# Patient Record
Sex: FEMALE | Race: WHITE | NOT HISPANIC OR LATINO | Employment: OTHER | URBAN - METROPOLITAN AREA
[De-identification: names, ages, dates, MRNs, and addresses within clinical notes are randomized per-mention and may not be internally consistent; named-entity substitution may affect disease eponyms.]

---

## 2017-01-02 ENCOUNTER — NURSE TRIAGE (OUTPATIENT)
Dept: ADMINISTRATIVE | Facility: CLINIC | Age: 72
End: 2017-01-02

## 2017-01-02 NOTE — TELEPHONE ENCOUNTER
"  Reason for Disposition   [1] MILD or MODERATE vomiting AND [2] present > 48 hours (2 days) (Exception: mild vomiting with associated diarrhea)    Answer Assessment - Initial Assessment Questions  1. VOMITING SEVERITY: "How many times have you vomited in the past 24 hours?"      - MILD:  1 - 2 times/day     - MODERATE: 3 - 5 times/day, decreased oral intake without significant weight loss or symptoms of dehydration     - SEVERE: 6 or more times/day, vomits everything or nearly everything, with significant weight loss, symptoms of dehydration       Mild x 2 today  2. ONSET: "When did the vomiting begin?"       Has had nausea for several days   3. FLUIDS: "What fluids or food have you vomited up today?" "Have you been able to keep any fluids down?"      Has been drinking tea today  4. ABDOMINAL PAIN: "Are your having any abdominal pain?" If yes : "How bad is it and what does it feel like?" (e.g., crampy, dull, intermittent, constant)       Intermittent right side abdominal pain  5. DIARRHEA: "Is there any diarrhea?" If so, ask: "How many times today?"       X 4 small amounts today- has taken an otc pill for diarrhea and thinks it is better now  6. CONTACTS: "Is there anyone else in the family with the same symptoms?"       no  7. CAUSE: "What do you think is causing your vomiting?"      Thinks it is related to medicine she was given in ER. Seen 12/28 and 12/30  8. HYDRATION STATUS: "Any signs of dehydration?" (e.g., dry mouth [not only dry lips], too weak to stand) "When did you last urinate?"      Lips dry- has been urinating today, smaller amounts but clear  9. OTHER SYMPTOMS: "Do you have any other symptoms?" (e.g., fever, headache, vertigo, vomiting blood or coffee grounds)      Headache she has had when seen in ER  10. PREGNANCY: "Is there any chance you are pregnant?" "When was your last menstrual period?"        n/a    Protocols used: ST VOMITING-A-    "

## 2017-01-03 ENCOUNTER — TELEPHONE (OUTPATIENT)
Dept: INTERNAL MEDICINE | Facility: CLINIC | Age: 72
End: 2017-01-03

## 2017-01-03 NOTE — TELEPHONE ENCOUNTER
----- Message from Belén Pyle sent at 1/3/2017  2:29 PM CST -----  Contact: self [phone]  RX request - refill or new RX.  Is this a refill or new RX:  New   RX name and strength: diphenhydramine atropine 2.5 mg  Directions: Take 1 tablet by mouth 4 times a day   Is this a 30 day or 90 day RX:  30 day  Pharmacy name and phone #: [email or web address]  [phone] (Phone)  [phone] (Fax)  Comments:

## 2017-01-10 ENCOUNTER — TELEPHONE (OUTPATIENT)
Dept: INTERNAL MEDICINE | Facility: CLINIC | Age: 72
End: 2017-01-10

## 2017-01-10 NOTE — TELEPHONE ENCOUNTER
Spoke to pt and was able to get her in on 1/30/17 for a f/u.  patient stated that she has had diarrhea since 12/28 and had a few pill left from last year of imodium and would like a rx sent to pharmacy. I suggest she come in and see some on to address the diarrhea she refused appt stated that she was tired and she just want to sleep

## 2017-01-10 NOTE — TELEPHONE ENCOUNTER
Please informed patient that Imodium is available over-the-counter.  How many stools is patient having a day?  Is she having diarrhea daily?

## 2017-01-10 NOTE — TELEPHONE ENCOUNTER
----- Message from Aden Panchal sent at 1/9/2017  2:43 PM CST -----  Contact: Self [phone]  Type: Sooner appointment than  is able to schedule    When is the first available appointment? 02/20/2017    What is the nature of the appointment? ED f/u for Seizures and med refill    What appointment type: EP    Comments:Please advice    Thanks

## 2017-01-20 ENCOUNTER — OFFICE VISIT (OUTPATIENT)
Dept: PHYSICAL MEDICINE AND REHAB | Facility: CLINIC | Age: 72
End: 2017-01-20
Payer: MEDICARE

## 2017-01-20 ENCOUNTER — HOSPITAL ENCOUNTER (OUTPATIENT)
Dept: RADIOLOGY | Facility: HOSPITAL | Age: 72
Discharge: HOME | End: 2017-01-20
Attending: PHYSICAL MEDICINE & REHABILITATION
Payer: MEDICARE

## 2017-01-20 VITALS
DIASTOLIC BLOOD PRESSURE: 111 MMHG | HEART RATE: 78 BPM | WEIGHT: 186.06 LBS | BODY MASS INDEX: 31.77 KG/M2 | SYSTOLIC BLOOD PRESSURE: 178 MMHG | HEIGHT: 64 IN

## 2017-01-20 DIAGNOSIS — S13.4XXS WHIPLASH INJURY TO NECK, SEQUELA: ICD-10-CM

## 2017-01-20 DIAGNOSIS — M54.12 RADICULOPATHY OF CERVICAL SPINE: ICD-10-CM

## 2017-01-20 DIAGNOSIS — G89.29 CHRONIC BILATERAL LOW BACK PAIN WITHOUT SCIATICA: ICD-10-CM

## 2017-01-20 DIAGNOSIS — R20.2 NUMBNESS AND TINGLING: ICD-10-CM

## 2017-01-20 DIAGNOSIS — M54.2 NECK PAIN: ICD-10-CM

## 2017-01-20 DIAGNOSIS — R20.0 NUMBNESS AND TINGLING: ICD-10-CM

## 2017-01-20 DIAGNOSIS — R56.9 SEIZURE: Primary | ICD-10-CM

## 2017-01-20 DIAGNOSIS — R20.0 LEFT FACIAL NUMBNESS: ICD-10-CM

## 2017-01-20 DIAGNOSIS — M54.50 CHRONIC BILATERAL LOW BACK PAIN WITHOUT SCIATICA: ICD-10-CM

## 2017-01-20 DIAGNOSIS — M47.22 OSTEOARTHRITIS OF SPINE WITH RADICULOPATHY, CERVICAL REGION: ICD-10-CM

## 2017-01-20 PROBLEM — S13.4XXA WHIPLASH INJURY TO NECK: Status: ACTIVE | Noted: 2017-01-20

## 2017-01-20 PROCEDURE — 99499 UNLISTED E&M SERVICE: CPT | Mod: S$GLB,,, | Performed by: PHYSICAL MEDICINE & REHABILITATION

## 2017-01-20 PROCEDURE — 1157F ADVNC CARE PLAN IN RCRD: CPT | Mod: S$GLB,,, | Performed by: PHYSICAL MEDICINE & REHABILITATION

## 2017-01-20 PROCEDURE — 99999 PR PBB SHADOW E&M-EST. PATIENT-LVL III: CPT | Mod: PBBFAC,,, | Performed by: PHYSICAL MEDICINE & REHABILITATION

## 2017-01-20 PROCEDURE — 3080F DIAST BP >= 90 MM HG: CPT | Mod: S$GLB,,, | Performed by: PHYSICAL MEDICINE & REHABILITATION

## 2017-01-20 PROCEDURE — 1159F MED LIST DOCD IN RCRD: CPT | Mod: S$GLB,,, | Performed by: PHYSICAL MEDICINE & REHABILITATION

## 2017-01-20 PROCEDURE — 72114 X-RAY EXAM L-S SPINE BENDING: CPT | Mod: TC

## 2017-01-20 PROCEDURE — 72114 X-RAY EXAM L-S SPINE BENDING: CPT | Mod: 26,,, | Performed by: RADIOLOGY

## 2017-01-20 PROCEDURE — 1160F RVW MEDS BY RX/DR IN RCRD: CPT | Mod: S$GLB,,, | Performed by: PHYSICAL MEDICINE & REHABILITATION

## 2017-01-20 PROCEDURE — 3077F SYST BP >= 140 MM HG: CPT | Mod: S$GLB,,, | Performed by: PHYSICAL MEDICINE & REHABILITATION

## 2017-01-20 PROCEDURE — 99214 OFFICE O/P EST MOD 30 MIN: CPT | Mod: S$GLB,,, | Performed by: PHYSICAL MEDICINE & REHABILITATION

## 2017-01-20 PROCEDURE — 1125F AMNT PAIN NOTED PAIN PRSNT: CPT | Mod: S$GLB,,, | Performed by: PHYSICAL MEDICINE & REHABILITATION
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
## 2017-01-20 NOTE — LETTER
January 23, 2017      Luanne Connell MD  [address]  [place] [address]           [facility]  [address]  [facility] [address]  Phone: [phone]          Patient: Sofia Augustine   MR Number: [id number]   YOB: 1945   Date of Visit: 1/20/2017       Dear Dr. Luanne Connell:    Thank you for referring Sofia Augustine to me for evaluation. Attached you will find relevant portions of my assessment and plan of care.    If you have questions, please do not hesitate to call me. I look forward to following Sofia Augustine along with you.    Sincerely,    Kym Mckeon MD    Enclosure  CC:  No Recipients    If you would like to receive this communication electronically, please contact [email or web address] or [phone] to request more information on [facility] [employer] Link access.    For providers and/or their staff who would like to refer a patient to [facility], please contact us through our one-stop-shop provider referral line, [facility], at [phone].    If you feel you have received this communication in error or would no longer like to receive these types of communications, please e-mail [email or web address]
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
## 2017-01-28 ENCOUNTER — NURSE TRIAGE (OUTPATIENT)
Dept: ADMINISTRATIVE | Facility: CLINIC | Age: 72
End: 2017-01-28

## 2017-01-28 ENCOUNTER — HOSPITAL ENCOUNTER (EMERGENCY)
Facility: HOSPITAL | Age: 72
Discharge: HOME | End: 2017-01-28
Attending: EMERGENCY MEDICINE
Payer: MEDICARE

## 2017-01-28 VITALS
OXYGEN SATURATION: 96 % | HEIGHT: 64 IN | HEART RATE: 71 BPM | DIASTOLIC BLOOD PRESSURE: 77 MMHG | BODY MASS INDEX: 31.58 KG/M2 | SYSTOLIC BLOOD PRESSURE: 158 MMHG | TEMPERATURE: 98 F | WEIGHT: 185 LBS | RESPIRATION RATE: 18 BRPM

## 2017-01-28 DIAGNOSIS — S29.011A CHEST WALL MUSCLE STRAIN, INITIAL ENCOUNTER: Primary | ICD-10-CM

## 2017-01-28 LAB
ALBUMIN SERPL BCP-MCNC: 3.9 G/DL
ALP SERPL-CCNC: 105 U/L
ALT SERPL W/O P-5'-P-CCNC: 13 U/L
ANION GAP SERPL CALC-SCNC: 8 MMOL/L
APTT BLDCRRT: 25.5 SEC
AST SERPL-CCNC: 15 U/L
BASOPHILS # BLD AUTO: 0.02 K/UL
BASOPHILS NFR BLD: 0.5 %
BILIRUB SERPL-MCNC: 0.4 MG/DL
BNP SERPL-MCNC: 33 PG/ML
BUN SERPL-MCNC: 14 MG/DL
CALCIUM SERPL-MCNC: 9 MG/DL
CHLORIDE SERPL-SCNC: 105 MMOL/L
CO2 SERPL-SCNC: 27 MMOL/L
CREAT SERPL-MCNC: 0.9 MG/DL
DIFFERENTIAL METHOD: NORMAL
EOSINOPHIL # BLD AUTO: 0.1 K/UL
EOSINOPHIL NFR BLD: 3.4 %
ERYTHROCYTE [DISTWIDTH] IN BLOOD BY AUTOMATED COUNT: 13.1 %
EST. GFR  (AFRICAN AMERICAN): >60 ML/MIN/1.73 M^2
EST. GFR  (NON AFRICAN AMERICAN): >60 ML/MIN/1.73 M^2
GLUCOSE SERPL-MCNC: 101 MG/DL
HCT VFR BLD AUTO: 40.5 %
HGB BLD-MCNC: 13.6 G/DL
INR PPP: 1.1
LYMPHOCYTES # BLD AUTO: 1.4 K/UL
LYMPHOCYTES NFR BLD: 34.5 %
MCH RBC QN AUTO: 30.8 PG
MCHC RBC AUTO-ENTMCNC: 33.6 %
MCV RBC AUTO: 92 FL
MONOCYTES # BLD AUTO: 0.5 K/UL
MONOCYTES NFR BLD: 11.5 %
NEUTROPHILS # BLD AUTO: 2.1 K/UL
NEUTROPHILS NFR BLD: 50.1 %
PLATELET # BLD AUTO: 190 K/UL
PMV BLD AUTO: 9.7 FL
POTASSIUM SERPL-SCNC: 4.1 MMOL/L
PROT SERPL-MCNC: 7 G/DL
PROTHROMBIN TIME: 11.3 SEC
RBC # BLD AUTO: 4.42 M/UL
SODIUM SERPL-SCNC: 140 MMOL/L
TROPONIN I SERPL DL<=0.01 NG/ML-MCNC: 0.06 NG/ML
TROPONIN I SERPL DL<=0.01 NG/ML-MCNC: 0.06 NG/ML
WBC # BLD AUTO: 4.17 K/UL

## 2017-01-28 PROCEDURE — 83880 ASSAY OF NATRIURETIC PEPTIDE: CPT

## 2017-01-28 PROCEDURE — 85730 THROMBOPLASTIN TIME PARTIAL: CPT

## 2017-01-28 PROCEDURE — 85025 COMPLETE CBC W/AUTO DIFF WBC: CPT

## 2017-01-28 PROCEDURE — 93005 ELECTROCARDIOGRAM TRACING: CPT

## 2017-01-28 PROCEDURE — 85610 PROTHROMBIN TIME: CPT

## 2017-01-28 PROCEDURE — 99284 EMERGENCY DEPT VISIT MOD MDM: CPT | Mod: 25

## 2017-01-28 PROCEDURE — 80053 COMPREHEN METABOLIC PANEL: CPT

## 2017-01-28 PROCEDURE — 96374 THER/PROPH/DIAG INJ IV PUSH: CPT

## 2017-01-28 PROCEDURE — 63600175 PHARM REV CODE 636 W HCPCS: Performed by: EMERGENCY MEDICINE

## 2017-01-28 PROCEDURE — 84484 ASSAY OF TROPONIN QUANT: CPT | Mod: 91

## 2017-01-28 RX ORDER — METHOCARBAMOL 500 MG/1
1000 TABLET, FILM COATED ORAL 3 TIMES DAILY
Qty: 30 TABLET | Refills: 0 | Status: SHIPPED | OUTPATIENT
Start: 2017-01-28 | End: 2017-02-02

## 2017-01-28 RX ORDER — KETOROLAC TROMETHAMINE 30 MG/ML
15 INJECTION, SOLUTION INTRAMUSCULAR; INTRAVENOUS
Status: COMPLETED | OUTPATIENT
Start: 2017-01-28 | End: 2017-01-28

## 2017-01-28 RX ORDER — DIAZEPAM 10 MG/2ML
2 INJECTION INTRAMUSCULAR
Status: DISCONTINUED | OUTPATIENT
Start: 2017-01-28 | End: 2017-01-28

## 2017-01-28 RX ADMIN — KETOROLAC TROMETHAMINE 15 MG: 30 INJECTION, SOLUTION INTRAMUSCULAR at 05:01

## 2017-01-28 NOTE — ED AVS SNAPSHOT
[facility] [address]  [place] [address]               Sofia Augustine   2017  3:36 PM   ED    Description:  Female : 1945   Department:  [facility]           Your Care was Coordinated By:     Provider Role From To    Sugey Venegas MD Attending Provider [id number] --      Reason for Visit     Chest Pain           Diagnoses this Visit        Comments    Chest wall muscle strain, initial encounter    -  Primary       ED Disposition     None           To Do List           Follow-up Information     Follow up with Luanne Connell MD In 2 days.    Specialty:  Internal Medicine    Contact information:     [facility]  [place] [address]  [phone]         These Medications        Disp Refills Start End    methocarbamol (ROBAXIN) 500 MG Tab 30 tablet 0 2017    Take 2 tablets (1,000 mg total) by mouth 3 (three) times daily. - Oral    Pharmacy: [facility] [address]  [place] - [address] AT [facility] OF [facility] [address] Ph #: [phone]         [facility]     [facility] -  Assistance  Registered nurses in the [facility] provide clinical advisement, health education, appointment booking, and other advisory services.  Call for this free service at [phone].             Medications           Message regarding Medications     Verify the changes and/or additions to your medication regime listed below are the same as discussed with your clinician today.  If any of these changes or additions are incorrect, please notify your healthcare provider.        START taking these NEW medications        Refills    methocarbamol (ROBAXIN) 500 MG Tab 0    Sig: Take 2 tablets (1,000 mg total) by mouth 3 (three) times daily.    Class: Print    Route: Oral      These medications were administered today        Dose Freq    ketorolac injection 15 mg 15 mg ED  "1 Time    Sig: Inject 15 mg into the vein ED 1 Time.    Class: Normal    Route: Intravenous           Verify that the below list of medications is an accurate representation of the medications you are currently taking.  If none reported, the list may be blank. If incorrect, please contact your healthcare provider. Carry this list with you in case of emergency.           Current Medications     aspirin 81 MG Chew Take 81 mg by mouth once daily.    atorvastatin (LIPITOR) 40 MG tablet Take 1 tablet (40 mg total) by mouth once daily.    carbamazepine (TEGRETOL) 200 mg tablet Take 200 mg nightly for one week then take 200 mg TWICE DAILY thereafter    chlorthalidone (HYGROTEN) 25 MG Tab Take 1 tablet (25 mg total) by mouth once daily.    gabapentin (NEURONTIN) 300 MG capsule Take one cap at bedtime for one week, than increase to 2 caps at bedtime at bedtime, if tolerated.  Do not stop abruptly.    hydrocodone-acetaminophen 5-325mg (NORCO) 5-325 mg per tablet Take 1 tablet by mouth every 8 (eight) hours as needed for Pain.    levocetirizine (XYZAL) 5 MG tablet Take 1 tablet (5 mg total) by mouth every evening.    lidocaine (LIDODERM) 5 % Apply patch to affected area for 12 hours, then remove for 12 hours.    lisinopril 10 MG tablet Take 1 tablet (10 mg total) by mouth once daily.    meloxicam (MOBIC) 15 MG tablet TAKE 1 TABLET(15 MG) BY MOUTH EVERY DAY    methocarbamol (ROBAXIN) 500 MG Tab Take 2 tablets (1,000 mg total) by mouth 3 (three) times daily.    ondansetron (ZOFRAN-ODT) 4 MG TbDL Take 1 tablet (4 mg total) by mouth every 8 (eight) hours as needed.    promethazine (PHENERGAN) 25 MG tablet Take 1 tablet (25 mg total) by mouth every 6 (six) hours as needed for Nausea.           Clinical Reference Information           Your Vitals Were     BP Pulse Temp Resp Height Weight    148/83 66 98.2 °F (36.8 °C) (Oral) 13 5' 4" (1.626 m) 83.9 kg (185 lb)    SpO2 BMI             98% 31.76 kg/m2         Allergies as of 1/28/2017 "     No Known Allergies      Immunizations Administered on Date of Encounter - 1/28/2017     None      ED Micro, Lab, POCT     Start Ordered       Status Ordering Provider    01/28/17 1538 01/28/17 1538  CBC auto differential  STAT      Final result     01/28/17 1538 01/28/17 1538  Comprehensive metabolic panel  STAT      Final result     01/28/17 1538 01/28/17 1538  Protime-INR  STAT      Final result     01/28/17 1538 01/28/17 1538  Troponin I  Now then every 3 hours     Comments:  PLEASE REVIEW ORDER START TIME BEFORE MARKING SPECIMEN COLLECTED.   Start Status   01/28/17 1538 Final result   01/28/17 1838 In process       Acknowledged     01/28/17 1538 01/28/17 1538  B-Type natriuretic peptide (BNP)  STAT      Final result     01/28/17 1538 01/28/17 1538  APTT  STAT      Final result     01/28/17 1538 01/28/17 1538    STAT,   Status:  Canceled      Canceled       ED Imaging Orders     Start Ordered       Status Ordering Provider    01/28/17 1538 01/28/17 1538  X-Ray Chest PA And Lateral  1 time imaging      Final result         Discharge Instructions         FOLLOW UP WITH PRIMARY CARE ON Monday  RETURN TO ED IF SYMPTOMS WORSEN    Chest Strain    You have a chest strain. This happens when the muscles between the ribs stretch and tear. This may occur when you have a severe cough. It may also happen after strenuous lifting or twisting injuries of the upper back.  A chest strain usually causes pain when you move or take a deep breath. The strain may take a few days to a few weeks to heal.  Home care  Follow these guidelines when caring for yourself at home:  · Rest. Dont do any heavy lifting or strenuous activity. Dont do any activity that causes pain.  · If you have a severe cough, use a cough syrup with dextromethorphan, unless another cough medicine was prescribed. If you have high blood pressure, check with your health care provider or pharmacist before using an over-the-counter cough medicine.  · You may use  acetaminophen or ibuprofen to control pain, unless another medicine was prescribed. If you have chronic liver or kidney disease, talk with your provider before using these medicines. Also talk with your provider if youve had a stomach ulcer or GI bleeding.  Follow-up care  Follow up with your health care provider, or as advised.  When to seek medical advice  Call your health care provider right away if any of these occur:  · A change in the type of pain. This means if it feels different, gets worse, lasts longer, or begins to spread into your shoulder, arm, neck, jaw, or back.  · Pain doesnt go away in 1 week  · Shortness of breath, difficulty breathing, or fast breathing  · Pain gets worse when you breathe  · Cough with dark-colored sputum (phlegm) or blood  · Weakness, dizziness, or fainting  · Fever of 101ºF (38.3ºC) or higher, or as directed by your health care provider   © 0042-6315 "[employer]". [address] [place] [address]. All rights reserved. This information is not intended as a substitute for professional medical care. Always follow your healthcare professional's instructions.          Your Scheduled Appointments     Jan 30, 2017 11:00 AM [facility]   Hospital Follow Up with Luanne Connell MD   [facility] ([place])    [address] [place] [address]   [phone]            Mar 07, 2017  9:40 AM CST   Consult with MD Jim Torres - Neurology ([facility] )    [address]  [place] [address] [phone]            Mar 21, 2017  9:00 AM CDT   Established Patient Visit with MD Jim Page [facility]-Physical Med & Rehab ([facility] )    [address]  [place] [address] [phone]              [facility] Sign-Up     Activating your [facility] account is as easy as 1-2-3!     1) Visit [email or web address], select Sign Up Now, enter this activation code and your date of birth, then select  Next.  [email or web address]  Expires: 3/14/2017  6:51 PM      2) Create a username and password to use when you visit [facility] in the future and select a security question in case you lose your password and select Next.    3) Enter your e-mail address and click Sign Up!    Additional Information  If you have questions, please e-mail [employer] [email or web address] or call [phone] to talk to our [employer] staff. Remember, [facility] is NOT to be used for urgent needs. For medical emergencies, dial 911.          [facility] complies with applicable Federal civil rights laws and does not discriminate on the basis of race, color, national origin, age, disability, or sex.        Language Assistance Services     ATTENTION: Language assistance services are available, free of charge. Please call [phone].      ATENCIÓN: Si habla ruddyañol, tiene a rodriguez disposición servicios gratuitos de asistencia lingüística. Llame al [phone].     DOMONIQUE Ý: N?u b?n nói Ti?ng Vi?t, có các d?ch v? h? tr? ngôn ng? mi?n phí dành cho b?n. G?i s? [phone].

## 2017-01-28 NOTE — ED PROVIDER NOTES
Encounter Date: 1/28/2017       History     Chief Complaint   Patient presents with    Chest Pain     radiates to the back since Friday morning. +nausea, lightheadedness     Review of patient's allergies indicates:  No Known Allergies  HPI Comments: 72 Y/O WF PRESENTS TO ED WITH RIGHT SIDED CHEST PAIN THAT HAS BEEN ONGOING SINCE Friday NIGHT.  SHE REPORTS THAT SHE HAD A SEIZURE AT HOME AND MUST HAVE HIT THE RIGHT SIDE OF HER CHEST WHEN SHE DID.  PAIN IS REPRODUCED WITH PALPATION AND WORSENED WITH MOVEMENT.  NO SOB.  NO N/V/DIAPHORESIS.  NO FEVER/CHILLS.  NO COUGH.  NO CHEST PRESSURE.  NO RELIEVING FACTORS.  NO NEW MEDS.      The history is provided by the patient. No  was used.     Past Medical History   Diagnosis Date    Abnormal mammogram of both breasts     Hypertension     Memory disorder     Seizures      Past Medical History Pertinent Negatives   Diagnosis Date Noted    Anticoagulant long-term use 10/12/2016    Asthma 10/12/2016    CHF (congestive heart failure) 10/12/2016    COPD (chronic obstructive pulmonary disease) 10/12/2016    Coronary artery disease 10/12/2016    Diabetes mellitus 10/26/2015    Encounter for blood transfusion 10/12/2016     Past Surgical History   Procedure Laterality Date    Cataract extraction      Cysts breast       History reviewed. No pertinent family history.  Social History   Substance Use Topics    Smoking status: Never Smoker    Smokeless tobacco: Never Used    Alcohol use No     Review of Systems   Constitutional: Negative for chills, diaphoresis and fever.   HENT: Negative for congestion and facial swelling.    Eyes: Negative for pain and redness.   Respiratory: Negative for cough, chest tightness and shortness of breath.    Cardiovascular: Positive for chest pain. Negative for palpitations and leg swelling.   Gastrointestinal: Negative for abdominal pain, diarrhea, nausea and vomiting.   Endocrine: Negative for polydipsia and polyphagia.    Genitourinary: Negative for difficulty urinating, dysuria and flank pain.   Musculoskeletal: Negative for back pain and neck pain.        RIGHT LATERAL CHEST PAIN   Skin: Negative for pallor and rash.   Allergic/Immunologic: Negative for immunocompromised state.   Neurological: Positive for seizures. Negative for dizziness, syncope, weakness, numbness and headaches.        SEIZURE Friday NIGHT   Hematological: Does not bruise/bleed easily.   Psychiatric/Behavioral: Negative for agitation and confusion. The patient is nervous/anxious.    All other systems reviewed and are negative.      Physical Exam   Initial Vitals   BP Pulse Resp Temp SpO2   01/28/17 1533 01/28/17 1533 01/28/17 1533 01/28/17 1533 01/28/17 1533   195/95 76 18 97.8 °F (36.6 °C) 97 %     Physical Exam    Nursing note and vitals reviewed.  Constitutional: She appears well-developed and well-nourished. She is not diaphoretic. No distress.   HENT:   Head: Normocephalic and atraumatic.   Mouth/Throat: Oropharynx is clear and moist.   Eyes: Conjunctivae and EOM are normal. No scleral icterus.   Neck: Normal range of motion. Neck supple.   Cardiovascular: Normal rate, regular rhythm and normal heart sounds. Exam reveals no gallop and no friction rub.    No murmur heard.  Pulmonary/Chest: Breath sounds normal. No respiratory distress. She has no wheezes. She has no rhonchi. She has no rales. She exhibits tenderness.       Abdominal: Soft. Bowel sounds are normal. She exhibits no distension. There is no tenderness. There is no rebound and no guarding.   Musculoskeletal: Normal range of motion. She exhibits tenderness. She exhibits no edema.   Lymphadenopathy:     She has no cervical adenopathy.   Neurological: She is alert and oriented to person, place, and time.   Skin: Skin is warm and dry. No rash noted. No erythema.   Psychiatric: She has a normal mood and affect. Her behavior is normal. Judgment and thought content normal.         ED Course    Procedures  Labs Reviewed   TROPONIN I - Abnormal; Notable for the following:        Result Value    Troponin I 0.064 (*)     All other components within normal limits    Narrative:     PLEASE REVIEW ORDER START TIME BEFORE MARKING SPECIMEN  COLLECTED.   CBC W/ AUTO DIFFERENTIAL    Narrative:     PLEASE REVIEW ORDER START TIME BEFORE MARKING SPECIMEN  COLLECTED.   COMPREHENSIVE METABOLIC PANEL    Narrative:     PLEASE REVIEW ORDER START TIME BEFORE MARKING SPECIMEN  COLLECTED.   PROTIME-INR    Narrative:     PLEASE REVIEW ORDER START TIME BEFORE MARKING SPECIMEN  COLLECTED.   B-TYPE NATRIURETIC PEPTIDE    Narrative:     PLEASE REVIEW ORDER START TIME BEFORE MARKING SPECIMEN  COLLECTED.   APTT    Narrative:     PLEASE REVIEW ORDER START TIME BEFORE MARKING SPECIMEN  COLLECTED.   TROPONIN I     EKG Readings: (Independently Interpreted)   Initial Reading: No STEMI. Heart Rate: 74.       X-Rays:   Independently Interpreted Readings:   Chest X-Ray: Normal heart size.  No infiltrates.  No acute abnormalities.     Medical Decision Making:   Initial Assessment:   72 Y/O WF WITH RIGHT LATERAL CHEST PAIN THAT BEGAN AFTER HAVING A SEIZURE THIS MORNING AT 0200.  NO ASSOCIATED CHEST PRESSURE OR SOB.  NO COUGH/WHEEZING.  NO BACK OR NECK PAIN.      ON PE, + TOTALLY REPRODUCIBLE RIGHT LATERAL RIB PAIN ON EXAM.  LUNGS CTAB.   Differential Diagnosis:   DDX:  RIB FRACTURE, MUSCLE STRAIN, RIB CONTUSION, ACS  Independently Interpreted Test(s):   I have ordered and independently interpreted X-rays - see prior notes.  I have ordered and independently interpreted EKG Reading(s) - see prior notes  Clinical Tests:   Lab Tests: Ordered and Reviewed  The following lab test(s) were unremarkable: CBC and CMP       <> Summary of Lab: TROP ELEVATED  Radiological Study: Ordered and Reviewed  Medical Tests: Ordered and Reviewed  ED Management:  TX:  IV TORADOL  CXR NEG  TROP ELEVATED.  WILL REPEAT TROP IN 3 HOURS TO ENSURE IT IS NOT TRENDING UP.   PT SYMPTOMS BEGAN AFTER A SEIZURE.  PT REPEAT TROP IS NEG.  SHE IS D/C HOME TO F/U WITH PCP     Pt counseled on their diagnosis and the importance of following up with PCP.  Pt also cautioned on when to return to ED.  Pt verbalizes understanding of discharge plan and will return to ED immediately if symptoms worsen                     ED Course     Clinical Impression:   The encounter diagnosis was Chest wall muscle strain, initial encounter.    Disposition:   Disposition: Discharged  Condition: Stable       Sugey Venegas MD  01/31/17 7460

## 2017-01-28 NOTE — TELEPHONE ENCOUNTER
Reason for Disposition   [1] Pain in the upper back over the ribs (rib cage) AND [2] worsened by coughing (or clearly increases with breathing)   [1] Chest pain lasts > 5 minutes AND [2] age > 50    Protocols used: ST BACK PAIN-A-, ST CHEST PAIN-A-AH    Patient had a seizure yesterday morning and afterwords she said she felt stiff. Today she is calling because she says she started having pain on the right side of her body from her back radiating to her ribs on anterior side of her chest. She states the pain is a 7/10 burning pain not relieved by taking pain medication norco. She states at times she feels like she may pass out when she stands up. She also reports that she had felt disoriented at times. Advised to call 911 to have an ambulance bring her into ED.

## 2017-01-28 NOTE — ED TRIAGE NOTES
Pt came into ED states she had a seizure on Friday and she started having chest pain that started 6hrs ago

## 2017-01-29 ENCOUNTER — HOSPITAL ENCOUNTER (EMERGENCY)
Facility: HOSPITAL | Age: 72
Discharge: HOME | End: 2017-01-29
Attending: FAMILY MEDICINE
Payer: MEDICARE

## 2017-01-29 ENCOUNTER — NURSE TRIAGE (OUTPATIENT)
Dept: ADMINISTRATIVE | Facility: CLINIC | Age: 72
End: 2017-01-29

## 2017-01-29 VITALS
RESPIRATION RATE: 18 BRPM | DIASTOLIC BLOOD PRESSURE: 67 MMHG | OXYGEN SATURATION: 97 % | HEIGHT: 64 IN | SYSTOLIC BLOOD PRESSURE: 153 MMHG | TEMPERATURE: 98 F | BODY MASS INDEX: 31.58 KG/M2 | HEART RATE: 80 BPM | WEIGHT: 185 LBS

## 2017-01-29 DIAGNOSIS — S29.011D MUSCLE STRAIN OF CHEST WALL, SUBSEQUENT ENCOUNTER: Primary | ICD-10-CM

## 2017-01-29 PROCEDURE — 63600175 PHARM REV CODE 636 W HCPCS

## 2017-01-29 PROCEDURE — 99284 EMERGENCY DEPT VISIT MOD MDM: CPT | Mod: ,,,

## 2017-01-29 PROCEDURE — 99283 EMERGENCY DEPT VISIT LOW MDM: CPT | Mod: 25

## 2017-01-29 PROCEDURE — 96372 THER/PROPH/DIAG INJ SC/IM: CPT

## 2017-01-29 RX ORDER — KETOROLAC TROMETHAMINE 30 MG/ML
10 INJECTION, SOLUTION INTRAMUSCULAR; INTRAVENOUS
Status: COMPLETED | OUTPATIENT
Start: 2017-01-29 | End: 2017-01-29

## 2017-01-29 RX ORDER — NAPROXEN 500 MG/1
500 TABLET ORAL 2 TIMES DAILY WITH MEALS
Qty: 10 TABLET | Refills: 0 | Status: SHIPPED | OUTPATIENT
Start: 2017-01-29 | End: 2017-02-03

## 2017-01-29 RX ADMIN — KETOROLAC TROMETHAMINE 10 MG: 30 INJECTION, SOLUTION INTRAMUSCULAR at 04:01

## 2017-01-29 NOTE — DISCHARGE INSTRUCTIONS
FOLLOW UP WITH PRIMARY CARE ON Monday  RETURN TO ED IF SYMPTOMS WORSEN    Chest Strain    You have a chest strain. This happens when the muscles between the ribs stretch and tear. This may occur when you have a severe cough. It may also happen after strenuous lifting or twisting injuries of the upper back.  A chest strain usually causes pain when you move or take a deep breath. The strain may take a few days to a few weeks to heal.  Home care  Follow these guidelines when caring for yourself at home:  · Rest. Dont do any heavy lifting or strenuous activity. Dont do any activity that causes pain.  · If you have a severe cough, use a cough syrup with dextromethorphan, unless another cough medicine was prescribed. If you have high blood pressure, check with your health care provider or pharmacist before using an over-the-counter cough medicine.  · You may use acetaminophen or ibuprofen to control pain, unless another medicine was prescribed. If you have chronic liver or kidney disease, talk with your provider before using these medicines. Also talk with your provider if youve had a stomach ulcer or GI bleeding.  Follow-up care  Follow up with your health care provider, or as advised.  When to seek medical advice  Call your health care provider right away if any of these occur:  · A change in the type of pain. This means if it feels different, gets worse, lasts longer, or begins to spread into your shoulder, arm, neck, jaw, or back.  · Pain doesnt go away in 1 week  · Shortness of breath, difficulty breathing, or fast breathing  · Pain gets worse when you breathe  · Cough with dark-colored sputum (phlegm) or blood  · Weakness, dizziness, or fainting  · Fever of 101ºF (38.3ºC) or higher, or as directed by your health care provider   © 8655-2311 [employer]. [address] [place] [address], [place] [address]. All rights reserved. This information is not intended as a substitute for professional medical care.  Always follow your healthcare professional's instructions.

## 2017-01-29 NOTE — ED PROVIDER NOTES
"Encounter Date: 1/29/2017       History     Chief Complaint   Patient presents with    Back Pain     seen at [facility] with seizure, today rib pain  and back pain, didn't give me any pain pills     Review of patient's allergies indicates:  No Known Allergies  HPI Comments: 72yo WF with medical history of HTN and seizures presents to ED with complaint of muscle strain in right rib region. Pain is secondary to "possible fall". Patient states "I think I had a seizure two nights ago and fell out of my bed but woke in the bed." Denies hitting head. Patient seen at [facility] ER yesterday, treated for muscle strain and discharged home with robaxin. Xrays at [facility] were negative for a fracture and infectious process. Patient has hydrocodone at home and states pain is 7/10 with hydrocodone and robaxin. Patient called PCP nurse this am and told to come to ED with pain management. Patient denies fever, chills, nausea, vomit, diaphoresis, chest pain, shortness of breath, syncope, changes in bowel, changes in urine, changes in vision, paresthesias, back pain, headache, rash,     The history is provided by the patient.     Past Medical History   Diagnosis Date    Abnormal mammogram of both breasts     Hypertension     Memory disorder     Seizures      Past Medical History Pertinent Negatives   Diagnosis Date Noted    Anticoagulant long-term use 10/12/2016    Asthma 10/12/2016    CHF (congestive heart failure) 10/12/2016    COPD (chronic obstructive pulmonary disease) 10/12/2016    Coronary artery disease 10/12/2016    Diabetes mellitus 10/26/2015    Encounter for blood transfusion 10/12/2016     Past Surgical History   Procedure Laterality Date    Cataract extraction      Cysts breast       No family history on file.  Social History   Substance Use Topics    Smoking status: Never Smoker    Smokeless tobacco: Never Used    Alcohol use No     Review of Systems   Constitutional: Negative for activity change, chills, " diaphoresis, fatigue and fever.   HENT: Negative for congestion, ear discharge, ear pain, hearing loss and sore throat.    Eyes: Negative for visual disturbance.   Respiratory: Negative for cough, chest tightness, shortness of breath and wheezing.    Cardiovascular: Negative for chest pain, palpitations and leg swelling.   Gastrointestinal: Negative for abdominal pain, constipation, diarrhea, nausea and vomiting.   Genitourinary: Negative for dysuria, flank pain and hematuria.   Musculoskeletal: Negative for back pain, gait problem, neck pain and neck stiffness.   Skin: Negative for rash.   Neurological: Negative for dizziness, syncope, weakness, light-headedness and headaches.   Psychiatric/Behavioral: Negative for sleep disturbance.       Physical Exam   Initial Vitals   BP Pulse Resp Temp SpO2   01/29/17 1544 01/29/17 1544 01/29/17 1544 01/29/17 1544 01/29/17 1544   153/67 80 18 98 °F (36.7 °C) 97 %     Physical Exam    Vitals reviewed.  Constitutional: Vital signs are normal. She appears well-developed.   HENT:   Head: Normocephalic and atraumatic.   Nose: Nose normal.   Eyes: Conjunctivae and lids are normal. Pupils are equal, round, and reactive to light. Lids are everted and swept, no foreign bodies found.   Neck: Trachea normal and normal range of motion. Neck supple. No thyromegaly present.   Cardiovascular: Normal rate, regular rhythm, normal heart sounds, intact distal pulses and normal pulses.   Pulmonary/Chest: Breath sounds normal. She has no wheezes. She has no rhonchi. She has no rales.   Abdominal: Soft. Normal appearance and bowel sounds are normal. She exhibits no distension. There is no tenderness. There is no guarding.   Musculoskeletal: Normal range of motion. She exhibits tenderness. She exhibits no edema.        Arms:  Neurological: She is alert. She has normal strength and normal reflexes.   Skin: Skin is warm and dry. No rash noted. No cyanosis.   Psychiatric: She has a normal mood and  affect.         ED Course   Procedures  Labs Reviewed - No data to display          Medical Decision Making:   History:   Old Medical Records: I decided to obtain old medical records.  Old Records Summarized: records from clinic visits.       APC / Resident Notes:   70yo with medical history of HTN and seizures presents to ED with complaint of muscle strain in right rib region. Cardiac exam reveals regular rate and rhythm, no murmurs. Lungs clear bilaterally with no wheezes, no rales, no rhonchi. Abdomen is soft, non tender, non distended with normal bowel sounds x4. Right lateral chest wall is tender on palpation. No rashes or bruises noted. Pain exacerbated with movement. Vitals stable. Patient is in no acute distress.    I did not see the need to re-image the patient from yesterday's xray at [facility] since it was negative. I advised patient that the ED is not for pain management.    Given toradol 10mg.     DDX include but not limited to muscle strain, pleurisy, rib fracture.    Patient discharged home with naproxen 500mg. ED precautions given. Patient advised to follow up with PCP. Patient stable at discharge.     I have discussed and reviewed with my supervising physician.              ED Course     Clinical Impression:   The encounter diagnosis was Muscle strain of chest wall, subsequent encounter.    Disposition:   Disposition: Discharged  Condition: Stable       Carley Tyson PA-C  01/29/17 7824

## 2017-01-29 NOTE — ED AVS SNAPSHOT
[facility]  [address]  [facility] [address]               Sofia Augustine   2017  3:48 PM   ED    Description:  Female : 1945   Department:  [facility]           Your Care was Coordinated By:     Provider Role From To    Lee Wu MD Attending Provider [phone] --    Carley Tyson PA-C Physician Assistant [phone] --      Reason for Visit     Back Pain           Diagnoses this Visit        Comments    Muscle strain of chest wall, subsequent encounter    -  Primary       ED Disposition     None           To Do List           Follow-up Information     Schedule an appointment as soon as possible for a visit with Luanne Connell MD.    Specialty:  Internal Medicine    Contact information:     [facility]  [place] [address]  [phone]         These Medications        Disp Refills Start End    naproxen (NAPROSYN) 500 MG tablet 10 tablet 0 2017 2/3/2017    Take 1 tablet (500 mg total) by mouth 2 (two) times daily with meals. - Oral    Pharmacy: [facility] [id number]  [place] [address] AT [facility] [address] Ph #: [phone]         [facility]     [facility] Care [facility] -  Assistance  Registered nurses in the [facility] provide clinical advisement, health education, appointment booking, and other advisory services.  Call for this free service at [phone].             Medications           Message regarding Medications     Verify the changes and/or additions to your medication regime listed below are the same as discussed with your clinician today.  If any of these changes or additions are incorrect, please notify your healthcare provider.        START taking these NEW medications        Refills    naproxen (NAPROSYN) 500 MG tablet 0    Sig: Take 1 tablet (500 mg total) by mouth 2 (two) times daily with meals.    Class:  Print    Route: Oral      These medications were administered today        Dose Freq    ketorolac injection 10 mg 10 mg ED 1 Time    Sig: Inject 10 mg into the muscle ED 1 Time.    Class: Normal    Route: Intramuscular    Cosign for Ordering: Required by Lee Wu MD           Verify that the below list of medications is an accurate representation of the medications you are currently taking.  If none reported, the list may be blank. If incorrect, please contact your healthcare provider. Carry this list with you in case of emergency.           Current Medications     aspirin 81 MG Chew Take 81 mg by mouth once daily.    atorvastatin (LIPITOR) 40 MG tablet Take 1 tablet (40 mg total) by mouth once daily.    carbamazepine (TEGRETOL) 200 mg tablet Take 200 mg nightly for one week then take 200 mg TWICE DAILY thereafter    chlorthalidone (HYGROTEN) 25 MG Tab Take 1 tablet (25 mg total) by mouth once daily.    gabapentin (NEURONTIN) 300 MG capsule Take one cap at bedtime for one week, than increase to 2 caps at bedtime at bedtime, if tolerated.  Do not stop abruptly.    hydrocodone-acetaminophen 5-325mg (NORCO) 5-325 mg per tablet Take 1 tablet by mouth every 8 (eight) hours as needed for Pain.    ketorolac injection 10 mg Inject 10 mg into the muscle ED 1 Time.    levocetirizine (XYZAL) 5 MG tablet Take 1 tablet (5 mg total) by mouth every evening.    lidocaine (LIDODERM) 5 % Apply patch to affected area for 12 hours, then remove for 12 hours.    lisinopril 10 MG tablet Take 1 tablet (10 mg total) by mouth once daily.    meloxicam (MOBIC) 15 MG tablet TAKE 1 TABLET(15 MG) BY MOUTH EVERY DAY    methocarbamol (ROBAXIN) 500 MG Tab Take 2 tablets (1,000 mg total) by mouth 3 (three) times daily.    naproxen (NAPROSYN) 500 MG tablet Take 1 tablet (500 mg total) by mouth 2 (two) times daily with meals.    ondansetron (ZOFRAN-ODT) 4 MG TbDL Take 1 tablet (4 mg total) by mouth every 8 (eight) hours as needed.     "promethazine (PHENERGAN) 25 MG tablet Take 1 tablet (25 mg total) by mouth every 6 (six) hours as needed for Nausea.           Clinical Reference Information           Your Vitals Were     BP Pulse Temp Resp Height Weight    153/67 80 98 °F (36.7 °C) (Oral) 18 5' 4" (1.626 m) 83.9 kg (185 lb)    SpO2 BMI             97% 31.76 kg/m2         Allergies as of 1/29/2017     No Known Allergies      Immunizations Administered on Date of Encounter - 1/29/2017     None      ED Micro, Lab, POCT     None      ED Imaging Orders     None        Discharge Instructions         Chest Strain    You have a chest strain. This happens when the muscles between the ribs stretch and tear. This may occur when you have a severe cough. It may also happen after strenuous lifting or twisting injuries of the upper back.  A chest strain usually causes pain when you move or take a deep breath. The strain may take a few days to a few weeks to heal.  Home care  Follow these guidelines when caring for yourself at home:  · Rest. Dont do any heavy lifting or strenuous activity. Dont do any activity that causes pain.  · If you have a severe cough, use a cough syrup with dextromethorphan, unless another cough medicine was prescribed. If you have high blood pressure, check with your health care provider or pharmacist before using an over-the-counter cough medicine.  · You may use acetaminophen or ibuprofen to control pain, unless another medicine was prescribed. If you have chronic liver or kidney disease, talk with your provider before using these medicines. Also talk with your provider if youve had a stomach ulcer or GI bleeding.  Follow-up care  Follow up with your health care provider, or as advised.  When to seek medical advice  Call your health care provider right away if any of these occur:  · A change in the type of pain. This means if it feels different, gets worse, lasts longer, or begins to spread into your shoulder, arm, neck, jaw, or " back.  · Pain doesnt go away in 1 week  · Shortness of breath, difficulty breathing, or fast breathing  · Pain gets worse when you breathe  · Cough with dark-colored sputum (phlegm) or blood  · Weakness, dizziness, or fainting  · Fever of 101ºF (38.3ºC) or higher, or as directed by your health care provider   © 4813-1915 [employer]. [address] [place] [address], [place] [address]. All rights reserved. This information is not intended as a substitute for professional medical care. Always follow your healthcare professional's instructions.          Your Scheduled Appointments     Jan 30, 2017 11:00 AM CST   Hospital Follow Up with Luanne Connell MD   [facility]    [address] [facility] [address]   [phone]            Mar 07, 2017  9:40 AM CST   Consult with Sergio Valladares MD   [facility] - Neurology ([facility] )    [address]  [place] [address] [phone]            Mar 21, 2017  9:00 AM CDT   Established Patient Visit with MD Jim Page [facility] ([facility] )    [address]  [place] [address] [phone]              [facility] Sign-Up     Activating your [facility] account is as easy as 1-2-3!     1) Visit [email or web address], select Sign Up Now, enter this activation code and your date of birth, then select Next.  [email or web address]  Expires: 3/14/2017  6:51 PM      2) Create a username and password to use when you visit [facility] in the future and select a security question in case you lose your password and select Next.    3) Enter your e-mail address and click Sign Up!    Additional Information  If you have questions, please e-mail [email or web address] or call [phone] to talk to our [facility] staff. Remember, [facility] is NOT to be used for urgent needs. For medical emergencies, dial 911.          [facility] complies with applicable Federal civil rights  laws and does not discriminate on the basis of race, color, national origin, age, disability, or sex.        Language Assistance Services     ATTENTION: Language assistance services are available, free of charge. Please call [phone].      ATENCIÓN: Si habla ree, tiene a rodriguez disposición servicios gratuitos de asistencia lingüística. Llame al [phone].     CHÚ Ý: N?u b?n nói Ti?ng Vi?t, có các d?ch v? h? tr? ngôn ng? mi?n phí dành cho b?n. G?i s? [phone].

## 2017-01-29 NOTE — DISCHARGE INSTRUCTIONS
Chest Strain    You have a chest strain. This happens when the muscles between the ribs stretch and tear. This may occur when you have a severe cough. It may also happen after strenuous lifting or twisting injuries of the upper back.  A chest strain usually causes pain when you move or take a deep breath. The strain may take a few days to a few weeks to heal.  Home care  Follow these guidelines when caring for yourself at home:  · Rest. Dont do any heavy lifting or strenuous activity. Dont do any activity that causes pain.  · If you have a severe cough, use a cough syrup with dextromethorphan, unless another cough medicine was prescribed. If you have high blood pressure, check with your health care provider or pharmacist before using an over-the-counter cough medicine.  · You may use acetaminophen or ibuprofen to control pain, unless another medicine was prescribed. If you have chronic liver or kidney disease, talk with your provider before using these medicines. Also talk with your provider if youve had a stomach ulcer or GI bleeding.  Follow-up care  Follow up with your health care provider, or as advised.  When to seek medical advice  Call your health care provider right away if any of these occur:  · A change in the type of pain. This means if it feels different, gets worse, lasts longer, or begins to spread into your shoulder, arm, neck, jaw, or back.  · Pain doesnt go away in 1 week  · Shortness of breath, difficulty breathing, or fast breathing  · Pain gets worse when you breathe  · Cough with dark-colored sputum (phlegm) or blood  · Weakness, dizziness, or fainting  · Fever of 101ºF (38.3ºC) or higher, or as directed by your health care provider   © 3119-9660 [employer]. [address] [place] [address], [place] [address]. All rights reserved. This information is not intended as a substitute for professional medical care. Always follow your healthcare professional's instructions.

## 2017-01-29 NOTE — TELEPHONE ENCOUNTER
Reason for Disposition   SEVERE chest pain    Additional Information   Commented on: Answer Assessment - Initial Assessment Questions     Patient was seen in ER for back pain after having a seizure and was given robaxin as a muscle relaxer.  Patient states that after taken muscle relaxer, Ibuprofen and norco, patient is still experiencing pain.  Patient rates her pain 7/10 with no relief.  Advised patient to go to ER for pain management.    Protocols used: ST CHEST PAIN-A-AH    Please f/u with patient.

## 2017-01-30 ENCOUNTER — HOSPITAL ENCOUNTER (OUTPATIENT)
Dept: RADIOLOGY | Facility: HOSPITAL | Age: 72
Discharge: HOME | End: 2017-01-30
Attending: INTERNAL MEDICINE
Payer: MEDICARE

## 2017-01-30 ENCOUNTER — OFFICE VISIT (OUTPATIENT)
Dept: INTERNAL MEDICINE | Facility: CLINIC | Age: 72
End: 2017-01-30
Payer: MEDICARE

## 2017-01-30 ENCOUNTER — TELEPHONE (OUTPATIENT)
Dept: INTERNAL MEDICINE | Facility: CLINIC | Age: 72
End: 2017-01-30

## 2017-01-30 VITALS
RESPIRATION RATE: 14 BRPM | WEIGHT: 186.06 LBS | HEIGHT: 64 IN | BODY MASS INDEX: 31.77 KG/M2 | TEMPERATURE: 98 F | HEART RATE: 80 BPM | SYSTOLIC BLOOD PRESSURE: 140 MMHG | DIASTOLIC BLOOD PRESSURE: 78 MMHG

## 2017-01-30 DIAGNOSIS — R10.9 ABDOMINAL PAIN, UNSPECIFIED LOCATION: Primary | ICD-10-CM

## 2017-01-30 DIAGNOSIS — R07.81 RIB PAIN ON RIGHT SIDE: ICD-10-CM

## 2017-01-30 PROCEDURE — 1157F ADVNC CARE PLAN IN RCRD: CPT | Mod: S$GLB,,, | Performed by: INTERNAL MEDICINE

## 2017-01-30 PROCEDURE — 1159F MED LIST DOCD IN RCRD: CPT | Mod: S$GLB,,, | Performed by: INTERNAL MEDICINE

## 2017-01-30 PROCEDURE — 3077F SYST BP >= 140 MM HG: CPT | Mod: S$GLB,,, | Performed by: INTERNAL MEDICINE

## 2017-01-30 PROCEDURE — 1125F AMNT PAIN NOTED PAIN PRSNT: CPT | Mod: S$GLB,,, | Performed by: INTERNAL MEDICINE

## 2017-01-30 PROCEDURE — 99999 PR PBB SHADOW E&M-EST. PATIENT-LVL IV: CPT | Mod: PBBFAC,,, | Performed by: INTERNAL MEDICINE

## 2017-01-30 PROCEDURE — 99213 OFFICE O/P EST LOW 20 MIN: CPT | Mod: S$GLB,,, | Performed by: INTERNAL MEDICINE

## 2017-01-30 PROCEDURE — 71100 X-RAY EXAM RIBS UNI 2 VIEWS: CPT | Mod: TC,PO

## 2017-01-30 PROCEDURE — 3078F DIAST BP <80 MM HG: CPT | Mod: S$GLB,,, | Performed by: INTERNAL MEDICINE

## 2017-01-30 PROCEDURE — 71100 X-RAY EXAM RIBS UNI 2 VIEWS: CPT | Mod: 26,,, | Performed by: RADIOLOGY

## 2017-01-30 PROCEDURE — 1160F RVW MEDS BY RX/DR IN RCRD: CPT | Mod: S$GLB,,, | Performed by: INTERNAL MEDICINE

## 2017-01-30 RX ORDER — VALACYCLOVIR HYDROCHLORIDE 1 G/1
1000 TABLET, FILM COATED ORAL 3 TIMES DAILY
Qty: 21 TABLET | Refills: 0 | Status: SHIPPED | OUTPATIENT
Start: 2017-01-30 | End: 2017-02-06 | Stop reason: SDUPTHER

## 2017-01-30 RX ORDER — HYDROCODONE BITARTRATE AND ACETAMINOPHEN 5; 325 MG/1; MG/1
1 TABLET ORAL EVERY 8 HOURS PRN
Qty: 40 TABLET | Refills: 0 | Status: SHIPPED | OUTPATIENT
Start: 2017-01-30 | End: 2017-02-13 | Stop reason: SDUPTHER

## 2017-01-30 NOTE — PROGRESS NOTES
CC: followup of ED visit for chest pain  HPI:  The patient is a 71 y.o. year old female who presents to the office for followup of ED visit for chest pain.  Her symptoms started Saturday upon awakening.  She reports experiencing a seizure in her sleep, but denies any known trauma.  She reports awakening in her bed.  Pain is independent of activity, but is exacerbated by certain activities.  The pain is an intermittent aching sensation involving the right side of the abdomen.  The patient also reports pruritis this morning.  She denies any fever, nausea or vomiting, or relationship to meals.  Pain is exacerbated with cough and deep inspiration.    PAST MEDICAL HISTORY:  Past Medical History   Diagnosis Date    Abnormal mammogram of both breasts     Hypertension     Memory disorder     Seizures        SURGICAL HISTORY:  Past Surgical History   Procedure Laterality Date    Cataract extraction      Cysts breast         MEDS:  Medcard reviewed and updated    ALLERGIES: Allergy Card reviewed and updated    SOCIAL HISTORY:   The patient is a nonsmoker.    PE:   APPEARANCE: Well nourished, well developed, in no acute distress.    CHEST: Lungs clear to auscultation with unlabored respirations.  CARDIOVASCULAR: Normal S1, S2. No murmurs. No carotid bruits. No pedal edema.  ABDOMEN: Bowel sounds normal. Not distended. Soft. No tenderness or masses.    SKIN: Normal skin turgor, warm and dry.  PSYCHIATRIC: The patient is oriented to person, place, and time and has a pleasant affect.        ASSESSMENT/PLAN:  Sofia was seen today for er f/u.    Diagnoses and all orders for this visit:    Abdominal pain, unspecified location  -     Possibly musculoskeletal  -     Monitor    Rib pain on right side  -     X-Ray Ribs 2 View Right; Future    Other orders  -     valacyclovir (VALTREX) 1000 MG tablet; Take 1 tablet (1,000 mg total) by mouth 3 (three) times daily.  -     hydrocodone-acetaminophen 5-325mg (NORCO) 5-325 mg per tablet;  Take 1 tablet by mouth every 8 (eight) hours as needed for Pain.

## 2017-01-30 NOTE — MR AVS SNAPSHOT
[facility] Medicine   [facility]  [place] [address]  Phone: [phone]  Fax: [phone]                  Sofia Augustine   2017 11:00 AM   Office Visit    Description:  Female : 1945   Provider:  Luanne Connell MD   Department:  [facility]           Reason for Visit     ER f/u           Diagnoses this Visit        Comments    Abdominal pain, unspecified location    -  Primary     Rib pain on right side                To Do List           Future Appointments        Provider Department Dept Phone    3/7/2017 9:40 AM MD Jim [facility] [phone]    3/21/2017 9:00 AM MD Jim Page [facility] [phone]      Goals (5 Years of Data)     None      Follow-Up and Disposition     Return in about 3 months (around 2017).       These Medications        Disp Refills Start End    valacyclovir (VALTREX) 1000 MG tablet 21 tablet 0 2017    Take 1 tablet (1,000 mg total) by mouth 3 (three) times daily. - Oral    Pharmacy: [facility] [id number]  [address]  [address]  AT [facility] Ph #: [phone]       hydrocodone-acetaminophen 5-325mg (NORCO) 5-325 mg per tablet 40 tablet 0 2017     Take 1 tablet by mouth every 8 (eight) hours as needed for Pain. - Oral    [facility] [address] [address]  AT [facility] Ph #: [phone]       Notes to Pharmacy: Caution, medication is sedating      [facility]     [facility] On Call Nurse Care Line -  Assistance  Registered nurses in the [facility] On Call Center provide clinical advisement, health education, appointment booking, and other advisory services.  Call for this free service at [phone].             Medications           Message regarding Medications     Verify the changes and/or additions to your medication regime listed below are the  same as discussed with your clinician today.  If any of these changes or additions are incorrect, please notify your healthcare provider.        START taking these NEW medications        Refills    valacyclovir (VALTREX) 1000 MG tablet 0    Sig: Take 1 tablet (1,000 mg total) by mouth 3 (three) times daily.    Class: Print    Route: Oral           Verify that the below list of medications is an accurate representation of the medications you are currently taking.  If none reported, the list may be blank. If incorrect, please contact your healthcare provider. Carry this list with you in case of emergency.           Current Medications     aspirin 81 MG Chew Take 81 mg by mouth once daily.    atorvastatin (LIPITOR) 40 MG tablet Take 1 tablet (40 mg total) by mouth once daily.    carbamazepine (TEGRETOL) 200 mg tablet Take 200 mg nightly for one week then take 200 mg TWICE DAILY thereafter    chlorthalidone (HYGROTEN) 25 MG Tab Take 1 tablet (25 mg total) by mouth once daily.    gabapentin (NEURONTIN) 300 MG capsule Take one cap at bedtime for one week, than increase to 2 caps at bedtime at bedtime, if tolerated.  Do not stop abruptly.    hydrocodone-acetaminophen 5-325mg (NORCO) 5-325 mg per tablet Take 1 tablet by mouth every 8 (eight) hours as needed for Pain.    levocetirizine (XYZAL) 5 MG tablet Take 1 tablet (5 mg total) by mouth every evening.    lidocaine (LIDODERM) 5 % Apply patch to affected area for 12 hours, then remove for 12 hours.    lisinopril 10 MG tablet Take 1 tablet (10 mg total) by mouth once daily.    meloxicam (MOBIC) 15 MG tablet TAKE 1 TABLET(15 MG) BY MOUTH EVERY DAY    methocarbamol (ROBAXIN) 500 MG Tab Take 2 tablets (1,000 mg total) by mouth 3 (three) times daily.    naproxen (NAPROSYN) 500 MG tablet Take 1 tablet (500 mg total) by mouth 2 (two) times daily with meals.    ondansetron (ZOFRAN-ODT) 4 MG TbDL Take 1 tablet (4 mg total) by mouth every 8 (eight) hours as needed.    promethazine  "(PHENERGAN) 25 MG tablet Take 1 tablet (25 mg total) by mouth every 6 (six) hours as needed for Nausea.    valacyclovir (VALTREX) 1000 MG tablet Take 1 tablet (1,000 mg total) by mouth 3 (three) times daily.           Clinical Reference Information           Vital Signs - Last Recorded  Most recent update: 1/30/2017 10:59 AM by Cody cShulte LPN    BP Pulse Temp Resp Ht Wt    (!) 140/78 (BP Location: Left arm, Patient Position: Sitting, BP Method: Manual) 80 98.1 °F (36.7 °C) (Oral) 14 5' 4" (1.626 m) 84.4 kg (186 lb 1.1 oz)    BMI                31.94 kg/m2          Blood Pressure          Most Recent Value    BP  (!)  140/78      Allergies as of 1/30/2017     No Known Allergies      Immunizations Administered on Date of Encounter - 1/30/2017     None      Orders Placed During Today's Visit     Future Labs/Procedures Expected by Expires    X-Ray Ribs 2 View Right  1/30/2017 1/30/2018      [facility] Sign-Up     Activating your [facility] account is as easy as 1-2-3!     1) Visit [email or web address], select Sign Up Now, enter this activation code and your date of birth, then select Next.  [email or web address]  Expires: 3/14/2017  6:51 PM      2) Create a username and password to use when you visit [facility] in the future and select a security question in case you lose your password and select Next.    3) Enter your e-mail address and click Sign Up!    Additional Information  If you have questions, please e-mail [email or web address] or call [phone] to talk to our [facility] staff. Remember, [facility] is NOT to be used for urgent needs. For medical emergencies, dial 911.         "

## 2017-01-31 NOTE — TELEPHONE ENCOUNTER
----- Message from Christina Palma sent at 1/31/2017 10:41 AM CST -----  Contact: patient- [phone]  Patient was told to contact  When she made it home. Please return call to patient.

## 2017-02-03 ENCOUNTER — TELEPHONE (OUTPATIENT)
Dept: INTERNAL MEDICINE | Facility: CLINIC | Age: 72
End: 2017-02-03

## 2017-02-03 ENCOUNTER — NURSE TRIAGE (OUTPATIENT)
Dept: ADMINISTRATIVE | Facility: CLINIC | Age: 72
End: 2017-02-03

## 2017-02-03 NOTE — TELEPHONE ENCOUNTER
Reason for Disposition   [1] Shingles rash already diagnosed and [2] taking antiviral medication   Shingles, questions about   Prevention of Shingles (vaccine info), questions about    [facility]  [facility]    Patient is already being treated for shingles rash and is taking valcyclovir, norco q8, and ibuprofen. She state she took her pain medication about 4 hours ago and her pain right now is 7/10 and she still has 4 hours before she can take any more pain medication and she is wondering what she should do. Asked her if she is taking neurontin at bedtime and she says she is not. Advised she should begin taking that as prescribed as it will help with nerve pain. Also advised triager would speak to on call MD to see if maybe pain medication could be increased to Q6H or given at a higher dosage.     On call MD recommends she can take Norco Q6H and also recommended that she start taking neurontin as prescribed. Advised patient.

## 2017-02-03 NOTE — TELEPHONE ENCOUNTER
----- Message from Ashleigh Cook RN sent at 2/3/2017  5:33 PM CST -----  Contact: david Connell,    I paged you because Ms. Augustine is being treated for shingles. The pain medication is q8h prn and she took it 4 hours ago and the pain persists 7/10 on pain scale. She has not been taking neurontin at night so advised that would also help. She would like someone from your office to call her advising on whether she can increase norco to take it more frequent or increase the dosage?? Please advise.    Thank you,    Ashleigh Cook RN  [facility]

## 2017-02-06 RX ORDER — VALACYCLOVIR HYDROCHLORIDE 1 G/1
TABLET, FILM COATED ORAL
Qty: 21 TABLET | Refills: 0 | Status: SHIPPED | OUTPATIENT
Start: 2017-02-06 | End: 2017-05-01 | Stop reason: ALTCHOICE

## 2017-02-13 NOTE — TELEPHONE ENCOUNTER
----- Message from Mili Bond sent at 2/13/2017  2:11 PM CST -----  Contact: Self/ [phone]  Type: Rx    Name of medication(s): hydrocodone-acetaminophen 5-325mg (NORCO) 5-325 mg per tablet    Is this a refill? New rx? Refill     Who prescribed medication? Dr. Connell     Pharmacy Name, Phone, & Location: [facility] on file     Comments: pt is calling to have a refill on the medication above. Please call and advise     Thank you

## 2017-02-14 ENCOUNTER — TELEPHONE (OUTPATIENT)
Dept: INTERNAL MEDICINE | Facility: CLINIC | Age: 72
End: 2017-02-14

## 2017-02-14 RX ORDER — HYDROCODONE BITARTRATE AND ACETAMINOPHEN 5; 325 MG/1; MG/1
1 TABLET ORAL EVERY 8 HOURS PRN
Qty: 40 TABLET | Refills: 0 | Status: SHIPPED | OUTPATIENT
Start: 2017-02-14 | End: 2017-05-01 | Stop reason: ALTCHOICE

## 2017-02-15 RX ORDER — VALACYCLOVIR HYDROCHLORIDE 1 G/1
TABLET, FILM COATED ORAL
Qty: 21 TABLET | Refills: 0 | OUTPATIENT
Start: 2017-02-15

## 2017-02-15 NOTE — TELEPHONE ENCOUNTER
Called patient and advised her that she needs to schedule an appointment.  Recommend she drinks fluids and gets some rest.  Patient also needs to be reevaluated for shingles.

## 2017-02-15 NOTE — TELEPHONE ENCOUNTER
----- Message from Lizzy Howe sent at 2/15/2017 12:50 PM CST -----  Contact: pt [phone]  Pt will like a call from the nurse  she has a tempeture is 101,please advise

## 2017-02-16 ENCOUNTER — TELEPHONE (OUTPATIENT)
Dept: INTERNAL MEDICINE | Facility: CLINIC | Age: 72
End: 2017-02-16

## 2017-02-16 ENCOUNTER — OFFICE VISIT (OUTPATIENT)
Dept: INTERNAL MEDICINE | Facility: CLINIC | Age: 72
End: 2017-02-16
Payer: MEDICARE

## 2017-02-16 VITALS
DIASTOLIC BLOOD PRESSURE: 96 MMHG | TEMPERATURE: 98 F | HEART RATE: 86 BPM | SYSTOLIC BLOOD PRESSURE: 144 MMHG | HEIGHT: 64 IN | WEIGHT: 185.19 LBS | BODY MASS INDEX: 31.62 KG/M2

## 2017-02-16 DIAGNOSIS — B02.9 HERPES ZOSTER WITHOUT COMPLICATION: ICD-10-CM

## 2017-02-16 DIAGNOSIS — M25.512 ACUTE PAIN OF LEFT SHOULDER: ICD-10-CM

## 2017-02-16 DIAGNOSIS — A08.4 VIRAL GASTROENTERITIS: Primary | ICD-10-CM

## 2017-02-16 PROCEDURE — 3077F SYST BP >= 140 MM HG: CPT | Mod: S$GLB,,, | Performed by: INTERNAL MEDICINE

## 2017-02-16 PROCEDURE — 1159F MED LIST DOCD IN RCRD: CPT | Mod: S$GLB,,, | Performed by: INTERNAL MEDICINE

## 2017-02-16 PROCEDURE — 1160F RVW MEDS BY RX/DR IN RCRD: CPT | Mod: S$GLB,,, | Performed by: INTERNAL MEDICINE

## 2017-02-16 PROCEDURE — 1157F ADVNC CARE PLAN IN RCRD: CPT | Mod: S$GLB,,, | Performed by: INTERNAL MEDICINE

## 2017-02-16 PROCEDURE — 99213 OFFICE O/P EST LOW 20 MIN: CPT | Mod: S$GLB,,, | Performed by: INTERNAL MEDICINE

## 2017-02-16 PROCEDURE — 99999 PR PBB SHADOW E&M-EST. PATIENT-LVL III: CPT | Mod: PBBFAC,,, | Performed by: INTERNAL MEDICINE

## 2017-02-16 PROCEDURE — 3080F DIAST BP >= 90 MM HG: CPT | Mod: S$GLB,,, | Performed by: INTERNAL MEDICINE

## 2017-02-16 PROCEDURE — 1125F AMNT PAIN NOTED PAIN PRSNT: CPT | Mod: S$GLB,,, | Performed by: INTERNAL MEDICINE

## 2017-02-16 NOTE — PROGRESS NOTES
Subjective:       Patient ID: Sofia Augustine is a 71 y.o. female.    Chief Complaint: Herpes Zoster; Sore Throat; and Nausea    HPI     Patient is a 71-year-old female here today with a 2 day history of nausea and diarrhea.  She says that her symptoms began yesterday with nausea, gagging, abdominal cramping and diarrhea.  She declines getting any outside food.  No fever or chills, but she does feel fatigued.  She's been able to drink some Coca-Cola today which has helped.  No new medications.  Her family does have a upper respiratory infection but they're trying to overcome at home.    She also says that she is being treated right now for shingles with Valtrex.  Her rash is improving, pain is intermittent and sharp in the right upper quadrant where the rash is.  The pain has slowly improved as well.  She uses hydrocodone for this.    She also describes a week long pain in the left shoulder.  She says she woke up and she noticed that she had restricted movement of the left shoulder.  She says she slowly trying to stretch it and that does help the pain.  She says that her hydrocodone also helps for this pain.  She does not recall any heavy lifting.  No other direct trauma reported prior to onset of pain.    Review of Systems   Constitutional: Negative for chills, fatigue and fever.   HENT: Negative for congestion, ear pain, postnasal drip, rhinorrhea, sinus pressure and sore throat.    Eyes: Negative for itching and visual disturbance.   Respiratory: Negative for cough, shortness of breath and wheezing.    Cardiovascular: Negative for chest pain, palpitations and leg swelling.   Gastrointestinal: Positive for abdominal pain, diarrhea, nausea and vomiting.   Genitourinary: Negative for dysuria.   Musculoskeletal: Positive for arthralgias. Negative for myalgias.   Skin: Negative for rash.   Neurological: Negative for weakness, light-headedness and headaches.       Objective:      Physical Exam   Constitutional: She  is oriented to person, place, and time. She appears well-developed and well-nourished. No distress.   HENT:   Head: Normocephalic and atraumatic.   Mouth/Throat: Oropharynx is clear and moist. No oropharyngeal exudate.   Eyes: Conjunctivae and EOM are normal. Pupils are equal, round, and reactive to light. Right eye exhibits no discharge. Left eye exhibits no discharge.   Neck: Normal range of motion. Neck supple. No thyromegaly present.   Cardiovascular: Normal rate, regular rhythm and normal heart sounds.    No murmur heard.  Pulmonary/Chest: Effort normal and breath sounds normal. No respiratory distress. She has no wheezes. She has no rales.   Abdominal: Soft. She exhibits no distension. There is no tenderness.   Rash on the RUQ crusted and minimally present    Musculoskeletal: She exhibits no edema.   Lymphadenopathy:     She has no cervical adenopathy.   Neurological: She is alert and oriented to person, place, and time.   Skin: Skin is warm and dry. She is not diaphoretic.   Nursing note and vitals reviewed.        Left Shoulder:  No TTP   Limited ROM on lateral abduction >60 degrees    Assessment:       1. Viral gastroenteritis    2. Acute pain of left shoulder    3. Herpes zoster without complication        Plan:       Suspect diarrhea, cramping and nausea secondary to viral gastroenteritis. BP stable, no tachycardia, moist oral mucosa. Keep up with hydration, avoid caffeine/excessive sugary drinks; she has Zofran at home for nausea PRN, avoid anti-diarrheals; sx should take up to 72 hours to resolve. If sx worse or do not improve, let us know and we'll need to see her in clinic for examination.    Shingles Rash:  Clearing, still having pain  Using hydrocodone and still completing therapy with Valtrex    L shoulder pain:  Suspect musculoskeletal pain given restricted ROM  Ibuprofen 800 mg IBD PRN pain  Declines imaging or physical therapy at this time    RTC PRN

## 2017-02-16 NOTE — MR AVS SNAPSHOT
[facility]   [facility]  [place] [address]  Phone: [phone]  Fax: [phone]                  Sofia Augustine   2017 11:40 AM   Office Visit    Description:  Female : 1945   Provider:  Alecia Segura MD   Department:  [facility]           Reason for Visit     Herpes Zoster     Sore Throat     Nausea                To Do List           Future Appointments        Provider Department Dept Phone    2017 11:40 AM Alecia Segura MD [facility] [phone]    3/7/2017 9:40 AM Sergio Valladares MD [facility] Neu[facility] [phone]    3/21/2017 9:00 AM Kym Mckeon MD [facility] [phone]    2017 8:40 AM Luanne Connell MD [facility] [phone]      Goals (5 Years of Data)     None      [facility] On Call     [facility] On Call Nurse [facility] -  Assistance  Registered nurses in the [facility] On Call Center provide clinical advisement, health education, appointment booking, and other advisory services.  Call for this free service at [phone].             Medications           Message regarding Medications     Verify the changes and/or additions to your medication regime listed below are the same as discussed with your clinician today.  If any of these changes or additions are incorrect, please notify your healthcare provider.        STOP taking these medications     meloxicam (MOBIC) 15 MG tablet TAKE 1 TABLET(15 MG) BY MOUTH EVERY DAY    levocetirizine (XYZAL) 5 MG tablet Take 1 tablet (5 mg total) by mouth every evening.           Verify that the below list of medications is an accurate representation of the medications you are currently taking.  If none reported, the list may be blank. If incorrect, please contact your healthcare provider. Carry this list with you in case of emergency.           Current Medications     aspirin 81 MG Chew Take 81 mg by mouth once  "daily.    atorvastatin (LIPITOR) 40 MG tablet Take 1 tablet (40 mg total) by mouth once daily.    carbamazepine (TEGRETOL) 200 mg tablet Take 200 mg nightly for one week then take 200 mg TWICE DAILY thereafter    chlorthalidone (HYGROTEN) 25 MG Tab Take 1 tablet (25 mg total) by mouth once daily.    gabapentin (NEURONTIN) 300 MG capsule Take one cap at bedtime for one week, than increase to 2 caps at bedtime at bedtime, if tolerated.  Do not stop abruptly.    hydrocodone-acetaminophen 5-325mg (NORCO) 5-325 mg per tablet Take 1 tablet by mouth every 8 (eight) hours as needed for Pain.    lidocaine (LIDODERM) 5 % Apply patch to affected area for 12 hours, then remove for 12 hours.    lisinopril 10 MG tablet Take 1 tablet (10 mg total) by mouth once daily.    ondansetron (ZOFRAN-ODT) 4 MG TbDL Take 1 tablet (4 mg total) by mouth every 8 (eight) hours as needed.    promethazine (PHENERGAN) 25 MG tablet Take 1 tablet (25 mg total) by mouth every 6 (six) hours as needed for Nausea.    valacyclovir (VALTREX) 1000 MG tablet TAKE 1 TABLET BY MOUTH THREE TIMES DAILY           Clinical Reference Information           Your Vitals Were     BP Pulse Temp Height Weight BMI    144/96 86 98.3 °F (36.8 °C) 5' 4" (1.626 m) 84 kg (185 lb 3 oz) 31.79 kg/m2      Blood Pressure          Most Recent Value    BP  (!)  144/96      Allergies as of 2/16/2017     No Known Allergies      Immunizations Administered on Date of Encounter - 2/16/2017     None      [facility] Sign-Up     Activating your [facility] account is as easy as 1-2-3!     1) Visit [email or web address], select Sign Up Now, enter this activation code and your date of birth, then select Next.  [email or web address]  Expires: 3/14/2017  6:51 PM      2) Create a username and password to use when you visit [facility] in the future and select a security question in case you lose your password and select Next.    3) Enter your e-mail address and click Sign Up!    Additional Information  If you " have questions, please e-mail [email or web address] or call [phone] to talk to our [facility] staff. Remember, [facility] is NOT to be used for urgent needs. For medical emergencies, dial 911.         Language Assistance Services     ATTENTION: Language assistance services are available, free of charge. Please call [phone].      ATENCIÓN: Si habla español, tiene a rodriguez disposición servicios gratuitos de asistencia lingüística. Llame al [phone].     CHÚ Ý: N?u b?n nói Ti?ng Vi?t, có các d?ch v? h? tr? ngôn ng? mi?n phí dành cho b?n. G?i s? [phone].         [facility] complies with applicable Federal civil rights laws and does not discriminate on the basis of race, color, national origin, age, disability, or sex.

## 2017-02-16 NOTE — TELEPHONE ENCOUNTER
"Called patient per PCP UC appointment is needed for the fever pain and lesions from "shingles".. There was no answer lmom for a return call held slot for 1120  "

## 2017-02-21 ENCOUNTER — TELEPHONE (OUTPATIENT)
Dept: INTERNAL MEDICINE | Facility: CLINIC | Age: 72
End: 2017-02-21

## 2017-02-21 NOTE — TELEPHONE ENCOUNTER
----- Message from Lizzy Howe sent at 2/20/2017  3:59 PM CST -----  Contact: pt [phone]  Pt will like a script of (Diphenoxylate atropine 2.5 mg) sent to [email or web address] for Diarrhea please advise

## 2017-02-21 NOTE — TELEPHONE ENCOUNTER
Spoke with pt-she has been having diarrhea since she saw Dr Segura last Wednesday.       She wants some Lomotil. She has not tried anything OTC. Asked her to try Imodium,and if she is still having diarrhea,she needs to some in and see someone.

## 2017-02-25 ENCOUNTER — NURSE TRIAGE (OUTPATIENT)
Dept: ADMINISTRATIVE | Facility: CLINIC | Age: 72
End: 2017-02-25

## 2017-04-27 RX ORDER — CARBAMAZEPINE 200 MG/1
TABLET ORAL
Qty: 30 TABLET | Refills: 11 | Status: SHIPPED | OUTPATIENT
Start: 2017-04-27 | End: 2017-11-05 | Stop reason: SDUPTHER

## 2017-04-27 NOTE — TELEPHONE ENCOUNTER
----- Message from Luis Angel Chavez sent at 4/27/2017  3:56 PM CDT -----  Contact: Pt at [phone]  RX request - refill or new RX.  Is this a refill or new RX:  New rx  RX name and strength: carbamazepine (TEGRETOL) 200 mg tablet  Directions:  Take 200 mg nightly for one week then take 200 mg TWICE DAILY thereafter  Is this a 30 day or 90 day RX:  30 tablet  Pharmacy name and phone #: [facility]  [phone] (Phone)  [phone] (Fax)  Comments:  Pt only has 3 pills left. Pt need script to be refilled.

## 2017-05-01 ENCOUNTER — OFFICE VISIT (OUTPATIENT)
Dept: INTERNAL MEDICINE | Facility: CLINIC | Age: 72
End: 2017-05-01
Payer: MEDICARE

## 2017-05-01 VITALS
BODY MASS INDEX: 34.96 KG/M2 | WEIGHT: 185.19 LBS | DIASTOLIC BLOOD PRESSURE: 98 MMHG | HEIGHT: 61 IN | HEART RATE: 78 BPM | SYSTOLIC BLOOD PRESSURE: 160 MMHG | TEMPERATURE: 99 F

## 2017-05-01 DIAGNOSIS — R07.9 CHEST PAIN, UNSPECIFIED TYPE: ICD-10-CM

## 2017-05-01 DIAGNOSIS — R56.9 SEIZURE: ICD-10-CM

## 2017-05-01 DIAGNOSIS — I10 ESSENTIAL HYPERTENSION: Primary | Chronic | ICD-10-CM

## 2017-05-01 DIAGNOSIS — Z01.419 WELL WOMAN EXAM: ICD-10-CM

## 2017-05-01 PROCEDURE — 99499 UNLISTED E&M SERVICE: CPT | Mod: S$GLB,,, | Performed by: INTERNAL MEDICINE

## 2017-05-01 PROCEDURE — 3080F DIAST BP >= 90 MM HG: CPT | Mod: S$GLB,,, | Performed by: INTERNAL MEDICINE

## 2017-05-01 PROCEDURE — 99999 PR PBB SHADOW E&M-EST. PATIENT-LVL IV: CPT | Mod: PBBFAC,,, | Performed by: INTERNAL MEDICINE

## 2017-05-01 PROCEDURE — 1159F MED LIST DOCD IN RCRD: CPT | Mod: S$GLB,,, | Performed by: INTERNAL MEDICINE

## 2017-05-01 PROCEDURE — 3077F SYST BP >= 140 MM HG: CPT | Mod: S$GLB,,, | Performed by: INTERNAL MEDICINE

## 2017-05-01 PROCEDURE — 1160F RVW MEDS BY RX/DR IN RCRD: CPT | Mod: S$GLB,,, | Performed by: INTERNAL MEDICINE

## 2017-05-01 PROCEDURE — 93010 ELECTROCARDIOGRAM REPORT: CPT | Mod: S$GLB,,, | Performed by: INTERNAL MEDICINE

## 2017-05-01 PROCEDURE — 99214 OFFICE O/P EST MOD 30 MIN: CPT | Mod: S$GLB,,, | Performed by: INTERNAL MEDICINE

## 2017-05-01 PROCEDURE — 93005 ELECTROCARDIOGRAM TRACING: CPT | Mod: S$GLB,,, | Performed by: INTERNAL MEDICINE

## 2017-05-01 PROCEDURE — 1125F AMNT PAIN NOTED PAIN PRSNT: CPT | Mod: S$GLB,,, | Performed by: INTERNAL MEDICINE

## 2017-05-01 RX ORDER — LOSARTAN POTASSIUM 50 MG/1
50 TABLET ORAL NIGHTLY
Qty: 90 TABLET | Refills: 4
Start: 2017-05-01 | End: 2017-05-29 | Stop reason: ALTCHOICE

## 2017-05-01 RX ORDER — HYDROCODONE BITARTRATE AND ACETAMINOPHEN 5; 325 MG/1; MG/1
1 TABLET ORAL EVERY 8 HOURS PRN
Qty: 40 TABLET | Refills: 0 | Status: SHIPPED | OUTPATIENT
Start: 2017-05-01 | End: 2017-06-13 | Stop reason: SDUPTHER

## 2017-05-01 NOTE — PROGRESS NOTES
"CC: followup of hypertension  HPI:  The patient is a 71 y.o. year old female with seizures who presents to the office for followup of hypertension.  The patient complains of chest pain, shortness of breath, headache, fatigue and nausea and lightheadedness.  She denies any blurred vision.  Chest pain is a pressure sensation, "like something is sitting on her chest".  Pain has improved today, but she does report left neck and shoulder pain.  She reports numbness and tingling on her left side, mild symptoms over the weekend.  Then symptoms started on the right side yesterday.  She has not been taking her antihypertensives for several months.  She states she thought she was told to discontinue her medications, but there is no clear record of that in her chart.  She states she may have misunderstood.  She denies any seizure activity recently.    PAST MEDICAL HISTORY:  Past Medical History:   Diagnosis Date    Abnormal mammogram of both breasts     Hypertension     Memory disorder     Seizures        SURGICAL HISTORY:  Past Surgical History:   Procedure Laterality Date    CATARACT EXTRACTION      cysts breast         MEDS:  Medcard reviewed and updated    ALLERGIES: Allergy Card reviewed and updated    SOCIAL HISTORY:   The patient is a nonsmoker.    PE:   Vitals: B/P: 160/100, recheck  APPEARANCE: Well nourished, well developed, in no acute distress.    CHEST: Lungs clear to auscultation with unlabored respirations.  CARDIOVASCULAR: Normal S1, S2. No murmurs. No carotid bruits. No pedal edema.  ABDOMEN: Bowel sounds normal. Not distended. Soft. No tenderness or masses.   PSYCHIATRIC: The patient is oriented to person, place, and time and has a pleasant affect.        ASSESSMENT/PLAN:  Sofia was seen today for hypertension.    Diagnoses and all orders for this visit:    Essential hypertension  -     Blood pressure is elevated  -   Resume losartan      Seizure  -     Quiescent at present    Chest pain, unspecified " type  -     EKG 12-lead  -     Recommend patient go to the emergency department          Well woman exam  -     Ambulatory consult to Obstetrics / Gynecology    Other orders  -     losartan (COZAAR) 50 MG tablet; Take 1 tablet (50 mg total) by mouth every evening.  -     hydrocodone-acetaminophen 5-325mg (NORCO) 5-325 mg per tablet; Take 1 tablet by mouth every 8 (eight) hours as needed for Pain.

## 2017-05-01 NOTE — MR AVS SNAPSHOT
[facility] [facility]   [facility]  [place] [address]  Phone: [phone]  Fax: [phone]                  Sofia Augustine   2017 8:40 AM   Office Visit    Description:  Female : 1945   Provider:  Luanne Connell MD   Department:  [facility]           Reason for Visit     Hypertension           Diagnoses this Visit        Comments    Essential hypertension    -  Primary     Seizure         Chest pain, unspecified type         Well woman exam                To Do List           Goals (5 Years of Data)     None      Follow-Up and Disposition     Return in about 3 months (around 2017).       These Medications        Disp Refills Start End    losartan (COZAAR) 50 MG tablet 90 tablet 4 2017     Take 1 tablet (50 mg total) by mouth every evening. - Oral    Pharmacy: [facility] [id number]  [place] [address] [address]  AT [facility] Ph #: [phone]       hydrocodone-acetaminophen 5-325mg (NORCO) 5-325 mg per tablet 40 tablet 0 2017     Take 1 tablet by mouth every 8 (eight) hours as needed for Pain. - Oral    [facility] [id number] [facility] [place] [facility] [address]  [address] [facility] Ph #: [phone]       Notes to Pharmacy: Caution, medication is sedating      [facility] On Call     [facility] On Call Nurse Care Line - 24/7 Assistance  Unless otherwise directed by your provider, please contact [facility] On-Call, our nurse care line that is available for 24/7 assistance.     Registered nurses in the [facility] On Call Center provide: appointment scheduling, clinical advisement, health education, and other advisory services.  Call: [phone] (toll free)               Medications           Message regarding Medications     Verify the changes and/or additions to your medication regime listed below are the same as discussed with your clinician today.  If any of these changes or additions are  "incorrect, please notify your healthcare provider.        START taking these NEW medications        Refills    losartan (COZAAR) 50 MG tablet 4    Sig: Take 1 tablet (50 mg total) by mouth every evening.    Class: No Print    Route: Oral      STOP taking these medications     lisinopril 10 MG tablet Take 1 tablet (10 mg total) by mouth once daily.    chlorthalidone (HYGROTEN) 25 MG Tab Take 1 tablet (25 mg total) by mouth once daily.    promethazine (PHENERGAN) 25 MG tablet Take 1 tablet (25 mg total) by mouth every 6 (six) hours as needed for Nausea.    ondansetron (ZOFRAN-ODT) 4 MG TbDL Take 1 tablet (4 mg total) by mouth every 8 (eight) hours as needed.    lidocaine (LIDODERM) 5 % Apply patch to affected area for 12 hours, then remove for 12 hours.    valacyclovir (VALTREX) 1000 MG tablet TAKE 1 TABLET BY MOUTH THREE TIMES DAILY           Verify that the below list of medications is an accurate representation of the medications you are currently taking.  If none reported, the list may be blank. If incorrect, please contact your healthcare provider. Carry this list with you in case of emergency.           Current Medications     aspirin 81 MG Chew Take 81 mg by mouth once daily.    atorvastatin (LIPITOR) 40 MG tablet Take 1 tablet (40 mg total) by mouth once daily.    carbamazepine (TEGRETOL) 200 mg tablet Take 200 mg nightly for one week then take 200 mg TWICE DAILY thereafter    gabapentin (NEURONTIN) 300 MG capsule Take one cap at bedtime for one week, than increase to 2 caps at bedtime at bedtime, if tolerated.  Do not stop abruptly.    hydrocodone-acetaminophen 5-325mg (NORCO) 5-325 mg per tablet Take 1 tablet by mouth every 8 (eight) hours as needed for Pain.    losartan (COZAAR) 50 MG tablet Take 1 tablet (50 mg total) by mouth every evening.           Clinical Reference Information           Your Vitals Were     BP Pulse Temp Height Weight BMI    160/98 78 98.6 °F (37 °C) 5' 1" (1.549 m) 84 kg (185 lb 3 " oz) 34.99 kg/m2      Blood Pressure          Most Recent Value    BP  (!)  160/98      Allergies as of 5/1/2017     No Known Allergies      Immunizations Administered on Date of Encounter - 5/1/2017     None      Orders Placed During Today's Visit      Normal Orders This Visit    Ambulatory consult to Obstetrics / Gynecology     EKG 12-lead       [facility] Sign-Up     Activating your [facility] account is as easy as 1-2-3!     1) Visit [email or web address], select Sign Up Now, enter this activation code and your date of birth, then select Next.  [email or web address]  Expires: 6/15/2017  9:07 AM      2) Create a username and password to use when you visit [facility] in the future and select a security question in case you lose your password and select Next.    3) Enter your e-mail address and click Sign Up!    Additional Information  If you have questions, please e-mail [email or web address] or call [phone] to talk to our [facility] staff. Remember, [facility] is NOT to be used for urgent needs. For medical emergencies, dial 911.         Language Assistance Services     ATTENTION: Language assistance services are available, free of charge. Please call [phone].      ATENCIÓN: Si radhikala ree, tiene a rodriguez disposición servicios gratuitos de asistencia lingüística. Llame al [phone].     CHÚ Ý: N?u b?n nói Ti?ng Vi?t, có các d?ch v? h? tr? ngôn ng? mi?n phí dành cho b?n. G?i s? [phone].         [facility] complies with applicable Federal civil rights laws and does not discriminate on the basis of race, color, national origin, age, disability, or sex.

## 2017-05-13 ENCOUNTER — HOSPITAL ENCOUNTER (OUTPATIENT)
Facility: HOSPITAL | Age: 72
Discharge: HOME | End: 2017-05-15
Attending: EMERGENCY MEDICINE | Admitting: HOSPITALIST
Payer: MEDICARE

## 2017-05-13 DIAGNOSIS — R07.9 CHEST PAIN: ICD-10-CM

## 2017-05-13 DIAGNOSIS — R56.9 SEIZURE: ICD-10-CM

## 2017-05-13 DIAGNOSIS — I10 ESSENTIAL HYPERTENSION: Chronic | ICD-10-CM

## 2017-05-13 DIAGNOSIS — M47.22 OSTEOARTHRITIS OF SPINE WITH RADICULOPATHY, CERVICAL REGION: ICD-10-CM

## 2017-05-13 DIAGNOSIS — R07.9 CHEST PAIN, RULE OUT ACUTE MYOCARDIAL INFARCTION: Primary | ICD-10-CM

## 2017-05-13 DIAGNOSIS — F33.9 RECURRENT MAJOR DEPRESSIVE DISORDER, REMISSION STATUS UNSPECIFIED: ICD-10-CM

## 2017-05-13 DIAGNOSIS — R20.0 LEFT FACIAL NUMBNESS: ICD-10-CM

## 2017-05-13 LAB
ALBUMIN SERPL BCP-MCNC: 3.5 G/DL
ALP SERPL-CCNC: 114 U/L
ALT SERPL W/O P-5'-P-CCNC: 10 U/L
ANION GAP SERPL CALC-SCNC: 9 MMOL/L
AST SERPL-CCNC: 16 U/L
BASOPHILS # BLD AUTO: 0.01 K/UL
BASOPHILS NFR BLD: 0.3 %
BILIRUB SERPL-MCNC: 0.4 MG/DL
BNP SERPL-MCNC: 67 PG/ML
BUN SERPL-MCNC: 13 MG/DL
CALCIUM SERPL-MCNC: 9.1 MG/DL
CHLORIDE SERPL-SCNC: 107 MMOL/L
CHOLEST/HDLC SERPL: 3.9 {RATIO}
CO2 SERPL-SCNC: 24 MMOL/L
CREAT SERPL-MCNC: 0.8 MG/DL
DIFFERENTIAL METHOD: ABNORMAL
EOSINOPHIL # BLD AUTO: 0.1 K/UL
EOSINOPHIL NFR BLD: 2.6 %
ERYTHROCYTE [DISTWIDTH] IN BLOOD BY AUTOMATED COUNT: 12.2 %
EST. GFR  (AFRICAN AMERICAN): >60 ML/MIN/1.73 M^2
EST. GFR  (NON AFRICAN AMERICAN): >60 ML/MIN/1.73 M^2
GLUCOSE SERPL-MCNC: 107 MG/DL
HCT VFR BLD AUTO: 37.1 %
HDL/CHOLESTEROL RATIO: 25.8 %
HDLC SERPL-MCNC: 182 MG/DL
HDLC SERPL-MCNC: 47 MG/DL
HGB BLD-MCNC: 13 G/DL
INR PPP: 1
LDLC SERPL CALC-MCNC: 112.8 MG/DL
LYMPHOCYTES # BLD AUTO: 1.7 K/UL
LYMPHOCYTES NFR BLD: 43.7 %
MAGNESIUM SERPL-MCNC: 1.9 MG/DL
MCH RBC QN AUTO: 31.6 PG
MCHC RBC AUTO-ENTMCNC: 35 %
MCV RBC AUTO: 90 FL
MONOCYTES # BLD AUTO: 0.3 K/UL
MONOCYTES NFR BLD: 8.7 %
NEUTROPHILS # BLD AUTO: 1.7 K/UL
NEUTROPHILS NFR BLD: 44.4 %
NONHDLC SERPL-MCNC: 135 MG/DL
PHOSPHATE SERPL-MCNC: 3.1 MG/DL
PLATELET # BLD AUTO: 181 K/UL
PMV BLD AUTO: 10.2 FL
POCT GLUCOSE: 110 MG/DL (ref 70–110)
POTASSIUM SERPL-SCNC: 3.9 MMOL/L
PROT SERPL-MCNC: 6.9 G/DL
PROTHROMBIN TIME: 10.8 SEC
RBC # BLD AUTO: 4.12 M/UL
SODIUM SERPL-SCNC: 140 MMOL/L
TRIGL SERPL-MCNC: 111 MG/DL
TROPONIN I SERPL DL<=0.01 NG/ML-MCNC: 0.06 NG/ML
TROPONIN I SERPL DL<=0.01 NG/ML-MCNC: 0.07 NG/ML
TSH SERPL DL<=0.005 MIU/L-ACNC: 0.41 UIU/ML
WBC # BLD AUTO: 3.78 K/UL

## 2017-05-13 PROCEDURE — 25000003 PHARM REV CODE 250: Performed by: HOSPITALIST

## 2017-05-13 PROCEDURE — 80053 COMPREHEN METABOLIC PANEL: CPT

## 2017-05-13 PROCEDURE — 93005 ELECTROCARDIOGRAM TRACING: CPT

## 2017-05-13 PROCEDURE — 84484 ASSAY OF TROPONIN QUANT: CPT

## 2017-05-13 PROCEDURE — 82962 GLUCOSE BLOOD TEST: CPT

## 2017-05-13 PROCEDURE — 99220 PR INITIAL OBSERVATION CARE,LEVL III: CPT | Mod: ,,, | Performed by: PHYSICIAN ASSISTANT

## 2017-05-13 PROCEDURE — 93010 ELECTROCARDIOGRAM REPORT: CPT | Mod: 76,,, | Performed by: INTERNAL MEDICINE

## 2017-05-13 PROCEDURE — 83880 ASSAY OF NATRIURETIC PEPTIDE: CPT

## 2017-05-13 PROCEDURE — 93010 ELECTROCARDIOGRAM REPORT: CPT | Mod: ,,, | Performed by: INTERNAL MEDICINE

## 2017-05-13 PROCEDURE — 84100 ASSAY OF PHOSPHORUS: CPT

## 2017-05-13 PROCEDURE — 99285 EMERGENCY DEPT VISIT HI MDM: CPT | Mod: ,,, | Performed by: EMERGENCY MEDICINE

## 2017-05-13 PROCEDURE — 84443 ASSAY THYROID STIM HORMONE: CPT

## 2017-05-13 PROCEDURE — 83735 ASSAY OF MAGNESIUM: CPT

## 2017-05-13 PROCEDURE — 85610 PROTHROMBIN TIME: CPT

## 2017-05-13 PROCEDURE — 99285 EMERGENCY DEPT VISIT HI MDM: CPT | Mod: 25

## 2017-05-13 PROCEDURE — G0378 HOSPITAL OBSERVATION PER HR: HCPCS

## 2017-05-13 PROCEDURE — 80061 LIPID PANEL: CPT

## 2017-05-13 PROCEDURE — 85025 COMPLETE CBC W/AUTO DIFF WBC: CPT

## 2017-05-13 PROCEDURE — 25000003 PHARM REV CODE 250: Performed by: PHYSICIAN ASSISTANT

## 2017-05-13 PROCEDURE — 25000003 PHARM REV CODE 250: Performed by: EMERGENCY MEDICINE

## 2017-05-13 RX ORDER — HYDRALAZINE HYDROCHLORIDE 25 MG/1
25 TABLET, FILM COATED ORAL EVERY 8 HOURS PRN
Status: DISCONTINUED | OUTPATIENT
Start: 2017-05-13 | End: 2017-05-15 | Stop reason: HOSPADM

## 2017-05-13 RX ORDER — PANTOPRAZOLE SODIUM 40 MG/1
40 TABLET, DELAYED RELEASE ORAL DAILY
Status: DISCONTINUED | OUTPATIENT
Start: 2017-05-14 | End: 2017-05-15 | Stop reason: HOSPADM

## 2017-05-13 RX ORDER — GLUCAGON 1 MG
1 KIT INJECTION
Status: DISCONTINUED | OUTPATIENT
Start: 2017-05-13 | End: 2017-05-15 | Stop reason: HOSPADM

## 2017-05-13 RX ORDER — ONDANSETRON 2 MG/ML
4 INJECTION INTRAMUSCULAR; INTRAVENOUS EVERY 12 HOURS PRN
Status: DISCONTINUED | OUTPATIENT
Start: 2017-05-13 | End: 2017-05-15 | Stop reason: HOSPADM

## 2017-05-13 RX ORDER — NITROGLYCERIN 0.4 MG/1
0.4 TABLET SUBLINGUAL
Status: COMPLETED | OUTPATIENT
Start: 2017-05-13 | End: 2017-05-13

## 2017-05-13 RX ORDER — ATORVASTATIN CALCIUM 20 MG/1
40 TABLET, FILM COATED ORAL DAILY
Status: DISCONTINUED | OUTPATIENT
Start: 2017-05-14 | End: 2017-05-15 | Stop reason: HOSPADM

## 2017-05-13 RX ORDER — IBUPROFEN 200 MG
24 TABLET ORAL
Status: DISCONTINUED | OUTPATIENT
Start: 2017-05-13 | End: 2017-05-15 | Stop reason: HOSPADM

## 2017-05-13 RX ORDER — CARBAMAZEPINE 200 MG/1
200 TABLET ORAL 2 TIMES DAILY
Status: DISCONTINUED | OUTPATIENT
Start: 2017-05-13 | End: 2017-05-15 | Stop reason: HOSPADM

## 2017-05-13 RX ORDER — LABETALOL HYDROCHLORIDE 5 MG/ML
10 INJECTION, SOLUTION INTRAVENOUS
Status: DISCONTINUED | OUTPATIENT
Start: 2017-05-13 | End: 2017-05-13

## 2017-05-13 RX ORDER — ONDANSETRON 8 MG/1
8 TABLET, ORALLY DISINTEGRATING ORAL EVERY 8 HOURS PRN
Status: DISCONTINUED | OUTPATIENT
Start: 2017-05-13 | End: 2017-05-15 | Stop reason: HOSPADM

## 2017-05-13 RX ORDER — LOSARTAN POTASSIUM 50 MG/1
50 TABLET ORAL NIGHTLY
Status: DISCONTINUED | OUTPATIENT
Start: 2017-05-14 | End: 2017-05-15 | Stop reason: HOSPADM

## 2017-05-13 RX ORDER — LOSARTAN POTASSIUM 50 MG/1
50 TABLET ORAL DAILY
Status: DISCONTINUED | OUTPATIENT
Start: 2017-05-14 | End: 2017-05-13

## 2017-05-13 RX ORDER — NAPROXEN SODIUM 220 MG/1
81 TABLET, FILM COATED ORAL DAILY
Status: DISCONTINUED | OUTPATIENT
Start: 2017-05-14 | End: 2017-05-15 | Stop reason: HOSPADM

## 2017-05-13 RX ORDER — ACETAMINOPHEN 325 MG/1
650 TABLET ORAL EVERY 4 HOURS PRN
Status: DISCONTINUED | OUTPATIENT
Start: 2017-05-13 | End: 2017-05-15 | Stop reason: HOSPADM

## 2017-05-13 RX ORDER — LOSARTAN POTASSIUM 50 MG/1
50 TABLET ORAL DAILY
Status: DISCONTINUED | OUTPATIENT
Start: 2017-05-13 | End: 2017-05-13

## 2017-05-13 RX ORDER — HYDROCODONE BITARTRATE AND ACETAMINOPHEN 5; 325 MG/1; MG/1
1 TABLET ORAL EVERY 4 HOURS PRN
Status: DISCONTINUED | OUTPATIENT
Start: 2017-05-13 | End: 2017-05-15 | Stop reason: HOSPADM

## 2017-05-13 RX ORDER — IBUPROFEN 200 MG
16 TABLET ORAL
Status: DISCONTINUED | OUTPATIENT
Start: 2017-05-13 | End: 2017-05-15 | Stop reason: HOSPADM

## 2017-05-13 RX ORDER — GABAPENTIN 300 MG/1
600 CAPSULE ORAL NIGHTLY
Status: DISCONTINUED | OUTPATIENT
Start: 2017-05-13 | End: 2017-05-13

## 2017-05-13 RX ORDER — NITROGLYCERIN 0.4 MG/1
0.4 TABLET SUBLINGUAL EVERY 5 MIN PRN
Status: DISCONTINUED | OUTPATIENT
Start: 2017-05-13 | End: 2017-05-15 | Stop reason: HOSPADM

## 2017-05-13 RX ORDER — ASPIRIN 325 MG
325 TABLET ORAL
Status: COMPLETED | OUTPATIENT
Start: 2017-05-13 | End: 2017-05-13

## 2017-05-13 RX ADMIN — NITROGLYCERIN 0.4 MG: 0.4 TABLET SUBLINGUAL at 07:05

## 2017-05-13 RX ADMIN — CARBAMAZEPINE 200 MG: 200 TABLET ORAL at 10:05

## 2017-05-13 RX ADMIN — LOSARTAN POTASSIUM 50 MG: 50 TABLET, FILM COATED ORAL at 07:05

## 2017-05-13 RX ADMIN — ASPIRIN 325 MG ORAL TABLET 325 MG: 325 PILL ORAL at 03:05

## 2017-05-13 NOTE — IP AVS SNAPSHOT
[facility]  [address]  [facility] [address]  Phone: [phone]           Patient Discharge Instructions   Our goal is to set you up for success. This packet includes information on your condition, medications, and your home care.  It will help you care for yourself to prevent having to return to the hospital.     Please ask your nurse if you have any questions.      There are many details to remember when preparing to leave the hospital. Here is what you will need to do:    1. Take your medicine. If you are prescribed medications, review your Medication List on the following pages. You may have new medications to  at the pharmacy and others that you'll need to stop taking. Review the instructions for how and when to take your medications. Talk with your doctor or nurses if you are unsure of what to do.     2. Go to your follow-up appointments. Specific follow-up information is listed in the following pages. Your may be contacted by a nurse or clinical provider about future appointments. Be sure we have all of the phone numbers to reach you. Please contact your provider's office if you are unable to make an appointment.     3. Watch for warning signs. Your doctor or nurse will give you detailed warning signs to watch for and when to call for assistance. These instructions may also include educational information about your condition. If you experience any of warning signs to your health, call your doctor.           [facility]  Unless otherwise directed by your provider, please   contact [facility], our nurse care line   that is available for 24/7 assistance.     [phone] (toll-free)     Registered nurses in the [facility]   provide: appointment scheduling, clinical advisement, health education, and other advisory services.                  ** Verify the list of medication(s) below is accurate and up to date. Carry this with you in case of  emergency. If your medications have changed, please notify your healthcare provider.             Medication List      CONTINUE taking these medications        Additional Info                      aspirin 81 MG Chew   Refills:  0   Dose:  81 mg    Last time this was given:  81 mg on 5/15/2017  8:22 AM   Instructions:  Take 81 mg by mouth once daily.     Begin Date    AM    Noon    PM    Bedtime       atorvastatin 40 MG tablet   Commonly known as:  LIPITOR   Quantity:  14 tablet   Refills:  0   Dose:  40 mg    Last time this was given:  40 mg on 5/15/2017  8:23 AM   Instructions:  Take 1 tablet (40 mg total) by mouth once daily.     Begin Date    AM    Noon    PM    Bedtime       carbamazepine 200 mg tablet   Commonly known as:  TEGRETOL   Quantity:  30 tablet   Refills:  11    Last time this was given:  200 mg on 5/15/2017  8:22 AM   Instructions:  Take 200 mg nightly for one week then take 200 mg TWICE DAILY thereafter     Begin Date    AM    Noon    PM    Bedtime       gabapentin 300 MG capsule   Commonly known as:  NEURONTIN   Quantity:  90 capsule   Refills:  11    Instructions:  Take one cap at bedtime for one week, than increase to 2 caps at bedtime at bedtime, if tolerated. Do not stop abruptly.     Begin Date    AM    Noon    PM    Bedtime       hydrocodone-acetaminophen 5-325mg 5-325 mg per tablet   Commonly known as:  NORCO   Quantity:  40 tablet   Refills:  0   Dose:  1 tablet   Comments:  Caution, medication is sedating    Last time this was given:  1 tablet on 5/15/2017  5:09 AM   Instructions:  Take 1 tablet by mouth every 8 (eight) hours as needed for Pain.     Begin Date    AM    Noon    PM    Bedtime       losartan 50 MG tablet   Commonly known as:  COZAAR   Quantity:  90 tablet   Refills:  4   Dose:  50 mg    Last time this was given:  50 mg on 5/14/2017  8:56 PM   Instructions:  Take 1 tablet (50 mg total) by mouth every evening.     Begin Date    AM    Noon    PM    Bedtime                  Please  bring to all follow up appointments:    1. A copy of your discharge instructions.  2. All medicines you are currently taking in their original bottles.  3. Identification and insurance card.    Please arrive 15 minutes ahead of scheduled appointment time.    Please call 24 hours in advance if you must reschedule your appointment and/or time.        Your Scheduled Appointments     Jun 13, 2017  2:00 PM CDT   Hospital Follow Up with MD Eunice Wylie - Internal Medicine ([facility] [place])    [address]  [place] [address]   [phone]            Aug 01, 2017  8:20 AM CDT   Established Patient Visit with MD Eunice Wylie - Internal Medicine ([facility] [place])    [address]  [place] [address]   [phone]              Follow-up Information     Follow up with Luanne Connell MD On 6/13/2017.    Specialty:  Internal Medicine    Why:  Please follow up with PCP in 2-3 weeks. / 2:00pm     Contact information:    [address] [facility] [address]  [phone]          Follow up with Jim Paiz - Neurology.    Specialty:  Neurology    Why:  Please follow up with Neurology - the office will call you to schedule appointment.     Contact information:    [address]  [facility] [address] [phone]    Additional information:    7th Floor - [facility] [place]        Follow up with Jim Paiz - Psychiatry.    Specialty:  Psychiatry    Why:  Please call office to schedule appointment.     Contact information:    [address]  [facility] [address] [phone]    Additional information:    [facility] - 4th Floor        Follow up with Jim Paiz - Cardiology.    Specialty:  Cardiology    Why:  Please follow up with cardiology, the office will call you to schedule appointment.     Contact information:    [address]  [facility] [address] [phone]    Additional information:    3rd floor     "  Referrals     Future Orders    Ambulatory Referral to Cardiology     Ambulatory Referral to Neurology     Ambulatory Referral to Psychiatry         Discharge Instructions     Future Orders    Activity as tolerated     Call MD for:  difficulty breathing or increased cough     Call MD for:  severe uncontrolled pain     Diet general     Questions:    Total calories:      Fat restriction, if any:      Protein restriction, if any:      Na restriction, if any:  2gNa    Fluid restriction:      Additional restrictions:          Primary Diagnosis     Your primary diagnosis was:  Chest Pain, Rule Out Acute Myocardial Infarction      Admission Information     Date & Time Provider Department CSN    5/13/2017  2:35 PM [facility] [id number]      Care Providers     Provider Role Specialty Primary office phone    Rasta Gonzalez MD Attending Provider Hospitalist [phone]    Jose Mcdaniel MD Team Attending  Hospitalist [phone]    Rasta Gonzalez MD Team Attending  Hospitalist [phone]      Your Vitals Were     BP Pulse Temp Resp Height Weight    182/87 (BP Location: Left arm, Patient Position: Lying, BP Method: Automatic) 78 96.9 °F (36.1 °C) (Oral) 18 5' 4" (1.626 m) 85.7 kg (189 lb)    SpO2 BMI             99% 32.44 kg/m2         Recent Lab Values        5/9/2016                           6:06 PM           A1C 6.1                       Allergies as of 5/15/2017     No Known Allergies      Advance Directives     An advance directive is a document which, in the event you are no longer able to make decisions for yourself, tells your healthcare team what kind of treatment you do or do not want to receive, or who you would like to make those decisions for you.  If you do not currently have an advance directive, [facility] encourages you to create one.  For more information call:  [phone]), [phone] ([phone]),  or log on to [email or web address].      "   Language Assistance Services     ATTENTION: Language assistance services are available, free of charge. Please call [phone].      ATENCIÓN: Si servando keenan, tiene a rodriguez disposición servicios gratuitos de asistencia lingüística. Llame al [phone].     CHÚ Ý: N?u b?n nói Ti?ng Vi?t, có các d?ch v? h? tr? ngôn ng? mi?n phí dành cho b?n. G?i s? [phone].        Stroke Education              [facility] Sign-Up     Activating your [facility] account is as easy as 1-2-3!     1) Visit [email or web address], select Sign Up Now, enter this activation code and your date of birth, then select Next.  [email or web address]  Expires: 6/15/2017  9:07 AM      2) Create a username and password to use when you visit [facility] in the future and select a security question in case you lose your password and select Next.    3) Enter your e-mail address and click Sign Up!    Additional Information  If you have questions, please e-mail [email or web address] or call [phone] to talk to our [facility] staff. Remember, [facility] is NOT to be used for urgent needs. For medical emergencies, dial 911.          [facility] complies with applicable Federal civil rights laws and does not discriminate on the basis of race, color, national origin, age, disability, or sex.

## 2017-05-13 NOTE — ED NOTES
"Patient report chest pain since last night that was slightly relieved with pain medication last night but returned this morning.  Pain radiates to neck and face and left arm.  Reports "tightness" in face.  Denies SOB.  C/o some nausea.  States "I had an episode this morning when I felt like i couldn't get my words out right".   "

## 2017-05-13 NOTE — ED PROVIDER NOTES
"Encounter Date: 5/13/2017       History     Chief Complaint   Patient presents with    Chest Pain     Review of patient's allergies indicates:  No Known Allergies  HPI Comments: 72 yo F with HTN, seizure disorder presents with left sided chest pain. She first noticed the chest pain last night while at rest, lasted about 2 hours before resolving (took home hydrocodone for pain). She states she felt normal when she woke up this am. Around 9:30 am symptoms returned- left sided chest tightness with pain in her L arm and a sensation of "tightness" in her left face that is constant and associated with shortness of breath. She reports a mild posterior headache that she says is similar to headaches she has had in the past. She reports feeling weak in her left upper extremity that has been present since 9:30 am. She did not take any medication for pain or blood pressure today (takes her losartan at night).     The history is provided by the patient and medical records.     Past Medical History:   Diagnosis Date    Abnormal mammogram of both breasts     Hypertension     Memory disorder     Seizures      Past Surgical History:   Procedure Laterality Date    CATARACT EXTRACTION      cysts breast       No family history on file.  Social History   Substance Use Topics    Smoking status: Never Smoker    Smokeless tobacco: Never Used    Alcohol use No     Review of Systems   Constitutional: Negative for chills and fever.   Eyes: Negative for visual disturbance.   Respiratory: Positive for shortness of breath. Negative for cough.    Cardiovascular: Positive for chest pain. Negative for leg swelling.   Gastrointestinal: Negative for abdominal pain, nausea and vomiting.   Genitourinary: Negative for dysuria.   Musculoskeletal: Negative for neck pain.   Neurological: Positive for weakness, numbness and headaches.   All other systems reviewed and are negative.      Physical Exam   Initial Vitals   BP Pulse Resp Temp SpO2 "   05/13/17 1433 05/13/17 1433 05/13/17 1433 05/13/17 1433 05/13/17 1433   221/105 88 16 98.2 °F (36.8 °C) 97 %     Physical Exam    Nursing note and vitals reviewed.  Constitutional: She appears well-developed and well-nourished. She is not diaphoretic. No distress.   HENT:   Mouth/Throat: Oropharynx is clear and moist.   Eyes: EOM are normal. Pupils are equal, round, and reactive to light.   Neck: Neck supple.   Cardiovascular: Normal rate and regular rhythm.   Pulses:       Carotid pulses are 2+ on the right side, and 2+ on the left side.       Radial pulses are 2+ on the right side, and 2+ on the left side.   Pulmonary/Chest: Breath sounds normal. No respiratory distress. She has no wheezes. She has no rales.   Musculoskeletal: She exhibits no edema.   Neurological: She is alert and oriented to person, place, and time.   Patient reports diminished sensation to light touch in left face and left arm, otherwise CN 2-12 intact. 4/5 strength in LUE with elbow extension, otherwise strength 5/5 in RUE and bilateral lower extremities. No pronator drift, no dysarthria or aphasia.    Skin: Skin is warm and dry.         ED Course   Procedures  Labs Reviewed - No data to display          Medical Decision Making:   Initial Assessment:   70 yo F with chest pain, left upper extremity weakness/pain. Ddx broad- ACS v. CVA (at time of presentation patient >4 hours from symptom onset) v. Muscle spasm v. PTX v. Hypertensive emergency. Low suspicion for PNA v. Dissection. Work up initiated with labs including cardiac markers, ECG, CXR, CT head. Holding aspirin until CT head performed.   Valentina Reyes MD PGY3  [employer] [facility]  3:15 PM    Clinical Tests:   Lab Tests: Reviewed  The following lab test(s) were unremarkable: Troponin, CBC and CMP  Radiological Study: Reviewed  ED Management:  Patient reports CP minimal at this time (1/10), BP improved to 160/70s without intervention, patient given aspirin as CT head without acute abnormality. On  chart review patient has not had a stress test in the past, trop minimally elevated. Consulted to medicine for admission for ACS rule out.   Valentina Reyes MD PGY3  [employer] [facility]  6:00 PM                     ED Course     Clinical Impression:   The encounter diagnosis was Chest pain.          Valentina Reyes MD  Resident  05/13/17 1800

## 2017-05-14 PROBLEM — F32.9 MAJOR DEPRESSIVE DISORDER: Status: ACTIVE | Noted: 2017-05-14

## 2017-05-14 LAB
ANION GAP SERPL CALC-SCNC: 8 MMOL/L
BILIRUB UR QL STRIP: NEGATIVE
BUN SERPL-MCNC: 17 MG/DL
CALCIUM SERPL-MCNC: 8.9 MG/DL
CHLORIDE SERPL-SCNC: 106 MMOL/L
CLARITY UR REFRACT.AUTO: CLEAR
CO2 SERPL-SCNC: 27 MMOL/L
COLOR UR AUTO: YELLOW
CREAT SERPL-MCNC: 0.8 MG/DL
EST. GFR  (AFRICAN AMERICAN): >60 ML/MIN/1.73 M^2
EST. GFR  (NON AFRICAN AMERICAN): >60 ML/MIN/1.73 M^2
GLUCOSE SERPL-MCNC: 103 MG/DL
GLUCOSE UR QL STRIP: NEGATIVE
HGB UR QL STRIP: NEGATIVE
KETONES UR QL STRIP: NEGATIVE
LEUKOCYTE ESTERASE UR QL STRIP: ABNORMAL
MICROSCOPIC COMMENT: NORMAL
NITRITE UR QL STRIP: NEGATIVE
PH UR STRIP: 6 [PH] (ref 5–8)
POTASSIUM SERPL-SCNC: 3.9 MMOL/L
PROT UR QL STRIP: NEGATIVE
RBC #/AREA URNS AUTO: 0 /HPF (ref 0–4)
SODIUM SERPL-SCNC: 141 MMOL/L
SP GR UR STRIP: 1.01 (ref 1–1.03)
SQUAMOUS #/AREA URNS AUTO: 1 /HPF
TROPONIN I SERPL DL<=0.01 NG/ML-MCNC: 0.06 NG/ML
TROPONIN I SERPL DL<=0.01 NG/ML-MCNC: 0.07 NG/ML
URN SPEC COLLECT METH UR: ABNORMAL
UROBILINOGEN UR STRIP-ACNC: NEGATIVE EU/DL
WBC #/AREA URNS AUTO: 1 /HPF (ref 0–5)

## 2017-05-14 PROCEDURE — 81001 URINALYSIS AUTO W/SCOPE: CPT

## 2017-05-14 PROCEDURE — 99226 PR SUBSEQUENT OBSERVATION CARE,LEVEL III: CPT | Mod: GC,,, | Performed by: INTERNAL MEDICINE

## 2017-05-14 PROCEDURE — 25000003 PHARM REV CODE 250: Performed by: PHYSICIAN ASSISTANT

## 2017-05-14 PROCEDURE — 99226 PR SUBSEQUENT OBSERVATION CARE,LEVEL III: CPT | Mod: ,,, | Performed by: PHYSICIAN ASSISTANT

## 2017-05-14 PROCEDURE — 36415 COLL VENOUS BLD VENIPUNCTURE: CPT

## 2017-05-14 PROCEDURE — 84484 ASSAY OF TROPONIN QUANT: CPT | Mod: 91

## 2017-05-14 PROCEDURE — 84484 ASSAY OF TROPONIN QUANT: CPT

## 2017-05-14 PROCEDURE — G0378 HOSPITAL OBSERVATION PER HR: HCPCS

## 2017-05-14 PROCEDURE — 80048 BASIC METABOLIC PNL TOTAL CA: CPT

## 2017-05-14 RX ORDER — BENZONATATE 100 MG/1
100 CAPSULE ORAL 3 TIMES DAILY PRN
Status: DISCONTINUED | OUTPATIENT
Start: 2017-05-14 | End: 2017-05-15 | Stop reason: HOSPADM

## 2017-05-14 RX ADMIN — LOSARTAN POTASSIUM 50 MG: 50 TABLET, FILM COATED ORAL at 08:05

## 2017-05-14 RX ADMIN — CARBAMAZEPINE 200 MG: 200 TABLET ORAL at 08:05

## 2017-05-14 RX ADMIN — HYDROCODONE BITARTRATE AND ACETAMINOPHEN 1 TABLET: 5; 325 TABLET ORAL at 02:05

## 2017-05-14 RX ADMIN — PANTOPRAZOLE SODIUM 40 MG: 40 TABLET, DELAYED RELEASE ORAL at 09:05

## 2017-05-14 RX ADMIN — ASPIRIN 81 MG CHEWABLE TABLET 81 MG: 81 TABLET CHEWABLE at 09:05

## 2017-05-14 RX ADMIN — BENZONATATE 100 MG: 100 CAPSULE ORAL at 05:05

## 2017-05-14 RX ADMIN — CARBAMAZEPINE 200 MG: 200 TABLET ORAL at 01:05

## 2017-05-14 RX ADMIN — HYDROCODONE BITARTRATE AND ACETAMINOPHEN 1 TABLET: 5; 325 TABLET ORAL at 08:05

## 2017-05-14 RX ADMIN — ATORVASTATIN CALCIUM 40 MG: 20 TABLET, FILM COATED ORAL at 09:05

## 2017-05-14 NOTE — SUBJECTIVE & OBJECTIVE
Past Medical History:   Diagnosis Date    Abnormal mammogram of both breasts     Hypertension     Memory disorder     Seizures        Past Surgical History:   Procedure Laterality Date    CATARACT EXTRACTION      cysts breast         Review of patient's allergies indicates:  No Known Allergies    No current facility-administered medications on file prior to encounter.      Current Outpatient Prescriptions on File Prior to Encounter   Medication Sig    aspirin 81 MG Chew Take 81 mg by mouth once daily.    atorvastatin (LIPITOR) 40 MG tablet Take 1 tablet (40 mg total) by mouth once daily.    carbamazepine (TEGRETOL) 200 mg tablet Take 200 mg nightly for one week then take 200 mg TWICE DAILY thereafter    gabapentin (NEURONTIN) 300 MG capsule Take one cap at bedtime for one week, than increase to 2 caps at bedtime at bedtime, if tolerated.  Do not stop abruptly.    hydrocodone-acetaminophen 5-325mg (NORCO) 5-325 mg per tablet Take 1 tablet by mouth every 8 (eight) hours as needed for Pain.    losartan (COZAAR) 50 MG tablet Take 1 tablet (50 mg total) by mouth every evening.     Family History     None        Social History Main Topics    Smoking status: Never Smoker    Smokeless tobacco: Never Used    Alcohol use No    Drug use: No    Sexual activity: No     Review of Systems   Constitutional: Positive for activity change, appetite change, chills and fatigue. Negative for fever.   Eyes: Negative for photophobia and visual disturbance.   Respiratory: Positive for cough, chest tightness and shortness of breath. Negative for wheezing.    Cardiovascular: Positive for chest pain and palpitations. Negative for leg swelling.   Gastrointestinal: Positive for nausea. Negative for abdominal pain and vomiting.   Endocrine: Positive for cold intolerance. Negative for heat intolerance.   Genitourinary: Negative for difficulty urinating and dysuria.   Musculoskeletal: Positive for arthralgias, myalgias and neck  pain.   Skin: Negative for rash and wound.   Neurological: Positive for seizures, speech difficulty, weakness and numbness. Negative for dizziness.   Psychiatric/Behavioral: Positive for dysphoric mood. Negative for confusion and self-injury.     Objective:     Vital Signs (Most Recent):  Temp: 97.8 °F (36.6 °C) (05/13/17 2300)  Pulse: 64 (05/13/17 2300)  Resp: 18 (05/13/17 2300)  BP: (!) 145/86 (05/13/17 2300)  SpO2: 96 % (05/13/17 2300) Vital Signs (24h Range):  Temp:  [97.8 °F (36.6 °C)-98.4 °F (36.9 °C)] 97.8 °F (36.6 °C)  Pulse:  [63-88] 64  Resp:  [11-20] 18  SpO2:  [94 %-100 %] 96 %  BP: (145-221)/() 145/86     Weight: 85.7 kg (189 lb)  Body mass index is 32.44 kg/(m^2).    Physical Exam   Constitutional: She is oriented to person, place, and time. She appears well-developed and well-nourished. No distress.   HENT:   Head: Normocephalic and atraumatic.   Eyes: EOM are normal. Pupils are equal, round, and reactive to light.   Neck: Normal range of motion.   Cardiovascular: Normal rate and regular rhythm.    No murmur heard.  Pulmonary/Chest: Effort normal and breath sounds normal. No respiratory distress. She has no wheezes.   Abdominal: Soft. Bowel sounds are normal. She exhibits no distension. There is no tenderness. There is no guarding.   Musculoskeletal: Normal range of motion. She exhibits no edema, tenderness or deformity.   Neurological: She is alert and oriented to person, place, and time. No cranial nerve deficit. She exhibits normal muscle tone. Coordination normal. GCS eye subscore is 4. GCS verbal subscore is 5. GCS motor subscore is 6.   No focal deficits. 4-5/5 strength throughout, no facial droop, no slurred speech, no dysmetria.   Skin: Skin is warm and dry.   Psychiatric: Her affect is blunt. She exhibits a depressed mood.   Nursing note and vitals reviewed.       Significant Labs:   CBC:   Recent Labs  Lab 05/13/17  1457   WBC 3.78*   HGB 13.0   HCT 37.1        CMP:   Recent  Labs  Lab 05/13/17  1457      K 3.9      CO2 24      BUN 13   CREATININE 0.8   CALCIUM 9.1   PROT 6.9   ALBUMIN 3.5   BILITOT 0.4   ALKPHOS 114   AST 16   ALT 10   ANIONGAP 9   EGFRNONAA >60.0     Cardiac Markers:   Recent Labs  Lab 05/13/17  1457   BNP 67     All pertinent labs within the past 24 hours have been reviewed.    Significant Imaging: EKG: I have reviewed all pertinent results/findings within the past 24 hours and my personal findings are: no ST changes  I have reviewed all pertinent imaging results/findings within the past 24 hours.

## 2017-05-14 NOTE — H&P
"[facility] Medicine  History & Physical    Patient Name: Sofia Augustine  MRN: [id number]  Admission Date: 5/13/2017  Attending Physician: Rasta Gonzalez MD   Primary Care Provider: Luanne Connell MD    [facility] Medicine Team: [facility] MED E Lee Whitten PA-C     Patient information was obtained from patient, past medical records and ER records.     Subjective:     Principal Problem:Chest pain, rule out acute myocardial infarction    Chief Complaint:   Chief Complaint   Patient presents with    Chest Pain        HPI: 71F with PMHx of depression, hypertension, hyperlipidemia, TIA, seizures currently on Tegretol presents for evaluation of chest pain that started acutely last night at 1800 and relieved with 2 hydrocodone. She additionally reports a HA and left facial/tongue numbness that is chronic per chart review. She reports left arm pain as well that is chronic and "more intense than usual". She describes her chest pain as a heaviness. Movement and inspiration do not change her chest pain. She reports a mild posterior headache that she says is similar to headaches she has had in the past. She reports feeling weak in her left upper extremity. At the time of my exam the patient only reports a mild HA, all other symptoms have resolved or improved.     Upon chart review, patient has had multiple admissions and ED visits for nearly the exact same presentation. She has been evaluated by cardiology and vascular neurology who did not feel her symptoms were ACS or CVA in light of non-focal neurologic exam. She was encouraged to follow up with neurology for her seizures, cardiology for outpatient pharmacological SPECT, and psychiatry for what may be a psychosomatic component. She has not followed up with any of those recommendations. Vascular neurology notes her symptoms may be suspicious for partial seizures. She did have an abnormal EEG as of 10/2016. She reported to nursing having " recently lost her son to an overdose/murder.    Intake labs remarkable for elevated troponin near baseline and HTN. Serial EKGs without signs of ischemia.       Past Medical History:   Diagnosis Date    Abnormal mammogram of both breasts     Hypertension     Memory disorder     Seizures        Past Surgical History:   Procedure Laterality Date    CATARACT EXTRACTION      cysts breast         Review of patient's allergies indicates:  No Known Allergies    No current facility-administered medications on file prior to encounter.      Current Outpatient Prescriptions on File Prior to Encounter   Medication Sig    aspirin 81 MG Chew Take 81 mg by mouth once daily.    atorvastatin (LIPITOR) 40 MG tablet Take 1 tablet (40 mg total) by mouth once daily.    carbamazepine (TEGRETOL) 200 mg tablet Take 200 mg nightly for one week then take 200 mg TWICE DAILY thereafter    gabapentin (NEURONTIN) 300 MG capsule Take one cap at bedtime for one week, than increase to 2 caps at bedtime at bedtime, if tolerated.  Do not stop abruptly.    hydrocodone-acetaminophen 5-325mg (NORCO) 5-325 mg per tablet Take 1 tablet by mouth every 8 (eight) hours as needed for Pain.    losartan (COZAAR) 50 MG tablet Take 1 tablet (50 mg total) by mouth every evening.     Family History     None        Social History Main Topics    Smoking status: Never Smoker    Smokeless tobacco: Never Used    Alcohol use No    Drug use: No    Sexual activity: No     Review of Systems   Constitutional: Positive for activity change, appetite change, chills and fatigue. Negative for fever.   Eyes: Negative for photophobia and visual disturbance.   Respiratory: Positive for cough, chest tightness and shortness of breath. Negative for wheezing.    Cardiovascular: Positive for chest pain and palpitations. Negative for leg swelling.   Gastrointestinal: Positive for nausea. Negative for abdominal pain and vomiting.   Endocrine: Positive for cold intolerance.  Negative for heat intolerance.   Genitourinary: Negative for difficulty urinating and dysuria.   Musculoskeletal: Positive for arthralgias, myalgias and neck pain.   Skin: Negative for rash and wound.   Neurological: Positive for seizures, speech difficulty, weakness and numbness. Negative for dizziness.   Psychiatric/Behavioral: Positive for dysphoric mood. Negative for confusion and self-injury.     Objective:     Vital Signs (Most Recent):  Temp: 97.8 °F (36.6 °C) (05/13/17 2300)  Pulse: 64 (05/13/17 2300)  Resp: 18 (05/13/17 2300)  BP: (!) 145/86 (05/13/17 2300)  SpO2: 96 % (05/13/17 2300) Vital Signs (24h Range):  Temp:  [97.8 °F (36.6 °C)-98.4 °F (36.9 °C)] 97.8 °F (36.6 °C)  Pulse:  [63-88] 64  Resp:  [11-20] 18  SpO2:  [94 %-100 %] 96 %  BP: (145-221)/() 145/86     Weight: 85.7 kg (189 lb)  Body mass index is 32.44 kg/(m^2).    Physical Exam   Constitutional: She is oriented to person, place, and time. She appears well-developed and well-nourished. No distress.   HENT:   Head: Normocephalic and atraumatic.   Eyes: EOM are normal. Pupils are equal, round, and reactive to light.   Neck: Normal range of motion.   Cardiovascular: Normal rate and regular rhythm.    No murmur heard.  Pulmonary/Chest: Effort normal and breath sounds normal. No respiratory distress. She has no wheezes.   Abdominal: Soft. Bowel sounds are normal. She exhibits no distension. There is no tenderness. There is no guarding.   Musculoskeletal: Normal range of motion. She exhibits no edema, tenderness or deformity.   Neurological: She is alert and oriented to person, place, and time. No cranial nerve deficit. She exhibits normal muscle tone. Coordination normal. GCS eye subscore is 4. GCS verbal subscore is 5. GCS motor subscore is 6.   No focal deficits. 4-5/5 strength throughout, no facial droop, no slurred speech, no dysmetria.   Skin: Skin is warm and dry.   Psychiatric: Her affect is blunt. She exhibits a depressed mood.    Nursing note and vitals reviewed.       Significant Labs:   CBC:   Recent Labs  Lab 05/13/17  1457   WBC 3.78*   HGB 13.0   HCT 37.1        CMP:   Recent Labs  Lab 05/13/17  1457      K 3.9      CO2 24      BUN 13   CREATININE 0.8   CALCIUM 9.1   PROT 6.9   ALBUMIN 3.5   BILITOT 0.4   ALKPHOS 114   AST 16   ALT 10   ANIONGAP 9   EGFRNONAA >60.0     Cardiac Markers:   Recent Labs  Lab 05/13/17  1457   BNP 67     All pertinent labs within the past 24 hours have been reviewed.    Significant Imaging: EKG: I have reviewed all pertinent results/findings within the past 24 hours and my personal findings are: no ST changes  I have reviewed all pertinent imaging results/findings within the past 24 hours.       Assessment/Plan:     * Chest pain, rule out acute myocardial infarction  - no EKG changes suspicious for ischemia, trop trend flat x3 and at baseline, BNP WNL  - last cardiology note recommended outpatient pharmacologic SPET, will order dobutamine stress echo  - low suspicion of ACS at this time    Left facial numbness  - no focal deficits on exam, resolved by my examination, history of exact same complaint in previous admissions  - in light of no focal deficits, will not order MRI or vascular consult at this time    Seizure  - some suspicion that numbness may be partial seizure vs psychosomatic  - continue carbamazepine   - please ensure patient has proper follow up with neurology, as she has not followed up with them since d/c in 10/2016  - previous EEG with some slowing, no epileptiform discharges    Spondylosis of cervical spine  - seen by pain management. Symptoms associated with LEFT arm pain.  - recommended to start gabapentin, patient has not, uncertain why    Essential hypertension  - uncontrolled on admission, hydralazine prn  - losartan 50 mg, may need to titrate up  - patient does not take Lipitor 2/2 nausea, encourage diet and exercise    Major depressive disorder  - recommend  psych eval outpatient. No HI/SI/AVH  - reports to nursing she lost her son abruptly, this admission noted to be over mother's day weekend  - I believe her depression is the main contributing factor to her presentation today.    VTE Risk Mitigation         Ordered     Medium Risk of VTE  Once      05/13/17 2115     Place sequential compression device  Until discontinued      05/13/17 2115     Place PAUL hose  Until discontinued      05/13/17 2115        Lee Whitten PA-C  Department of Hospital Medicine   [facility]

## 2017-05-14 NOTE — SUBJECTIVE & OBJECTIVE
Interval History: Patient resting in bed. She endorses another episode of chest pain around 9 am this morning described as someone stepping on her chest and endorses it is present under bilateral breasts. The pain resolved on it's own after less than one minute. Patient also continue to have left arm numbness and weakness.     Review of Systems   Constitutional: Positive for activity change, appetite change, chills and fatigue. Negative for fever.   Respiratory: Positive for cough, chest tightness and shortness of breath. Negative for wheezing.    Cardiovascular: Positive for chest pain. Negative for palpitations and leg swelling.   Gastrointestinal: Positive for nausea. Negative for abdominal pain and vomiting.   Musculoskeletal: Positive for arthralgias, myalgias and neck pain.   Neurological: Positive for weakness and numbness. Negative for dizziness.     Objective:     Vital Signs (Most Recent):  Temp: 97.9 °F (36.6 °C) (05/14/17 1159)  Pulse: 81 (05/14/17 1159)  Resp: 18 (05/14/17 1159)  BP: 125/70 (05/14/17 1159)  SpO2: 95 % (05/14/17 1159) Vital Signs (24h Range):  Temp:  [97.1 °F (36.2 °C)-98.4 °F (36.9 °C)] 97.9 °F (36.6 °C)  Pulse:  [63-88] 81  Resp:  [11-20] 18  SpO2:  [92 %-100 %] 95 %  BP: (123-221)/() 125/70     Weight: 85.7 kg (189 lb)  Body mass index is 32.44 kg/(m^2).    Intake/Output Summary (Last 24 hours) at 05/14/17 1254  Last data filed at 05/14/17 1000   Gross per 24 hour   Intake              180 ml   Output                0 ml   Net              180 ml      Physical Exam   Constitutional: She is oriented to person, place, and time. She appears well-developed and well-nourished. No distress.   HENT:   Head: Normocephalic and atraumatic.   Neck: Normal range of motion. Neck supple. No JVD present.   Cardiovascular: Normal rate, regular rhythm and intact distal pulses.    No murmur heard.  Pulmonary/Chest: Effort normal and breath sounds normal. She has no wheezes. She has no rales.    Abdominal: Soft. Bowel sounds are normal. She exhibits no distension and no mass. There is no tenderness. There is no guarding.   Neurological: She is alert and oriented to person, place, and time. No cranial nerve deficit.     Significant Labs:   CBC:   Recent Labs  Lab 05/13/17  1457   WBC 3.78*   HGB 13.0   HCT 37.1        CMP:   Recent Labs  Lab 05/13/17  1457 05/14/17  0534    141   K 3.9 3.9    106   CO2 24 27    103   BUN 13 17   CREATININE 0.8 0.8   CALCIUM 9.1 8.9   PROT 6.9  --    ALBUMIN 3.5  --    BILITOT 0.4  --    ALKPHOS 114  --    AST 16  --    ALT 10  --    ANIONGAP 9 8   EGFRNONAA >60.0 >60.0     All pertinent labs within the past 24 hours have been reviewed.

## 2017-05-14 NOTE — ASSESSMENT & PLAN NOTE
- Intermittent atypical chest pain that comes/goes and resolves spontaneously.   - Chronically elevated troponin but below baseline and remain flat. No EKG changes suspicious for ischemia. BNP WNL.   - low suspicion of ACS at this time  - Previously evaluated by Cardiology and recommended outpatient pharmacologic spect at that time but was never complete. Does not appear to have followed up in cardiology clinic.   - Given recurrence with plan for pharmacologic spect stress to be completed tomorrow. NPO after midnight.     Recent Labs  Lab 05/13/17  1907 05/14/17  0003 05/14/17  0534   TROPONINI 0.071* 0.064* 0.066*

## 2017-05-14 NOTE — ASSESSMENT & PLAN NOTE
- seen by pain management. Symptoms associated with LEFT arm pain.  - recommended to start gabapentin, patient has not, uncertain why

## 2017-05-14 NOTE — ASSESSMENT & PLAN NOTE
- BP uncontrolled at time of admission but now BP stable   - Continue losartan 50 mg and titrate up PRN if BP elevated  - Of note, patient does not take Lipitor 2/2 nausea, encourage diet and exercise

## 2017-05-14 NOTE — ASSESSMENT & PLAN NOTE
- recommend psych eval outpatient. No HI/SI/AVH  - reports to nursing she lost of her son abruptly, this admission noted to be over mother's day weekend  - Underlying depression likely contributing factor to her presentation

## 2017-05-14 NOTE — ASSESSMENT & PLAN NOTE
- uncontrolled on admission, hydralazine prn  - losartan 50 mg, may need to titrate up  - patient does not take Lipitor 2/2 nausea, encourage diet and exercise

## 2017-05-14 NOTE — ASSESSMENT & PLAN NOTE
- no focal deficits on exam, history of exact same complaint in previous admissions  - in light of no focal deficits, will not order MRI or vascular consult at this time  - Neuro checks

## 2017-05-14 NOTE — ASSESSMENT & PLAN NOTE
- recommend psych eval outpatient. No HI/SI/AVH  - reports to nursing she lost of her son abruptly, this admission noted to be over mother's day weekend  - I believe her depression is the main contributing factor to her presentation today.

## 2017-05-14 NOTE — CONSULTS
Cardiology Consult initial evalaution    Reason for consult: Chest pain, elevated troponins  Requesting physician: JACQUI Carlson    History of Present Illness:  72 Y/O F with PMH significant for HTN, HLD, TIA, seizures disorder, chronic elevated troponins. She presents with recurrent episodes of chest pain at home associated with headache with left sided facial numbness and heavy tongue, she took hydrocodone on Friday and went to sleep, had recurrent episode yesterday and came to the ED where she received NTG that relived the chest pain but made her feel very week. On presentation patient was hypertensive, /105 she was given her home dose of losartan with improvement in her BP.  Upon chart review, patient has had multiple admissions and ED visits for nearly the exact same presentation. She has been evaluated by cardiology and vascular neurology who did not feel her symptoms were ACS or CVA in light of non-focal neurologic exam. She was encouraged to follow up with neurology for her seizures, cardiology for outpatient stress, and psychiatry for what may be a psychosomatic component. She has not followed    EKG: NSR    Cardiac Marker:     Recent Labs  Lab 05/13/17  1457 05/13/17  1907 05/14/17  0003 05/14/17  0534   TROPONINI 0.064* 0.071* 0.064* 0.066*   BNP 67  --   --   --        Prior Cardiology Work up:   TTE   1 - Normal left ventricular systolic function (EF 60-65%).     2 - Normal left ventricular diastolic function.     3 - Normal right ventricular systolic function .     4 - Trivial mitral regurgitation.     5 - Trivial to mild tricuspid regurgitation.     6 - The estimated PA systolic pressure is 28 mmHg.     Review of patient's allergies indicates:  No Known Allergies  Past Medical History:   Diagnosis Date    Abnormal mammogram of both breasts     Hypertension     Major depressive disorder 5/14/2017    Memory disorder     Seizures    .  Past Surgical History:   Procedure Laterality Date     CATARACT EXTRACTION      cysts breast       History reviewed. No pertinent family history.  Social History   Substance Use Topics    Smoking status: Never Smoker    Smokeless tobacco: Never Used    Alcohol use No      PTA Medications   Medication Sig    aspirin 81 MG Chew Take 81 mg by mouth once daily.    atorvastatin (LIPITOR) 40 MG tablet Take 1 tablet (40 mg total) by mouth once daily.    carbamazepine (TEGRETOL) 200 mg tablet Take 200 mg nightly for one week then take 200 mg TWICE DAILY thereafter    gabapentin (NEURONTIN) 300 MG capsule Take one cap at bedtime for one week, than increase to 2 caps at bedtime at bedtime, if tolerated.  Do not stop abruptly.    hydrocodone-acetaminophen 5-325mg (NORCO) 5-325 mg per tablet Take 1 tablet by mouth every 8 (eight) hours as needed for Pain.    losartan (COZAAR) 50 MG tablet Take 1 tablet (50 mg total) by mouth every evening.     Review of Systems:  Constitutional: negative for chills, fevers and night sweats  Ears, nose, mouth, throat, and face: negative for nasal congestion, sore throat and tinnitus  Respiratory: negative for cough, dyspnea on exertion, pleurisy/chest pain, sputum and wheezing  Cardiovascular: See HPI  Gastrointestinal: negative  Genitourinary:negative for dysuria, frequency, hematuria, hesitancy and nocturia  Hematologic/lymphatic: negative for bleeding and easy bruising  Musculoskeletal:negative for muscle weakness and myalgias  Neurological: negative for dizziness, headaches, paresthesia and weakness  Behavioral/Psych: negative for anxiety and bad mood      Vital Signs (Most Recent)  Temp: 97.2 °F (36.2 °C) (05/14/17 0800)  Pulse: 69 (05/14/17 0800)  Resp: 18 (05/14/17 0800)  BP: (!) 142/71 (05/14/17 0800)  SpO2: (!) 92 % (05/14/17 0800)    Vital Signs Range (Last 24H):  Temp:  [97.1 °F (36.2 °C)-98.4 °F (36.9 °C)]   Pulse:  [63-88]   Resp:  [11-20]   BP: (123-221)/()   SpO2:  [92 %-100 %]     I&O: No intake or output data in the  24 hours ending 05/14/17 1120    Physical Exam:  General: Patient in no acute distress or discomfort  HEENT: No JVD, moist mucous membranes  Cardiac: S1S2 RRR no GMR  Chest: CTABL, no wheezing or rales  Abd:Soft NTND  Ext: No Edema No swelling  Neuro: A and O X 3, non focal      Laboratory:  Cardiac Biomarker:     Recent Labs  Lab 05/13/17  1457 05/13/17  1907 05/14/17  0003 05/14/17  0534   TROPONINI 0.064* 0.071* 0.064* 0.066*   BNP 67  --   --   --        CBC with Diff:     Recent Labs  Lab 05/13/17  1457   WBC 3.78*   HGB 13.0   HCT 37.1      LYMPH 43.7  1.7   MONO 8.7  0.3   EOSINOPHIL 2.6       COAG:    Recent Labs  Lab 05/13/17  1457   INR 1.0       CMP:   Recent Labs  Lab 05/13/17  1457 05/13/17 1907 05/14/17  0534     --  103   CALCIUM 9.1  --  8.9   ALBUMIN 3.5  --   --    PROT 6.9  --   --      --  141   K 3.9  --  3.9   CO2 24  --  27     --  106   BUN 13  --  17   CREATININE 0.8  --  0.8   ALKPHOS 114  --   --    ALT 10  --   --    AST 16  --   --    BILITOT 0.4  --   --    MG  --  1.9  --    PHOS  --  3.1  --      Estimated Creatinine Clearance: 67.3 mL/min (based on Cr of 0.8).      Active Problems:   Present on Admission:   Chest pain, rule out acute myocardial infarction   Essential hypertension   Seizure   Left facial numbness   Spondylosis of cervical spine   Major depressive disorder      ASSESSMENT/PLAN:   72 Y/O F with PMH significant for HTN, HLD, TIA, seizures disorder, chronic elevated troponins. She presents with recurrent episodes of chest pain that resolved with NTG given in the ED.    Impression:  Hypertension emergency.  Chronic troponins elevation of unclear etiology.  Chest pain resolved with NTG concerning for cardiac chest pain, currently chest pain free.    Recommendations:  Keep NPO MN.  Plan for Dobutamine stress test in the morning.      Mattie Simental MD  Cardiology Fellow

## 2017-05-14 NOTE — ED NOTES
Called back to Dr. Ching ([facility] [id number]) to inform of pt's arm pain and chest pain.  He requests repeat BP in opposite arm with larger cuff.

## 2017-05-14 NOTE — ASSESSMENT & PLAN NOTE
- some suspicion that numbness may be partial seizure vs psychosomatic  - continue carbamazepine   - please ensure patient has proper follow up with neurology, as she has not followed up with them since d/c in 10/2016  - previous EEG with some slowing, no epileptiform discharges

## 2017-05-14 NOTE — ED NOTES
Spoke to Dr. Ching regarding patient's left arm pain and chest relief after being given 1 SL nitro.  Notified of patient's elevated BP.  VORB to give Losartan 50mg as per home medications and send to [facility].  Awaiting troponin results.

## 2017-05-14 NOTE — ED NOTES
Pt c/o midsternal CP rated 3/10.  BP elevated.  Called to Dr. Ching, orders received.  Repeat EKG, troponin done,  NTG 1 SL given as per ordered

## 2017-05-14 NOTE — ASSESSMENT & PLAN NOTE
- no focal deficits on exam, resolved by my examination, history of exact same complaint in previous admissions  - in light of no focal deficits, will not order MRI or vascular consult at this time

## 2017-05-14 NOTE — PLAN OF CARE
Problem: Patient Care Overview  Goal: Plan of Care Review  Outcome: Ongoing (interventions implemented as appropriate)  Pt aaox3, vss, no s/s of distress noted.  Pt denies pain.  Safety precautions maintained, pt remains free of falls.  Seizure precautions maintained, bed rails padded, suction at bedside.  Pt states depression, but no thoughts of suicide.  Call light within reach.

## 2017-05-14 NOTE — PLAN OF CARE
Problem: Patient Care Overview  Goal: Plan of Care Review  Outcome: Ongoing (interventions implemented as appropriate)  Fall precautions maintained. Bed in lowest position with wheels locked. Side table and call bell within reach. Psych consult pending per PA note. Pt afebrile. Will continue to monitor.

## 2017-05-14 NOTE — ASSESSMENT & PLAN NOTE
- some suspicion that numbness may be partial seizure vs psychosomatic vs atypical migraine  - continue carbamazepine   - please ensure patient has proper follow up with neurology, as she has not followed up with them since d/c in 10/2016  - previous EEG with some slowing, no epileptiform discharges

## 2017-05-14 NOTE — PROGRESS NOTES
"[facility] Hospital Medicine  Progress Note    Patient Name: Sofia Augustine  MRN: [id number]  Patient Class: OP- Observation   Admission Date: 5/13/2017  Length of Stay: 0 days  Attending Physician: Rasta Gonzalez MD  Primary Care Provider: Luanne Connell MD    [facility] Medicine Team: [facility] Ariella Kay PA-C    Subjective:     Principal Problem:Chest pain, rule out acute myocardial infarction    HPI:  71F with PMHx of depression, hypertension, hyperlipidemia, TIA, seizures currently on Tegretol presents for evaluation of chest pain that started acutely last night at 1800 and relieved with 2 hydrocodone. She additionally reports a HA and left facial/tongue numbness that is chronic per chart review. She reports left arm pain as well that is chronic and "more intense than usual". She describes her chest pain as a heaviness. Movement and inspiration do not change her chest pain. She reports a mild posterior headache that she says is similar to headaches she has had in the past. She reports feeling weak in her left upper extremity. At the time of my exam the patient only reports a mild HA, all other symptoms have resolved or improved.     Upon chart review, patient has had multiple admissions and ED visits for nearly the exact same presentation. She has been evaluated by cardiology and vascular neurology who did not feel her symptoms were ACS or CVA in light of non-focal neurologic exam. She was encouraged to follow up with neurology for her seizures, cardiology for outpatient pharmacological SPECT, and psychiatry for what may be a psychosomatic component. She has not followed up with any of those recommendations. Vascular neurology notes her symptoms may be suspicious for partial seizures. She did have an abnormal EEG as of 10/2016. She reported to nursing having recently lost her son to an overdose/murder.    Intake labs remarkable for elevated troponin near baseline and HTN. " Serial EKGs without signs of ischemia.       Hospital Course:  Placed in observation overnight for evaluation of chest pain. Troponin levels remained flat and EKG with no ischemic changes. Patient previously seen by Cardiology and recommended to have outpatient pharmacological spect (not completed) and unable to be done on weekend 5/14. Plan for NPO after midnight and spect ordered for tomorrow.     Interval History: Patient resting in bed. She endorses another episode of chest pain around 9 am this morning described as someone stepping on her chest and endorses it is present under bilateral breasts. The pain resolved on it's own after less than one minute. Patient also continue to have left arm numbness and weakness.     Review of Systems   Constitutional: Positive for activity change, appetite change, chills and fatigue. Negative for fever.   Respiratory: Positive for cough, chest tightness and shortness of breath. Negative for wheezing.    Cardiovascular: Positive for chest pain. Negative for palpitations and leg swelling.   Gastrointestinal: Positive for nausea. Negative for abdominal pain and vomiting.   Musculoskeletal: Positive for arthralgias, myalgias and neck pain.   Neurological: Positive for weakness and numbness. Negative for dizziness.     Objective:     Vital Signs (Most Recent):  Temp: 97.9 °F (36.6 °C) (05/14/17 1159)  Pulse: 81 (05/14/17 1159)  Resp: 18 (05/14/17 1159)  BP: 125/70 (05/14/17 1159)  SpO2: 95 % (05/14/17 1159) Vital Signs (24h Range):  Temp:  [97.1 °F (36.2 °C)-98.4 °F (36.9 °C)] 97.9 °F (36.6 °C)  Pulse:  [63-88] 81  Resp:  [11-20] 18  SpO2:  [92 %-100 %] 95 %  BP: (123-221)/() 125/70     Weight: 85.7 kg (189 lb)  Body mass index is 32.44 kg/(m^2).    Intake/Output Summary (Last 24 hours) at 05/14/17 1254  Last data filed at 05/14/17 1000   Gross per 24 hour   Intake              180 ml   Output                0 ml   Net              180 ml      Physical Exam   Constitutional:  She is oriented to person, place, and time. She appears well-developed and well-nourished. No distress.   HENT:   Head: Normocephalic and atraumatic.   Neck: Normal range of motion. Neck supple. No JVD present.   Cardiovascular: Normal rate, regular rhythm and intact distal pulses.    No murmur heard.  Pulmonary/Chest: Effort normal and breath sounds normal. She has no wheezes. She has no rales.   Abdominal: Soft. Bowel sounds are normal. She exhibits no distension and no mass. There is no tenderness. There is no guarding.   Neurological: She is alert and oriented to person, place, and time. No cranial nerve deficit.     Significant Labs:   CBC:   Recent Labs  Lab 05/13/17  1457   WBC 3.78*   HGB 13.0   HCT 37.1        CMP:   Recent Labs  Lab 05/13/17  1457 05/14/17  0534    141   K 3.9 3.9    106   CO2 24 27    103   BUN 13 17   CREATININE 0.8 0.8   CALCIUM 9.1 8.9   PROT 6.9  --    ALBUMIN 3.5  --    BILITOT 0.4  --    ALKPHOS 114  --    AST 16  --    ALT 10  --    ANIONGAP 9 8   EGFRNONAA >60.0 >60.0     All pertinent labs within the past 24 hours have been reviewed.      Assessment/Plan:      * Chest pain, rule out acute myocardial infarction  - Intermittent atypical chest pain that comes/goes and resolves spontaneously.   - Chronically elevated troponin but below baseline and remain flat. No EKG changes suspicious for ischemia. BNP WNL.   - low suspicion of ACS at this time  - Previously evaluated by Cardiology and recommended outpatient pharmacologic spect at that time but was never complete. Does not appear to have followed up in cardiology clinic.   - Given recurrence with plan for pharmacologic spect stress to be completed tomorrow. NPO after midnight.     Recent Labs  Lab 05/13/17  1907 05/14/17  0003 05/14/17  0534   TROPONINI 0.071* 0.064* 0.066*     Left facial numbness  - no focal deficits on exam, history of exact same complaint in previous admissions  - in light of no focal  deficits, will not order MRI or vascular consult at this time  - Neuro checks    Spondylosis of cervical spine  - seen by pain management. Symptoms associated with LEFT arm pain.  - recommended to start gabapentin, patient has not, uncertain why    Essential hypertension  - BP uncontrolled at time of admission but now BP stable   - Continue losartan 50 mg and titrate up PRN if BP elevated  - Of note, patient does not take Lipitor 2/2 nausea, encourage diet and exercise    Seizure  - some suspicion that numbness may be partial seizure vs psychosomatic vs atypical migraine  - continue carbamazepine   - please ensure patient has proper follow up with neurology, as she has not followed up with them since d/c in 10/2016  - previous EEG with some slowing, no epileptiform discharges    Major depressive disorder  - recommend psych eval outpatient. No HI/SI/AVH  - reports to nursing she lost of her son abruptly, this admission noted to be over mother's day weekend  - Underlying depression likely contributing factor to her presentation    VTE Risk Mitigation         Ordered     Medium Risk of VTE  Once      05/13/17 2115     Place sequential compression device  Until discontinued      05/13/17 2115     Place PAUL hose  Until discontinued      05/13/17 2115          Ariella Kay PA-C  Department of Hospital Medicine   [facility]

## 2017-05-14 NOTE — ASSESSMENT & PLAN NOTE
- no EKG changes suspicious for ischemia, trop trend flat x3 and at baseline, BNP WNL  - last cardiology note recommended outpatient pharmacologic SPET, will order dobutamine stress echo  - low suspicion of ACS at this time

## 2017-05-15 VITALS
RESPIRATION RATE: 18 BRPM | DIASTOLIC BLOOD PRESSURE: 87 MMHG | HEIGHT: 64 IN | WEIGHT: 189 LBS | OXYGEN SATURATION: 99 % | SYSTOLIC BLOOD PRESSURE: 182 MMHG | BODY MASS INDEX: 32.27 KG/M2 | TEMPERATURE: 97 F | HEART RATE: 78 BPM

## 2017-05-15 LAB
ANION GAP SERPL CALC-SCNC: 8 MMOL/L
BASOPHILS # BLD AUTO: 0.01 K/UL
BASOPHILS NFR BLD: 0.3 %
BUN SERPL-MCNC: 20 MG/DL
CALCIUM SERPL-MCNC: 8.7 MG/DL
CHLORIDE SERPL-SCNC: 108 MMOL/L
CO2 SERPL-SCNC: 25 MMOL/L
CREAT SERPL-MCNC: 0.9 MG/DL
DIASTOLIC DYSFUNCTION: NO
DIFFERENTIAL METHOD: ABNORMAL
EOSINOPHIL # BLD AUTO: 0.1 K/UL
EOSINOPHIL NFR BLD: 2.8 %
ERYTHROCYTE [DISTWIDTH] IN BLOOD BY AUTOMATED COUNT: 12.3 %
EST. GFR  (AFRICAN AMERICAN): >60 ML/MIN/1.73 M^2
EST. GFR  (NON AFRICAN AMERICAN): >60 ML/MIN/1.73 M^2
GLUCOSE SERPL-MCNC: 101 MG/DL
HCT VFR BLD AUTO: 37.9 %
HGB BLD-MCNC: 12.9 G/DL
LYMPHOCYTES # BLD AUTO: 1.6 K/UL
LYMPHOCYTES NFR BLD: 43.9 %
MAGNESIUM SERPL-MCNC: 1.9 MG/DL
MCH RBC QN AUTO: 31.6 PG
MCHC RBC AUTO-ENTMCNC: 34 %
MCV RBC AUTO: 93 FL
MONOCYTES # BLD AUTO: 0.4 K/UL
MONOCYTES NFR BLD: 11 %
NEUTROPHILS # BLD AUTO: 1.5 K/UL
NEUTROPHILS NFR BLD: 42 %
PHOSPHATE SERPL-MCNC: 4.1 MG/DL
PLATELET # BLD AUTO: 165 K/UL
PMV BLD AUTO: 10.3 FL
POTASSIUM SERPL-SCNC: 4 MMOL/L
RBC # BLD AUTO: 4.08 M/UL
RETIRED EF AND QEF - SEE NOTES: 60 (ref 55–65)
SODIUM SERPL-SCNC: 141 MMOL/L
WBC # BLD AUTO: 3.55 K/UL

## 2017-05-15 PROCEDURE — 85025 COMPLETE CBC W/AUTO DIFF WBC: CPT

## 2017-05-15 PROCEDURE — 36415 COLL VENOUS BLD VENIPUNCTURE: CPT

## 2017-05-15 PROCEDURE — 93351 STRESS TTE COMPLETE: CPT | Mod: 26,,, | Performed by: INTERNAL MEDICINE

## 2017-05-15 PROCEDURE — G0378 HOSPITAL OBSERVATION PER HR: HCPCS

## 2017-05-15 PROCEDURE — 84100 ASSAY OF PHOSPHORUS: CPT

## 2017-05-15 PROCEDURE — 99217 PR OBSERVATION CARE DISCHARGE: CPT | Mod: ,,, | Performed by: PHYSICIAN ASSISTANT

## 2017-05-15 PROCEDURE — C8930 TTE W OR W/O CONTR, CONT ECG: HCPCS

## 2017-05-15 PROCEDURE — 83735 ASSAY OF MAGNESIUM: CPT

## 2017-05-15 PROCEDURE — 80048 BASIC METABOLIC PNL TOTAL CA: CPT

## 2017-05-15 PROCEDURE — 93321 DOPPLER ECHO F-UP/LMTD STD: CPT | Mod: 26,,, | Performed by: INTERNAL MEDICINE

## 2017-05-15 PROCEDURE — 99217 PR OBSERVATION CARE DISCHARGE: CPT | Mod: GC,,, | Performed by: INTERNAL MEDICINE

## 2017-05-15 PROCEDURE — 25000003 PHARM REV CODE 250: Performed by: PHYSICIAN ASSISTANT

## 2017-05-15 RX ADMIN — ATORVASTATIN CALCIUM 40 MG: 20 TABLET, FILM COATED ORAL at 08:05

## 2017-05-15 RX ADMIN — CARBAMAZEPINE 200 MG: 200 TABLET ORAL at 08:05

## 2017-05-15 RX ADMIN — HYDROCODONE BITARTRATE AND ACETAMINOPHEN 1 TABLET: 5; 325 TABLET ORAL at 05:05

## 2017-05-15 RX ADMIN — PANTOPRAZOLE SODIUM 40 MG: 40 TABLET, DELAYED RELEASE ORAL at 08:05

## 2017-05-15 RX ADMIN — ASPIRIN 81 MG CHEWABLE TABLET 81 MG: 81 TABLET CHEWABLE at 08:05

## 2017-05-15 RX ADMIN — ACETAMINOPHEN 650 MG: 325 TABLET ORAL at 02:05

## 2017-05-15 NOTE — ASSESSMENT & PLAN NOTE
- recommend psych eval outpatient. No HI/SI/AVH  - reported to nursing she lost her son abruptly, this admission noted to be over mother's day weekend  - Underlying depression likely contributing factor to her presentation

## 2017-05-15 NOTE — DISCHARGE SUMMARY
"[facility] Hospital Medicine  Discharge Summary      Patient Name: Sofia Augustine  MRN: [id number]  Admission Date: 5/13/2017  Hospital Length of Stay: 0 days  Discharge Date and Time:  05/15/2017 3:04 PM  Attending Physician: Rasta Gonzalez MD   Discharging Provider: Valentina Huizar PA-C  Primary Care Provider: Luanne Connell MD  Hospital Medicine Team: [facility] MED E Valentina Huizar PA-C    HPI:   71F with PMHx of depression, hypertension, hyperlipidemia, TIA, seizures currently on Tegretol presents for evaluation of chest pain that started acutely last night at 1800 and relieved with 2 hydrocodone. She additionally reports a HA and left facial/tongue numbness that is chronic per chart review. She reports left arm pain as well that is chronic and "more intense than usual". She describes her chest pain as a heaviness. Movement and inspiration do not change her chest pain. She reports a mild posterior headache that she says is similar to headaches she has had in the past. She reports feeling weak in her left upper extremity. At the time of my exam the patient only reports a mild HA, all other symptoms have resolved or improved.     Upon chart review, patient has had multiple admissions and ED visits for nearly the exact same presentation. She has been evaluated by cardiology and vascular neurology who did not feel her symptoms were ACS or CVA in light of non-focal neurologic exam. She was encouraged to follow up with neurology for her seizures, cardiology for outpatient pharmacological SPECT, and psychiatry for what may be a psychosomatic component. She has not followed up with any of those recommendations. Vascular neurology notes her symptoms may be suspicious for partial seizures. She did have an abnormal EEG as of 10/2016. She reported to nursing having recently lost her son to an overdose/murder.    Intake labs remarkable for elevated troponin near baseline and HTN. Serial EKGs " without signs of ischemia.       * No surgery found *      Indwelling Lines/Drains at time of discharge:   Lines/Drains/Airways          No matching active lines, drains, or airways        Hospital Course:   Placed in observation overnight for evaluation of chest pain. Troponin levels remained flat and EKG with no ischemic changes. Patient previously seen by Cardiology and recommended to have outpatient pharmacological spect (not completed) and unable to be done on weekend 5/14. Dobutamine stress test completed 5/15/17 with no evidence of ischemia. Discussed with cardiology, patient stable for discharge with outpatient PCP and neurology follow up (has not followed up since discharge in 10/2016).      Consults:   Consults         Status Ordering Provider     Inpatient consult to Cardiology  Once     Provider:  (Not yet assigned)    Completed MUARO MONTGOMERY          Significant Diagnostic Studies: Dobutamine stress test: No evidence of stress induced myocardial ischemia.    Pending Diagnostic Studies:     None        Final Active Diagnoses:    Diagnosis Date Noted POA    PRINCIPAL PROBLEM:  Chest pain, rule out acute myocardial infarction [R07.9] 05/13/2017 Yes    Left facial numbness [R20.0]  Yes    Spondylosis of cervical spine [M47.812] 04/03/2016 Yes    Major depressive disorder [F32.9] 05/14/2017 Yes    Seizure [R56.9] 10/12/2016 Yes    Essential hypertension [I10] 04/03/2016 Yes     Chronic      Problems Resolved During this Admission:    Diagnosis Date Noted Date Resolved POA      * Chest pain, rule out acute myocardial infarction  - Intermittent atypical chest pain that comes/goes and resolves spontaneously.   - Chronically elevated troponin but below baseline and remained flat. No EKG changes suspicious for ischemia. BNP WNL.   - low suspicion of ACS at this time  - Previously evaluated by Cardiology and recommended outpatient pharmacologic spect at that time but was never complete. Does not  appear to have followed up in cardiology clinic.   - Given recurrence, dobutamine stress test completed 5/15/17 which showed no evidence of ischemia. Discussed with cardiology, patient stable for discharge home today with outpatient PCP follow up.      Recent Labs  Lab 05/13/17  1907 05/14/17  0003 05/14/17  0534   TROPONINI 0.071* 0.064* 0.066*         Left facial numbness  - no focal deficits on exam, history of exact same complaint in previous admissions  - in light of no focal deficits, will not order MRI or vascular consult at this time  - Neuro checks    Spondylosis of cervical spine  - seen by pain management. Symptoms associated with LEFT arm pain.  - recommended to start gabapentin, patient has not, uncertain why    Essential hypertension  - BP uncontrolled at time of admission but now BP stable   - Continue losartan 50 mg and titrate up PRN if BP elevated  - Of note, patient does not take Lipitor 2/2 nausea, encourage diet and exercise    Seizure  - some suspicion that numbness may be partial seizure vs psychosomatic vs atypical migraine  - continue carbamazepine   - please ensure patient has proper follow up with neurology, as she has not followed up with them since d/c in 10/2016. Ambulatory referral placed on discharge.   - previous EEG with some slowing, no epileptiform discharges    Major depressive disorder  - recommend psych eval outpatient. No HI/SI/AVH  - reported to nursing she lost her son abruptly, this admission noted to be over mother's day weekend  - Underlying depression likely contributing factor to her presentation      Discharged Condition: stable    Disposition: Home or Self Care    Follow Up:  Follow-up Information     Follow up with Luanne Connell MD.    Specialty:  Internal Medicine    Why:  Please follow up with PCP in 2-3 weeks.     Contact information:    [address] [facility]  [place] [address]  [phone]          Follow up with Jim Paiz - Neurology.    Specialty:   Neurology    Why:  Please follow up with Neurology - the office will call you to schedule appointment.     Contact information:    [address]  [facility] [address] [phone]    Additional information:    7th Floor - [facility] [place]        Patient Instructions:     Ambulatory Referral to Neurology   Referral Priority: Routine Referral Type: Consultation   Referral Reason: Specialty Services Required    Requested Specialty: Neurology    Number of Visits Requested: 1      Diet general   Order Specific Question Answer Comments   Na restriction, if any: 2gNa      Activity as tolerated     Call MD for:  severe uncontrolled pain     Call MD for:  difficulty breathing or increased cough       Medications:  Reconciled Home Medications:   Current Discharge Medication List      CONTINUE these medications which have NOT CHANGED    Details   aspirin 81 MG Chew Take 81 mg by mouth once daily.      atorvastatin (LIPITOR) 40 MG tablet Take 1 tablet (40 mg total) by mouth once daily.  Qty: 14 tablet, Refills: 0      carbamazepine (TEGRETOL) 200 mg tablet Take 200 mg nightly for one week then take 200 mg TWICE DAILY thereafter  Qty: 30 tablet, Refills: 11      gabapentin (NEURONTIN) 300 MG capsule Take one cap at bedtime for one week, than increase to 2 caps at bedtime at bedtime, if tolerated.  Do not stop abruptly.  Qty: 90 capsule, Refills: 11    Associated Diagnoses: Numbness and tingling; Osteoarthritis of spine with radiculopathy, cervical region; Transient neurological symptoms; Numbness; Left facial numbness      hydrocodone-acetaminophen 5-325mg (NORCO) 5-325 mg per tablet Take 1 tablet by mouth every 8 (eight) hours as needed for Pain.  Qty: 40 tablet, Refills: 0    Comments: Caution, medication is sedating      losartan (COZAAR) 50 MG tablet Take 1 tablet (50 mg total) by mouth every evening.  Qty: 90 tablet, Refills: 4           Time spent on the discharge of patient: 25 minutes    Valentina Huizar  URSULA  Department of Hospital Medicine  [facility]  Staff: Dr. Gonzalez

## 2017-05-15 NOTE — SUBJECTIVE & OBJECTIVE
"Interval History: Reports one episode of chest pain overnight and an additional episode this morning, both described as "pressure" that lasted approximately a minute with radiation to left side of neck and resolved spontaneously. Endorses intermittent shortness of breath.     Review of Systems   Constitutional: Positive for activity change, appetite change, chills and fatigue. Negative for fever.   Respiratory: Positive for cough, chest tightness and shortness of breath. Negative for wheezing.    Cardiovascular: Positive for chest pain. Negative for palpitations and leg swelling.   Gastrointestinal: Negative for abdominal pain, nausea and vomiting.   Musculoskeletal: Positive for arthralgias, myalgias and neck pain.   Neurological: Positive for weakness and numbness. Negative for dizziness.     Objective:     Vital Signs (Most Recent):  Temp: 96.8 °F (36 °C) (05/15/17 0800)  Pulse: 64 (05/15/17 1100)  Resp: 18 (05/15/17 0800)  BP: 105/61 (05/15/17 0800)  SpO2: (!) 94 % (05/15/17 0800) Vital Signs (24h Range):  Temp:  [96.8 °F (36 °C)-98.6 °F (37 °C)] 96.8 °F (36 °C)  Pulse:  [63-79] 64  Resp:  [14-18] 18  SpO2:  [94 %-96 %] 94 %  BP: (105-168)/(61-81) 105/61     Weight: 85.7 kg (189 lb)  Body mass index is 32.44 kg/(m^2).  No intake or output data in the 24 hours ending 05/15/17 1231   Physical Exam   Constitutional: She is oriented to person, place, and time. She appears well-developed and well-nourished. No distress.   Neck: Normal range of motion. Neck supple. No JVD present.   Cardiovascular: Normal rate, regular rhythm and intact distal pulses.    No murmur heard.  Pulmonary/Chest: Effort normal and breath sounds normal. She has no wheezes. She has no rales.   Abdominal: Soft. Bowel sounds are normal. She exhibits no distension and no mass. There is no tenderness. There is no guarding.   Neurological: She is alert and oriented to person, place, and time. No cranial nerve deficit.       Significant Labs:   CBC: "   Recent Labs  Lab 05/13/17  1457 05/15/17  0532   WBC 3.78* 3.55*   HGB 13.0 12.9   HCT 37.1 37.9    165     CMP:   Recent Labs  Lab 05/13/17  1457 05/14/17  0534 05/15/17  0531    141 141   K 3.9 3.9 4.0    106 108   CO2 24 27 25    103 101   BUN 13 17 20   CREATININE 0.8 0.8 0.9   CALCIUM 9.1 8.9 8.7   PROT 6.9  --   --    ALBUMIN 3.5  --   --    BILITOT 0.4  --   --    ALKPHOS 114  --   --    AST 16  --   --    ALT 10  --   --    ANIONGAP 9 8 8   EGFRNONAA >60.0 >60.0 >60.0     All pertinent labs within the past 24 hours have been reviewed.    Significant Imaging: I have reviewed all pertinent imaging results/findings within the past 24 hours.

## 2017-05-15 NOTE — PLAN OF CARE
Problem: Patient Care Overview  Goal: Plan of Care Review  Outcome: Ongoing (interventions implemented as appropriate)  Pt aaox3, vss, no s/s of distress noted.  Safety precautions maintained, pt remains free of falls.  No s/s of infection.  Pt states depression.  Pt c/o headache.  Stress test today.  Call light within reach.

## 2017-05-15 NOTE — PLAN OF CARE
Problem: Patient Care Overview  Goal: Plan of Care Review  Outcome: Ongoing (interventions implemented as appropriate)  Patient AAO,VSS. Complains of HA rating 4/10. PRN medication provided. POC reviewed with patient. Tele monitor and nonskid socks on patient. Bed in lowest position with wheels locked. Call bell and side table in reach of patient. Will continue to monitor patient.

## 2017-05-15 NOTE — ASSESSMENT & PLAN NOTE
- Intermittent atypical chest pain that comes/goes and resolves spontaneously.   - Chronically elevated troponin but below baseline and remained flat. No EKG changes suspicious for ischemia. BNP WNL.   - low suspicion of ACS at this time  - Previously evaluated by Cardiology and recommended outpatient pharmacologic spect at that time but was never complete. Does not appear to have followed up in cardiology clinic.   - Given recurrence, dobutamine stress test completed 5/15/17 which showed no evidence of ischemia. Discussed with cardiology, patient stable for discharge home today with outpatient PCP follow up.      Recent Labs  Lab 05/13/17  1907 05/14/17  0003 05/14/17  0534   TROPONINI 0.071* 0.064* 0.066*

## 2017-05-15 NOTE — PROGRESS NOTES
Progress Note  Cardiology Consult Service.      Interval History:   No acute events overnight. Pt states that chest pain has resolved, but she can still feel some residual pressure. She also states that the L side of her face still feels slightly different. Dobutamine echo today.     EKG: NSR    Cardiac Marker:     Recent Labs  Lab 05/13/17  1457 05/13/17  1907 05/14/17  0003 05/14/17  0534   TROPONINI 0.064* 0.071* 0.064* 0.066*   BNP 67  --   --   --        Prior Cardiology Work up:   TTE   1 - Normal left ventricular systolic function (EF 60-65%).     2 - Normal left ventricular diastolic function.     3 - Normal right ventricular systolic function .     4 - Trivial mitral regurgitation.     5 - Trivial to mild tricuspid regurgitation.     6 - The estimated PA systolic pressure is 28 mmHg.     Review of patient's allergies indicates:  No Known Allergies  Past Medical History:   Diagnosis Date    Abnormal mammogram of both breasts     Hypertension     Major depressive disorder 5/14/2017    Memory disorder     Seizures    .  Past Surgical History:   Procedure Laterality Date    CATARACT EXTRACTION      cysts breast       History reviewed. No pertinent family history.  Social History   Substance Use Topics    Smoking status: Never Smoker    Smokeless tobacco: Never Used    Alcohol use No      PTA Medications   Medication Sig    aspirin 81 MG Chew Take 81 mg by mouth once daily.    atorvastatin (LIPITOR) 40 MG tablet Take 1 tablet (40 mg total) by mouth once daily.    carbamazepine (TEGRETOL) 200 mg tablet Take 200 mg nightly for one week then take 200 mg TWICE DAILY thereafter    gabapentin (NEURONTIN) 300 MG capsule Take one cap at bedtime for one week, than increase to 2 caps at bedtime at bedtime, if tolerated.  Do not stop abruptly.    hydrocodone-acetaminophen 5-325mg (NORCO) 5-325 mg per tablet Take 1 tablet by mouth every 8 (eight) hours as needed for Pain.    losartan (COZAAR) 50 MG tablet  Take 1 tablet (50 mg total) by mouth every evening.     Review of Systems:  Constitutional: negative for chills, fevers and night sweats  Ears, nose, mouth, throat, and face: negative for nasal congestion, sore throat and tinnitus  Respiratory: negative for cough, dyspnea on exertion, pleurisy/chest pain, sputum and wheezing  Cardiovascular: See HPI  Gastrointestinal: negative  Genitourinary:negative for dysuria, frequency, hematuria, hesitancy and nocturia  Hematologic/lymphatic: negative for bleeding and easy bruising  Musculoskeletal:negative for muscle weakness and myalgias  Neurological: negative for dizziness, headaches, paresthesia and weakness  Behavioral/Psych: negative for anxiety and bad mood      Vital Signs (Most Recent)  Temp: 96.8 °F (36 °C) (05/15/17 0800)  Pulse: 66 (05/15/17 0800)  Resp: 18 (05/15/17 0800)  BP: 105/61 (05/15/17 0800)  SpO2: (!) 94 % (05/15/17 0800)    Vital Signs Range (Last 24H):  Temp:  [96.8 °F (36 °C)-98.6 °F (37 °C)]   Pulse:  [63-81]   Resp:  [14-18]   BP: (105-168)/(61-81)   SpO2:  [94 %-96 %]     I&O:     Intake/Output Summary (Last 24 hours) at 05/15/17 0918  Last data filed at 05/14/17 1000   Gross per 24 hour   Intake              180 ml   Output                0 ml   Net              180 ml       Physical Exam:  General: Patient in no acute distress or discomfort  HEENT: No JVD, moist mucous membranes  Cardiac: S1S2 RRR no GMR  Chest: CTABL, no wheezing or rales  Abd:Soft NTND  Ext: No Edema No swelling  Neuro: A and O X 3, non focal      Laboratory:  Cardiac Biomarker:     Recent Labs  Lab 05/13/17  1457 05/13/17  1907 05/14/17  0003 05/14/17  0534   TROPONINI 0.064* 0.071* 0.064* 0.066*   BNP 67  --   --   --        CBC with Diff:     Recent Labs  Lab 05/13/17  1457 05/15/17  0532   WBC 3.78* 3.55*   HGB 13.0 12.9   HCT 37.1 37.9    165   LYMPH 43.7  1.7 43.9  1.6   MONO 8.7  0.3 11.0  0.4   EOSINOPHIL 2.6 2.8       COAG:    Recent Labs  Lab 05/13/17  1414    INR 1.0       CMP:     Recent Labs  Lab 05/13/17  1457 05/13/17  1907 05/14/17  0534 05/15/17  0531     --  103 101   CALCIUM 9.1  --  8.9 8.7   ALBUMIN 3.5  --   --   --    PROT 6.9  --   --   --      --  141 141   K 3.9  --  3.9 4.0   CO2 24  --  27 25     --  106 108   BUN 13  --  17 20   CREATININE 0.8  --  0.8 0.9   ALKPHOS 114  --   --   --    ALT 10  --   --   --    AST 16  --   --   --    BILITOT 0.4  --   --   --    MG  --  1.9  --  1.9   PHOS  --  3.1  --  4.1     Estimated Creatinine Clearance: 59.9 mL/min (based on Cr of 0.9).      Active Problems:   Present on Admission:   Chest pain, rule out acute myocardial infarction   Essential hypertension   Seizure   Left facial numbness   Spondylosis of cervical spine   Major depressive disorder      ASSESSMENT/PLAN:   70 Y/O F with PMH significant for HTN, HLD, TIA, seizures disorder, chronic elevated troponins. She presents with recurrent episodes of chest pain that resolved with NTG given in the ED.    Impression:  Chronic troponins elevation of unclear etiology perhaps related to hypertensive emergency.  Chest pain resolved with NTG concerning for cardiac chest pain, currently chest pain free.    Recommendations:    Plan for Dobutamine stress test today.    Will discuss to following plans with staff today...    LUTHER Beltran MD  PGY-1

## 2017-05-15 NOTE — PLAN OF CARE
Luanne Connell MD  [address] / [place] [address]    Future Appointments  Date Time Provider Department Center   8/1/2017 8:20 AM Luanne Connell MD [facility] [place]         [facility] [id number]  [place] DONATO  [address]  AT [facility] [address]  [address] [place] [address]  Phone: [phone] Fax: [phone]      Payor: [email or web address] MEDICARE / Plan: [employer] / Product Type: Medicare Advantage /        05/15/17 1523   Discharge Assessment   Assessment Type Discharge Planning Assessment   Confirmed/corrected address and phone number on facesheet? Yes   Assessment information obtained from? Patient   Expected Length of Stay (days) 2   Prior to hospitilization cognitive status: Alert/Oriented   Prior to hospitalization functional status: Assistive Equipment   Current cognitive status: Alert/Oriented   Current Functional Status: Assistive Equipment   Arrived From home or self-care   Lives With child(leanne), adult   Able to Return to Prior Arrangements yes   Is patient able to care for self after discharge? Yes   Who are your caregiver(s) and their phone number(s)? Bernarda Nicolas  daughter [phone]   Patient's perception of discharge disposition home or selfcare   Patient currently receives home health services? No   Patient currently receives any other outside agency services? No   Equipment Currently Used at Home walker, standard   Does the patient have transportation to healthcare appointments? Yes   Transportation Available car   On Dialysis? No   Discharge Plan A Home with family   Patient/Family In Agreement With Plan yes

## 2017-05-15 NOTE — PROGRESS NOTES
Pt returned to unit via w/c from echo.  Pt aaox3, vss, no s/s of distress noted.  Call light placed within reach.

## 2017-05-15 NOTE — PROGRESS NOTES
Pt discharged from unit.  Pt aaox3, vss, no s/s of distress noted.  Discharge instructions given to pt and she verbalized understanding.  Home meds and f/u appts reviewed as well.  IV removed from rfa w/ catheter intact, no redness or swelling noted to area.  Pt in room waiting for w/c.

## 2017-05-15 NOTE — ASSESSMENT & PLAN NOTE
- some suspicion that numbness may be partial seizure vs psychosomatic vs atypical migraine  - continue carbamazepine   - please ensure patient has proper follow up with neurology, as she has not followed up with them since d/c in 10/2016. Ambulatory referral placed on discharge.   - previous EEG with some slowing, no epileptiform discharges

## 2017-05-22 ENCOUNTER — TELEPHONE (OUTPATIENT)
Dept: INTERNAL MEDICINE | Facility: CLINIC | Age: 72
End: 2017-05-22

## 2017-05-22 NOTE — TELEPHONE ENCOUNTER
----- Message from Belén Pyle sent at 5/20/2017 11:42 AM CDT -----  Contact: Patient  [phone]  Patient would like to get medical advice.  Symptoms (please be specific):  Diarrhea   How long has patient had these symptoms:  2  Pharmacy name and phone #:  [facility] [place] [id number] - [phone] (Phone)  [phone] (Fax)  Any drug allergies:  none  Comments:

## 2017-05-22 NOTE — TELEPHONE ENCOUNTER
----- Message from Lizzy Howe sent at 5/22/2017 12:49 PM CDT -----  Contact: pt [phone]  Pt just want to let you know she is feeling better.

## 2017-05-23 ENCOUNTER — CLINICAL SUPPORT (OUTPATIENT)
Dept: AUDIOLOGY | Facility: CLINIC | Age: 72
End: 2017-05-23
Payer: MEDICARE

## 2017-05-23 ENCOUNTER — OFFICE VISIT (OUTPATIENT)
Dept: OTOLARYNGOLOGY | Facility: CLINIC | Age: 72
End: 2017-05-23
Payer: MEDICARE

## 2017-05-23 VITALS
DIASTOLIC BLOOD PRESSURE: 91 MMHG | SYSTOLIC BLOOD PRESSURE: 142 MMHG | BODY MASS INDEX: 32.17 KG/M2 | WEIGHT: 187.38 LBS

## 2017-05-23 DIAGNOSIS — H61.23 EXCESSIVE EAR WAX, BILATERAL: ICD-10-CM

## 2017-05-23 DIAGNOSIS — H90.3 ASYMMETRICAL SENSORINEURAL HEARING LOSS: ICD-10-CM

## 2017-05-23 DIAGNOSIS — R42 DIZZINESS: Primary | ICD-10-CM

## 2017-05-23 DIAGNOSIS — R42 LIGHTHEADEDNESS: ICD-10-CM

## 2017-05-23 DIAGNOSIS — H93.13 TINNITUS, BILATERAL: ICD-10-CM

## 2017-05-23 PROCEDURE — 92567 TYMPANOMETRY: CPT | Mod: S$GLB,,, | Performed by: AUDIOLOGIST

## 2017-05-23 PROCEDURE — 69210 REMOVE IMPACTED EAR WAX UNI: CPT | Mod: S$GLB,,, | Performed by: NURSE PRACTITIONER

## 2017-05-23 PROCEDURE — 1159F MED LIST DOCD IN RCRD: CPT | Mod: S$GLB,,, | Performed by: NURSE PRACTITIONER

## 2017-05-23 PROCEDURE — 92557 COMPREHENSIVE HEARING TEST: CPT | Mod: S$GLB,,, | Performed by: AUDIOLOGIST

## 2017-05-23 PROCEDURE — 99999 PR PBB SHADOW E&M-EST. PATIENT-LVL III: CPT | Mod: PBBFAC,,, | Performed by: NURSE PRACTITIONER

## 2017-05-23 PROCEDURE — 99203 OFFICE O/P NEW LOW 30 MIN: CPT | Mod: 25,S$GLB,, | Performed by: NURSE PRACTITIONER

## 2017-05-23 PROCEDURE — 99999 PR PBB SHADOW E&M-EST. PATIENT-LVL I: CPT | Mod: PBBFAC,,,

## 2017-05-23 RX ORDER — DICLOFENAC SODIUM 50 MG/1
TABLET, DELAYED RELEASE ORAL
Refills: 3 | Status: ON HOLD | COMMUNITY
Start: 2017-04-30 | End: 2017-12-27

## 2017-05-23 NOTE — PATIENT INSTRUCTIONS
Audiogram Reviewed.  Schedule VNG? (on Tegretol).  Hearing conservation strongly recommended.  Trial of amplification bilaterally also recommended.  Re-check of hearing in 18-24 months or sooner if subjective change noted.  F/U with PCP as per schedule.  Referral to Dr. Sosa.  RTC prn.

## 2017-05-23 NOTE — LETTER
May 26, 2017      Luanne Connell MD  [address] [place] [address]           [facility]  [address]  [facility] [address]  Phone: [phone]  Fax: [phone]          Patient: Sofia Augustine   MR Number: [id number]   YOB: 1945   Date of Visit: 5/23/2017       Dear Dr. Luanne Connell:    Thank you for referring Sofia Augustine to me for evaluation. Attached you will find relevant portions of my assessment and plan of care.    If you have questions, please do not hesitate to call me. I look forward to following Sofia Augustine along with you.    Sincerely,        Enclosure  CC:  No Recipients    If you would like to receive this communication electronically, please contact [email or web address] or [phone] to request more information on [employer] Link access.    For providers and/or their staff who would like to refer a patient to [facility], please contact us through our one-stop-shop provider referral line, [facility], at [phone].    If you feel you have received this communication in error or would no longer like to receive these types of communications, please e-mail [email or web address]

## 2017-05-29 ENCOUNTER — OFFICE VISIT (OUTPATIENT)
Dept: CARDIOLOGY | Facility: CLINIC | Age: 72
End: 2017-05-29
Payer: MEDICARE

## 2017-05-29 VITALS
SYSTOLIC BLOOD PRESSURE: 158 MMHG | OXYGEN SATURATION: 97 % | WEIGHT: 187 LBS | BODY MASS INDEX: 31.92 KG/M2 | HEART RATE: 74 BPM | DIASTOLIC BLOOD PRESSURE: 89 MMHG | HEIGHT: 64 IN

## 2017-05-29 DIAGNOSIS — I10 ESSENTIAL HYPERTENSION: Primary | Chronic | ICD-10-CM

## 2017-05-29 DIAGNOSIS — R07.9 CHEST PAIN, RULE OUT ACUTE MYOCARDIAL INFARCTION: ICD-10-CM

## 2017-05-29 PROCEDURE — 99499 UNLISTED E&M SERVICE: CPT | Mod: S$GLB,,, | Performed by: INTERNAL MEDICINE

## 2017-05-29 PROCEDURE — 99204 OFFICE O/P NEW MOD 45 MIN: CPT | Mod: S$GLB,,, | Performed by: INTERNAL MEDICINE

## 2017-05-29 PROCEDURE — 1159F MED LIST DOCD IN RCRD: CPT | Mod: S$GLB,,, | Performed by: INTERNAL MEDICINE

## 2017-05-29 PROCEDURE — 99999 PR PBB SHADOW E&M-EST. PATIENT-LVL III: CPT | Mod: PBBFAC,,, | Performed by: INTERNAL MEDICINE

## 2017-05-29 PROCEDURE — 1125F AMNT PAIN NOTED PAIN PRSNT: CPT | Mod: S$GLB,,, | Performed by: INTERNAL MEDICINE

## 2017-05-29 RX ORDER — LOSARTAN POTASSIUM AND HYDROCHLOROTHIAZIDE 12.5; 5 MG/1; MG/1
1 TABLET ORAL DAILY
Qty: 90 TABLET | Refills: 3 | Status: ON HOLD | OUTPATIENT
Start: 2017-05-29 | End: 2017-12-27

## 2017-05-29 NOTE — LETTER
May 29, 2017      Rasta Gonzalez MD  [address]  [facility] [address]           [facility]  [address]  [facility] [address]  Phone: [phone]          Patient: Sofia Augustine   MR Number: [id number]   YOB: 1945   Date of Visit: 5/29/2017       Dear Dr. Rasta Gonzalez:    Thank you for referring Sofia Augustine to me for evaluation. Attached you will find relevant portions of my assessment and plan of care.    If you have questions, please do not hesitate to call me. I look forward to following Sofia Augustine along with you.    Sincerely,    Mao Bowen MD    Enclosure  CC:  No Recipients    If you would like to receive this communication electronically, please contact [email or web address] or [phone] to request more information on [employer] Link access.    For providers and/or their staff who would like to refer a patient to [facility], please contact us through our one-stop-shop provider referral line, [facility] , at [phone].    If you feel you have received this communication in error or would no longer like to receive these types of communications, please e-mail [email or web address]
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
## 2017-06-06 NOTE — PROGRESS NOTES
"Subjective:       Patient ID: Sofia Augustine is a 71 y.o. female.    Chief Complaint: Dizziness    Dizziness:   Chronicity:  New  Onset:  More than 1 month ago  Progression since onset:  Gradually improving  Severity:  Mild  Duration: "a few minutes"  Dizziness characteristics:  Lightheaded/impending faint and off-balance   Associated symptoms: tinnitus and visual disturbances (sees "sparkles").no hearing loss, no ear congestion, no ear pain, no fever, no headaches, no nausea, no vomiting, no diaphoresis, no aural fullness, no weakness, no light-headedness, no syncope, no palpitations, no panic, no facial weakness, no slurred speech, no numbness in extremities and no chest pain.   AU tinnitus, L>R  Aggravated by:  Position changes  Treatments tried:  Body position changes (laying down)  Improvements on treatment:  Moderate   PMH includes: neurologic disease.no strokes, no cardiac surgery, no head trauma, no balance testing, no ear trauma, no ear surgery, no head trauma, no ear infections, no anxiety, no ear tubes and no environmental allergies.    She is saddened and tearful as she discusses the death of her son (2001) and her dad a few years ago.    Past Medical History: Patient has a past medical history of Abnormal mammogram of both breasts; Hypertension; Major depressive disorder (5/14/2017); Memory disorder; and Seizures.    Past Surgical History: Patient has a past surgical history that includes Cataract extraction and cysts breast.    Social History: Patient reports that she has never smoked. She has never used smokeless tobacco. She reports that she does not drink alcohol or use drugs.    Family History: family history is not on file.    Medications:   Current Outpatient Prescriptions   Medication Sig    aspirin 81 MG Chew Take 81 mg by mouth once daily.    atorvastatin (LIPITOR) 40 MG tablet Take 1 tablet (40 mg total) by mouth once daily.    carbamazepine (TEGRETOL) 200 mg tablet Take 200 mg " nightly for one week then take 200 mg TWICE DAILY thereafter    diclofenac (VOLTAREN) 50 MG EC tablet TK 1 T PO BID PRN P    gabapentin (NEURONTIN) 300 MG capsule Take one cap at bedtime for one week, than increase to 2 caps at bedtime at bedtime, if tolerated.  Do not stop abruptly.    hydrocodone-acetaminophen 5-325mg (NORCO) 5-325 mg per tablet Take 1 tablet by mouth every 8 (eight) hours as needed for Pain.    losartan-hydrochlorothiazide 50-12.5 mg (HYZAAR) 50-12.5 mg per tablet Take 1 tablet by mouth once daily.     No current facility-administered medications for this visit.        Allergies: Patient has No Known Allergies.    Review of Systems   Constitutional: Negative for activity change, appetite change, diaphoresis, fatigue, fever and unexpected weight change.   HENT: Positive for tinnitus. Negative for congestion, dental problem, ear discharge, ear pain, facial swelling, hearing loss, nosebleeds, postnasal drip, rhinorrhea, sinus pressure, sneezing, sore throat, trouble swallowing and voice change.    Eyes: Negative for pain and visual disturbance.   Respiratory: Negative for cough, chest tightness, shortness of breath, wheezing and stridor.    Cardiovascular: Negative for chest pain, palpitations and syncope.   Gastrointestinal: Negative for nausea and vomiting.   Musculoskeletal: Negative for gait problem and neck pain.   Skin: Negative for color change and rash.   Allergic/Immunologic: Negative for environmental allergies.   Neurological: Positive for dizziness. Negative for seizures, syncope, facial asymmetry, speech difficulty, weakness, light-headedness, numbness and headaches.   Psychiatric/Behavioral: Negative for agitation and confusion. The patient is not nervous/anxious.        Objective:       BP (!) 142/91 (BP Location: Right arm, Patient Position: Sitting, BP Method: Automatic)   Wt 85 kg (187 lb 6.3 oz)   BMI 32.17 kg/m²     Physical Exam   Constitutional: She is oriented to person,  place, and time. She appears well-developed and well-nourished.   HENT:   Head: Normocephalic and atraumatic. Not macrocephalic and not microcephalic. Head is without raccoon's eyes, without Syed's sign, without abrasion, without contusion, without laceration, without right periorbital erythema and without left periorbital erythema. Hair is normal.   Right Ear: Tympanic membrane, external ear and ear canal normal. No lacerations. No drainage, swelling or tenderness. No foreign bodies. No mastoid tenderness. Tympanic membrane is not injected, not scarred, not perforated, not erythematous, not retracted and not bulging. Tympanic membrane mobility is normal. No middle ear effusion. No hemotympanum. Decreased hearing is noted.   Left Ear: Tympanic membrane, external ear and ear canal normal. No lacerations. No drainage, swelling or tenderness. No foreign bodies. No mastoid tenderness. Tympanic membrane is not injected, not scarred, not perforated, not erythematous, not retracted and not bulging. Tympanic membrane mobility is normal.  No middle ear effusion. No hemotympanum. Decreased hearing is noted.   Nose: Nose normal. No mucosal edema, rhinorrhea, nose lacerations, sinus tenderness or nasal deformity.   Mouth/Throat: Uvula is midline, oropharynx is clear and moist and mucous membranes are normal. No oral lesions. No trismus in the jaw. Abnormal dentition. Dental caries present. No dental abscesses, uvula swelling or lacerations.   PROCEDURE NOTE:  Ceruminosis is noted in both EACs.  Wax was removed by manual debridement and suctioning utilizing the assistance of binocular microscopy revealing EACs and TMs WNL. The patient tolerated this procedure without difficulty. The subjective decrease noted in hearing pre-cleaning was resolved post-cleaning.       Eyes: Conjunctivae, EOM and lids are normal. Pupils are equal, round, and reactive to light.   Neck: Trachea normal and normal range of motion. Neck supple. No  spinous process tenderness and no muscular tenderness present. No no neck rigidity. No edema, no erythema and normal range of motion present. No thyroid mass and no thyromegaly present.   Pulmonary/Chest: Effort normal.   Abdominal: Soft.   Musculoskeletal: Normal range of motion.   Lymphadenopathy:        Head (right side): No submental, no submandibular, no tonsillar, no preauricular and no posterior auricular adenopathy present.        Head (left side): No submental, no submandibular, no tonsillar, no preauricular, no posterior auricular and no occipital adenopathy present.     She has no cervical adenopathy.   Neurological: She is alert and oriented to person, place, and time. No cranial nerve deficit or sensory deficit.   Skin: Skin is warm and dry.   Psychiatric: She has a normal mood and affect. Her behavior is normal. Judgment and thought content normal.   Nursing note and vitals reviewed.      As a result of this patients history and examination findings, a comprehensive audiogram was ordered to determine the level of hearing/hearing loss.        CT of Head Without Contrast Results(05/13/2017): No acute intracranial hemorrhage, mass or mass effect.  MRI Brain Without Contrast Results (10/14/2016): Unremarkable noncontrast MRI of the brain.    Assessment:       1. Dizziness    2. Lightheadedness    3. Tinnitus, bilateral    4. Excessive ear wax, bilateral    5. Asymmetrical sensorineural hearing loss        Plan:       Audiogram Reviewed.  Schedule VNG? (on Tegretol).  Hearing conservation strongly recommended.  Trial of amplification bilaterally also recommended.  Re-check of hearing in 18-24 months or sooner if subjective change noted.  F/U with PCP as per schedule.  Referral to Dr. Sosa.  RTC 2 weeks or prn sooner if symptoms worsen.

## 2017-06-13 ENCOUNTER — HOSPITAL ENCOUNTER (OUTPATIENT)
Dept: RADIOLOGY | Facility: HOSPITAL | Age: 72
Discharge: HOME | End: 2017-06-13
Attending: INTERNAL MEDICINE
Payer: MEDICARE

## 2017-06-13 ENCOUNTER — OFFICE VISIT (OUTPATIENT)
Dept: INTERNAL MEDICINE | Facility: CLINIC | Age: 72
End: 2017-06-13
Payer: MEDICARE

## 2017-06-13 VITALS
TEMPERATURE: 99 F | BODY MASS INDEX: 31.57 KG/M2 | HEIGHT: 64 IN | DIASTOLIC BLOOD PRESSURE: 90 MMHG | HEART RATE: 80 BPM | WEIGHT: 184.94 LBS | SYSTOLIC BLOOD PRESSURE: 122 MMHG

## 2017-06-13 DIAGNOSIS — M25.512 LEFT SHOULDER PAIN, UNSPECIFIED CHRONICITY: ICD-10-CM

## 2017-06-13 DIAGNOSIS — R07.9 CHEST PAIN, UNSPECIFIED TYPE: Primary | ICD-10-CM

## 2017-06-13 DIAGNOSIS — R07.9 CHEST PAIN, UNSPECIFIED TYPE: ICD-10-CM

## 2017-06-13 PROCEDURE — 99999 PR PBB SHADOW E&M-EST. PATIENT-LVL IV: CPT | Mod: PBBFAC,,, | Performed by: INTERNAL MEDICINE

## 2017-06-13 PROCEDURE — 99214 OFFICE O/P EST MOD 30 MIN: CPT | Mod: S$GLB,,, | Performed by: INTERNAL MEDICINE

## 2017-06-13 PROCEDURE — 71020 XR CHEST PA AND LATERAL: CPT | Mod: TC,PO

## 2017-06-13 PROCEDURE — 1159F MED LIST DOCD IN RCRD: CPT | Mod: S$GLB,,, | Performed by: INTERNAL MEDICINE

## 2017-06-13 PROCEDURE — 71020 XR CHEST PA AND LATERAL: CPT | Mod: 26,,, | Performed by: RADIOLOGY

## 2017-06-13 PROCEDURE — 1125F AMNT PAIN NOTED PAIN PRSNT: CPT | Mod: S$GLB,,, | Performed by: INTERNAL MEDICINE

## 2017-06-13 RX ORDER — HYDROCODONE BITARTRATE AND ACETAMINOPHEN 5; 325 MG/1; MG/1
1 TABLET ORAL EVERY 8 HOURS PRN
Qty: 40 TABLET | Refills: 0 | Status: SHIPPED | OUTPATIENT
Start: 2017-06-13 | End: 2017-07-25 | Stop reason: SDUPTHER

## 2017-06-13 NOTE — PROGRESS NOTES
Transitional Care Note  Subjective:       Patient ID: Sofia Augustine is a 71 y.o. female.  Chief Complaint: Follow-up (hosp)    Family and/or Caretaker present at visit?  No.  Diagnostic tests reviewed/disposition: I have reviewed all completed as well as pending diagnostic tests at the time of discharge.  Disease/illness education: Chest pain  Home health/community services discussion/referrals: Patient does not have home health established from hospital visit.  They do not need home health.  If needed, we will set up home health for the patient.   Establishment or re-establishment of referral orders for community resources: No other necessary community resources.   Discussion with other health care providers: No discussion with other health care providers necessary.   CC: followup of hypertension  HPI:  The patient is a 71 y.o. year old female who presents to the office for followup of hypertension.  The patient denies any chest pain, shortness of breath, headache, blurred vision, excessive fatigue, nausea or vomiting.      PAST MEDICAL HISTORY:  Past Medical History:  No date: Abnormal mammogram of both breasts  No date: Hypertension  5/14/2017: Major depressive disorder  No date: Memory disorder  No date: Seizures    SURGICAL HISTORY:  Past Surgical History:  No date: CATARACT EXTRACTION  No date: cysts breast    MEDS:  Medcard reviewed and updated    ALLERGIES: Allergy Card reviewed and updated    SOCIAL HISTORY:   The patient is a nonsmoker.          Review of Systems   Constitutional: Positive for fatigue. Negative for fever.   Eyes: Positive for pain.   Respiratory: Positive for cough and shortness of breath. Negative for wheezing.    Cardiovascular: Positive for chest pain. Negative for palpitations.   Gastrointestinal: Negative for abdominal pain, constipation and diarrhea.   Musculoskeletal: Positive for back pain and gait problem.        Left shoulder pain- right hand dominant   Neurological:  Positive for dizziness, numbness and headaches.   Psychiatric/Behavioral: Positive for sleep disturbance.       Objective:      Physical Exam   Constitutional: She is oriented to person, place, and time. She appears well-developed and well-nourished.   Cardiovascular: Normal rate, regular rhythm and normal heart sounds.    No murmur heard.  Pulmonary/Chest: Effort normal and breath sounds normal. No respiratory distress.   Abdominal: Soft. Bowel sounds are normal.   Musculoskeletal:   Tenderness to palpation of anterior aspect of left shoulder.  Pain with abduction, elevation to 90 degrees, internal and external rotation.   Neurological: She is alert and oriented to person, place, and time. No cranial nerve deficit.   Skin: Skin is warm and dry.   Psychiatric: She has a normal mood and affect. Her behavior is normal. Judgment and thought content normal.       Assessment:       1. Chest pain, unspecified type    2. Left shoulder pain, unspecified chronicity        Plan:         Sofia was seen today for follow-up.    Diagnoses and all orders for this visit:    Chest pain, unspecified type  -     X-Ray Chest PA And Lateral; Future    Left shoulder pain, unspecified chronicity  -     Ambulatory Referral to Orthopedics  -     Hydrocodone prescribed    Other orders  -     hydrocodone-acetaminophen 5-325mg (NORCO) 5-325 mg per tablet; Take 1 tablet by mouth every 8 (eight) hours as needed for Pain.

## 2017-06-22 ENCOUNTER — HOSPITAL ENCOUNTER (OUTPATIENT)
Facility: HOSPITAL | Age: 72
Discharge: HOME | End: 2017-06-23
Attending: FAMILY MEDICINE | Admitting: FAMILY MEDICINE
Payer: MEDICARE

## 2017-06-22 ENCOUNTER — NURSE TRIAGE (OUTPATIENT)
Dept: ADMINISTRATIVE | Facility: CLINIC | Age: 72
End: 2017-06-22

## 2017-06-22 DIAGNOSIS — K52.9 GASTROENTERITIS: ICD-10-CM

## 2017-06-22 DIAGNOSIS — A08.4 VIRAL GASTROENTERITIS: ICD-10-CM

## 2017-06-22 DIAGNOSIS — R07.89 CHEST TIGHTNESS: ICD-10-CM

## 2017-06-22 DIAGNOSIS — I10 ESSENTIAL HYPERTENSION: Chronic | ICD-10-CM

## 2017-06-22 DIAGNOSIS — R07.89 CHEST PRESSURE: ICD-10-CM

## 2017-06-22 DIAGNOSIS — R79.89 ELEVATED TROPONIN: Primary | ICD-10-CM

## 2017-06-22 LAB
ALBUMIN SERPL BCP-MCNC: 3.8 G/DL
ALP SERPL-CCNC: 147 U/L
ALT SERPL W/O P-5'-P-CCNC: 12 U/L
ANION GAP SERPL CALC-SCNC: 11 MMOL/L
AST SERPL-CCNC: 19 U/L
BASOPHILS # BLD AUTO: 0.01 K/UL
BASOPHILS NFR BLD: 0.2 %
BILIRUB SERPL-MCNC: 0.6 MG/DL
BUN SERPL-MCNC: 18 MG/DL
CALCIUM SERPL-MCNC: 9.2 MG/DL
CHLORIDE SERPL-SCNC: 107 MMOL/L
CO2 SERPL-SCNC: 22 MMOL/L
CREAT SERPL-MCNC: 0.9 MG/DL
DIFFERENTIAL METHOD: ABNORMAL
EOSINOPHIL # BLD AUTO: 0.1 K/UL
EOSINOPHIL NFR BLD: 0.8 %
ERYTHROCYTE [DISTWIDTH] IN BLOOD BY AUTOMATED COUNT: 12.4 %
EST. GFR  (AFRICAN AMERICAN): >60 ML/MIN/1.73 M^2
EST. GFR  (NON AFRICAN AMERICAN): >60 ML/MIN/1.73 M^2
GLUCOSE SERPL-MCNC: 108 MG/DL
HCT VFR BLD AUTO: 39 %
HGB BLD-MCNC: 13.3 G/DL
LIPASE SERPL-CCNC: 22 U/L
LYMPHOCYTES # BLD AUTO: 0.6 K/UL
LYMPHOCYTES NFR BLD: 9.5 %
MCH RBC QN AUTO: 31 PG
MCHC RBC AUTO-ENTMCNC: 34.1 %
MCV RBC AUTO: 91 FL
MONOCYTES # BLD AUTO: 0.3 K/UL
MONOCYTES NFR BLD: 4 %
NEUTROPHILS # BLD AUTO: 5.3 K/UL
NEUTROPHILS NFR BLD: 85.3 %
PLATELET # BLD AUTO: 187 K/UL
PMV BLD AUTO: 9.9 FL
POTASSIUM SERPL-SCNC: 3.9 MMOL/L
PROT SERPL-MCNC: 7.5 G/DL
RBC # BLD AUTO: 4.29 M/UL
SODIUM SERPL-SCNC: 140 MMOL/L
TROPONIN I SERPL DL<=0.01 NG/ML-MCNC: 0.05 NG/ML
TROPONIN I SERPL DL<=0.01 NG/ML-MCNC: 0.05 NG/ML
WBC # BLD AUTO: 6.2 K/UL

## 2017-06-22 PROCEDURE — 87045 FECES CULTURE AEROBIC BACT: CPT

## 2017-06-22 PROCEDURE — 25000003 PHARM REV CODE 250

## 2017-06-22 PROCEDURE — 87209 SMEAR COMPLEX STAIN: CPT

## 2017-06-22 PROCEDURE — 63600175 PHARM REV CODE 636 W HCPCS

## 2017-06-22 PROCEDURE — 99285 EMERGENCY DEPT VISIT HI MDM: CPT | Mod: ,,,

## 2017-06-22 PROCEDURE — 25000003 PHARM REV CODE 250: Performed by: FAMILY MEDICINE

## 2017-06-22 PROCEDURE — 93010 ELECTROCARDIOGRAM REPORT: CPT | Mod: S$GLB,,, | Performed by: INTERNAL MEDICINE

## 2017-06-22 PROCEDURE — G0378 HOSPITAL OBSERVATION PER HR: HCPCS

## 2017-06-22 PROCEDURE — 96374 THER/PROPH/DIAG INJ IV PUSH: CPT

## 2017-06-22 PROCEDURE — 84484 ASSAY OF TROPONIN QUANT: CPT

## 2017-06-22 PROCEDURE — 81001 URINALYSIS AUTO W/SCOPE: CPT

## 2017-06-22 PROCEDURE — 93005 ELECTROCARDIOGRAM TRACING: CPT

## 2017-06-22 PROCEDURE — 87449 NOS EACH ORGANISM AG IA: CPT

## 2017-06-22 PROCEDURE — 63600175 PHARM REV CODE 636 W HCPCS: Performed by: FAMILY MEDICINE

## 2017-06-22 PROCEDURE — 99285 EMERGENCY DEPT VISIT HI MDM: CPT | Mod: 25

## 2017-06-22 PROCEDURE — 87046 STOOL CULTR AEROBIC BACT EA: CPT | Mod: 59

## 2017-06-22 PROCEDURE — 99220 PR INITIAL OBSERVATION CARE,LEVL III: CPT | Mod: ,,, | Performed by: INTERNAL MEDICINE

## 2017-06-22 PROCEDURE — 81003 URINALYSIS AUTO W/O SCOPE: CPT

## 2017-06-22 PROCEDURE — 25000003 PHARM REV CODE 250: Performed by: INTERNAL MEDICINE

## 2017-06-22 PROCEDURE — 63600175 PHARM REV CODE 636 W HCPCS: Performed by: INTERNAL MEDICINE

## 2017-06-22 PROCEDURE — 96375 TX/PRO/DX INJ NEW DRUG ADDON: CPT

## 2017-06-22 PROCEDURE — 99219 PR INITIAL OBSERVATION CARE,LEVL II: CPT | Mod: ,,, | Performed by: INTERNAL MEDICINE

## 2017-06-22 PROCEDURE — 36415 COLL VENOUS BLD VENIPUNCTURE: CPT

## 2017-06-22 PROCEDURE — 80053 COMPREHEN METABOLIC PANEL: CPT

## 2017-06-22 PROCEDURE — 87427 SHIGA-LIKE TOXIN AG IA: CPT

## 2017-06-22 PROCEDURE — 85025 COMPLETE CBC W/AUTO DIFF WBC: CPT

## 2017-06-22 PROCEDURE — 96361 HYDRATE IV INFUSION ADD-ON: CPT

## 2017-06-22 PROCEDURE — 84484 ASSAY OF TROPONIN QUANT: CPT | Mod: 91

## 2017-06-22 PROCEDURE — 83690 ASSAY OF LIPASE: CPT

## 2017-06-22 RX ORDER — ENOXAPARIN SODIUM 100 MG/ML
40 INJECTION SUBCUTANEOUS EVERY 24 HOURS
Status: DISCONTINUED | OUTPATIENT
Start: 2017-06-22 | End: 2017-06-24 | Stop reason: HOSPADM

## 2017-06-22 RX ORDER — ONDANSETRON 2 MG/ML
4 INJECTION INTRAMUSCULAR; INTRAVENOUS
Status: COMPLETED | OUTPATIENT
Start: 2017-06-22 | End: 2017-06-22

## 2017-06-22 RX ORDER — NAPROXEN SODIUM 220 MG/1
81 TABLET, FILM COATED ORAL DAILY
Status: DISCONTINUED | OUTPATIENT
Start: 2017-06-23 | End: 2017-06-24 | Stop reason: HOSPADM

## 2017-06-22 RX ORDER — ONDANSETRON 2 MG/ML
4 INJECTION INTRAMUSCULAR; INTRAVENOUS EVERY 12 HOURS PRN
Status: DISCONTINUED | OUTPATIENT
Start: 2017-06-22 | End: 2017-06-23

## 2017-06-22 RX ORDER — ACETAMINOPHEN 325 MG/1
650 TABLET ORAL EVERY 6 HOURS PRN
Status: DISCONTINUED | OUTPATIENT
Start: 2017-06-22 | End: 2017-06-24 | Stop reason: HOSPADM

## 2017-06-22 RX ORDER — HYDROCODONE BITARTRATE AND ACETAMINOPHEN 5; 325 MG/1; MG/1
1 TABLET ORAL EVERY 8 HOURS PRN
Status: DISCONTINUED | OUTPATIENT
Start: 2017-06-22 | End: 2017-06-23

## 2017-06-22 RX ORDER — CARBAMAZEPINE 200 MG/1
200 TABLET ORAL 2 TIMES DAILY
Status: DISCONTINUED | OUTPATIENT
Start: 2017-06-22 | End: 2017-06-24 | Stop reason: HOSPADM

## 2017-06-22 RX ORDER — MORPHINE SULFATE 2 MG/ML
4 INJECTION, SOLUTION INTRAMUSCULAR; INTRAVENOUS EVERY 4 HOURS PRN
Status: DISCONTINUED | OUTPATIENT
Start: 2017-06-22 | End: 2017-06-24 | Stop reason: HOSPADM

## 2017-06-22 RX ORDER — LOSARTAN POTASSIUM AND HYDROCHLOROTHIAZIDE 12.5; 5 MG/1; MG/1
1 TABLET ORAL DAILY
Status: DISCONTINUED | OUTPATIENT
Start: 2017-06-23 | End: 2017-06-23

## 2017-06-22 RX ORDER — ASPIRIN 325 MG
325 TABLET ORAL
Status: COMPLETED | OUTPATIENT
Start: 2017-06-22 | End: 2017-06-22

## 2017-06-22 RX ORDER — GABAPENTIN 300 MG/1
600 CAPSULE ORAL NIGHTLY
Status: DISCONTINUED | OUTPATIENT
Start: 2017-06-22 | End: 2017-06-24 | Stop reason: HOSPADM

## 2017-06-22 RX ORDER — KETOROLAC TROMETHAMINE 30 MG/ML
10 INJECTION, SOLUTION INTRAMUSCULAR; INTRAVENOUS
Status: COMPLETED | OUTPATIENT
Start: 2017-06-22 | End: 2017-06-22

## 2017-06-22 RX ORDER — HYDROCODONE BITARTRATE AND ACETAMINOPHEN 5; 325 MG/1; MG/1
1 TABLET ORAL EVERY 4 HOURS PRN
Status: DISCONTINUED | OUTPATIENT
Start: 2017-06-22 | End: 2017-06-24 | Stop reason: HOSPADM

## 2017-06-22 RX ORDER — ATORVASTATIN CALCIUM 20 MG/1
40 TABLET, FILM COATED ORAL DAILY
Status: DISCONTINUED | OUTPATIENT
Start: 2017-06-23 | End: 2017-06-24 | Stop reason: HOSPADM

## 2017-06-22 RX ADMIN — KETOROLAC TROMETHAMINE 10 MG: 30 INJECTION, SOLUTION INTRAMUSCULAR at 02:06

## 2017-06-22 RX ADMIN — ONDANSETRON 4 MG: 2 INJECTION INTRAMUSCULAR; INTRAVENOUS at 11:06

## 2017-06-22 RX ADMIN — ONDANSETRON 4 MG: 2 INJECTION INTRAMUSCULAR; INTRAVENOUS at 01:06

## 2017-06-22 RX ADMIN — ASPIRIN 325 MG ORAL TABLET 325 MG: 325 PILL ORAL at 05:06

## 2017-06-22 RX ADMIN — ENOXAPARIN SODIUM 40 MG: 100 INJECTION SUBCUTANEOUS at 09:06

## 2017-06-22 RX ADMIN — GABAPENTIN 600 MG: 300 CAPSULE ORAL at 09:06

## 2017-06-22 RX ADMIN — SODIUM CHLORIDE, SODIUM LACTATE, POTASSIUM CHLORIDE, AND CALCIUM CHLORIDE 1000 ML: .6; .31; .03; .02 INJECTION, SOLUTION INTRAVENOUS at 02:06

## 2017-06-22 RX ADMIN — CARBAMAZEPINE 200 MG: 200 TABLET ORAL at 09:06

## 2017-06-22 RX ADMIN — HYDROCODONE BITARTRATE AND ACETAMINOPHEN 1 TABLET: 5; 325 TABLET ORAL at 09:06

## 2017-06-22 NOTE — ED TRIAGE NOTES
Nausea, vomiting, diarrhea, chest pain, facial numbness that all began this morning. Pt has vomited and had diarrhea greater than 10 times today.

## 2017-06-22 NOTE — ED PROVIDER NOTES
Encounter Date: 6/22/2017       History     Chief Complaint   Patient presents with    Diarrhea     N/V/D. Denies fever. Reports chest tightness.      71-year-old female with medical history of hypertension, depression, diabetes mellitus, COPD and CHF presents to the ED with nausea, vomiting and diarrhea.  Patient also endorses chest pressure.  Patient states symptoms began this morning around 7 AM.  Patient reports approximately 7 episodes of vomiting and 10 episodes of diarrhea.  Patient denies any recent travel, recent antibiotic use, eating anything out of the ordinary, sick contacts.  Patient denies any blood in her vomit or stool.  Patient reports that chest pain radiates from her left chest into her back.  Patient denies fever, chills, shortness of breath, weakness, syncope, changes in urination, constipation.          Review of patient's allergies indicates:  No Known Allergies  Past Medical History:   Diagnosis Date    Abnormal mammogram of both breasts     Hypertension     Major depressive disorder 5/14/2017    Memory disorder     Seizures      Past Surgical History:   Procedure Laterality Date    CATARACT EXTRACTION      cysts breast       History reviewed. No pertinent family history.  Social History   Substance Use Topics    Smoking status: Never Smoker    Smokeless tobacco: Never Used    Alcohol use No     Review of Systems   Constitutional: Negative for fever.   HENT: Negative for congestion and sore throat.    Respiratory: Negative for cough, chest tightness and shortness of breath.    Cardiovascular: Positive for chest pain.   Gastrointestinal: Positive for abdominal pain, nausea and vomiting. Negative for abdominal distention, blood in stool and diarrhea.   Genitourinary: Negative for dysuria and hematuria.   Musculoskeletal: Negative for back pain and myalgias.   Skin: Negative for rash.   Neurological: Negative for weakness.   Hematological: Does not bruise/bleed easily.    Psychiatric/Behavioral: The patient is nervous/anxious.        Physical Exam     Initial Vitals [06/22/17 1148]   BP Pulse Resp Temp SpO2   (!) 142/89 109 16 99.7 °F (37.6 °C) 96 %      MAP       106.67         Physical Exam    Vitals reviewed.  Constitutional: Vital signs are normal. She appears well-developed and well-nourished. She is not diaphoretic. No distress.   HENT:   Head: Normocephalic and atraumatic.   Nose: Nose normal.   Mouth/Throat: Oropharynx is clear and moist.   Eyes: Conjunctivae and lids are normal. Pupils are equal, round, and reactive to light. Lids are everted and swept, no foreign bodies found.   Neck: Trachea normal and normal range of motion. Neck supple.   Cardiovascular: Normal rate, regular rhythm, intact distal pulses and normal pulses.   No murmur heard.  Pulmonary/Chest: Breath sounds normal. She has no wheezes. She has no rhonchi. She has no rales.   Abdominal: Soft. Normal appearance. Bowel sounds are increased. There is no tenderness. There is no rebound and no guarding.   Musculoskeletal: Normal range of motion. She exhibits no edema or tenderness.   Neurological: She is alert and oriented to person, place, and time. She has normal strength. No cranial nerve deficit or sensory deficit.   Skin: Skin is warm. Capillary refill takes less than 2 seconds. No cyanosis.   Psychiatric: She has a normal mood and affect.         ED Course   Procedures  Labs Reviewed   CBC W/ AUTO DIFFERENTIAL - Abnormal; Notable for the following:        Result Value    Lymph # 0.6 (*)     Gran% 85.3 (*)     Lymph% 9.5 (*)     All other components within normal limits   COMPREHENSIVE METABOLIC PANEL - Abnormal; Notable for the following:     CO2 22 (*)     Alkaline Phosphatase 147 (*)     All other components within normal limits   TROPONIN I - Abnormal; Notable for the following:     Troponin I 0.053 (*)     All other components within normal limits    Narrative:     ADD ON TROPONIN ORDER [id number] PER  DR. JUAN DANIEL GUPTA  06/22/2017    15:55    CULTURE, STOOL   CLOSTRIDIUM DIFFICILE   ENTEROHEMORRHAGIC E.COLI   LIPASE   TROPONIN I   URINALYSIS, REFLEX TO URINE CULTURE   STOOL EXAM-OVA,CYSTS,PARASITES   TROPONIN I        Imaging Results          X-Ray Chest PA And Lateral (Final result)  Result time 06/22/17 14:24:26    Final result by Silvestre Best MD (06/22/17 14:24:26)                 Impression:     Normal chest      Electronically signed by: Silvestre Best MD  Date:     06/22/17  Time:    14:24              Narrative:    No cardiac size is normal.  Lungs are clear.  No infiltrate is seen.                                 Medical Decision Making:   History:   Old Medical Records: I decided to obtain old medical records.  Old Records Summarized: records from clinic visits.  Clinical Tests:   Lab Tests: Ordered and Reviewed  Radiological Study: Ordered and Reviewed  Medical Tests: Ordered and Reviewed       APC / Resident Notes:   71-year-old female with medical history of hypertension, depression, diabetes mellitus, COPD and CHF presents to the ED with nausea, vomiting and diarrhea.  Cardiac exam reveals regular rate and rhythm.  Abdomen is soft, nontender, nondistended with hyperactive bowel sounds.  Lungs clear bilateral auscultation with no decreased breath sounds.  Cranial nerves II through XII intact.  Patient able to ambulate without difficulty.  Romberg negative.  Strength intact.  Sensation intact.  Distal pulses intact.  Vitals are stable.  Patient is in no acute distress.    Patient given IV fluids, IV Zofran 4 mg, IV toradol 10mg.    3:25 PM   Patient resting in recliner.    Labs and imaging reviewed.  CBC wnl. CMP wnl.Lipase 22. Troponin .053.     CXR shows no acute process.     DDX includes but is not limited to ACS, gastroenteritis, IBS, pneumonia, pleural effusion, electrolyte abnormality, dehydration, RIGOBERTO.     Patient placed in IMC for observation.    I discussed and reviewed with my  supervising physician.              ED Course     Clinical Impression:   The primary encounter diagnosis was Elevated troponin. Diagnoses of Chest tightness, Chest pressure, and Gastroenteritis were also pertinent to this visit.    Disposition:   Disposition: Placed in Observation  Condition: Stable                        Carley Tyson PA-C  06/22/17 2036

## 2017-06-22 NOTE — CONSULTS
[facility]  Cardiology Consult Note    Attending Physician: Candice Magallon MD  Reason for Consult: Elevated Troponin    HPI:   Ms. Augustine is a 70yo F with a history of HTN, HLD, TIA, and seizures who presented to the ED with nausea, vomiting, diarrhea and chest pressure.  Her troponin was elevated and cardiology was consulted.    She states that the chest pain is similar to previous, but more severe.  She describes it as a central chest pressure that is intermittent and not associated with exertion.    Her EKG shows sinus tachycardia with a rate of 103 and no evidence of ischemic changes.    She is still having frequent episodes of watery diarrhea and nausea.  The chest pain has improved.    Review of Systems   Review of Systems   Constitution: Positive for weakness. Negative for chills.   HENT: Negative for headaches, hoarse voice and nosebleeds.    Eyes: Negative for double vision, pain and photophobia.   Cardiovascular: Positive for chest pain. Negative for dyspnea on exertion, irregular heartbeat and leg swelling.   Respiratory: Negative for hemoptysis and shortness of breath.    Skin: Negative for dry skin, flushing and itching.   Musculoskeletal: Negative for falls, joint swelling and muscle weakness.   Gastrointestinal: Positive for diarrhea, nausea and vomiting. Negative for abdominal pain.   Genitourinary: Negative for dysuria, flank pain and frequency.   Neurological: Negative for focal weakness and loss of balance.         PMH:     Past Medical History:   Diagnosis Date    Abnormal mammogram of both breasts     Hypertension     Major depressive disorder 5/14/2017    Memory disorder     Seizures      Past Surgical History:   Procedure Laterality Date    CATARACT EXTRACTION      cysts breast          Allergies:     Review of patient's allergies indicates:  No Known Allergies     Medications:     No current facility-administered medications on file prior to encounter.      Current  Outpatient Prescriptions on File Prior to Encounter   Medication Sig Dispense Refill    aspirin 81 MG Chew Take 81 mg by mouth once daily.      atorvastatin (LIPITOR) 40 MG tablet Take 1 tablet (40 mg total) by mouth once daily. 14 tablet 0    carbamazepine (TEGRETOL) 200 mg tablet Take 200 mg nightly for one week then take 200 mg TWICE DAILY thereafter 30 tablet 11    gabapentin (NEURONTIN) 300 MG capsule Take one cap at bedtime for one week, than increase to 2 caps at bedtime at bedtime, if tolerated.  Do not stop abruptly. 90 capsule 11    hydrocodone-acetaminophen 5-325mg (NORCO) 5-325 mg per tablet Take 1 tablet by mouth every 8 (eight) hours as needed for Pain. 40 tablet 0    losartan-hydrochlorothiazide 50-12.5 mg (HYZAAR) 50-12.5 mg per tablet Take 1 tablet by mouth once daily. 90 tablet 3    diclofenac (VOLTAREN) 50 MG EC tablet TK 1 T PO BID PRN P  3        Social History:     Social History   Substance Use Topics    Smoking status: Never Smoker    Smokeless tobacco: Never Used    Alcohol use No        Family History:     History reviewed. No pertinent family history.     Physical Exam:     Vitals:  Temp:  [99.7 °F (37.6 °C)]   Pulse:  [109]   Resp:  [16]   BP: (142)/(89)   SpO2:  [96 %]   I/O's:  No intake or output data in the 24 hours ending 06/22/17 1838     Physical Exam   Constitutional: She is oriented to person, place, and time. She appears well-developed and well-nourished. No distress.   HENT:   Head: Normocephalic and atraumatic.   Dry mucous membranes     Eyes: Conjunctivae and EOM are normal. Pupils are equal, round, and reactive to light.   Neck: Normal range of motion. Neck supple. No JVD present.   Cardiovascular: Normal rate and regular rhythm.  Exam reveals no gallop and no friction rub.    No murmur heard.  Pulmonary/Chest: Effort normal and breath sounds normal. No respiratory distress. She has no wheezes. She has no rales.   Abdominal: Soft. Bowel sounds are normal. She  exhibits no distension. There is no tenderness.   Musculoskeletal: Normal range of motion. She exhibits no edema.   Neurological: She is alert and oriented to person, place, and time. No cranial nerve deficit.   Skin: Skin is warm and dry. No rash noted. No erythema.         Labs:     Recent Results (from the past 336 hour(s))   CBC auto differential    Collection Time: 06/22/17  1:45 PM   Result Value Ref Range    WBC 6.20 3.90 - 12.70 K/uL    Hemoglobin 13.3 12.0 - 16.0 g/dL    Hematocrit 39.0 37.0 - 48.5 %    Platelets 187 150 - 350 K/uL       No results found for this or any previous visit (from the past 336 hour(s)).    Lab Results   Component Value Date    CHOL 182 05/13/2017    HDL 47 05/13/2017    LDLCALC 112.8 05/13/2017    TRIG 111 05/13/2017         Recent Labs  Lab 06/22/17  1345   TROPONINI 0.053*       Lab Results   Component Value Date    HGBA1C 6.1 05/09/2016       Estimated Creatinine Clearance: 59.9 mL/min (based on Cr of 0.9).    Stress Echo 5/15/17:    CONCLUSIONS     1 - Normal left ventricular systolic function (EF 60-65%).     2 - Normal right ventricular systolic function .     3 - Normal left ventricular diastolic function.     No evidence of stress induced myocardial ischemia.  Sensitivity is diminished given challenging imaging.    Assessment & Recommendations:   In summary, Ms. Augustine is a 70yo F with a history of HTN, HLD, TIA, and seizures who presented to the ED with nausea, vomiting, diarrhea and chest pressure.  Her troponin was elevated and cardiology was consulted.    #Atypical chest pain: Pt has a history of chronic atypical chest pain and elevated troponin.  States pain is more severe than prior. Suspect this is unlikely to be ACS.    Recommend admitting to obs and trending troponin.  If it remains flat, can likely discharge in AM.  Could consider PET Stress vs LHC as an outpatient with chronic troponin elevation and stress echo that showed reduced sensitivity due to image  quality.    Thank you for this consult. Further recommendations are pending the attending addendum. Please do not hesitate to page with questions.     Signed:  Celina Beltran MD, MPH  Cardiology Fellow, PGY-5  Pager: [phone]  6/22/2017 6:38 PM    Attending Addendum:

## 2017-06-22 NOTE — TELEPHONE ENCOUNTER
Reason for Disposition   MODERATE vomiting (e.g., 3 - 5 times/day) and age > 60    Protocols used: ST VOMITING-A-OH    Pt reports diarrhea and vomiting since this morning. She has vomited 4 times. Per protocol patient advised to go to ER for further eval

## 2017-06-23 VITALS
TEMPERATURE: 99 F | HEIGHT: 64 IN | WEIGHT: 187.38 LBS | DIASTOLIC BLOOD PRESSURE: 62 MMHG | OXYGEN SATURATION: 93 % | RESPIRATION RATE: 16 BRPM | BODY MASS INDEX: 31.99 KG/M2 | SYSTOLIC BLOOD PRESSURE: 121 MMHG | HEART RATE: 84 BPM

## 2017-06-23 PROBLEM — E87.6 HYPOKALEMIA DUE TO LOSS OF POTASSIUM: Status: ACTIVE | Noted: 2017-06-23

## 2017-06-23 PROBLEM — E83.42 HYPOMAGNESEMIA: Status: ACTIVE | Noted: 2017-06-23

## 2017-06-23 LAB
ANION GAP SERPL CALC-SCNC: 7 MMOL/L
BASOPHILS # BLD AUTO: 0.01 K/UL
BASOPHILS NFR BLD: 0.3 %
BILIRUB UR QL STRIP: NEGATIVE
BUN SERPL-MCNC: 16 MG/DL
C DIFF GDH STL QL: NEGATIVE
C DIFF TOX A+B STL QL IA: NEGATIVE
CALCIUM SERPL-MCNC: 8.3 MG/DL
CHLORIDE SERPL-SCNC: 108 MMOL/L
CLARITY UR REFRACT.AUTO: ABNORMAL
CO2 SERPL-SCNC: 24 MMOL/L
COLOR UR AUTO: YELLOW
CREAT SERPL-MCNC: 0.8 MG/DL
DIFFERENTIAL METHOD: ABNORMAL
EOSINOPHIL # BLD AUTO: 0 K/UL
EOSINOPHIL NFR BLD: 1.3 %
ERYTHROCYTE [DISTWIDTH] IN BLOOD BY AUTOMATED COUNT: 12.3 %
EST. GFR  (AFRICAN AMERICAN): >60 ML/MIN/1.73 M^2
EST. GFR  (NON AFRICAN AMERICAN): >60 ML/MIN/1.73 M^2
GLUCOSE SERPL-MCNC: 89 MG/DL
GLUCOSE UR QL STRIP: NEGATIVE
HCT VFR BLD AUTO: 34.3 %
HGB BLD-MCNC: 11.8 G/DL
HGB UR QL STRIP: NEGATIVE
KETONES UR QL STRIP: NEGATIVE
LEUKOCYTE ESTERASE UR QL STRIP: ABNORMAL
LYMPHOCYTES # BLD AUTO: 0.8 K/UL
LYMPHOCYTES NFR BLD: 25.6 %
MAGNESIUM SERPL-MCNC: 1.4 MG/DL
MAGNESIUM SERPL-MCNC: 3.6 MG/DL
MCH RBC QN AUTO: 31.1 PG
MCHC RBC AUTO-ENTMCNC: 34.4 %
MCV RBC AUTO: 91 FL
MICROSCOPIC COMMENT: NORMAL
MONOCYTES # BLD AUTO: 0.4 K/UL
MONOCYTES NFR BLD: 13.5 %
NEUTROPHILS # BLD AUTO: 1.8 K/UL
NEUTROPHILS NFR BLD: 59 %
NITRITE UR QL STRIP: NEGATIVE
PH UR STRIP: 5 [PH] (ref 5–8)
PLATELET # BLD AUTO: 127 K/UL
PMV BLD AUTO: 9.5 FL
POTASSIUM SERPL-SCNC: 3.7 MMOL/L
PROT UR QL STRIP: NEGATIVE
RBC # BLD AUTO: 3.79 M/UL
RBC #/AREA URNS AUTO: 2 /HPF (ref 0–4)
SODIUM SERPL-SCNC: 139 MMOL/L
SP GR UR STRIP: 1.02 (ref 1–1.03)
SQUAMOUS #/AREA URNS AUTO: 2 /HPF
TROPONIN I SERPL DL<=0.01 NG/ML-MCNC: 0.06 NG/ML
URN SPEC COLLECT METH UR: ABNORMAL
UROBILINOGEN UR STRIP-ACNC: NEGATIVE EU/DL
WBC # BLD AUTO: 3.12 K/UL
WBC #/AREA URNS AUTO: 3 /HPF (ref 0–5)

## 2017-06-23 PROCEDURE — 63600175 PHARM REV CODE 636 W HCPCS

## 2017-06-23 PROCEDURE — 25000003 PHARM REV CODE 250

## 2017-06-23 PROCEDURE — 25000003 PHARM REV CODE 250: Performed by: INTERNAL MEDICINE

## 2017-06-23 PROCEDURE — 83735 ASSAY OF MAGNESIUM: CPT | Mod: 91

## 2017-06-23 PROCEDURE — 36415 COLL VENOUS BLD VENIPUNCTURE: CPT

## 2017-06-23 PROCEDURE — 84484 ASSAY OF TROPONIN QUANT: CPT

## 2017-06-23 PROCEDURE — 83735 ASSAY OF MAGNESIUM: CPT

## 2017-06-23 PROCEDURE — 63600175 PHARM REV CODE 636 W HCPCS: Performed by: NURSE PRACTITIONER

## 2017-06-23 PROCEDURE — 80048 BASIC METABOLIC PNL TOTAL CA: CPT

## 2017-06-23 PROCEDURE — 85025 COMPLETE CBC W/AUTO DIFF WBC: CPT

## 2017-06-23 PROCEDURE — 99217 PR OBSERVATION CARE DISCHARGE: CPT | Mod: ,,, | Performed by: NURSE PRACTITIONER

## 2017-06-23 PROCEDURE — G0378 HOSPITAL OBSERVATION PER HR: HCPCS

## 2017-06-23 PROCEDURE — 25000003 PHARM REV CODE 250: Performed by: NURSE PRACTITIONER

## 2017-06-23 PROCEDURE — 63600175 PHARM REV CODE 636 W HCPCS: Performed by: INTERNAL MEDICINE

## 2017-06-23 RX ORDER — ONDANSETRON 4 MG/1
4 TABLET, ORALLY DISINTEGRATING ORAL EVERY 6 HOURS PRN
Qty: 20 TABLET | Refills: 0 | Status: SHIPPED | OUTPATIENT
Start: 2017-06-23 | End: 2017-10-02 | Stop reason: SDUPTHER

## 2017-06-23 RX ORDER — LOPERAMIDE HYDROCHLORIDE 2 MG/1
2 CAPSULE ORAL 3 TIMES DAILY
Qty: 30 CAPSULE | Refills: 0 | Status: SHIPPED | OUTPATIENT
Start: 2017-06-23 | End: 2018-02-21 | Stop reason: SDUPTHER

## 2017-06-23 RX ORDER — ONDANSETRON 4 MG/1
4 TABLET, ORALLY DISINTEGRATING ORAL EVERY 6 HOURS PRN
Status: DISCONTINUED | OUTPATIENT
Start: 2017-06-23 | End: 2017-06-24 | Stop reason: HOSPADM

## 2017-06-23 RX ORDER — LOPERAMIDE HYDROCHLORIDE 2 MG/1
2 CAPSULE ORAL 3 TIMES DAILY
Status: DISCONTINUED | OUTPATIENT
Start: 2017-06-23 | End: 2017-06-24 | Stop reason: HOSPADM

## 2017-06-23 RX ORDER — LOSARTAN POTASSIUM AND HYDROCHLOROTHIAZIDE 12.5; 5 MG/1; MG/1
2 TABLET ORAL DAILY
Status: DISCONTINUED | OUTPATIENT
Start: 2017-06-23 | End: 2017-06-24 | Stop reason: HOSPADM

## 2017-06-23 RX ORDER — IBUPROFEN 400 MG/1
800 TABLET ORAL ONCE
Status: COMPLETED | OUTPATIENT
Start: 2017-06-23 | End: 2017-06-23

## 2017-06-23 RX ORDER — POTASSIUM CHLORIDE 750 MG/1
30 CAPSULE, EXTENDED RELEASE ORAL ONCE
Status: COMPLETED | OUTPATIENT
Start: 2017-06-23 | End: 2017-06-23

## 2017-06-23 RX ORDER — ONDANSETRON 2 MG/ML
4 INJECTION INTRAMUSCULAR; INTRAVENOUS EVERY 6 HOURS PRN
Status: DISCONTINUED | OUTPATIENT
Start: 2017-06-23 | End: 2017-06-24 | Stop reason: HOSPADM

## 2017-06-23 RX ORDER — MAGNESIUM SULFATE HEPTAHYDRATE 40 MG/ML
2 INJECTION, SOLUTION INTRAVENOUS
Status: COMPLETED | OUTPATIENT
Start: 2017-06-23 | End: 2017-06-23

## 2017-06-23 RX ADMIN — POTASSIUM CHLORIDE 30 MEQ: 750 CAPSULE, EXTENDED RELEASE ORAL at 10:06

## 2017-06-23 RX ADMIN — ONDANSETRON 4 MG: 4 TABLET, ORALLY DISINTEGRATING ORAL at 08:06

## 2017-06-23 RX ADMIN — ASPIRIN 81 MG CHEWABLE TABLET 81 MG: 81 TABLET CHEWABLE at 08:06

## 2017-06-23 RX ADMIN — LOPERAMIDE HYDROCHLORIDE 2 MG: 2 CAPSULE ORAL at 11:06

## 2017-06-23 RX ADMIN — MAGNESIUM SULFATE IN WATER 2 G: 40 INJECTION, SOLUTION INTRAVENOUS at 09:06

## 2017-06-23 RX ADMIN — ONDANSETRON 4 MG: 2 INJECTION INTRAMUSCULAR; INTRAVENOUS at 08:06

## 2017-06-23 RX ADMIN — MAGNESIUM SULFATE IN WATER 2 G: 40 INJECTION, SOLUTION INTRAVENOUS at 02:06

## 2017-06-23 RX ADMIN — GABAPENTIN 600 MG: 300 CAPSULE ORAL at 08:06

## 2017-06-23 RX ADMIN — CARBAMAZEPINE 200 MG: 200 TABLET ORAL at 10:06

## 2017-06-23 RX ADMIN — ENOXAPARIN SODIUM 40 MG: 100 INJECTION SUBCUTANEOUS at 05:06

## 2017-06-23 RX ADMIN — HYDROCODONE BITARTRATE AND ACETAMINOPHEN 1 TABLET: 5; 325 TABLET ORAL at 08:06

## 2017-06-23 RX ADMIN — LOSARTAN POTASSIUM AND HYDROCHLOROTHIAZIDE 2 TABLET: 12.5; 5 TABLET ORAL at 10:06

## 2017-06-23 RX ADMIN — ATORVASTATIN CALCIUM 40 MG: 20 TABLET, FILM COATED ORAL at 08:06

## 2017-06-23 RX ADMIN — IBUPROFEN 800 MG: 400 TABLET, FILM COATED ORAL at 11:06

## 2017-06-23 RX ADMIN — MAGNESIUM SULFATE IN WATER 2 G: 40 INJECTION, SOLUTION INTRAVENOUS at 12:06

## 2017-06-23 RX ADMIN — CARBAMAZEPINE 200 MG: 200 TABLET ORAL at 08:06

## 2017-06-23 NOTE — NURSING
Paged and spoke to THOR Miller NP to inform her that 4g of Mg had been administered thus far. Requested Mg level to be drawn before last dose. Stated that a level would be obtained once completed. No new orders at this time.

## 2017-06-23 NOTE — HPI
Ms. Augustine is a 70 yo female with past medical history of DM2, HTN, COPD presents w mild gastroenteritis and CP since 8am.  She states that she has vomited early this morninx at home, 4x in the hospital so far, with 1x episode of diarrhea in the hospital.  Pt states that she has had fever ~99 and chills. Denies cough.  Described her CP as: Site: left central, Onset: sudden, Character: pressure, Radiation: jaw, L arm, Associations: none, Timing: continuous since 2am this morning and constant, Exacerbating/Relieving factors: none, Severity: 6/10  Seen in cardiology clinic.  Med rec derived from last clinic note as pt does not know her meds.    She has visit the ED three times this year for chest pain - 2x in 2017 and again 2 weeks ago.  She had a  stress echo on 5/15/2017 that was normal - no ischemia. recently seen in Cardiology clinic as well.     The patient was placed in observation to the Hospital Medicine Service for further evaluation and treatment.

## 2017-06-23 NOTE — NURSING
Order clarification confirmed for magnesium 2 mg x3 by THOR Miller NP. Stated ok to administer as ordered.

## 2017-06-23 NOTE — HPI
72 yo F w DM2, HTN, COPD presents w mild gastroenteritis and CP since 8am.  Pt states that she has vomited early this morninx at home, 4x in the hospital so far, with 1x episode of diarrhea in the hospital.  Pt states that she has had fever ~99 and chills.  Denies cough.  Described her CP as:    Site: left central, Onset: sudden, Character: pressure, Radiation: jaw, L arm, Associations: none, Timing: continuous since 2am this morning and constant, Exacerbating/Relieving factors: none, Severity: 6/10    She has visit the ED three times this year for chest pain - 2x in 2017 and again 2 weeks ago.  She had a DSE on 5/15/2017 that was normal - no ischemia.      ECHOCARDIOGRAM: 2016  EF 65%, otherwise normal     EC2017  NSR, normal ECG     STRESS TESTIN2017  DSE - no ischemia

## 2017-06-23 NOTE — PLAN OF CARE
Problem: Patient Care Overview  Goal: Plan of Care Review  Outcome: Ongoing (interventions implemented as appropriate)  Pt. Placed on fall precautions,assisted to ambulate to BR instructed to call every time she needs to get up,verbalizes understanding.Pt. Placed on tele monitor shows NSR rate in 80's,continue to monitor no c/o CP. Complaining of generalized discomfort,effectively controlled with Hydrocodone. Continue plan of care

## 2017-06-23 NOTE — HOSPITAL COURSE
Ms. Augustine was admitted 6/22 with complaints of chest pain and N/V/D. Initial EKG was negative, troponin trend was positive however flat, she has elevated troponin historically. She had  stress echo in May that was negative for ischemia, had follow up in cardiology clinic without changes to plan of care. She was hydrated with IVF overnight, electrolytes repleted. She has improved N/V. Diarrhea less frequent, tolerated imodium. She will be discharged with appropriate prescriptions and is scheduled for PET stress July 12 and Cardiology follow up July 14.

## 2017-06-23 NOTE — PLAN OF CARE
Problem: Patient Care Overview  Goal: Plan of Care Review  Outcome: Ongoing (interventions implemented as appropriate)  POC reviewed with pt. Pt verbalizes understanding of plan of care. Pt remained free of injury/trauma throughout the shift. Safety precautions maintained. Bedrails upx2, bed in lowest position and locked. Call bell in reach at all times. Telemetry moniotring in process. Pt c/o pain and nausea and was medicated with PRN pain meds and antiemetics.  Pt able to ambulate and reposition self in bed.  No acute events noted this shift.

## 2017-06-23 NOTE — PLAN OF CARE
06/23/17 1106   Discharge Assessment   Assessment Type Discharge Planning Assessment   Confirmed/corrected address and phone number on facesheet? No   Assessment information obtained from? Medical Record   Expected Length of Stay (days) 1   Communicated expected length of stay with patient/caregiver no   Prior to hospitilization cognitive status: Alert/Oriented   Prior to hospitalization functional status: Assistive Equipment   Current cognitive status: Alert/Oriented   Current Functional Status: Assistive Equipment   Arrived From home or self-care   Lives With child(leanne), adult   Able to Return to Prior Arrangements yes   Is patient able to care for self after discharge? Yes   Who are your caregiver(s) and their phone number(s)? Bernarda Nicolas  daughter  [phone]   Readmission Within The Last 30 Days no previous admission in last 30 days   Patient currently being followed by outpatient case management? No   Patient currently receives home health services? No   Does the patient currently use HME? No   Patient currently receives private duty nursing? No   Patient currently receives any other outside agency services? No   Equipment Currently Used at Home walker, standard   Does the patient have transportation to healthcare appointments? Yes   Transportation Available car   On Dialysis? No   Does the patient receive services at the Coumadin Clinic? No   Are there any open cases? No   Discharge Plan A Home with family   Placed in Obs for CM. Recent Obs placement for same. Information obtained from EMR. Independent with her DME. Plan is to DC home. No DC needs anticipated,    Future Appointments  Date Time Provider Department Center   7/3/2017 2:30 PM Riccardo Wylie Jr., CCC-A [facility] AUDIO [facility]   7/3/2017 3:30 PM Wild Sosa MD [facility] ENT [facility]   7/12/2017 4:00 PM CARDIAC, PET IMAGING [facility] CARDPET [facility]   7/14/2017 10:00 AM Mao Bowen MD [facility] CARDIO [facility]   8/1/2017 8:20 AM Luanne Connell  MD [facility]

## 2017-06-23 NOTE — SUBJECTIVE & OBJECTIVE
Past Medical History:   Diagnosis Date    Abnormal mammogram of both breasts     Hypertension     Major depressive disorder 5/14/2017    Memory disorder     Seizures        Past Surgical History:   Procedure Laterality Date    CATARACT EXTRACTION      cysts breast         Review of patient's allergies indicates:  No Known Allergies    No current facility-administered medications on file prior to encounter.      Current Outpatient Prescriptions on File Prior to Encounter   Medication Sig    atorvastatin (LIPITOR) 40 MG tablet Take 1 tablet (40 mg total) by mouth once daily.    carbamazepine (TEGRETOL) 200 mg tablet Take 200 mg nightly for one week then take 200 mg TWICE DAILY thereafter    gabapentin (NEURONTIN) 300 MG capsule Take one cap at bedtime for one week, than increase to 2 caps at bedtime at bedtime, if tolerated.  Do not stop abruptly.    losartan-hydrochlorothiazide 50-12.5 mg (HYZAAR) 50-12.5 mg per tablet Take 1 tablet by mouth once daily.    aspirin 81 MG Chew Take 81 mg by mouth once daily.    diclofenac (VOLTAREN) 50 MG EC tablet TK 1 T PO BID PRN P    hydrocodone-acetaminophen 5-325mg (NORCO) 5-325 mg per tablet Take 1 tablet by mouth every 8 (eight) hours as needed for Pain.     Family History     None        Social History Main Topics    Smoking status: Never Smoker    Smokeless tobacco: Never Used    Alcohol use No    Drug use: No    Sexual activity: No     Review of Systems   Constitution: Positive for chills, decreased appetite, diaphoresis, fever and weakness. Negative for night sweats and weight gain.   HENT: Negative for congestion.    Cardiovascular: Positive for chest pain. Negative for claudication, dyspnea on exertion, irregular heartbeat, leg swelling, near-syncope, orthopnea, palpitations, paroxysmal nocturnal dyspnea and syncope.   Respiratory: Negative for sleep disturbances due to breathing.    Endocrine: Negative for cold intolerance.   Hematologic/Lymphatic:  Negative for bleeding problem. Does not bruise/bleed easily.   Skin: Negative for flushing.   Musculoskeletal: Negative for back pain.   Gastrointestinal: Positive for diarrhea, nausea and vomiting. Negative for bloating, abdominal pain and heartburn.   Neurological: Negative for dizziness and light-headedness.   Psychiatric/Behavioral: The patient is not nervous/anxious.      Objective:     Vital Signs (Most Recent):  Temp: 99.1 °F (37.3 °C) (06/22/17 1926)  Pulse: 86 (06/22/17 1926)  Resp: 20 (06/22/17 1926)  BP: (!) 143/72 (06/22/17 1926)  SpO2: 96 % (06/22/17 1926) Vital Signs (24h Range):  Temp:  [99.1 °F (37.3 °C)-99.7 °F (37.6 °C)] 99.1 °F (37.3 °C)  Pulse:  [] 86  Resp:  [16-20] 20  SpO2:  [96 %] 96 %  BP: (142-143)/(72-89) 143/72     Weight: 85.7 kg (189 lb)  Body mass index is 32.44 kg/m².    SpO2: 96 %  O2 Device (Oxygen Therapy): room air    No intake or output data in the 24 hours ending 06/22/17 1957    Lines/Drains/Airways     Peripheral Intravenous Line                 Peripheral IV - Single Lumen 06/22/17 1340 Right Antecubital less than 1 day                Physical Exam   Constitutional: She is oriented to person, place, and time. She appears well-developed.   HENT:   Head: Normocephalic and atraumatic.   Neck: Normal range of motion. Neck supple. No JVD present. No thyromegaly present.   Cardiovascular: Normal rate, regular rhythm, S1 normal, S2 normal, normal heart sounds, intact distal pulses and normal pulses.  Exam reveals no gallop, no S3, no S4, no distant heart sounds, no friction rub, no midsystolic click and no opening snap.    No murmur heard.  Pulses:       Carotid pulses are 2+ on the right side, and 2+ on the left side.       Radial pulses are 2+ on the right side, and 2+ on the left side.        Posterior tibial pulses are 2+ on the right side, and 2+ on the left side.   Pulmonary/Chest: Effort normal and breath sounds normal. No stridor. No respiratory distress. She has no  wheezes. She has no rales. She exhibits no tenderness.   Abdominal: Soft. Bowel sounds are normal. She exhibits no distension and no mass. There is no tenderness. There is no rebound and no guarding.   Musculoskeletal: She exhibits no edema, tenderness or deformity.   Lymphadenopathy:     She has no cervical adenopathy.   Neurological: She is alert and oriented to person, place, and time.   Skin: Skin is warm and dry. No rash noted. No erythema. No pallor.   Psychiatric: She has a normal mood and affect. Her behavior is normal.       Significant Labs:      Recent Labs  Lab 06/22/17  1345         K 3.9      CO2 22*   BUN 18   CREATININE 0.9   CALCIUM 9.2     Recent Labs  Lab 06/22/17  1345      K 3.9      CO2 22*      BUN 18   CREATININE 0.9   CALCIUM 9.2   PROT 7.5   ALBUMIN 3.8   BILITOT 0.6   ALKPHOS 147*   AST 19   ALT 12   ANIONGAP 11   ESTGFRAFRICA >60.0   EGFRNONAA >60.0     Recent Labs  Lab 06/22/17  1345   WBC 6.20   HGB 13.3   HCT 39.0        Recent Labs  Lab 06/22/17  1345   TROPONINI 0.053*       Significant Imaging:  X-ray Chest Pa And Lateral    Result Date: 6/22/2017   Normal chest Electronically signed by: Silvestre Best MD Date:     06/22/17 Time:    14:24

## 2017-06-23 NOTE — NURSING
Received from Er per w/c in [place] awake alert and oriented. Skin warm and dry to touch color pink. Denies c/o CP no sob noted,oriented to room and call bell system call bell in reach

## 2017-06-23 NOTE — H&P
[facility]  Cardiology  History and Physical     Patient Name: Sofia Augustine  MRN: [id number]  Admission Date: 2017  Code Status: Prior   Attending Provider: Candice Magallon MD   Primary Care Physician: Luanne Connell MD  Principal Problem:Elevated troponin    Patient information was obtained from patient and ER records.     Subjective:     Chief Complaint:  Chest pain    HPI:  72 yo F w DM2, HTN, COPD presents w mild gastroenteritis and CP since 8am.  Pt states that she has vomited early this morninx at home, 4x in the hospital so far, with 1x episode of diarrhea in the hospital.  Pt states that she has had fever ~99 and chills.  Denies cough.  Described her CP as:    Site: left central, Onset: sudden, Character: pressure, Radiation: jaw, L arm, Associations: none, Timing: continuous since 2am this morning and constant, Exacerbating/Relieving factors: none, Severity: 6/10    Seen in cardiology clinic.  Med rec derived from last clinic note as pt does not know her meds.    She has visit the ED three times this year for chest pain - 2x in 2017 and again 2 weeks ago.  She had a DSE on 5/15/2017 that was normal - no ischemia.      ECHOCARDIOGRAM: 2016  EF 65%, otherwise normal     EC2017  NSR, normal ECG     STRESS TESTIN2017  DSE - no ischemia    Past Medical History:   Diagnosis Date    Abnormal mammogram of both breasts     Hypertension     Major depressive disorder 2017    Memory disorder     Seizures        Past Surgical History:   Procedure Laterality Date    CATARACT EXTRACTION      cysts breast         Review of patient's allergies indicates:  No Known Allergies    No current facility-administered medications on file prior to encounter.      Current Outpatient Prescriptions on File Prior to Encounter   Medication Sig    atorvastatin (LIPITOR) 40 MG tablet Take 1 tablet (40 mg total) by mouth once daily.    carbamazepine (TEGRETOL) 200 mg tablet  Take 200 mg nightly for one week then take 200 mg TWICE DAILY thereafter    gabapentin (NEURONTIN) 300 MG capsule Take one cap at bedtime for one week, than increase to 2 caps at bedtime at bedtime, if tolerated.  Do not stop abruptly.    losartan-hydrochlorothiazide 50-12.5 mg (HYZAAR) 50-12.5 mg per tablet Take 1 tablet by mouth once daily.    aspirin 81 MG Chew Take 81 mg by mouth once daily.    diclofenac (VOLTAREN) 50 MG EC tablet TK 1 T PO BID PRN P    hydrocodone-acetaminophen 5-325mg (NORCO) 5-325 mg per tablet Take 1 tablet by mouth every 8 (eight) hours as needed for Pain.     Family History     None        Social History Main Topics    Smoking status: Never Smoker    Smokeless tobacco: Never Used    Alcohol use No    Drug use: No    Sexual activity: No     Review of Systems   Constitution: Positive for chills, decreased appetite, diaphoresis, fever and weakness. Negative for night sweats and weight gain.   HENT: Negative for congestion.    Cardiovascular: Positive for chest pain. Negative for claudication, dyspnea on exertion, irregular heartbeat, leg swelling, near-syncope, orthopnea, palpitations, paroxysmal nocturnal dyspnea and syncope.   Respiratory: Negative for sleep disturbances due to breathing.    Endocrine: Negative for cold intolerance.   Hematologic/Lymphatic: Negative for bleeding problem. Does not bruise/bleed easily.   Skin: Negative for flushing.   Musculoskeletal: Negative for back pain.   Gastrointestinal: Positive for diarrhea, nausea and vomiting. Negative for bloating, abdominal pain and heartburn.   Neurological: Negative for dizziness and light-headedness.   Psychiatric/Behavioral: The patient is not nervous/anxious.      Objective:     Vital Signs (Most Recent):  Temp: 99.1 °F (37.3 °C) (06/22/17 1926)  Pulse: 86 (06/22/17 1926)  Resp: 20 (06/22/17 1926)  BP: (!) 143/72 (06/22/17 1926)  SpO2: 96 % (06/22/17 1926) Vital Signs (24h Range):  Temp:  [99.1 °F (37.3 °C)-99.7  °F (37.6 °C)] 99.1 °F (37.3 °C)  Pulse:  [] 86  Resp:  [16-20] 20  SpO2:  [96 %] 96 %  BP: (142-143)/(72-89) 143/72     Weight: 85.7 kg (189 lb)  Body mass index is 32.44 kg/m².    SpO2: 96 %  O2 Device (Oxygen Therapy): room air    No intake or output data in the 24 hours ending 06/22/17 1957    Lines/Drains/Airways     Peripheral Intravenous Line                 Peripheral IV - Single Lumen 06/22/17 1340 Right Antecubital less than 1 day                Physical Exam   Constitutional: She is oriented to person, place, and time. She appears well-developed.   HENT:   Head: Normocephalic and atraumatic.   Neck: Normal range of motion. Neck supple. No JVD present. No thyromegaly present.   Cardiovascular: Normal rate, regular rhythm, S1 normal, S2 normal, normal heart sounds, intact distal pulses and normal pulses.  Exam reveals no gallop, no S3, no S4, no distant heart sounds, no friction rub, no midsystolic click and no opening snap.    No murmur heard.  Pulses:       Carotid pulses are 2+ on the right side, and 2+ on the left side.       Radial pulses are 2+ on the right side, and 2+ on the left side.        Posterior tibial pulses are 2+ on the right side, and 2+ on the left side.   Pulmonary/Chest: Effort normal and breath sounds normal. No stridor. No respiratory distress. She has no wheezes. She has no rales. She exhibits no tenderness.   Abdominal: Soft. Bowel sounds are normal. She exhibits no distension and no mass. There is no tenderness. There is no rebound and no guarding.   Musculoskeletal: She exhibits no edema, tenderness or deformity.   Lymphadenopathy:     She has no cervical adenopathy.   Neurological: She is alert and oriented to person, place, and time.   Skin: Skin is warm and dry. No rash noted. No erythema. No pallor.   Psychiatric: She has a normal mood and affect. Her behavior is normal.       Significant Labs:      Recent Labs  Lab 06/22/17  1345         K 3.9       CO2 22*   BUN 18   CREATININE 0.9   CALCIUM 9.2     Recent Labs  Lab 06/22/17  1345      K 3.9      CO2 22*      BUN 18   CREATININE 0.9   CALCIUM 9.2   PROT 7.5   ALBUMIN 3.8   BILITOT 0.6   ALKPHOS 147*   AST 19   ALT 12   ANIONGAP 11   ESTGFRAFRICA >60.0   EGFRNONAA >60.0     Recent Labs  Lab 06/22/17  1345   WBC 6.20   HGB 13.3   HCT 39.0        Recent Labs  Lab 06/22/17  1345   TROPONINI 0.053*       Significant Imaging:  X-ray Chest Pa And Lateral    Result Date: 6/22/2017   Normal chest Electronically signed by: Silvestre Best MD Date:     06/22/17 Time:    14:24       Assessment and Plan:     * Elevated troponin    Chest pain r/o for acute coronary syndrome  -recent stress test per last cardiology office visit  -continue serial troponin for 2 times every 6 hours  -IF negative stress test in am  -if positive will start ACS protocol   -initial  mg given  -continue ASA 81 mg daily  -NTG prn chest pain  -EKG prn chest pain  -continuous telemetry monitoring  -continue statin          Viral gastroenteritis    -encourage PO intake  -f/u daily electrolytes particularly Na, bicarb, BUN/Cr        Seizure    Continue current home medication: carbamazepine  -do not stop neurontin abruptly          Essential hypertension    -continues to be well controlled  home meds: hyzaar        Radiculopathy of cervical spine    Controlled  -continue home meds: norco, neurontin, voltaren            VTE Risk Mitigation         Ordered     enoxaparin injection 40 mg  Daily     Route:  Subcutaneous        06/22/17 1943     Medium Risk of VTE  Once      06/22/17 1943          Celina Howe MD  Cardiology   [facility]

## 2017-06-23 NOTE — ASSESSMENT & PLAN NOTE
Chest pain r/o for acute coronary syndrome    -continue serial troponin for 2 times every 6 hours  -IF negative stress test in am  -if positive will start ACS protocol   -initial  mg given  -continue ASA 81 mg daily  -NTG prn chest pain  -EKG prn chest pain  -continuous telemetry monitoring

## 2017-06-24 NOTE — NURSING
Heart monitor and saline lock discontinued. Discharge teaching done,medication list reviewed with patient. Verbalizes understanding of instructions. Discharged to home in [place]

## 2017-06-24 NOTE — DISCHARGE SUMMARY
[facility] Hospital Medicine  Discharge Summary      Patient Name: Sofia Augustine  MRN: [id number]  Admission Date: 2017  Hospital Length of Stay: 0 days  Discharge Date and Time:  2017 7:58 PM  Attending Physician: Candice Magallon MD   Discharging Provider: Hellen Miller NP  Primary Care Provider: Luanne Connell MD  [facility] Medicine Team: [facility] J Hellen Miller NP    HPI:   Ms. Augustine is a 70 yo female with past medical history of DM2, HTN, COPD presents w mild gastroenteritis and CP since 8am.   She states that she has vomited early this morninx at home, 4x in the hospital so far, with 1x episode of diarrhea in the hospital.  Pt states that she has had fever ~99 and chills. Denies cough.  Described her CP as: Site: left central, Onset: sudden, Character: pressure, Radiation: jaw, L arm, Associations: none, Timing: continuous since 2am this morning and constant, Exacerbating/Relieving factors: none, Severity: 6/10  Seen in cardiology clinic.  Med rec derived from last clinic note as pt does not know her meds.    She has visit the ED three times this year for chest pain - 2x in 2017 and again 2 weeks ago.  She had a  stress echo on 5/15/2017 that was normal - no ischemia. recently seen in Cardiology clinic as well.     The patient was placed in observation to the Hospital Medicine Service for further evaluation and treatment.       * No surgery found *      Indwelling Lines/Drains at time of discharge:   Lines/Drains/Airways          No matching active lines, drains, or airways        Hospital Course:   Ms. Augustine was admitted  with complaints of chest pain and N/V/D. Initial EKG was negative, troponin trend was positive however flat, she has elevated troponin historically. She had  stress echo in May that was negative for ischemia, had follow up in cardiology clinic without changes to plan of care. She was hydrated with IVF overnight,  electrolytes repleted. She has improved N/V. Diarrhea less frequent, tolerated imodium. She will be discharged with appropriate prescriptions and is scheduled for PET stress July 12 and Cardiology follow up July 14.      Consults:   Consults         Status Ordering Provider     Inpatient consult to Cardiology  Once     Provider:  (Not yet assigned)    Completed CARROL MELO        Review of Systems   Constitution: Positive for chills, decreased appetite, diaphoresis, fever and weakness. Negative for night sweats and weight gain.   HENT: Negative for congestion.    Cardiovascular: Positive for chest pain. Negative for claudication, dyspnea on exertion, irregular heartbeat, leg swelling, near-syncope, orthopnea, palpitations, paroxysmal nocturnal dyspnea and syncope.   Respiratory: Negative for sleep disturbances due to breathing.    Endocrine: Negative for cold intolerance.   Hematologic/Lymphatic: Negative for bleeding problem. Does not bruise/bleed easily.   Skin: Negative for flushing.   Musculoskeletal: Negative for back pain.   Gastrointestinal: Positive for diarrhea. Negative for nausea or vomiting. Negative for bloating, abdominal pain and heartburn.   Neurological: Negative for dizziness and light-headedness.   Psychiatric/Behavioral: The patient is not nervous/anxious.      Significant Diagnostic Studies: Labs:   CMP   Recent Labs  Lab 06/22/17  1345 06/23/17  0447    139   K 3.9 3.7    108   CO2 22* 24    89   BUN 18 16   CREATININE 0.9 0.8   CALCIUM 9.2 8.3*   PROT 7.5  --    ALBUMIN 3.8  --    BILITOT 0.6  --    ALKPHOS 147*  --    AST 19  --    ALT 12  --    ANIONGAP 11 7*   ESTGFRAFRICA >60.0 >60.0   EGFRNONAA >60.0 >60.0   , CBC   Recent Labs  Lab 06/22/17  1345 06/23/17  0447   WBC 6.20 3.12*   HGB 13.3 11.8*   HCT 39.0 34.3*    127*    and All labs within the past 24 hours have been reviewed    Pending Diagnostic Studies:     Procedure Component Value Units  Date/Time    Stool Exam-Ova,Cysts,Parasites [[id number]] Collected:  06/22/17 1449    Order Status:  Sent Lab Status:  In process Updated:  06/23/17 0036    Specimen:  Stool from Stool         Physical Exam   Constitutional: She is oriented to person, place, and time. She appears well-developed.    Neck: Normal range of motion. Neck supple. No JVD present.   Cardiovascular: Normal rate, regular rhythm, S1 normal, S2 normal, normal heart sounds, intact distal pulses and normal pulses.  Exam reveals no gallop, no S3, no S4, no distant heart sounds, no friction rub, no midsystolic click and no opening snap.    No murmur heard.  Pulses:       Radial pulses are 2+ on the right side, and 2+ on the left side.        Posterior tibial pulses are 2+ on the right side, and 2+ on the left side.   Pulmonary/Chest: Effort normal and breath sounds normal. No stridor. No respiratory distress. She has no wheezes. She has no rales. She exhibits no tenderness.   Abdominal: Soft. Bowel sounds are normal. She exhibits no distension and no mass. There is no tenderness. There is no rebound and no guarding.   Musculoskeletal: She exhibits no edema, tenderness or deformity.   Neurological: She is alert and oriented to person, place, and time.   Skin: Skin is warm and dry. No rash noted. No erythema. No pallor.   Psychiatric: She has a normal mood and affect. Her behavior is normal.     Final Active Diagnoses:    Diagnosis Date Noted POA    PRINCIPAL PROBLEM:  Chest pain, rule out acute myocardial infarction [R07.9] 05/13/2017 Yes    Elevated troponin [R74.8] 06/22/2017 Yes    Essential hypertension [I10] 04/03/2016 Yes     Chronic    Viral gastroenteritis [A08.4] 06/22/2017 Yes    Radiculopathy of cervical spine [M54.12] 04/03/2016 Yes    Seizure [R56.9] 10/12/2016 Yes    Hypokalemia due to loss of potassium [E87.6] 06/23/2017 Unknown    Hypomagnesemia [E83.42] 06/23/2017 Unknown      Problems Resolved During this Admission:     Diagnosis Date Noted Date Resolved POA      * Chest pain, rule out acute myocardial infarction    -chest pain free  -troponin trend positive but flat, however historical elevation noted  -recent  stress echo that was negative, with follow Cardiology appointment, no changes in medical management  -continue ASA, statin, and hyzaar  -no events on tele  -scheduled outpt PET Stress and cardiology follow up          Elevated troponin    -historically elevated, see above for detailed plans        Essential hypertension    -BP improved over hospital course  -continue home hyzaar  -cardiology follow up as scheduled        Viral gastroenteritis    -hydrated with IVF ON  -encouraged PO intake  -N/V resolved, diarrhea frequency decreased  -continue imodium, ODT zofran PRN  -repleted electrolytes        Radiculopathy of cervical spine    -continue home hydrocodone        Seizure    -continue home carbamazepine  -PCP follow up as needed        Hypomagnesemia    -repleted        Hypokalemia due to loss of potassium    -repleted            Discharged Condition: good    Disposition: Home or Self Care    Follow Up:    Patient Instructions:     Diet general   Order Specific Question Answer Comments   Additional restrictions: Cardiac (Low Na/Chol)      Activity as tolerated     Call MD for:  temperature >100.4     Call MD for:  persistent nausea and vomiting or diarrhea     Call MD for:  severe uncontrolled pain     Call MD for:  difficulty breathing or increased cough     Call MD for:  severe persistent headache     Call MD for:  persistent dizziness, light-headedness, or visual disturbances     Call MD for:  increased confusion or weakness     Cardiac pet scan stress   Standing Status: Future  Standing Exp. Date: 06/23/18       Medications:  Reconciled Home Medications:   Current Discharge Medication List      START taking these medications    Details   loperamide (IMODIUM) 2 mg capsule Take 1 capsule (2 mg total) by mouth 3  (three) times daily.  Qty: 30 capsule, Refills: 0      ondansetron (ZOFRAN-ODT) 4 MG TbDL Take 1 tablet (4 mg total) by mouth every 6 (six) hours as needed.  Qty: 20 tablet, Refills: 0         CONTINUE these medications which have NOT CHANGED    Details   atorvastatin (LIPITOR) 40 MG tablet Take 1 tablet (40 mg total) by mouth once daily.  Qty: 14 tablet, Refills: 0      carbamazepine (TEGRETOL) 200 mg tablet Take 200 mg nightly for one week then take 200 mg TWICE DAILY thereafter  Qty: 30 tablet, Refills: 11      gabapentin (NEURONTIN) 300 MG capsule Take one cap at bedtime for one week, than increase to 2 caps at bedtime at bedtime, if tolerated.  Do not stop abruptly.  Qty: 90 capsule, Refills: 11    Associated Diagnoses: Numbness and tingling; Osteoarthritis of spine with radiculopathy, cervical region; Transient neurological symptoms; Numbness; Left facial numbness      losartan-hydrochlorothiazide 50-12.5 mg (HYZAAR) 50-12.5 mg per tablet Take 1 tablet by mouth once daily.  Qty: 90 tablet, Refills: 3    Associated Diagnoses: Essential hypertension      aspirin 81 MG Chew Take 81 mg by mouth once daily.      diclofenac (VOLTAREN) 50 MG EC tablet TK 1 T PO BID PRN P  Refills: 3      hydrocodone-acetaminophen 5-325mg (NORCO) 5-325 mg per tablet Take 1 tablet by mouth every 8 (eight) hours as needed for Pain.  Qty: 40 tablet, Refills: 0    Comments: Caution, medication is sedating           Time spent on the discharge of patient: 32 minutes    Hellen Miller NP  Department of Hospital Medicine  [facility]  Pager [phone]  [facility] [phone]

## 2017-06-24 NOTE — ASSESSMENT & PLAN NOTE
-hydrated with IVF ON  -encouraged PO intake  -N/V resolved, diarrhea frequency decreased  -continue imodium, ODT zofran PRN  -repleted electrolytes

## 2017-06-24 NOTE — ASSESSMENT & PLAN NOTE
-chest pain free  -troponin trend positive but flat, however historical elevation noted  -recent  stress echo that was negative, with follow Cardiology appointment, no changes in medical management  -continue ASA, statin, and hyzaar  -no events on tele  -scheduled outpt PET Stress and cardiology follow up

## 2017-06-25 LAB
E COLI SXT1 STL QL IA: NEGATIVE
E COLI SXT2 STL QL IA: NEGATIVE

## 2017-06-26 DIAGNOSIS — R07.9 CHEST PAIN, UNSPECIFIED TYPE: Primary | ICD-10-CM

## 2017-06-26 LAB
BACTERIA STL CULT: NORMAL
O+P STL TRI STN: NORMAL

## 2017-06-26 NOTE — PLAN OF CARE
06/26/17 0727   Final Note   Assessment Type Final Discharge Note   Discharge Disposition Home   Hospital Follow Up  Appt(s) scheduled? Yes

## 2017-06-29 ENCOUNTER — NURSE TRIAGE (OUTPATIENT)
Dept: ADMINISTRATIVE | Facility: CLINIC | Age: 72
End: 2017-06-29

## 2017-06-29 ENCOUNTER — TELEPHONE (OUTPATIENT)
Dept: INTERNAL MEDICINE | Facility: CLINIC | Age: 72
End: 2017-06-29

## 2017-06-29 ENCOUNTER — HOSPITAL ENCOUNTER (EMERGENCY)
Facility: HOSPITAL | Age: 72
Discharge: HOME | End: 2017-06-29
Attending: EMERGENCY MEDICINE | Admitting: EMERGENCY MEDICINE
Payer: MEDICARE

## 2017-06-29 VITALS
SYSTOLIC BLOOD PRESSURE: 126 MMHG | BODY MASS INDEX: 32.27 KG/M2 | WEIGHT: 189 LBS | TEMPERATURE: 98 F | OXYGEN SATURATION: 97 % | HEART RATE: 68 BPM | DIASTOLIC BLOOD PRESSURE: 69 MMHG | HEIGHT: 64 IN | RESPIRATION RATE: 18 BRPM

## 2017-06-29 DIAGNOSIS — S80.01XA CONTUSION OF RIGHT KNEE, INITIAL ENCOUNTER: ICD-10-CM

## 2017-06-29 DIAGNOSIS — M25.522 ELBOW PAIN, LEFT: ICD-10-CM

## 2017-06-29 DIAGNOSIS — S09.90XA CLOSED HEAD INJURY, INITIAL ENCOUNTER: Primary | ICD-10-CM
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
## 2017-06-29 PROCEDURE — 25000003 PHARM REV CODE 250: Performed by: PHYSICIAN ASSISTANT

## 2017-06-29 PROCEDURE — 99284 EMERGENCY DEPT VISIT MOD MDM: CPT

## 2017-06-29 PROCEDURE — 99284 EMERGENCY DEPT VISIT MOD MDM: CPT | Mod: ,,, | Performed by: PHYSICIAN ASSISTANT

## 2017-06-29 RX ORDER — ACETAMINOPHEN 500 MG
1000 TABLET ORAL
Status: COMPLETED | OUTPATIENT
Start: 2017-06-29 | End: 2017-06-29

## 2017-06-29 RX ADMIN — ACETAMINOPHEN 1000 MG: 500 TABLET ORAL at 01:06

## 2017-06-29 NOTE — TELEPHONE ENCOUNTER
Called and spoke with the patient and advised of the results she understood . Patient states her son has the same issue when he was a child and he was Dx w/ this at a later age and was in a wheel chair for 3 years due to this issue. Patient wants an order for additional nallely to determine if she has this. Please advise

## 2017-06-29 NOTE — TELEPHONE ENCOUNTER
----- Message from Saundra Nuñez sent at 6/29/2017  9:37 AM CDT -----  Contact: Elo  [facility] [phone]  Medication Diclofenac was prescribed to patient at discharge from [facility] on 06/23 . Patient is refusing to take it

## 2017-06-29 NOTE — ED TRIAGE NOTES
"Pt presents to the ED c/o a fall today. Pt states she hit her head. +LOC for a few seconds, pt states. +HA, dizziness. Pt states, "It feels like something is loose in my left elbow." Small abrasion noted to right knee.  "

## 2017-06-29 NOTE — ED PROVIDER NOTES
Encounter Date: 6/29/2017    SCRIBE #1 NOTE: I, Balbir Shay, am scribing for, and in the presence of,  Dr. Lara. I have scribed the following portions of the note - the APC attestation.       History     Chief Complaint   Patient presents with    Fall     Pt states she tripped and fell and hit head on a metal door and hurt left elbow     72 yo with a PMH significant for HTN, seizures, memory disorder, major press of disorder presents for evaluation of a fall with head injury and LOC.  Patient states that she tripped over some photo cortes equipment in her daughter's living room PTA.  Patient recalls falling onto her right knee and hitting the left occipital aspect of her head on the equipment.  Patient reports seeing white light and lost consciousness for what she believes is a few seconds.  She complains of right knee pain, left elbow and shoulder pain, left-sided neck pain, headache with nausea and lightheadedness. Pt takes aspirin 81mg daily.           Review of patient's allergies indicates:  No Known Allergies  Past Medical History:   Diagnosis Date    Abnormal mammogram of both breasts     Hypertension     Major depressive disorder 5/14/2017    Memory disorder     Seizures      Past Surgical History:   Procedure Laterality Date    CATARACT EXTRACTION      cysts breast       History reviewed. No pertinent family history.  Social History   Substance Use Topics    Smoking status: Never Smoker    Smokeless tobacco: Never Used    Alcohol use No     Review of Systems   Constitutional: Negative for fever.   Eyes: Negative for visual disturbance.   Respiratory: Negative for shortness of breath.    Cardiovascular: Negative for chest pain.   Gastrointestinal: Positive for nausea. Negative for vomiting.   Genitourinary: Negative for dysuria.   Musculoskeletal: Positive for arthralgias (shoulder, elbow, knee) and neck pain. Negative for back pain.   Skin: Negative for wound.   Neurological: Positive for  syncope, light-headedness and headaches. Negative for weakness.   Psychiatric/Behavioral: Negative for confusion.       Physical Exam     Initial Vitals [06/29/17 1053]   BP Pulse Resp Temp SpO2   (!) 167/88 79 16 98.1 °F (36.7 °C) 97 %      MAP       114.33         Physical Exam    Nursing note and vitals reviewed.  Constitutional: She appears well-developed and well-nourished. She is not diaphoretic.  Non-toxic appearance. She does not appear ill. No distress.   HENT:   Head: Normocephalic and atraumatic.   Eyes: Conjunctivae and EOM are normal. Pupils are equal, round, and reactive to light.   Neck: Normal range of motion. Neck supple.   Full ROM with some pain with R lateral rotation. No midline cervical TTP.    Cardiovascular: Normal rate and regular rhythm. Exam reveals no gallop and no friction rub.    No murmur heard.  Pulmonary/Chest: Effort normal and breath sounds normal. No accessory muscle usage. No tachypnea. No respiratory distress. She has no decreased breath sounds. She has no wheezes. She has no rhonchi. She has no rales.   Abdominal: Normal appearance. She exhibits no distension.   Musculoskeletal: Normal range of motion.   Pain to the prox L forearm and posterior elbow. Norm sup/pron. Some pain with flex/ext of the elbow. TTP of the proximal humerus. Pain with ROM of the L shoulder past 90 degrees of abduction.    Neurological: She is alert and oriented to person, place, and time. She has normal strength. No cranial nerve deficit or sensory deficit. Coordination and gait normal. GCS eye subscore is 4. GCS verbal subscore is 5. GCS motor subscore is 6.   Skin: Skin is warm and dry. No rash noted. No pallor.   Psychiatric: She has a normal mood and affect. Her behavior is normal. Judgment and thought content normal.         ED Course   Procedures  Labs Reviewed - No data to display          Medical Decision Making:   History:   Old Medical Records: I decided to obtain old medical  records.  Differential Diagnosis:   Fracture, contusion, dislocation, SDH  Independently Interpreted Test(s):   I have ordered and independently interpreted X-rays - see prior notes.  Clinical Tests:   Lab Tests: Ordered  Radiological Study: Ordered       APC / Resident Notes:   72 yo female presents for evaluation after a fall with head trauma.  She is alert and oriented.  No focal neurologic deficits on exam.  Tenderness to the anterior right knee.  Tenderness to the left elbow and proximal forearm.  Neurovascularly intact.    X-ray of the right knee, left shoulder, left elbow and CT of the head are unremarkable for acute findings.    Patient discharged in stable condition with instructions to apply ice to the affected areas.  Tylenol as needed for pain.  PCP follow-up in 3-4 days if no improvement in her pain. ED warnings and return precautions given.  I have reviewed the patient's records and discussed this case with my supervising physician.           Scribe Attestation:   Scribe #1: I performed the above scribed service and the documentation accurately describes the services I performed. I attest to the accuracy of the note.    Attending Attestation:     Physician Attestation Statement for NP/PA:   I discussed this assessment and plan of this patient with the NP/PA, but I did not personally examine the patient. The face to face encounter was performed by the NP/PA.    Other NP/PA Attestation Additions:    History of Present Illness: 72 yo female on baby aspirin presents after trip and fall, striking her head and L elbow. Positive LOC.     Medical Decision Making: While in ED she received a Head CT, L elbow, L shoulder, R knee xrays. Head CT negative, xray shows degenerative changes but no fxs. R knee xray djd, no fx, L shoulder no fx, djd, L elbow degenerative changes, no fx.        Physician Attestation for Scribe:  Physician Attestation Statement for Scribe #1: I, Dr. Lara, reviewed documentation, as  scribed by Balbir Shay in my presence, and it is both accurate and complete.                 ED Course     Clinical Impression:   The primary encounter diagnosis was Closed head injury, initial encounter. Diagnoses of Fall, Contusion of right knee, initial encounter, and Elbow pain, left were also pertinent to this visit.    Disposition:   Disposition: Discharged  Condition: Stable                        Dionne Barragan PA-C  06/29/17 1871

## 2017-06-30 NOTE — TELEPHONE ENCOUNTER
Reason for Disposition   Caller has medication question, adult has minor symptoms, caller declines triage, and triager answers question    Answer Assessment - Initial Assessment Questions  Pt seen in ER today post fall. Has pain in right leg, left arm - she has taken 2 norco this pm from a previous scrip given by her pcp. She took 1 this pm and then a second one about an hr later. Wants to know if she can take more as she is still painful    Protocols used: ST MEDICATION QUESTION CALL-A-AH

## 2017-07-25 RX ORDER — HYDROCODONE BITARTRATE AND ACETAMINOPHEN 5; 325 MG/1; MG/1
1 TABLET ORAL EVERY 8 HOURS PRN
Qty: 40 TABLET | Refills: 0 | Status: SHIPPED | OUTPATIENT
Start: 2017-07-25 | End: 2017-08-15 | Stop reason: SDUPTHER

## 2017-07-25 NOTE — TELEPHONE ENCOUNTER
----- Message from Lizzy Howe sent at 7/25/2017 11:09 AM CDT -----  Contact: pt [phone]  RX request - refill or new RX.  Is this a refill or new RX:  refill  RX name and strength: hydrocodone-acetaminophen 5-325mg (NORCO) 5-325 mg per tablet  Directions:   Is this a 30 day or 90 day RX:      Comments:

## 2017-07-27 ENCOUNTER — TELEPHONE (OUTPATIENT)
Dept: INTERNAL MEDICINE | Facility: CLINIC | Age: 72
End: 2017-07-27

## 2017-07-27 DIAGNOSIS — R06.02 SHORTNESS OF BREATH: Primary | ICD-10-CM

## 2017-07-27 NOTE — TELEPHONE ENCOUNTER
[facility] is requesting a spirometry test for the patient. Need orders in computer. thx

## 2017-07-31 ENCOUNTER — TELEPHONE (OUTPATIENT)
Dept: INTERNAL MEDICINE | Facility: CLINIC | Age: 72
End: 2017-07-31

## 2017-08-01 ENCOUNTER — OFFICE VISIT (OUTPATIENT)
Dept: INTERNAL MEDICINE | Facility: CLINIC | Age: 72
End: 2017-08-01
Payer: MEDICARE

## 2017-08-01 ENCOUNTER — HOSPITAL ENCOUNTER (OUTPATIENT)
Dept: RADIOLOGY | Facility: HOSPITAL | Age: 72
Discharge: HOME | End: 2017-08-01
Attending: INTERNAL MEDICINE
Payer: MEDICARE

## 2017-08-01 VITALS
DIASTOLIC BLOOD PRESSURE: 60 MMHG | SYSTOLIC BLOOD PRESSURE: 98 MMHG | BODY MASS INDEX: 30.71 KG/M2 | RESPIRATION RATE: 16 BRPM | HEART RATE: 85 BPM | TEMPERATURE: 98 F | HEIGHT: 64 IN | WEIGHT: 179.88 LBS

## 2017-08-01 DIAGNOSIS — D64.9 ANEMIA, UNSPECIFIED TYPE: ICD-10-CM

## 2017-08-01 DIAGNOSIS — R10.9 FLANK PAIN: Primary | ICD-10-CM

## 2017-08-01 DIAGNOSIS — R56.9 SEIZURE: ICD-10-CM

## 2017-08-01 DIAGNOSIS — R10.9 FLANK PAIN: ICD-10-CM

## 2017-08-01 PROCEDURE — 74000 XR ABDOMEN AP 1 VIEW: CPT | Mod: TC,PO

## 2017-08-01 PROCEDURE — 3008F BODY MASS INDEX DOCD: CPT | Mod: S$GLB,,, | Performed by: INTERNAL MEDICINE

## 2017-08-01 PROCEDURE — 3078F DIAST BP <80 MM HG: CPT | Mod: S$GLB,,, | Performed by: INTERNAL MEDICINE

## 2017-08-01 PROCEDURE — 74000 XR ABDOMEN AP 1 VIEW: CPT | Mod: 26,,, | Performed by: RADIOLOGY

## 2017-08-01 PROCEDURE — 1159F MED LIST DOCD IN RCRD: CPT | Mod: S$GLB,,, | Performed by: INTERNAL MEDICINE

## 2017-08-01 PROCEDURE — 99999 PR PBB SHADOW E&M-EST. PATIENT-LVL IV: CPT | Mod: PBBFAC,,, | Performed by: INTERNAL MEDICINE

## 2017-08-01 PROCEDURE — 3074F SYST BP LT 130 MM HG: CPT | Mod: S$GLB,,, | Performed by: INTERNAL MEDICINE

## 2017-08-01 PROCEDURE — 1125F AMNT PAIN NOTED PAIN PRSNT: CPT | Mod: S$GLB,,, | Performed by: INTERNAL MEDICINE

## 2017-08-01 PROCEDURE — 99214 OFFICE O/P EST MOD 30 MIN: CPT | Mod: S$GLB,,, | Performed by: INTERNAL MEDICINE

## 2017-08-01 PROCEDURE — 99499 UNLISTED E&M SERVICE: CPT | Mod: S$GLB,,, | Performed by: INTERNAL MEDICINE

## 2017-08-01 NOTE — PROGRESS NOTES
CC: back pain  HPI:  The patient is a 71 y.o. year old female with seizues who presents to the office for back pain.  She complains of lower back and left hip pain.  Her symptoms started yesterday, and has been constant.  She had difficulty lifting her leg.  She denies any trauma.  Cough exacerbates the pain.  She denies any numbness or tingling in her leg.  The patient reports she is walking well, but complains of difficulty lifting her leg.  She states pain is similar to pain she has had in the past due to kidney stones.  She denies any dysuria or hematuria.  She does report pain in her left arm and left side of her neck.  Review of labs performed last month reveals new onset anemia.    PAST MEDICAL HISTORY:  Past Medical History:   Diagnosis Date    Abnormal mammogram of both breasts     Hypertension     Major depressive disorder 5/14/2017    Memory disorder     Seizures        SURGICAL HISTORY:  Past Surgical History:   Procedure Laterality Date    CATARACT EXTRACTION      cysts breast         MEDS:  Medcard reviewed and updated    ALLERGIES: Allergy Card reviewed and updated    SOCIAL HISTORY:   The patient is a nonsmoker.    PE:   APPEARANCE: Well nourished, well developed, in no acute distress.    NECK: Mild tenderness to palpation of left posterior neck.  CHEST: Lungs clear to auscultation with unlabored respirations.  CARDIOVASCULAR: Normal S1, S2. No murmurs. No carotid bruits. No pedal edema.  ABDOMEN: Bowel sounds normal. Not distended. Soft. No tenderness or masses.   MUSCULOSKELETAL:  Positive tenderness of left flank.  Positive tenderness to palpation of superior aspect of left shoulder.  PSYCHIATRIC: The patient is oriented to person, place, and time and has a pleasant affect.        ASSESSMENT/PLAN:  Sofia was seen today for follow-up, back pain and hip pain.    Diagnoses and all orders for this visit:    Flank pain  -     Urinalysis; Future  -     Urine culture; Future  -     X-Ray Abdomen  AP 1 View; Future    Seizure    Anemia, unspecified type  -     CBC auto differential; Future  -     Ferritin; Future  -     Iron and TIBC; Future

## 2017-08-02 ENCOUNTER — TELEPHONE (OUTPATIENT)
Dept: INTERNAL MEDICINE | Facility: CLINIC | Age: 72
End: 2017-08-02

## 2017-08-02 NOTE — TELEPHONE ENCOUNTER
----- Message from Kitty Bahena sent at 8/1/2017 11:22 AM CDT -----  I spoke with patient today to schedule her with Orthopedic.  Patient stated she was still experiencing shoulder pain but was wanting to know the results of a previous xray on her shoulder to determine is she was going to make that appointment.  Xray was done on 6/13.  Please call to advise.    Left shoulder pain, unspecified chronicity [M25.512    Thanks, Lupe

## 2017-08-08 RX ORDER — LOSARTAN POTASSIUM 50 MG/1
TABLET ORAL
Qty: 90 TABLET | Refills: 0 | Status: ON HOLD | OUTPATIENT
Start: 2017-08-08 | End: 2017-09-02 | Stop reason: HOSPADM

## 2017-08-10 ENCOUNTER — TELEPHONE (OUTPATIENT)
Dept: INTERNAL MEDICINE | Facility: CLINIC | Age: 72
End: 2017-08-10

## 2017-08-10 NOTE — TELEPHONE ENCOUNTER
----- Message from Kitty Bahena sent at 8/10/2017 10:14 AM CDT -----  Just to make you aware.  Dr Connell entered referrals for patient to see Orthopedics and have a PFT.   I've been unable to contact patient to schedule for these services.  I will mail a notification letter to patient to call us an schedule.    Orthopedics / Left shoulder pain, unspecified chronicity [M25.512]    SPIROMETRY WITH/WITHOUT BRONCHODILATOR  /  Shortness of breath [R06.02]    Thanks, Lupe

## 2017-08-15 RX ORDER — HYDROCODONE BITARTRATE AND ACETAMINOPHEN 5; 325 MG/1; MG/1
1 TABLET ORAL EVERY 8 HOURS PRN
Qty: 40 TABLET | Refills: 0 | Status: SHIPPED | OUTPATIENT
Start: 2017-08-15 | End: 2017-10-02 | Stop reason: SDUPTHER

## 2017-08-15 NOTE — TELEPHONE ENCOUNTER
----- Message from Jerica Somers sent at 8/15/2017 12:34 PM CDT -----  Contact: Mobile: [phone]   RX request - refill or new RX.  Is this a refill or new RX:  Refill  RX name and strength: Hydrocodone-Acetaminophen 5-325mg (NORCO) 5-325 mg per tablet  Directions:   Is this a 30 day or 90 day RX:    Pharmacy name and phone #:   Comments:  Going out of town on Wednesday morning for two weeks and she know it's not due for refill for two days. She to bring her medication with her.

## 2017-08-23 ENCOUNTER — CLINICAL SUPPORT (OUTPATIENT)
Dept: AUDIOLOGY | Facility: CLINIC | Age: 72
End: 2017-08-23
Payer: MEDICARE

## 2017-08-23 DIAGNOSIS — R42 DIZZINESS: ICD-10-CM

## 2017-08-23 PROCEDURE — 92557 COMPREHENSIVE HEARING TEST: CPT | Mod: S$GLB,,, | Performed by: AUDIOLOGIST

## 2017-08-23 PROCEDURE — 92567 TYMPANOMETRY: CPT | Mod: S$GLB,,, | Performed by: AUDIOLOGIST

## 2017-08-23 NOTE — PROGRESS NOTES
8/23/2017    AUDIOLOGICAL EVALUATION:    Sofia Augustine was seen for an audiological evaluation on 8/23/2017 for decreased hearing and dizziness.    Pure tone threshold testing revealed an asymmetrical sensorineural hearing loss.  Speech reception thresholds were obtained at 30dBHL for the right ear and 40dBHL for the left ear.  Speech discrimination scores were obtained at 96% for the right ear and 76% for the left ear.    Tympanometry revealed Type As tympanograms bilaterally.    Recommend:  1.  Otologic evaluation.  2.  Hearing aid evaluation.  3.  Annual evaluation.

## 2017-08-31 ENCOUNTER — HOSPITAL ENCOUNTER (OUTPATIENT)
Facility: HOSPITAL | Age: 72
Discharge: HOME | End: 2017-09-02
Attending: EMERGENCY MEDICINE | Admitting: HOSPITALIST
Payer: MEDICARE

## 2017-08-31 DIAGNOSIS — R07.9 CHEST PAIN: ICD-10-CM

## 2017-08-31 DIAGNOSIS — R07.9 CHEST PAIN, RULE OUT ACUTE MYOCARDIAL INFARCTION: Primary | ICD-10-CM

## 2017-08-31 DIAGNOSIS — M62.838 TRAPEZIUS MUSCLE SPASM: ICD-10-CM

## 2017-08-31 DIAGNOSIS — R20.0 LEFT FACIAL NUMBNESS: ICD-10-CM

## 2017-08-31 PROBLEM — E83.42 HYPOMAGNESEMIA: Status: RESOLVED | Noted: 2017-06-23 | Resolved: 2017-08-31

## 2017-08-31 PROBLEM — S13.4XXA WHIPLASH INJURY TO NECK: Status: RESOLVED | Noted: 2017-01-20 | Resolved: 2017-08-31

## 2017-08-31 PROBLEM — A08.4 VIRAL GASTROENTERITIS: Status: RESOLVED | Noted: 2017-06-22 | Resolved: 2017-08-31

## 2017-08-31 PROBLEM — E87.6 HYPOKALEMIA DUE TO LOSS OF POTASSIUM: Status: RESOLVED | Noted: 2017-06-23 | Resolved: 2017-08-31

## 2017-08-31 LAB
ALBUMIN SERPL BCP-MCNC: 3.5 G/DL
ALP SERPL-CCNC: 140 U/L
ALT SERPL W/O P-5'-P-CCNC: 12 U/L
ANION GAP SERPL CALC-SCNC: 8 MMOL/L
AST SERPL-CCNC: 22 U/L
BASOPHILS # BLD AUTO: 0.02 K/UL
BASOPHILS NFR BLD: 0.5 %
BILIRUB SERPL-MCNC: 0.3 MG/DL
BNP SERPL-MCNC: 22 PG/ML
BUN SERPL-MCNC: 17 MG/DL
CALCIUM SERPL-MCNC: 9.1 MG/DL
CHLORIDE SERPL-SCNC: 106 MMOL/L
CO2 SERPL-SCNC: 26 MMOL/L
CREAT SERPL-MCNC: 0.9 MG/DL
DIFFERENTIAL METHOD: NORMAL
EOSINOPHIL # BLD AUTO: 0.1 K/UL
EOSINOPHIL NFR BLD: 2.1 %
ERYTHROCYTE [DISTWIDTH] IN BLOOD BY AUTOMATED COUNT: 13.5 %
EST. GFR  (AFRICAN AMERICAN): >60 ML/MIN/1.73 M^2
EST. GFR  (NON AFRICAN AMERICAN): >60 ML/MIN/1.73 M^2
GLUCOSE SERPL-MCNC: 86 MG/DL
HCT VFR BLD AUTO: 37 %
HGB BLD-MCNC: 13.1 G/DL
LYMPHOCYTES # BLD AUTO: 1.8 K/UL
LYMPHOCYTES NFR BLD: 40.3 %
MAGNESIUM SERPL-MCNC: 2.2 MG/DL
MCH RBC QN AUTO: 31 PG
MCHC RBC AUTO-ENTMCNC: 35.4 G/DL
MCV RBC AUTO: 88 FL
MONOCYTES # BLD AUTO: 0.5 K/UL
MONOCYTES NFR BLD: 10.4 %
NEUTROPHILS # BLD AUTO: 2 K/UL
NEUTROPHILS NFR BLD: 46.7 %
PLATELET # BLD AUTO: 190 K/UL
PMV BLD AUTO: 10.1 FL
POTASSIUM SERPL-SCNC: 4 MMOL/L
PROT SERPL-MCNC: 7.5 G/DL
RBC # BLD AUTO: 4.22 M/UL
SODIUM SERPL-SCNC: 140 MMOL/L
TROPONIN I SERPL DL<=0.01 NG/ML-MCNC: 0.06 NG/ML
WBC # BLD AUTO: 4.34 K/UL

## 2017-08-31 PROCEDURE — 25000003 PHARM REV CODE 250: Performed by: EMERGENCY MEDICINE

## 2017-08-31 PROCEDURE — 93005 ELECTROCARDIOGRAM TRACING: CPT

## 2017-08-31 PROCEDURE — 84484 ASSAY OF TROPONIN QUANT: CPT

## 2017-08-31 PROCEDURE — 80053 COMPREHEN METABOLIC PANEL: CPT

## 2017-08-31 PROCEDURE — 99285 EMERGENCY DEPT VISIT HI MDM: CPT | Mod: ,,, | Performed by: EMERGENCY MEDICINE

## 2017-08-31 PROCEDURE — 99220 PR INITIAL OBSERVATION CARE,LEVL III: CPT | Mod: ,,, | Performed by: HOSPITALIST

## 2017-08-31 PROCEDURE — 85025 COMPLETE CBC W/AUTO DIFF WBC: CPT

## 2017-08-31 PROCEDURE — G0378 HOSPITAL OBSERVATION PER HR: HCPCS

## 2017-08-31 PROCEDURE — 83735 ASSAY OF MAGNESIUM: CPT

## 2017-08-31 PROCEDURE — 63600175 PHARM REV CODE 636 W HCPCS: Performed by: EMERGENCY MEDICINE

## 2017-08-31 PROCEDURE — 99284 EMERGENCY DEPT VISIT MOD MDM: CPT | Mod: 25

## 2017-08-31 PROCEDURE — 93010 ELECTROCARDIOGRAM REPORT: CPT | Mod: ,,, | Performed by: INTERNAL MEDICINE

## 2017-08-31 PROCEDURE — 96374 THER/PROPH/DIAG INJ IV PUSH: CPT

## 2017-08-31 PROCEDURE — 83880 ASSAY OF NATRIURETIC PEPTIDE: CPT

## 2017-08-31 RX ORDER — GLUCAGON 1 MG
1 KIT INJECTION
Status: DISCONTINUED | OUTPATIENT
Start: 2017-08-31 | End: 2017-09-02 | Stop reason: HOSPADM

## 2017-08-31 RX ORDER — GABAPENTIN 300 MG/1
300 CAPSULE ORAL NIGHTLY
Status: DISCONTINUED | OUTPATIENT
Start: 2017-08-31 | End: 2017-09-02 | Stop reason: HOSPADM

## 2017-08-31 RX ORDER — CARBAMAZEPINE 200 MG/1
200 TABLET ORAL 2 TIMES DAILY
Status: DISCONTINUED | OUTPATIENT
Start: 2017-08-31 | End: 2017-09-02 | Stop reason: HOSPADM

## 2017-08-31 RX ORDER — NITROGLYCERIN 0.4 MG/1
0.4 TABLET SUBLINGUAL
Status: COMPLETED | OUTPATIENT
Start: 2017-08-31 | End: 2017-08-31

## 2017-08-31 RX ORDER — ONDANSETRON 8 MG/1
8 TABLET, ORALLY DISINTEGRATING ORAL EVERY 8 HOURS PRN
Status: DISCONTINUED | OUTPATIENT
Start: 2017-08-31 | End: 2017-09-02 | Stop reason: HOSPADM

## 2017-08-31 RX ORDER — HYDROMORPHONE HYDROCHLORIDE 1 MG/ML
1 INJECTION, SOLUTION INTRAMUSCULAR; INTRAVENOUS; SUBCUTANEOUS EVERY 4 HOURS PRN
Status: DISCONTINUED | OUTPATIENT
Start: 2017-08-31 | End: 2017-09-01

## 2017-08-31 RX ORDER — HYDROCODONE BITARTRATE AND ACETAMINOPHEN 5; 325 MG/1; MG/1
1 TABLET ORAL EVERY 8 HOURS PRN
Status: DISCONTINUED | OUTPATIENT
Start: 2017-08-31 | End: 2017-09-01

## 2017-08-31 RX ORDER — IBUPROFEN 200 MG
16 TABLET ORAL
Status: DISCONTINUED | OUTPATIENT
Start: 2017-08-31 | End: 2017-09-02 | Stop reason: HOSPADM

## 2017-08-31 RX ORDER — IBUPROFEN 200 MG
24 TABLET ORAL
Status: DISCONTINUED | OUTPATIENT
Start: 2017-08-31 | End: 2017-09-02 | Stop reason: HOSPADM

## 2017-08-31 RX ORDER — LOSARTAN POTASSIUM AND HYDROCHLOROTHIAZIDE 12.5; 5 MG/1; MG/1
1 TABLET ORAL DAILY
Status: DISCONTINUED | OUTPATIENT
Start: 2017-09-01 | End: 2017-09-02 | Stop reason: HOSPADM

## 2017-08-31 RX ORDER — KETOROLAC TROMETHAMINE 30 MG/ML
10 INJECTION, SOLUTION INTRAMUSCULAR; INTRAVENOUS
Status: COMPLETED | OUTPATIENT
Start: 2017-08-31 | End: 2017-08-31

## 2017-08-31 RX ORDER — ATORVASTATIN CALCIUM 20 MG/1
40 TABLET, FILM COATED ORAL DAILY
Status: DISCONTINUED | OUTPATIENT
Start: 2017-09-01 | End: 2017-09-02 | Stop reason: HOSPADM

## 2017-08-31 RX ORDER — NAPROXEN SODIUM 220 MG/1
81 TABLET, FILM COATED ORAL DAILY
Status: DISCONTINUED | OUTPATIENT
Start: 2017-09-01 | End: 2017-09-02 | Stop reason: HOSPADM

## 2017-08-31 RX ORDER — ASPIRIN 325 MG
325 TABLET, DELAYED RELEASE (ENTERIC COATED) ORAL
Status: COMPLETED | OUTPATIENT
Start: 2017-08-31 | End: 2017-08-31

## 2017-08-31 RX ORDER — RAMELTEON 8 MG/1
8 TABLET ORAL NIGHTLY PRN
Status: DISCONTINUED | OUTPATIENT
Start: 2017-08-31 | End: 2017-09-02 | Stop reason: HOSPADM

## 2017-08-31 RX ORDER — ACETAMINOPHEN 325 MG/1
650 TABLET ORAL EVERY 8 HOURS PRN
Status: DISCONTINUED | OUTPATIENT
Start: 2017-08-31 | End: 2017-09-01

## 2017-08-31 RX ORDER — ONDANSETRON 2 MG/ML
8 INJECTION INTRAMUSCULAR; INTRAVENOUS EVERY 8 HOURS PRN
Status: DISCONTINUED | OUTPATIENT
Start: 2017-08-31 | End: 2017-09-02 | Stop reason: HOSPADM

## 2017-08-31 RX ADMIN — KETOROLAC TROMETHAMINE 10 MG: 30 INJECTION, SOLUTION INTRAMUSCULAR at 08:08

## 2017-08-31 RX ADMIN — NITROGLYCERIN 0.4 MG: 0.4 TABLET SUBLINGUAL at 09:08

## 2017-08-31 RX ADMIN — ASPIRIN 325 MG: 325 TABLET, DELAYED RELEASE ORAL at 09:08

## 2017-09-01 PROBLEM — I65.1 VERTEBROBASILAR DOLICHOECTASIA: Chronic | Status: ACTIVE | Noted: 2017-09-01

## 2017-09-01 LAB
ALBUMIN SERPL BCP-MCNC: 3 G/DL
ALP SERPL-CCNC: 123 U/L
ALT SERPL W/O P-5'-P-CCNC: 9 U/L
ANION GAP SERPL CALC-SCNC: 7 MMOL/L
AST SERPL-CCNC: 14 U/L
BASOPHILS # BLD AUTO: 0.02 K/UL
BASOPHILS NFR BLD: 0.5 %
BILIRUB SERPL-MCNC: 0.4 MG/DL
BUN SERPL-MCNC: 18 MG/DL
CALCIUM SERPL-MCNC: 8.6 MG/DL
CARBAMAZEPINE SERPL-MCNC: 6.8 UG/ML
CHLORIDE SERPL-SCNC: 108 MMOL/L
CO2 SERPL-SCNC: 26 MMOL/L
CREAT SERPL-MCNC: 0.8 MG/DL
CRP SERPL-MCNC: 5.5 MG/L
DIFFERENTIAL METHOD: ABNORMAL
EOSINOPHIL # BLD AUTO: 0.1 K/UL
EOSINOPHIL NFR BLD: 2.9 %
ERYTHROCYTE [DISTWIDTH] IN BLOOD BY AUTOMATED COUNT: 13.3 %
ERYTHROCYTE [SEDIMENTATION RATE] IN BLOOD BY WESTERGREN METHOD: 10 MM/HR
EST. GFR  (AFRICAN AMERICAN): >60 ML/MIN/1.73 M^2
EST. GFR  (NON AFRICAN AMERICAN): >60 ML/MIN/1.73 M^2
GLUCOSE SERPL-MCNC: 86 MG/DL
HCT VFR BLD AUTO: 35 %
HGB BLD-MCNC: 12.1 G/DL
LYMPHOCYTES # BLD AUTO: 1.9 K/UL
LYMPHOCYTES NFR BLD: 49 %
MAGNESIUM SERPL-MCNC: 1.9 MG/DL
MCH RBC QN AUTO: 30.3 PG
MCHC RBC AUTO-ENTMCNC: 34.6 G/DL
MCV RBC AUTO: 88 FL
MONOCYTES # BLD AUTO: 0.4 K/UL
MONOCYTES NFR BLD: 9.9 %
NEUTROPHILS # BLD AUTO: 1.4 K/UL
NEUTROPHILS NFR BLD: 37.7 %
PHOSPHATE SERPL-MCNC: 3.8 MG/DL
PLATELET # BLD AUTO: 174 K/UL
PMV BLD AUTO: 9.8 FL
POTASSIUM SERPL-SCNC: 3.4 MMOL/L
PROT SERPL-MCNC: 6.1 G/DL
RBC # BLD AUTO: 4 M/UL
SODIUM SERPL-SCNC: 141 MMOL/L
TROPONIN I SERPL DL<=0.01 NG/ML-MCNC: 0.05 NG/ML
TROPONIN I SERPL DL<=0.01 NG/ML-MCNC: 0.05 NG/ML
WBC # BLD AUTO: 3.82 K/UL

## 2017-09-01 PROCEDURE — 25000003 PHARM REV CODE 250: Performed by: HOSPITALIST

## 2017-09-01 PROCEDURE — 36415 COLL VENOUS BLD VENIPUNCTURE: CPT

## 2017-09-01 PROCEDURE — 99226 PR SUBSEQUENT OBSERVATION CARE,LEVEL III: CPT | Mod: ,,, | Performed by: HOSPITALIST

## 2017-09-01 PROCEDURE — 99215 OFFICE O/P EST HI 40 MIN: CPT | Mod: GC,,, | Performed by: PSYCHIATRY & NEUROLOGY

## 2017-09-01 PROCEDURE — 84484 ASSAY OF TROPONIN QUANT: CPT | Mod: 91

## 2017-09-01 PROCEDURE — 95819 EEG AWAKE AND ASLEEP: CPT | Mod: 26,,, | Performed by: PSYCHIATRY & NEUROLOGY

## 2017-09-01 PROCEDURE — 83735 ASSAY OF MAGNESIUM: CPT

## 2017-09-01 PROCEDURE — 84100 ASSAY OF PHOSPHORUS: CPT

## 2017-09-01 PROCEDURE — 99219 PR INITIAL OBSERVATION CARE,LEVL II: CPT | Mod: GC,,, | Performed by: INTERNAL MEDICINE

## 2017-09-01 PROCEDURE — 86140 C-REACTIVE PROTEIN: CPT

## 2017-09-01 PROCEDURE — 95816 EEG AWAKE AND DROWSY: CPT

## 2017-09-01 PROCEDURE — 80053 COMPREHEN METABOLIC PANEL: CPT

## 2017-09-01 PROCEDURE — G0378 HOSPITAL OBSERVATION PER HR: HCPCS

## 2017-09-01 PROCEDURE — 85651 RBC SED RATE NONAUTOMATED: CPT

## 2017-09-01 PROCEDURE — 80156 ASSAY CARBAMAZEPINE TOTAL: CPT

## 2017-09-01 PROCEDURE — 85025 COMPLETE CBC W/AUTO DIFF WBC: CPT

## 2017-09-01 RX ORDER — ACETAMINOPHEN 325 MG/1
650 TABLET ORAL EVERY 8 HOURS PRN
Status: DISCONTINUED | OUTPATIENT
Start: 2017-09-01 | End: 2017-09-02 | Stop reason: HOSPADM

## 2017-09-01 RX ORDER — HYDROCODONE BITARTRATE AND ACETAMINOPHEN 5; 325 MG/1; MG/1
1 TABLET ORAL EVERY 8 HOURS PRN
Status: DISCONTINUED | OUTPATIENT
Start: 2017-09-01 | End: 2017-09-01

## 2017-09-01 RX ORDER — POTASSIUM CHLORIDE 20 MEQ/1
40 TABLET, EXTENDED RELEASE ORAL DAILY
Status: COMPLETED | OUTPATIENT
Start: 2017-09-01 | End: 2017-09-01

## 2017-09-01 RX ADMIN — ACETAMINOPHEN 650 MG: 325 TABLET ORAL at 04:09

## 2017-09-01 RX ADMIN — ASPIRIN 81 MG CHEWABLE TABLET 81 MG: 81 TABLET CHEWABLE at 08:09

## 2017-09-01 RX ADMIN — POTASSIUM CHLORIDE 40 MEQ: 1500 TABLET, EXTENDED RELEASE ORAL at 04:09

## 2017-09-01 RX ADMIN — CARBAMAZEPINE 200 MG: 200 TABLET ORAL at 09:09

## 2017-09-01 RX ADMIN — ACETAMINOPHEN 650 MG: 325 TABLET ORAL at 12:09

## 2017-09-01 RX ADMIN — CARBAMAZEPINE 200 MG: 200 TABLET ORAL at 12:09

## 2017-09-01 RX ADMIN — CARBAMAZEPINE 200 MG: 200 TABLET ORAL at 08:09

## 2017-09-01 RX ADMIN — GABAPENTIN 300 MG: 300 CAPSULE ORAL at 08:09

## 2017-09-01 RX ADMIN — GABAPENTIN 300 MG: 300 CAPSULE ORAL at 12:09

## 2017-09-01 RX ADMIN — LOSARTAN POTASSIUM AND HYDROCHLOROTHIAZIDE 1 TABLET: 12.5; 5 TABLET ORAL at 08:09

## 2017-09-01 RX ADMIN — ATORVASTATIN CALCIUM 40 MG: 20 TABLET, FILM COATED ORAL at 08:09

## 2017-09-01 NOTE — CONSULTS
[facility]  Cardiology  Consult Note    Patient Name: Sofia Augustine  MRN: [id number]  Admission Date: 8/31/2017  Hospital Length of Stay: 0 days  Code Status: Full Code   Attending Provider: Amos Magallon MD   Consulting Provider: Mao Goodrich MD  Primary Care Physician: Luanne Connell MD  Principal Problem:Chest pain, rule out acute myocardial infarction    Patient information was obtained from patient, past medical records and ER records.     Inpatient consult to Cardiology  Consult performed by: MAO GOODRICH  Consult ordered by: AMOS MAGALLON        Subjective:     Chief Complaint:  Chest pain     HPI:   Ms. Augustine is a 71 y.o. WF with h/o HTN, seizure disorder, cervical radiculopathy, DM (not on medication) presenting with chest pain associated with jaw pain and LUE numbness/tingling. Her symptoms began as she was laying in bed having a stressful conversation with her phone company. She characterizes her chest pain as a substernal tightness can also be superficially tender across the upper L chest. The onset was acute and lasted from around 2PM to her presentation to the ED (about 4 hours). The pain severity fluctuated and was inconsistent. Rest and nitroglycerin in the ED did not relieve the pain and aspirin provided some relief. It was aggravated by moving her left arm and shoulder. She denies any lightheadedness, dizziness, diaphoresis, SOB, N/V. She endorses some generalized weakness and fatigue. She has presented 4 times this year with similar symptoms (January x2 and June x2). Cardiac workup included a dobutamine stress echo (5/2017) that showed no evidence of stress induced myocardial ischemia however sensitivity was diminished secondary to challenging imaging. Echo showed an EF of 60% and no valvular dysfunction.    In the ED, she had an elevated troponin of 0.055. Her troponins are chronically elevated so this is not a new finding. Her EKG was normal with  "no ST segment changes. She was admitted to team C for further work-up. On exam today, she reports that the chest pain has largely resolved. Still exacerbated with movement of her neck/shoulder/L arm. Of note, she now describes some numbness around her L face that feels like a "tightness" and she feels that her tongue is heavy. She does not report any other focal deficits or weakness at this time. This is a very transient episode that resolved  Within a few minutes during our interaction. She has a history of similar episodes for which she has previously been evaluated by neurology (in 2016) for TIA, but more likely atypical migraine. Work-up with MRI brain, MRA neck, and several subsequent CT heads have been negative to date.    Past Medical History:   Diagnosis Date    Abnormal mammogram of both breasts     Hypertension     Major depressive disorder 5/14/2017    Memory disorder     Seizures        Past Surgical History:   Procedure Laterality Date    CATARACT EXTRACTION      cysts breast      TONSILLECTOMY         Review of patient's allergies indicates:  No Known Allergies    No current facility-administered medications on file prior to encounter.      Current Outpatient Prescriptions on File Prior to Encounter   Medication Sig    aspirin 81 MG Chew Take 81 mg by mouth once daily.    atorvastatin (LIPITOR) 40 MG tablet Take 1 tablet (40 mg total) by mouth once daily.    carbamazepine (TEGRETOL) 200 mg tablet Take 200 mg nightly for one week then take 200 mg TWICE DAILY thereafter    gabapentin (NEURONTIN) 300 MG capsule Take one cap at bedtime for one week, than increase to 2 caps at bedtime at bedtime, if tolerated.  Do not stop abruptly.    hydrocodone-acetaminophen 5-325mg (NORCO) 5-325 mg per tablet Take 1 tablet by mouth every 8 (eight) hours as needed for Pain.    losartan-hydrochlorothiazide 50-12.5 mg (HYZAAR) 50-12.5 mg per tablet Take 1 tablet by mouth once daily.    diclofenac (VOLTAREN) " 50 MG EC tablet TK 1 T PO BID PRN P    losartan (COZAAR) 50 MG tablet TAKE 1 TABLET(50 MG) BY MOUTH EVERY EVENING    ondansetron (ZOFRAN-ODT) 4 MG TbDL Take 1 tablet (4 mg total) by mouth every 6 (six) hours as needed.     Family History     None        Social History Main Topics    Smoking status: Never Smoker    Smokeless tobacco: Never Used    Alcohol use No    Drug use: No    Sexual activity: No     Review of Systems   Constitution: Negative for weakness and malaise/fatigue.   Eyes: Negative for blurred vision and visual disturbance.   Cardiovascular: Negative for chest pain, dyspnea on exertion, leg swelling and palpitations.   Respiratory: Negative for cough and shortness of breath.    Musculoskeletal: Positive for myalgias, neck pain and stiffness.   Gastrointestinal: Negative for bloating, abdominal pain, nausea and vomiting.   Neurological: Positive for numbness, paresthesias and sensory change. Negative for headaches and light-headedness.   Psychiatric/Behavioral: Positive for memory loss.     Objective:     Vital Signs (Most Recent):  Temp: 97.1 °F (36.2 °C) (09/01/17 1149)  Pulse: 66 (09/01/17 1149)  Resp: 18 (09/01/17 1149)  BP: (!) 142/74 (09/01/17 1149)  SpO2: 96 % (09/01/17 1149) Vital Signs (24h Range):  Temp:  [96 °F (35.6 °C)-98.8 °F (37.1 °C)] 97.1 °F (36.2 °C)  Pulse:  [64-83] 66  Resp:  [14-19] 18  SpO2:  [93 %-99 %] 96 %  BP: (137-181)/() 142/74     Weight: 82.1 kg (181 lb)  Body mass index is 31.07 kg/m².    SpO2: 96 %  O2 Device (Oxygen Therapy): room air    No intake or output data in the 24 hours ending 09/01/17 1200    Lines/Drains/Airways     Peripheral Intravenous Line                 Peripheral IV - Single Lumen 08/31/17 2053 Right Antecubital less than 1 day                Physical Exam   Constitutional: She is oriented to person, place, and time. She appears well-developed and well-nourished.   HENT:   Head: Normocephalic and atraumatic.   Eyes: Pupils are equal, round,  and reactive to light.   Neck: No JVD present. Muscular tenderness (L side to palpation) present. Carotid bruit is not present. No thyromegaly present.   Cardiovascular: Normal rate, regular rhythm and normal heart sounds.    No murmur heard.  Pulmonary/Chest: Effort normal and breath sounds normal.   Abdominal: Soft. Bowel sounds are normal.   Musculoskeletal: Normal range of motion. She exhibits no edema.   Neurological: She is oriented to person, place, and time. She has normal strength. No cranial nerve deficit.   CN: Symmetric smile, good extraocular motor strength, sensation intact bilaterally, no tongue deviation.  Upper extremity: sensation intact, equal  strength bilaterally  Lower extremity: sensation intact, strength preserved bilaterally   Skin: Skin is warm and dry.       Significant Labs:   BMP:   Recent Labs  Lab 08/31/17 2051 09/01/17  0455   GLU 86 86    141   K 4.0 3.4*    108   CO2 26 26   BUN 17 18   CREATININE 0.9 0.8   CALCIUM 9.1 8.6*   MG 2.2 1.9   , CBC   Recent Labs  Lab 08/31/17 2051 09/01/17  0455   WBC 4.34 3.82*   HGB 13.1 12.1   HCT 37.0 35.0*    174    and Troponin   Recent Labs  Lab 08/31/17 2051 09/01/17  0121 09/01/17  0455   TROPONINI 0.055* 0.053* 0.049*       Significant Imaging: No imaging    Assessment and Plan:     * Chest pain, rule out acute myocardial infarction    71 y.o. WF with non-cardiac chest pain for several hours. Pain has since resolved with some tenderness to palpation and movement over the left shoulder, now with left-sided facial numbness and tongue heaviness.    - Flat troponins (0.055 > 0.053 > 0.049), negative EKG for ischemia, and non-cardiac (possibly atypical) chest pain  - Previous stress echo 5/2017 negative for ischemia, but with inadequate image quality  - We have ruled out ACS at this time  - TIA/neuro work-up takes priority over cardiac issues.   - Recommend SPECT stress inpatient following stroke r/o or PET stress  outpatient with Dr. Bowen who last evaluated her            VTE Risk Mitigation         Ordered     Medium Risk of VTE  Once      08/31/17 2205     Place PAUL hose  Until discontinued      08/31/17 2205          Thank you for your consult.    Mao Faith MD  Cardiology   [facility]

## 2017-09-01 NOTE — PLAN OF CARE
Problem: Fall Risk (Adult)  Goal: Absence of Falls  Patient will demonstrate the desired outcomes by discharge/transition of care.   Outcome: Ongoing (interventions implemented as appropriate)  Patient free of falls. Instructed to call for help as needed. Stand by assistance needed.     Problem: Patient Care Overview  Goal: Plan of Care Review  Outcome: Ongoing (interventions implemented as appropriate)  Patient AAOx4, calm and cooperative. Pt free of falls, instructed to call for help as needed. No complaints of chest pain. Pt complains of pain to L. arm, worse with touch. Tylenol administered for pain. Pt NPO after midnight. VSS, will continue to monitor.

## 2017-09-01 NOTE — NURSING
Patient arrived on the unit, complaining of L. arm pain. BP elevated.  Med Team C paged, currently awaiting a call bad.

## 2017-09-01 NOTE — SUBJECTIVE & OBJECTIVE
Past Medical History:   Diagnosis Date    Abnormal mammogram of both breasts     Hypertension     Major depressive disorder 5/14/2017    Memory disorder     Seizures        Past Surgical History:   Procedure Laterality Date    CATARACT EXTRACTION      cysts breast      TONSILLECTOMY         Review of patient's allergies indicates:  No Known Allergies    No current facility-administered medications on file prior to encounter.      Current Outpatient Prescriptions on File Prior to Encounter   Medication Sig    aspirin 81 MG Chew Take 81 mg by mouth once daily.    atorvastatin (LIPITOR) 40 MG tablet Take 1 tablet (40 mg total) by mouth once daily.    carbamazepine (TEGRETOL) 200 mg tablet Take 200 mg nightly for one week then take 200 mg TWICE DAILY thereafter    gabapentin (NEURONTIN) 300 MG capsule Take one cap at bedtime for one week, than increase to 2 caps at bedtime at bedtime, if tolerated.  Do not stop abruptly.    hydrocodone-acetaminophen 5-325mg (NORCO) 5-325 mg per tablet Take 1 tablet by mouth every 8 (eight) hours as needed for Pain.    losartan-hydrochlorothiazide 50-12.5 mg (HYZAAR) 50-12.5 mg per tablet Take 1 tablet by mouth once daily.    diclofenac (VOLTAREN) 50 MG EC tablet TK 1 T PO BID PRN P    losartan (COZAAR) 50 MG tablet TAKE 1 TABLET(50 MG) BY MOUTH EVERY EVENING    ondansetron (ZOFRAN-ODT) 4 MG TbDL Take 1 tablet (4 mg total) by mouth every 6 (six) hours as needed.     Family History     None        Social History Main Topics    Smoking status: Never Smoker    Smokeless tobacco: Never Used    Alcohol use No    Drug use: No    Sexual activity: No     Review of Systems   Constitution: Negative for weakness and malaise/fatigue.   Eyes: Negative for blurred vision and visual disturbance.   Cardiovascular: Negative for chest pain, dyspnea on exertion, leg swelling and palpitations.   Respiratory: Negative for cough and shortness of breath.    Musculoskeletal: Positive for  myalgias, neck pain and stiffness.   Gastrointestinal: Negative for bloating, abdominal pain, nausea and vomiting.   Neurological: Positive for numbness, paresthesias and sensory change. Negative for headaches and light-headedness.   Psychiatric/Behavioral: Positive for memory loss.     Objective:     Vital Signs (Most Recent):  Temp: 97.1 °F (36.2 °C) (09/01/17 1149)  Pulse: 66 (09/01/17 1149)  Resp: 18 (09/01/17 1149)  BP: (!) 142/74 (09/01/17 1149)  SpO2: 96 % (09/01/17 1149) Vital Signs (24h Range):  Temp:  [96 °F (35.6 °C)-98.8 °F (37.1 °C)] 97.1 °F (36.2 °C)  Pulse:  [64-83] 66  Resp:  [14-19] 18  SpO2:  [93 %-99 %] 96 %  BP: (137-181)/() 142/74     Weight: 82.1 kg (181 lb)  Body mass index is 31.07 kg/m².    SpO2: 96 %  O2 Device (Oxygen Therapy): room air    No intake or output data in the 24 hours ending 09/01/17 1200    Lines/Drains/Airways     Peripheral Intravenous Line                 Peripheral IV - Single Lumen 08/31/17 2053 Right Antecubital less than 1 day                Physical Exam   Constitutional: She is oriented to person, place, and time. She appears well-developed and well-nourished.   HENT:   Head: Normocephalic and atraumatic.   Eyes: Pupils are equal, round, and reactive to light.   Neck: No JVD present. Muscular tenderness (L side to palpation) present. Carotid bruit is not present. No thyromegaly present.   Cardiovascular: Normal rate, regular rhythm and normal heart sounds.    No murmur heard.  Pulmonary/Chest: Effort normal and breath sounds normal.   Abdominal: Soft. Bowel sounds are normal.   Musculoskeletal: Normal range of motion. She exhibits no edema.   Neurological: She is oriented to person, place, and time. She has normal strength. No cranial nerve deficit.   CN: Symmetric smile, good extraocular motor strength, sensation intact bilaterally, no tongue deviation.  Upper extremity: sensation intact, equal  strength bilaterally  Lower extremity: sensation intact,  strength preserved bilaterally   Skin: Skin is warm and dry.       Significant Labs:   BMP:   Recent Labs  Lab 08/31/17 2051 09/01/17  0455   GLU 86 86    141   K 4.0 3.4*    108   CO2 26 26   BUN 17 18   CREATININE 0.9 0.8   CALCIUM 9.1 8.6*   MG 2.2 1.9   , CBC   Recent Labs  Lab 08/31/17 2051 09/01/17  0455   WBC 4.34 3.82*   HGB 13.1 12.1   HCT 37.0 35.0*    174    and Troponin   Recent Labs  Lab 08/31/17 2051 09/01/17  0121 09/01/17  0455   TROPONINI 0.055* 0.053* 0.049*       Significant Imaging: No imaging

## 2017-09-01 NOTE — ASSESSMENT & PLAN NOTE
-Basilar measuring approx 51 mm on CTA head and neck 08/27/16  -Increases risk of stroke, but no findings concerning for stroke at this time  -Continue home anti-hypertensives with SBP goal < 140.  Continue ASA, statin.

## 2017-09-01 NOTE — NURSING
On assessment patient noted to have left facial droop, slightly slurred speech, with reported left face numbness, these are evidently not new when Dr. Magallon notified of these findings

## 2017-09-01 NOTE — HPI
Ms. Sofia Augustine is a 72yo female HTN, seizure disorder, and cervical radiculopathy who presents to the ED with chest pain and left arm numbness that started when she was on the phone with [employer].  She says that she was trying to cancel her service after September 3rd, and that the company disconnected her service at that time.  She then developed midsternal chest pressure around 2pm that she describes as a 6/10 in severity, someone sitting on her chest, with numbness down her left arm and along her left face.  The pain has been present and has not gone away, and is reproducible and more severe with palpation.  She says the pain is still present.  She denies any headaches or blurred vision, but says that she feels some pressure behind her left eye.  She denies any nausea or diaphoresis.  She doesn't exercise besides walking in the grocery store.  She is not a Diabetic.    Additionally, this is Ms. Augustine's fifth visit to the ED this year with chest pain:  Twice in January 2017, twice in June 2017, and this admission.  She had a  stress echo on 5/15/2017 that was normal.  Of note, she was scheduled to have a PET stress July 12th and Cardiology follow up July 14th, but claims that she didn't know about these appointments so they were not done.

## 2017-09-01 NOTE — ED NOTES
"LOC: The patient is awake, alert and aware of environment with an appropriate affect, the patient is oriented x 3 and speaking appropriately.  APPEARANCE: Patient resting comfortably and in no acute distress, patient is clean and well groomed.  SKIN: The skin is warm and dry, color consistent with ethnicity, patient has normal skin turgor and moist mucus membranes, skin intact.  MUSCULOSKELETAL: Patient moving all extremities well, reports pain to LUE  RESPIRATORY: Airway is open and patent, breath sounds clear throughout all lung fields; respirations are spontaneous, patient has a normal effort and rate, no accessory muscle use noted. Pt reports SOB.    CARDIAC: Patient has trace peripheral edema noted to bilateral lower extremities, capillary refill < 3 seconds. Pt reports intermittent chest pain described as a  "sore muscle feeling".   ABDOMEN: Soft and non tender to palpation, no distention noted. Bowel sounds present x 4.  NEUROLOGIC: PERRLA, 3 mm bilaterally, eyes open spontaneously, behavior appropriate to situation, follows commands, facial expression symmetrical, bilateral hand grasp equal and even, purposeful motor response noted, normal sensation in all peripheral extremities when touched with a finger. Pt reports left facial numbness to her cheek. Sensation normal to forehead and chin on both sides of her face. Pt reports intermittent tingling and numbness to her left arm and face.   Pt denies trauma, falls, recent heavy lifting.     "

## 2017-09-01 NOTE — ED NOTES
"Pt reports numbness in face and arm. Pt reports chest pain described as "a bad muscle ache". Pt reports SOB starting today. Pt reports dizziness and weakness, pt states "I feel like I could sleep all day". Pt reports social stressors and depression stating "I lived depressed". Pt denies trauma, vomiting. Pt reports nausea.   "

## 2017-09-01 NOTE — PLAN OF CARE
Problem: Pain, Acute (Adult)  Goal: Acceptable Pain Control/Comfort Level  Patient will demonstrate the desired outcomes by discharge/transition of care.  Outcome: Ongoing (interventions implemented as appropriate)  Acceptable level of pain 7

## 2017-09-01 NOTE — CONSULTS
[facility]  Vascular Neurology  Comprehensive Stroke Center  Consult Note    Inpatient consult to Vascular (Stroke) Neurology  Consult performed by: LOVE DOMINGO  Consult ordered by: AMOS MARTI        Assessment/Plan:     Patient is a 71 y.o. year old female with PMHx of MDD, cervical spondylosis with radiculopathy, and seizure disorder on carbamazepine presented to ED 08/31/17 with chest pain accompanied by L arm and L face numbness.    Vertebrobasilar dolichoectasia    -Basilar measuring approx 51 mm on CTA head and neck 08/27/16  -Increases risk of stroke, but no findings concerning for stroke at this time  -Continue home anti-hypertensives with SBP goal < 140.  Continue ASA, statin.      Left facial numbness, L arm numbness    -Resolving on discussion with patient this am.  L arm numbness resolved.  Speech appropriate.  -Ddx:  TIA, medication side effect, cervical radiculopathy w/ LUE sxs, conversion disorder  -Conservative management with control for stroke risk factors including ASA, statin, anti-hypertensives  -CT head pending.  US Carotid Bilateral pending.  Will follow up imaging.  -05/13/17 work up with .8 (on statin), TSH wnl.  Hgb A1c pending.  -Vitamin D previously deficient, peripheral neuropathy work up otherwise negative.  Consider supplementation.  -Could consider outpatient Holter monitor if patient has continued episodes.  Telemetry and EKG wnl.     Thrombolysis Candidate? No  1. Contraindications: Outside of treament window  2. Warnings: Stroke severity too mild and Rapidly resolving symptoms     Interventional Revascularization Candidate?  No; No large vessel occlusion, No; No ischemic penumbra and No; No significant neurological deficit    Research Candidate? No    Subjective:     History of Present Illness:  72 yo F with PMHx of MDD, cervical spine spondylosis with radiculopathy, and seizure disorder on carbamazepine presented [place] [facility] [place] 08/31/17  evening with concerns of L arm and face numbness with chest pain.  Of note, she has had 5 similar admissions (2 in 06/2017 and 2 in 07/2017) during which work up has been diffusely negative.  Vascular Neurology consulted for concern that L face and arm numbness are related to repeat TIAs or stroke.  Patient notes that she felt she was having some tongue swelling today with her L facial numbness and this has made her have a lisp.  Otherwise she does not have speech deficits.  When I am seeing her today, she notes no L arm numbness and improvement in the L facial numbness but is still having some lisp although she notes this is also improving. Does report some L shoulder pain.     Previous work ups have included:  06/29/17 CT head:  No acute intracranial pathology  10/13/16 MRI brain w/ w/o contrast:  Unremarkable  08/27/16 CTA head and neck:  Unremarkable  05/09/16 MRA neck:  No stenosis, occlusion, AV malformation, or aneurysm  05/10/16 Echo:  63% EF, trivial MR       Past Medical History:   Diagnosis Date    Abnormal mammogram of both breasts     Hypertension     Major depressive disorder 5/14/2017    Memory disorder     Seizures      Past Surgical History:   Procedure Laterality Date    CATARACT EXTRACTION      cysts breast      TONSILLECTOMY       History reviewed. No pertinent family history.  Social History   Substance Use Topics    Smoking status: Never Smoker    Smokeless tobacco: Never Used    Alcohol use No     Review of patient's allergies indicates:  No Known Allergies  Medications: I have reviewed the current medication administration record.    Prescriptions Prior to Admission   Medication Sig Dispense Refill Last Dose    aspirin 81 MG Chew Take 81 mg by mouth once daily.   8/31/2017    atorvastatin (LIPITOR) 40 MG tablet Take 1 tablet (40 mg total) by mouth once daily. 14 tablet 0 8/30/2017    carbamazepine (TEGRETOL) 200 mg tablet Take 200 mg nightly for one week then take 200 mg TWICE  DAILY thereafter 30 tablet 11 8/30/2017    gabapentin (NEURONTIN) 300 MG capsule Take one cap at bedtime for one week, than increase to 2 caps at bedtime at bedtime, if tolerated.  Do not stop abruptly. 90 capsule 11 8/30/2017    hydrocodone-acetaminophen 5-325mg (NORCO) 5-325 mg per tablet Take 1 tablet by mouth every 8 (eight) hours as needed for Pain. 40 tablet 0 8/30/2017    losartan-hydrochlorothiazide 50-12.5 mg (HYZAAR) 50-12.5 mg per tablet Take 1 tablet by mouth once daily. 90 tablet 3 8/30/2017    diclofenac (VOLTAREN) 50 MG EC tablet TK 1 T PO BID PRN P  3 Taking    losartan (COZAAR) 50 MG tablet TAKE 1 TABLET(50 MG) BY MOUTH EVERY EVENING 90 tablet 0     ondansetron (ZOFRAN-ODT) 4 MG TbDL Take 1 tablet (4 mg total) by mouth every 6 (six) hours as needed. 20 tablet 0 Taking     Review of Systems   Constitutional: Negative for chills and fever.   HENT: Negative for congestion and rhinorrhea.    Respiratory: Negative for cough and shortness of breath.    Cardiovascular: Negative for chest pain and palpitations.   Gastrointestinal: Negative for constipation, diarrhea, nausea and vomiting.   Genitourinary: Negative for dysuria and hematuria.   Musculoskeletal: Positive for arthralgias (L shoulder). Negative for myalgias.   Skin: Negative for rash and wound.   Neurological: Positive for speech difficulty (lisp, otherwise no aphasia or dysarthria). Negative for weakness and headaches.   Hematological: Negative for adenopathy. Does not bruise/bleed easily.   Psychiatric/Behavioral: Negative for agitation and confusion.     Objective:     Vital Signs (Most Recent):  Temp: 97.1 °F (36.2 °C) (09/01/17 1149)  Pulse: 66 (09/01/17 1149)  Resp: 18 (09/01/17 1149)  BP: (!) 142/74 (09/01/17 1149)  SpO2: 96 % (09/01/17 1149)    Vital Signs Range (Last 24H):  Temp:  [96 °F (35.6 °C)-98.8 °F (37.1 °C)]   Pulse:  [64-83]   Resp:  [14-19]   BP: (137-181)/()   SpO2:  [93 %-99 %]     Physical Exam  General:   Well-developed, well-nourished, nad  HEENT:  NCAT, PERRLA, EOMI, oropharyngeal membranes non-erythematous/without exudate.  No apparent swelling of the tongue though patient notes subjective feeling of swelling and has slight lisp with speech.  Neck:  Supple, no palpable lad, normal ROM without rigidity  Resp:  Symmetric expansion, no increased wob  CVS:  No LE edema.  Extremities warm, well-perfused  GI:  Abd soft, non-distended, non-tender    Neurological Exam:   Mental Status:  AAOx3.  Speech, thought content appropriate.  Able to spell 'world' forward and backward  Cranial Nerves:  VFs intact to movement in all quadrants bilaterally.  PERRLA, EOMI.  Facial movement and sensation intact and symmetric.  Palate elevates symmetrically, tongue protrudes midline.  SCM/Trapezius 5/5 bilaterally.  Motor:  Normal bulk and tone.  Strength 5/5 bilateral shoulder abduction, biceps/triceps,  strength, hip flexors, knee flexion/extension, plantarflexion/dorsiflexion.  No pronator drift.  Sensory:  Intact to light touch and temperature at all extremities.  Mild decrease in sensation reported at L jaw, no inattention or extinction noted.  Negative Romberg.  Reflexes:  Biceps, brachioradialis, patellar, Achilles reflexes 2+.  No ankle clonus.  Downgoing toe bilaterally.  Coordination:  FTN, HTS, AIAD intact bilaterally without ataxia, dysmetria, or dysdiadochokinesia.  Gait:  Normal gait without imbalance, shuffling, abnormal posture.    NIH Stroke Scale:  Interval: 24 hours post onset of symptoms +/- 20 mins  Level of Consciousness: 0 - alert  LOC Questions: 0 - answers both correctly  LOC Commands: 0 - performs both correctly  Best Gaze: 0 - normal  Visual: 0 - no visual loss  Facial Palsy: 0 - normal  Motor Left Arm: 0 - no drift  Motor Right Arm: 0 - no drift  Motor Left Le - no drift  Motor Right Le - no drift  Limb Ataxia: 0 - absent  Sensory: 0 - normal  Best Language: 0 - no aphasia  Dysarthria: 0 - normal  articulation  Extinction and Inattention: 0 - no neglect  NIH Stroke Scale Total: 0    Laboratory:  CMP:   Recent Labs  Lab 09/01/17  0455   CALCIUM 8.6*   ALBUMIN 3.0*   PROT 6.1      K 3.4*   CO2 26      BUN 18   CREATININE 0.8   ALKPHOS 123   ALT 9*   AST 14   BILITOT 0.4     CBC:   Recent Labs  Lab 09/01/17  0455   WBC 3.82*   RBC 4.00   HGB 12.1   HCT 35.0*      MCV 88   MCH 30.3   MCHC 34.6     Lipid Panel: No results for input(s): CHOL, LDLCALC, HDL, TRIG in the last 168 hours.  Coagulation: No results for input(s): INR, APTT in the last 168 hours.    Invalid input(s): PT  Hgb A1C: No results for input(s): HGBA1C in the last 168 hours.  TSH: No results for input(s): TSH in the last 168 hours.    Diagnostic Results:  Brain Imaging:   Acute Pathology: 09/01/17 CT head  Location:   Old Vascular Pathology:   Location:    Cerebrovascular Imaging:   Intracranial Pathology:  08/27/16 CTA head and neck   Location: [facility].    Cervical Vascular Imaging:   Cervical Vascular Pathology: 08/27/16 CTA head and neck  Location: [facility].    Cardiac Evaluation:  Key Findings: Normal EKG.  Last Echo with normal EF and no atrial dilation.  EKG/Telemetry: NSR, 75 bpm.  Normal EKG.    Mariya Topete MD  Comprehensive Stroke Center  Department of Vascular Neurology   [facility]

## 2017-09-01 NOTE — HPI
70 yo F with PMHx of MDD, cervical spine spondylosis with radiculopathy, and seizure disorder on carbamazepine presented to [facility] ED 08/31/17 evening with concerns of L arm and face numbness with chest pain.  Of note, she has had 5 similar admissions (2 in 06/2017 and 2 in 07/2017) during which work up has been diffusely negative.  Vascular Neurology consulted for concern that L face and arm numbness are related to repeat TIAs or stroke.  Patient notes that she felt she was having some tongue swelling today with her L facial numbness and this has made her have a lisp.  Otherwise she does not have speech deficits.  When I am seeing her today, she notes no L arm numbness and improvement in the L facial numbness but is still having some lisp although she notes this is also improving. Does report some L shoulder pain.     Previous work ups have included:  06/29/17 CT head:  No acute intracranial pathology  10/13/16 MRI brain w/ w/o contrast:  Unremarkable  08/27/16 CTA head and neck:  Unremarkable  05/09/16 MRA neck:  No stenosis, occlusion, AV malformation, or aneurysm  05/10/16 Echo:  63% EF, trivial MR

## 2017-09-01 NOTE — H&P
[facility] Hospital Medicine  History & Physical    Patient Name: Sofia Augustine  MRN: [id number]  Admission Date: 8/31/2017  Attending Physician: Candice Magallon MD   Primary Care Provider: Luanne Connell MD    [facility] Medicine Team: [facility] [place] [facility]OSP MED C Vivian Trevizo MD     Patient information was obtained from patient.     Subjective:     Principal Problem:<principal problem not specified>    Chief Complaint:   Chief Complaint   Patient presents with    Numbness     left facial numbness and LUE pain since 1400. also c/o left chest wall pressure 6/10        HPI: Ms. Sofia Augustine is a 70yo female HTN, seizure disorder, and cervical radiculopathy who presents to the ED with chest pain and left arm numbness that started when she was on the phone with [employer].  She says that she was trying to cancel her service after September 3rd, and that the company disconnected her service at that time.  She then developed midsternal chest pressure around 2pm that she describes as a 6/10 in severity, someone sitting on her chest, with numbness down her left arm and along her left face.  The pain has been present and has not gone away, and is reproducible and more severe with palpation.  She says the pain is still present.  She denies any headaches or blurred vision, but says that she feels some pressure behind her left eye.  She denies any nausea or diaphoresis.  She doesn't exercise besides walking in the grocery store.  She is not a Diabetic.    Additionally, this is Ms. Augustine's fifth visit to the ED this year with chest pain:  Twice in January 2017, twice in June 2017, and this admission.  She had a  stress echo on 5/15/2017 that was normal.  Of note, she was scheduled to have a PET stress July 12th and Cardiology follow up July 14th, but claims that she didn't know about these appointments so they were not done.    Past Medical History:   Diagnosis Date    Abnormal mammogram of both  breasts     Hypertension     Major depressive disorder 5/14/2017    Memory disorder     Seizures        Past Surgical History:   Procedure Laterality Date    CATARACT EXTRACTION      cysts breast      TONSILLECTOMY         Review of patient's allergies indicates:  No Known Allergies    No current facility-administered medications on file prior to encounter.      Current Outpatient Prescriptions on File Prior to Encounter   Medication Sig    aspirin 81 MG Chew Take 81 mg by mouth once daily.    atorvastatin (LIPITOR) 40 MG tablet Take 1 tablet (40 mg total) by mouth once daily.    carbamazepine (TEGRETOL) 200 mg tablet Take 200 mg nightly for one week then take 200 mg TWICE DAILY thereafter    gabapentin (NEURONTIN) 300 MG capsule Take one cap at bedtime for one week, than increase to 2 caps at bedtime at bedtime, if tolerated.  Do not stop abruptly.    hydrocodone-acetaminophen 5-325mg (NORCO) 5-325 mg per tablet Take 1 tablet by mouth every 8 (eight) hours as needed for Pain.    losartan-hydrochlorothiazide 50-12.5 mg (HYZAAR) 50-12.5 mg per tablet Take 1 tablet by mouth once daily.    diclofenac (VOLTAREN) 50 MG EC tablet TK 1 T PO BID PRN P    losartan (COZAAR) 50 MG tablet TAKE 1 TABLET(50 MG) BY MOUTH EVERY EVENING    ondansetron (ZOFRAN-ODT) 4 MG TbDL Take 1 tablet (4 mg total) by mouth every 6 (six) hours as needed.     Family History     None        Social History Main Topics    Smoking status: Never Smoker    Smokeless tobacco: Never Used    Alcohol use No    Drug use: No    Sexual activity: No     Review of Systems   Constitutional: Negative for chills, fatigue and fever.   HENT: Negative for sore throat and trouble swallowing.    Eyes: Positive for pain. Negative for photophobia and visual disturbance.   Respiratory: Negative for cough and shortness of breath.    Cardiovascular: Positive for chest pain. Negative for palpitations and leg swelling.   Gastrointestinal: Negative for  abdominal pain, constipation, diarrhea, nausea and vomiting.   Endocrine: Negative for cold intolerance and heat intolerance.   Genitourinary: Negative for dysuria and frequency.   Musculoskeletal: Positive for neck pain. Negative for arthralgias and myalgias.   Skin: Negative for rash and wound.   Neurological: Positive for numbness. Negative for dizziness, syncope, speech difficulty, weakness and light-headedness.   Psychiatric/Behavioral: Negative for confusion and hallucinations.     Objective:     Vital Signs (Most Recent):  Temp: 98.5 °F (36.9 °C) (08/31/17 1956)  Pulse: 76 (08/31/17 2203)  Resp: 19 (08/31/17 2203)  BP: (!) 147/84 (08/31/17 2203)  SpO2: (!) 93 % (08/31/17 2203) Vital Signs (24h Range):  Temp:  [98.5 °F (36.9 °C)-98.8 °F (37.1 °C)] 98.5 °F (36.9 °C)  Pulse:  [68-83] 76  Resp:  [14-19] 19  SpO2:  [93 %-99 %] 93 %  BP: (147-175)/() 147/84     Weight: 81.6 kg (180 lb)  Body mass index is 30.9 kg/m².    Physical Exam   Constitutional: She is oriented to person, place, and time. She appears well-developed and well-nourished.   HENT:   Head: Normocephalic and atraumatic.   Mouth/Throat: Oropharynx is clear and moist.   Eyes: EOM are normal. Pupils are equal, round, and reactive to light. No scleral icterus.   Neck: Normal range of motion. Neck supple.   Cardiovascular: Normal rate and regular rhythm.    No murmur heard.  Chest tenderness on midsternal palpation   Pulmonary/Chest: Effort normal and breath sounds normal. No respiratory distress. She has no wheezes.   Abdominal: Soft. Bowel sounds are normal. She exhibits no distension. There is no tenderness.   Musculoskeletal: Normal range of motion. She exhibits no edema.   Neurological: She is alert and oriented to person, place, and time.   Skin: Skin is warm and dry.   Psychiatric: She has a normal mood and affect. Her behavior is normal. Judgment and thought content normal.        Significant Labs:   CBC:   Recent Labs  Lab 08/31/17  7553    WBC 4.34   HGB 13.1   HCT 37.0        CMP:   Recent Labs  Lab 08/31/17 2051      K 4.0      CO2 26   GLU 86   BUN 17   CREATININE 0.9   CALCIUM 9.1   PROT 7.5   ALBUMIN 3.5   BILITOT 0.3   ALKPHOS 140*   AST 22   ALT 12   ANIONGAP 8   EGFRNONAA >60.0     Troponin:   Recent Labs  Lab 08/31/17 2051   TROPONINI 0.055*       Significant Imaging: Echo: I have reviewed all pertinent results/findings within the past 24 hours and my personal findings are:  No STEMI    Assessment/Plan:     Elevated troponin    See chest pain r/o ACS          Chest pain, rule out acute myocardial infarction    · 5th admit for chest pain in the last year  · DSE negative in May 2017  · Never had scheduled PET stress in July  · Troponin mildly elevated but around baseline around 0.055.  Trend  · Given Aspirin 325mg and Nitro in the ED  · Continue Lipitor  · NPO at midnight in case of stress test in the morning          Essential hypertension    · Continue Losartan and HCTZ          Chronic bilateral low back pain without sciatica    · Continue Hydrocodone-Acetaminophen          Nonintractable generalized idiopathic epilepsy without status epilepticus    · Continue Carbamazepine          Radiculopathy of cervical spine    · Continue Gabapentin          Spondylosis of cervical spine    See cervical radiculopathy          Left facial numbness    · Continue Gabapentin  · Continue Carbamazepine            VTE Risk Mitigation         Ordered     Medium Risk of VTE  Once      08/31/17 2205     Place PAUL hose  Until discontinued      08/31/17 2205         Dispo:  Pending troponin and decision on imaging    Vivian Trevizo MD  Department of Hospital Medicine   [facility]

## 2017-09-01 NOTE — SUBJECTIVE & OBJECTIVE
Past Medical History:   Diagnosis Date    Abnormal mammogram of both breasts     Hypertension     Major depressive disorder 5/14/2017    Memory disorder     Seizures      Past Surgical History:   Procedure Laterality Date    CATARACT EXTRACTION      cysts breast      TONSILLECTOMY       History reviewed. No pertinent family history.  Social History   Substance Use Topics    Smoking status: Never Smoker    Smokeless tobacco: Never Used    Alcohol use No     Review of patient's allergies indicates:  No Known Allergies  Medications: I have reviewed the current medication administration record.    Prescriptions Prior to Admission   Medication Sig Dispense Refill Last Dose    aspirin 81 MG Chew Take 81 mg by mouth once daily.   8/31/2017    atorvastatin (LIPITOR) 40 MG tablet Take 1 tablet (40 mg total) by mouth once daily. 14 tablet 0 8/30/2017    carbamazepine (TEGRETOL) 200 mg tablet Take 200 mg nightly for one week then take 200 mg TWICE DAILY thereafter 30 tablet 11 8/30/2017    gabapentin (NEURONTIN) 300 MG capsule Take one cap at bedtime for one week, than increase to 2 caps at bedtime at bedtime, if tolerated.  Do not stop abruptly. 90 capsule 11 8/30/2017    hydrocodone-acetaminophen 5-325mg (NORCO) 5-325 mg per tablet Take 1 tablet by mouth every 8 (eight) hours as needed for Pain. 40 tablet 0 8/30/2017    losartan-hydrochlorothiazide 50-12.5 mg (HYZAAR) 50-12.5 mg per tablet Take 1 tablet by mouth once daily. 90 tablet 3 8/30/2017    diclofenac (VOLTAREN) 50 MG EC tablet TK 1 T PO BID PRN P  3 Taking    losartan (COZAAR) 50 MG tablet TAKE 1 TABLET(50 MG) BY MOUTH EVERY EVENING 90 tablet 0     ondansetron (ZOFRAN-ODT) 4 MG TbDL Take 1 tablet (4 mg total) by mouth every 6 (six) hours as needed. 20 tablet 0 Taking     Review of Systems   Constitutional: Negative for chills and fever.   HENT: Negative for congestion and rhinorrhea.    Respiratory: Negative for cough and shortness of breath.     Cardiovascular: Negative for chest pain and palpitations.   Gastrointestinal: Negative for constipation, diarrhea, nausea and vomiting.   Genitourinary: Negative for dysuria and hematuria.   Musculoskeletal: Positive for arthralgias (L shoulder). Negative for myalgias.   Skin: Negative for rash and wound.   Neurological: Positive for speech difficulty (lisp, otherwise no aphasia or dysarthria). Negative for weakness and headaches.   Hematological: Negative for adenopathy. Does not bruise/bleed easily.   Psychiatric/Behavioral: Negative for agitation and confusion.     Objective:     Vital Signs (Most Recent):  Temp: 97.1 °F (36.2 °C) (09/01/17 1149)  Pulse: 66 (09/01/17 1149)  Resp: 18 (09/01/17 1149)  BP: (!) 142/74 (09/01/17 1149)  SpO2: 96 % (09/01/17 1149)    Vital Signs Range (Last 24H):  Temp:  [96 °F (35.6 °C)-98.8 °F (37.1 °C)]   Pulse:  [64-83]   Resp:  [14-19]   BP: (137-181)/()   SpO2:  [93 %-99 %]     Physical Exam  General:  Well-developed, well-nourished, nad  HEENT:  NCAT, PERRLA, EOMI, oropharyngeal membranes non-erythematous/without exudate.  No apparent swelling of the tongue though patient notes subjective feeling of swelling and has lisping speech.  Neck:  Supple, no palpable lad, normal ROM without rigidity  Resp:  Symmetric expansion, no increased wob  CVS:  No LE edema.  Extremities warm, well-perfused  GI:  Abd soft, non-distended, non-tender    Neurological Exam:   Mental Status:  AAOx3.  Speech, thought content appropriate.  Able to spell 'world' forward and backward  Cranial Nerves:  VFs intact to movement in all quadrants bilaterally.  PERRLA, EOMI.  Facial movement and sensation intact and symmetric.  Palate elevates symmetrically, tongue protrudes midline.  SCM/Trapezius 5/5 bilaterally.  Motor:  Normal bulk and tone.  Strength 5/5 bilateral shoulder abduction, biceps/triceps,  strength, hip flexors, knee flexion/extension, plantarflexion/dorsiflexion.  No pronator  drift.  Sensory:  Intact to light touch and temperature at all extremities.  Mild decrease in sensation reported at L jaw, no inattention or extinction noted.  Negative Romberg.  Reflexes:  Biceps, brachioradialis, patellar, Achilles reflexes 2+.  No ankle clonus.  Downgoing toe bilaterally.  Coordination:  FTN, HTS, AIDA intact bilaterally without ataxia, dysmetria, or dysdiadochokinesia.  Gait:  Normal gait without imbalance, shuffling, abnormal posture.    NIH Stroke Scale:  Interval: 24 hours post onset of symptoms +/- 20 mins  Level of Consciousness: 0 - alert  LOC Questions: 0 - answers both correctly  LOC Commands: 0 - performs both correctly  Best Gaze: 0 - normal  Visual: 0 - no visual loss  Facial Palsy: 0 - normal  Motor Left Arm: 0 - no drift  Motor Right Arm: 0 - no drift  Motor Left Le - no drift  Motor Right Le - no drift  Limb Ataxia: 0 - absent  Sensory: 0 - normal  Best Language: 0 - no aphasia  Dysarthria: 0 - normal articulation  Extinction and Inattention: 0 - no neglect  NIH Stroke Scale Total: 0    Laboratory:  CMP:   Recent Labs  Lab 17  0455   CALCIUM 8.6*   ALBUMIN 3.0*   PROT 6.1      K 3.4*   CO2 26      BUN 18   CREATININE 0.8   ALKPHOS 123   ALT 9*   AST 14   BILITOT 0.4     CBC:   Recent Labs  Lab 17  0455   WBC 3.82*   RBC 4.00   HGB 12.1   HCT 35.0*      MCV 88   MCH 30.3   MCHC 34.6     Lipid Panel: No results for input(s): CHOL, LDLCALC, HDL, TRIG in the last 168 hours.  Coagulation: No results for input(s): INR, APTT in the last 168 hours.    Invalid input(s): PT  Hgb A1C: No results for input(s): HGBA1C in the last 168 hours.  TSH: No results for input(s): TSH in the last 168 hours.    Diagnostic Results:  Brain Imaging:   Acute Pathology: 17 CT head  Location:   Old Vascular Pathology:   Location:    Cerebrovascular Imaging:   Intracranial Pathology:  16 CTA head and neck   Location: [facility].    Cervical Vascular Imaging:   Cervical  Vascular Pathology: 08/27/16 CTA head and neck  Location: [facility].    Cardiac Evaluation:  Key Findings: Normal EKG.  Last Echo with normal EF and no atrial dilation.  EKG/Telemetry: NSR, 75 bpm.  Normal EKG.

## 2017-09-01 NOTE — HPI
"Ms. Augustine is a 71 y.o. WF with h/o HTN, seizure disorder, cervical radiculopathy, DM (not on medication) presenting with chest pain associated with jaw pain and LUE numbness/tingling. Her symptoms began as she was laying in bed having a stressful conversation with her phone company. She characterizes her chest pain as a substernal tightness can also be superficially tender across the upper L chest. The onset was acute and lasted from around 2PM to her presentation to the ED (about 4 hours). The pain severity fluctuated and was inconsistent. Rest and nitroglycerin in the ED did not relieve the pain and aspirin provided some relief. It was aggravated by moving her left arm and shoulder. She denies any lightheadedness, dizziness, diaphoresis, SOB, N/V. She endorses some generalized weakness and fatigue. She has presented 4 times this year with similar symptoms (January x2 and June x2). Cardiac workup included a dobutamine stress echo (5/2017) that showed no evidence of stress induced myocardial ischemia however sensitivity was diminished secondary to challenging imaging. Echo showed an EF of 60% and no valvular dysfunction.    In the ED, she had an elevated troponin of 0.055. Her troponins are chronically elevated so this is not a new finding. Her EKG was normal with no ST segment changes. She was admitted to team C for further work-up. On exam today, she reports that the chest pain has largely resolved. Still exacerbated with movement of her neck/shoulder/L arm. Of note, she now describes some numbness around her L face that feels like a "tightness" and she feels that her tongue is heavy. She does not report any other focal deficits or weakness at this time. This is a very transient episode that resolved  Within a few minutes during our interaction. She has a history of similar episodes for which she has previously been evaluated by neurology (in 2016) for TIA, but more likely atypical migraine. Work-up with MRI " brain, MRA neck, and several subsequent CT heads have been negative to date.

## 2017-09-01 NOTE — ASSESSMENT & PLAN NOTE
71 y.o. WF with non-cardiac chest pain for several hours. Pain has since resolved with some tenderness to palpation and movement over the left shoulder, now with left-sided facial numbness and tongue heaviness.    - Flat troponins (0.055 > 0.053 > 0.049), negative EKG for ischemia, and non-cardiac (possibly atypical) chest pain  - Previous stress echo 5/2017 negative for ischemia, but with inadequate image quality  - We have ruled out ACS at this time  - TIA/neuro work-up takes priority over cardiac issues.   - Recommend SPECT stress inpatient following stroke r/o or PET stress outpatient with Dr. Bowen who last evaluated her

## 2017-09-01 NOTE — ED PROVIDER NOTES
Encounter Date: 8/31/2017    SCRIBE #1 NOTE: I, Zita Ball, am scribing for, and in the presence of,  Dr. Fong. I have scribed the entire note.       History     Chief Complaint   Patient presents with    Numbness     left facial numbness and LUE pain since 1400. also c/o left chest wall pressure 6/10     Time seen by provider: 8:37 PM    This is a 71 y.o. female with PM Hx of HTN on Losartan who presents with complaint of acute onset of left arm pain at 2:00 PM today.  Pain radiates to her neck and chest. Associated facial numbness and neck soreness. Pt was aggravated with the phone company when she noted the onset of all symptoms. She states her pain has worsened since initial onset and is a 6/10 on arrival to ED. Pain was constant at first but is now currently described as waxing and waning. No alleviating or exacerbating factors. She also endorses chronic, intermittent dyspnea. Pt denies fever, nausea, vomiting, cough, difficulty swallowing, or any other symptoms at this time. Pt lives with her daughter but drove herself to the ED today.     Hx of similar symptoms after a fall she experienced about 6 months ago. She has not noted left arm pain since this incident.  She reports hx of TIA on baby aspirin daily as well as seizures; but no hx of smoking, DM, or MI. She expresses compliance to all medication.       The history is provided by the patient and medical records.     Review of patient's allergies indicates:  No Known Allergies  Past Medical History:   Diagnosis Date    Abnormal mammogram of both breasts     Hypertension     Major depressive disorder 5/14/2017    Memory disorder     Seizures      Past Surgical History:   Procedure Laterality Date    CATARACT EXTRACTION      cysts breast      TONSILLECTOMY       History reviewed. No pertinent family history.  Social History   Substance Use Topics    Smoking status: Never Smoker    Smokeless tobacco: Never Used    Alcohol use No     Review of  Systems   Constitutional: Negative for fever.   HENT: Negative for trouble swallowing.    Eyes: Negative for visual disturbance.   Respiratory: Negative for cough.    Cardiovascular: Positive for chest pain.   Gastrointestinal: Negative for nausea and vomiting.   Genitourinary: Negative for difficulty urinating.   Musculoskeletal: Positive for neck pain (and soreness to left side).        Positive for left arm pain.    Skin: Negative for rash.   Neurological: Positive for numbness (face).       Physical Exam     Initial Vitals [08/31/17 1920]   BP Pulse Resp Temp SpO2   (!) 175/105 83 18 98.8 °F (37.1 °C) 98 %      MAP       128.33         Physical Exam    Vitals reviewed.  Constitutional: She appears well-developed and well-nourished. No distress.   70 yo  woman   HENT:   Head: Normocephalic and atraumatic.   Pt is edentulous without intraoral lesions.   Eyes: EOM are normal. Pupils are equal, round, and reactive to light.   Neck: No tracheal deviation present. No JVD present.   Moderate left trapezius spasm. No midline cervical spine tenderness or deformity.   Cardiovascular: Normal rate, normal heart sounds and intact distal pulses.   Pulmonary/Chest: Breath sounds normal. No stridor. No respiratory distress.   Abdominal: Soft. She exhibits no distension. There is no tenderness.   Musculoskeletal: Normal range of motion. She exhibits no edema.   Moves all extremities x 4. Moderate TTP of left upper chest and left upper arm without external evidence of trauma.    Neurological: She is alert and oriented to person, place, and time.   Psychiatric: Her behavior is normal. Thought content normal.         ED Course   Procedures  Labs Reviewed   COMPREHENSIVE METABOLIC PANEL - Abnormal; Notable for the following:        Result Value    Alkaline Phosphatase 140 (*)     All other components within normal limits   TROPONIN I - Abnormal; Notable for the following:     Troponin I 0.055 (*)     All other components  within normal limits   CBC W/ AUTO DIFFERENTIAL   B-TYPE NATRIURETIC PEPTIDE   MAGNESIUM     EKG Readings: (Independently Interpreted)   Sinus rhythm, normal axis, normal ST segments at 75 bpm.         Medical Decision Making:   History:   Old Medical Records: I decided to obtain old medical records.  Differential Diagnosis:   ACS, muscle spasm, lethal arrhythmia, and cervical radiculopathy.   Independently Interpreted Test(s):   I have ordered and independently interpreted EKG Reading(s) - see prior notes  Clinical Tests:   Lab Tests: Ordered and Reviewed  Medical Tests: Ordered and Reviewed            Scribe Attestation:   Scribe #1: I performed the above scribed service and the documentation accurately describes the services I performed. I attest to the accuracy of the note.    Attending Attestation:           Physician Attestation for Scribe:  Physician Attestation Statement for Scribe #1: I, Dr. Fong, reviewed documentation, as scribed by Zita Ball in my presence, and it is both accurate and complete.         Attending ED Notes:   Emergency department evaluation today reveals evidence of a mildly elevated serum troponin the setting of normal renal function in this patient presenting with chest pain with left upper arm and left neck pain which began at approximately 2 PM today.  The patient describes significant improvement of her presenting symptoms with emergency department therapy.  Notably, EKG does not reveal any evidence of injury pattern or arrhythmia.  Review of the medical record reveals inconclusive risk stratification studies performed in the past.  In light of these findings as well as a presentation of chest pain at the time of emotional distress with an elevated troponin, the patient will be placed in observation with internal medicine in stable condition for further monitoring and management.          ED Course      Clinical Impression:   The primary encounter diagnosis was Trapezius muscle  spasm. A diagnosis of Chest pain was also pertinent to this visit.    Disposition:   Disposition: Placed in Observation                        Pelon Fong MD  08/31/17 9303

## 2017-09-01 NOTE — PLAN OF CARE
09/01/17 0955   Discharge Assessment   Assessment Type Discharge Planning Assessment   Confirmed/corrected address and phone number on facesheet? Yes   Assessment information obtained from? Patient;Medical Record   Expected Length of Stay (days) 1   Communicated expected length of stay with patient/caregiver yes   Prior to hospitilization cognitive status: Alert/Oriented   Prior to hospitalization functional status: Independent   Current cognitive status: Alert/Oriented   Current Functional Status: Independent   Lives With child(leanne), adult   Able to Return to Prior Arrangements yes   Is patient able to care for self after discharge? Yes   Patient's perception of discharge disposition home or selfcare   Readmission Within The Last 30 Days no previous admission in last 30 days   Patient currently being followed by outpatient case management? No   Patient currently receives any other outside agency services? No   Equipment Currently Used at Home cane, straight;walker, standard   Do you have any problems affording any of your prescribed medications? No   Is the patient taking medications as prescribed? yes   Does the patient have transportation home? Yes   Does the patient receive services at the [facility]? No   Discharge Plan A Home with family   Patient/Family In Agreement With Plan yes   Placed in Obs for CP. Lives with daughter and is independent in her ADLs. Plan is to DC home. No DC needs identified.

## 2017-09-01 NOTE — ASSESSMENT & PLAN NOTE
· 5th admit for chest pain in the last year  · DSE negative in May 2017  · Never had scheduled PET stress in July  · Troponin mildly elevated but around baseline around 0.055.  Trend  · Given Aspirin 325mg and Nitro in the ED  · Continue Lipitor  · NPO at midnight in case of stress test in the morning

## 2017-09-01 NOTE — PROVIDER PROGRESS NOTES - EMERGENCY DEPT.
Encounter Date: 8/31/2017    ED Physician Progress Notes       SCRIBE NOTE: I, Natalee Harris, am scribing for, and in the presence of,  Dr. Golden.  Physician Statement: I, Dr. Golden, personally performed the services described in this documentation as scribed by Natalee Harris in my presence, and it is both accurate and complete.      EKG - STEMI Decision  Initial Reading: No STEMI present.

## 2017-09-01 NOTE — ASSESSMENT & PLAN NOTE
-Resolving on discussion with patient this am.  L arm numbness resolved.  -Ddx:  TIA, medication side effect, cervical radiculopathy w/ LUE sxs, conversion disorder  -Conservative management with control for stroke risk factors including ASA, statin, anti-hypertensives  -CT head pending.  US Carotid Bilateral pending.  Will follow up imaging.

## 2017-09-01 NOTE — SUBJECTIVE & OBJECTIVE
Past Medical History:   Diagnosis Date    Abnormal mammogram of both breasts     Hypertension     Major depressive disorder 5/14/2017    Memory disorder     Seizures        Past Surgical History:   Procedure Laterality Date    CATARACT EXTRACTION      cysts breast      TONSILLECTOMY         Review of patient's allergies indicates:  No Known Allergies    No current facility-administered medications on file prior to encounter.      Current Outpatient Prescriptions on File Prior to Encounter   Medication Sig    aspirin 81 MG Chew Take 81 mg by mouth once daily.    atorvastatin (LIPITOR) 40 MG tablet Take 1 tablet (40 mg total) by mouth once daily.    carbamazepine (TEGRETOL) 200 mg tablet Take 200 mg nightly for one week then take 200 mg TWICE DAILY thereafter    gabapentin (NEURONTIN) 300 MG capsule Take one cap at bedtime for one week, than increase to 2 caps at bedtime at bedtime, if tolerated.  Do not stop abruptly.    hydrocodone-acetaminophen 5-325mg (NORCO) 5-325 mg per tablet Take 1 tablet by mouth every 8 (eight) hours as needed for Pain.    losartan-hydrochlorothiazide 50-12.5 mg (HYZAAR) 50-12.5 mg per tablet Take 1 tablet by mouth once daily.    diclofenac (VOLTAREN) 50 MG EC tablet TK 1 T PO BID PRN P    losartan (COZAAR) 50 MG tablet TAKE 1 TABLET(50 MG) BY MOUTH EVERY EVENING    ondansetron (ZOFRAN-ODT) 4 MG TbDL Take 1 tablet (4 mg total) by mouth every 6 (six) hours as needed.     Family History     None        Social History Main Topics    Smoking status: Never Smoker    Smokeless tobacco: Never Used    Alcohol use No    Drug use: No    Sexual activity: No     Review of Systems   Constitutional: Negative for chills, fatigue and fever.   HENT: Negative for sore throat and trouble swallowing.    Eyes: Positive for pain. Negative for photophobia and visual disturbance.   Respiratory: Negative for cough and shortness of breath.    Cardiovascular: Positive for chest pain. Negative for  palpitations and leg swelling.   Gastrointestinal: Negative for abdominal pain, constipation, diarrhea, nausea and vomiting.   Endocrine: Negative for cold intolerance and heat intolerance.   Genitourinary: Negative for dysuria and frequency.   Musculoskeletal: Positive for neck pain. Negative for arthralgias and myalgias.   Skin: Negative for rash and wound.   Neurological: Positive for numbness. Negative for dizziness, syncope, speech difficulty, weakness and light-headedness.   Psychiatric/Behavioral: Negative for confusion and hallucinations.     Objective:     Vital Signs (Most Recent):  Temp: 98.5 °F (36.9 °C) (08/31/17 1956)  Pulse: 76 (08/31/17 2203)  Resp: 19 (08/31/17 2203)  BP: (!) 147/84 (08/31/17 2203)  SpO2: (!) 93 % (08/31/17 2203) Vital Signs (24h Range):  Temp:  [98.5 °F (36.9 °C)-98.8 °F (37.1 °C)] 98.5 °F (36.9 °C)  Pulse:  [68-83] 76  Resp:  [14-19] 19  SpO2:  [93 %-99 %] 93 %  BP: (147-175)/() 147/84     Weight: 81.6 kg (180 lb)  Body mass index is 30.9 kg/m².    Physical Exam   Constitutional: She is oriented to person, place, and time. She appears well-developed and well-nourished.   HENT:   Head: Normocephalic and atraumatic.   Mouth/Throat: Oropharynx is clear and moist.   Eyes: EOM are normal. Pupils are equal, round, and reactive to light. No scleral icterus.   Neck: Normal range of motion. Neck supple.   Cardiovascular: Normal rate and regular rhythm.    No murmur heard.  Chest tenderness on midsternal palpation   Pulmonary/Chest: Effort normal and breath sounds normal. No respiratory distress. She has no wheezes.   Abdominal: Soft. Bowel sounds are normal. She exhibits no distension. There is no tenderness.   Musculoskeletal: Normal range of motion. She exhibits no edema.   Neurological: She is alert and oriented to person, place, and time.   Skin: Skin is warm and dry.   Psychiatric: She has a normal mood and affect. Her behavior is normal. Judgment and thought content normal.         Significant Labs:   CBC:   Recent Labs  Lab 08/31/17 2051   WBC 4.34   HGB 13.1   HCT 37.0        CMP:   Recent Labs  Lab 08/31/17 2051      K 4.0      CO2 26   GLU 86   BUN 17   CREATININE 0.9   CALCIUM 9.1   PROT 7.5   ALBUMIN 3.5   BILITOT 0.3   ALKPHOS 140*   AST 22   ALT 12   ANIONGAP 8   EGFRNONAA >60.0     Troponin:   Recent Labs  Lab 08/31/17 2051   TROPONINI 0.055*       Significant Imaging: Echo: I have reviewed all pertinent results/findings within the past 24 hours and my personal findings are:  No STEMI

## 2017-09-01 NOTE — PROGRESS NOTES
"Progress Note  [facility] - [facility] [place]      Admit Date: 8/31/2017    SUBJECTIVE:     Follow-up For:  Chest pain, rule out acute myocardial infarction    HPI: Per Dr Vivian Trevizo, "Ms. Sofia Augustine is a 72yo female HTN, seizure disorder, and cervical radiculopathy who presents to the ED with chest pain and left arm numbness that started when she was on the phone with [employer].  She says that she was trying to cancel her service after September 3rd, and that the company disconnected her service at that time.  She then developed midsternal chest pressure around 2pm that she describes as a 6/10 in severity, someone sitting on her chest, with numbness down her left arm and along her left face.  The pain has been present and has not gone away, and is reproducible and more severe with palpation.  She says the pain is still present.  She denies any headaches or blurred vision, but says that she feels some pressure behind her left eye.  She denies any nausea or diaphoresis.  She doesn't exercise besides walking in the grocery store.  She is not a Diabetic.     Additionally, this is Ms. Augustine's fifth visit to the ED this year with chest pain:  Twice in January 2017, twice in June 2017, and this admission.  She had a  stress echo on 5/15/2017 that was normal.  Of note, she was scheduled to have a PET stress July 12th and Cardiology follow up July 14th, but claims that she didn't know about these appointments so they were not done"    Of note, she is an extremely poor historian     Hospital Course: Admitted to [facility]    Interval History: Consulted cardiology co-management. ACS ruled out. Ok for outpt stress test. Stayed in hospital for vascular neuro evaluation for reported L facial tingling and tongue heaviness    Review of Systems:  Pain Scale: 2 /10 - L face  Constitutional: no fever or chills  Respiratory: no cough or shortness of breath  Cardiovascular: no chest pain or palpitations  Gastrointestinal: no " nausea or vomiting, no abdominal pain or change in bowel habits  Genitourinary: no hematuria or dysuria  Integument/Breast: no rash or pruritis  Hematologic/Lymphatic: no easy bruising or lymphadenopathy  Musculoskeletal: no arthralgias or myalgias  Neurological: no seizures or tremors  Behavioral/Psych: no depression or anxiety    OBJECTIVE:     Vital Signs Range (Last 24H):  Temp:  [96 °F (35.6 °C)-98.8 °F (37.1 °C)]   Pulse:  [64-83]   Resp:  [14-19]   BP: (137-181)/()   SpO2:  [93 %-99 %]     I & O (Last 24H):No intake or output data in the 24 hours ending 09/01/17 1015    Physical Exam:  General appearance: no distress  Mental status: Alert and oriented x 3  HEENT:  conjunctivae/corneas clear, PERRL  Neck: supple, thyroid not enlarged  Pulm:   normal respiratory effort, CTA B, no c/w/r  Card: RRR, S1, S2 normal, no murmur, click, rub or gallop  Abd: soft, NT, ND, BS present; no masses, no organomegaly  Ext: no c/c/e  Pulses: 2+, symmetric  Skin: color, texture, turgor normal. No rashes or lesions  Neuro: CN II-XII grossly intact, no focal numbness or weakness, normal strength and tone. Mild dysarthria    Diagnostic Results:  Labs reviewed    Recent Results (from the past 24 hour(s))   CBC auto differential    Collection Time: 08/31/17  8:51 PM   Result Value Ref Range    WBC 4.34 3.90 - 12.70 K/uL    RBC 4.22 4.00 - 5.40 M/uL    Hemoglobin 13.1 12.0 - 16.0 g/dL    Hematocrit 37.0 37.0 - 48.5 %    MCV 88 82 - 98 fL    MCH 31.0 27.0 - 31.0 pg    MCHC 35.4 32.0 - 36.0 g/dL    RDW 13.5 11.5 - 14.5 %    Platelets 190 150 - 350 K/uL    MPV 10.1 9.2 - 12.9 fL    Gran # 2.0 1.8 - 7.7 K/uL    Lymph # 1.8 1.0 - 4.8 K/uL    Mono # 0.5 0.3 - 1.0 K/uL    Eos # 0.1 0.0 - 0.5 K/uL    Baso # 0.02 0.00 - 0.20 K/uL    Gran% 46.7 38.0 - 73.0 %    Lymph% 40.3 18.0 - 48.0 %    Mono% 10.4 4.0 - 15.0 %    Eosinophil% 2.1 0.0 - 8.0 %    Basophil% 0.5 0.0 - 1.9 %    Differential Method Automated    Comprehensive metabolic panel     Collection Time: 08/31/17  8:51 PM   Result Value Ref Range    Sodium 140 136 - 145 mmol/L    Potassium 4.0 3.5 - 5.1 mmol/L    Chloride 106 95 - 110 mmol/L    CO2 26 23 - 29 mmol/L    Glucose 86 70 - 110 mg/dL    BUN, Bld 17 8 - 23 mg/dL    Creatinine 0.9 0.5 - 1.4 mg/dL    Calcium 9.1 8.7 - 10.5 mg/dL    Total Protein 7.5 6.0 - 8.4 g/dL    Albumin 3.5 3.5 - 5.2 g/dL    Total Bilirubin 0.3 0.1 - 1.0 mg/dL    Alkaline Phosphatase 140 (H) 55 - 135 U/L    AST 22 10 - 40 U/L    ALT 12 10 - 44 U/L    Anion Gap 8 8 - 16 mmol/L    eGFR if African American >60.0 >60 mL/min/1.73 m^2    eGFR if non African American >60.0 >60 mL/min/1.73 m^2   Troponin I #1    Collection Time: 08/31/17  8:51 PM   Result Value Ref Range    Troponin I 0.055 (H) 0.000 - 0.026 ng/mL   B-Type natriuretic peptide (BNP)    Collection Time: 08/31/17  8:51 PM   Result Value Ref Range    BNP 22 0 - 99 pg/mL   Magnesium    Collection Time: 08/31/17  8:51 PM   Result Value Ref Range    Magnesium 2.2 1.6 - 2.6 mg/dL   Troponin I    Collection Time: 09/01/17  1:21 AM   Result Value Ref Range    Troponin I 0.053 (H) 0.000 - 0.026 ng/mL   Comprehensive Metabolic Panel (CMP)    Collection Time: 09/01/17  4:55 AM   Result Value Ref Range    Sodium 141 136 - 145 mmol/L    Potassium 3.4 (L) 3.5 - 5.1 mmol/L    Chloride 108 95 - 110 mmol/L    CO2 26 23 - 29 mmol/L    Glucose 86 70 - 110 mg/dL    BUN, Bld 18 8 - 23 mg/dL    Creatinine 0.8 0.5 - 1.4 mg/dL    Calcium 8.6 (L) 8.7 - 10.5 mg/dL    Total Protein 6.1 6.0 - 8.4 g/dL    Albumin 3.0 (L) 3.5 - 5.2 g/dL    Total Bilirubin 0.4 0.1 - 1.0 mg/dL    Alkaline Phosphatase 123 55 - 135 U/L    AST 14 10 - 40 U/L    ALT 9 (L) 10 - 44 U/L    Anion Gap 7 (L) 8 - 16 mmol/L    eGFR if African American >60.0 >60 mL/min/1.73 m^2    eGFR if non African American >60.0 >60 mL/min/1.73 m^2   Magnesium    Collection Time: 09/01/17  4:55 AM   Result Value Ref Range    Magnesium 1.9 1.6 - 2.6 mg/dL   Phosphorus    Collection  Time: 09/01/17  4:55 AM   Result Value Ref Range    Phosphorus 3.8 2.7 - 4.5 mg/dL   CBC with Automated Differential    Collection Time: 09/01/17  4:55 AM   Result Value Ref Range    WBC 3.82 (L) 3.90 - 12.70 K/uL    RBC 4.00 4.00 - 5.40 M/uL    Hemoglobin 12.1 12.0 - 16.0 g/dL    Hematocrit 35.0 (L) 37.0 - 48.5 %    MCV 88 82 - 98 fL    MCH 30.3 27.0 - 31.0 pg    MCHC 34.6 32.0 - 36.0 g/dL    RDW 13.3 11.5 - 14.5 %    Platelets 174 150 - 350 K/uL    MPV 9.8 9.2 - 12.9 fL    Gran # 1.4 (L) 1.8 - 7.7 K/uL    Lymph # 1.9 1.0 - 4.8 K/uL    Mono # 0.4 0.3 - 1.0 K/uL    Eos # 0.1 0.0 - 0.5 K/uL    Baso # 0.02 0.00 - 0.20 K/uL    Gran% 37.7 (L) 38.0 - 73.0 %    Lymph% 49.0 (H) 18.0 - 48.0 %    Mono% 9.9 4.0 - 15.0 %    Eosinophil% 2.9 0.0 - 8.0 %    Basophil% 0.5 0.0 - 1.9 %    Differential Method Automated    Troponin I    Collection Time: 09/01/17  4:55 AM   Result Value Ref Range    Troponin I 0.049 (H) 0.000 - 0.026 ng/mL           ASSESSMENT/PLAN:     Chest pain, recurrent. Not ACS. Rounded on her with cardiology team including staff Dr Espinosa.   - SPECT stress inpt vs PET stress outpt    L face numbness and tongue heaviness, recurrent. Episodes for several years. Admit to stroke team 5/2016 for R face numbness and tongue heaviness, no stroke found, Dx possible atypuical migraine, started on low dose elavil, followed in clinic with Nessa Caraballo NP  - CT head STAT  - carotid dopplers ordered but then canceled after my discussion with radiology, as she has recent imaging that showed no significant stenosis  - Vascular neuro consult  - EEg STAT  - check CBZ level  - consider general neuro or epilepsy consult, but will discuss with vascular neuro    Depression   - ??not on meds  - review chart    H/O prediabetes - A1C 6.1 last June  - re-check A1C now    HypoK - replace PO    ASA 81  atorva 40    losartan-hydrochlorothiazide 50-12.5 mg per tablet 1 tablet, 1 tablet, Oral, Daily    carbamazepime 200 BID    Gabapentin 300  [facility] - ?home med    Advance directives - full code    Post-acute care- pending clinical condition, back home    Time spent in care of patient: 80 mins    Candice Magallon MD  [facility] Medicine Staff

## 2017-09-02 VITALS
RESPIRATION RATE: 16 BRPM | HEIGHT: 64 IN | WEIGHT: 181 LBS | BODY MASS INDEX: 30.9 KG/M2 | SYSTOLIC BLOOD PRESSURE: 144 MMHG | DIASTOLIC BLOOD PRESSURE: 80 MMHG | OXYGEN SATURATION: 96 % | TEMPERATURE: 97 F | HEART RATE: 68 BPM

## 2017-09-02 PROBLEM — M62.838 TRAPEZIUS MUSCLE SPASM: Status: ACTIVE | Noted: 2017-09-02

## 2017-09-02 LAB
25(OH)D3+25(OH)D2 SERPL-MCNC: 15 NG/ML
ALBUMIN SERPL BCP-MCNC: 3.2 G/DL
ALP SERPL-CCNC: 138 U/L
ALT SERPL W/O P-5'-P-CCNC: 10 U/L
ANION GAP SERPL CALC-SCNC: 11 MMOL/L
AST SERPL-CCNC: 15 U/L
BASOPHILS # BLD AUTO: 0.02 K/UL
BASOPHILS NFR BLD: 0.5 %
BILIRUB SERPL-MCNC: 0.3 MG/DL
BUN SERPL-MCNC: 25 MG/DL
CALCIUM SERPL-MCNC: 9.1 MG/DL
CHLORIDE SERPL-SCNC: 107 MMOL/L
CO2 SERPL-SCNC: 24 MMOL/L
CREAT SERPL-MCNC: 1 MG/DL
DIFFERENTIAL METHOD: NORMAL
EOSINOPHIL # BLD AUTO: 0.1 K/UL
EOSINOPHIL NFR BLD: 3.1 %
ERYTHROCYTE [DISTWIDTH] IN BLOOD BY AUTOMATED COUNT: 13.5 %
EST. GFR  (AFRICAN AMERICAN): >60 ML/MIN/1.73 M^2
EST. GFR  (NON AFRICAN AMERICAN): 56.4 ML/MIN/1.73 M^2
ESTIMATED AVG GLUCOSE: 105 MG/DL
GLUCOSE SERPL-MCNC: 116 MG/DL
HBA1C MFR BLD HPLC: 5.3 %
HCT VFR BLD AUTO: 37 %
HGB BLD-MCNC: 12.7 G/DL
LYMPHOCYTES # BLD AUTO: 1.5 K/UL
LYMPHOCYTES NFR BLD: 34.7 %
MAGNESIUM SERPL-MCNC: 1.9 MG/DL
MCH RBC QN AUTO: 30.4 PG
MCHC RBC AUTO-ENTMCNC: 34.3 G/DL
MCV RBC AUTO: 89 FL
MONOCYTES # BLD AUTO: 0.4 K/UL
MONOCYTES NFR BLD: 9.6 %
NEUTROPHILS # BLD AUTO: 2.2 K/UL
NEUTROPHILS NFR BLD: 51.9 %
PHOSPHATE SERPL-MCNC: 4 MG/DL
PLATELET # BLD AUTO: 188 K/UL
PMV BLD AUTO: 9.9 FL
POTASSIUM SERPL-SCNC: 4.3 MMOL/L
PROT SERPL-MCNC: 6.5 G/DL
RBC # BLD AUTO: 4.18 M/UL
RPR SER QL: NORMAL
SODIUM SERPL-SCNC: 142 MMOL/L
T4 FREE SERPL-MCNC: 1.06 NG/DL
TSH SERPL DL<=0.005 MIU/L-ACNC: 0.82 UIU/ML
WBC # BLD AUTO: 4.26 K/UL

## 2017-09-02 PROCEDURE — 84443 ASSAY THYROID STIM HORMONE: CPT

## 2017-09-02 PROCEDURE — 83036 HEMOGLOBIN GLYCOSYLATED A1C: CPT

## 2017-09-02 PROCEDURE — 25000003 PHARM REV CODE 250: Performed by: HOSPITALIST

## 2017-09-02 PROCEDURE — 36415 COLL VENOUS BLD VENIPUNCTURE: CPT

## 2017-09-02 PROCEDURE — 83735 ASSAY OF MAGNESIUM: CPT

## 2017-09-02 PROCEDURE — 85025 COMPLETE CBC W/AUTO DIFF WBC: CPT

## 2017-09-02 PROCEDURE — 80053 COMPREHEN METABOLIC PANEL: CPT

## 2017-09-02 PROCEDURE — G0378 HOSPITAL OBSERVATION PER HR: HCPCS

## 2017-09-02 PROCEDURE — 99217 PR OBSERVATION CARE DISCHARGE: CPT | Mod: ,,, | Performed by: HOSPITALIST

## 2017-09-02 PROCEDURE — 99214 OFFICE O/P EST MOD 30 MIN: CPT | Mod: ,,, | Performed by: PSYCHIATRY & NEUROLOGY

## 2017-09-02 PROCEDURE — 84100 ASSAY OF PHOSPHORUS: CPT

## 2017-09-02 PROCEDURE — 84439 ASSAY OF FREE THYROXINE: CPT

## 2017-09-02 PROCEDURE — 86592 SYPHILIS TEST NON-TREP QUAL: CPT

## 2017-09-02 PROCEDURE — 82306 VITAMIN D 25 HYDROXY: CPT

## 2017-09-02 RX ORDER — ERGOCALCIFEROL 1.25 MG/1
50000 CAPSULE ORAL
Status: DISCONTINUED | OUTPATIENT
Start: 2017-09-02 | End: 2017-09-02 | Stop reason: HOSPADM

## 2017-09-02 RX ORDER — ERGOCALCIFEROL 1.25 MG/1
50000 CAPSULE ORAL
Qty: 4 CAPSULE | Refills: 0 | Status: ON HOLD | OUTPATIENT
Start: 2017-09-02 | End: 2017-12-27

## 2017-09-02 RX ADMIN — CARBAMAZEPINE 200 MG: 200 TABLET ORAL at 08:09

## 2017-09-02 RX ADMIN — ACETAMINOPHEN 650 MG: 325 TABLET ORAL at 01:09

## 2017-09-02 RX ADMIN — LOSARTAN POTASSIUM AND HYDROCHLOROTHIAZIDE 1 TABLET: 12.5; 5 TABLET ORAL at 08:09

## 2017-09-02 RX ADMIN — ASPIRIN 81 MG CHEWABLE TABLET 81 MG: 81 TABLET CHEWABLE at 08:09

## 2017-09-02 RX ADMIN — ATORVASTATIN CALCIUM 40 MG: 20 TABLET, FILM COATED ORAL at 08:09

## 2017-09-02 NOTE — ASSESSMENT & PLAN NOTE
-Resolving.  L arm numbness resolved.  -Ddx:  cervical radiculopathy, conversion disorder  -Conservative management with control for stroke risk factors including ASA, statin, anti-hypertensives  -CT head with no acute intracranial pathology.  Extensive work up on previous hospitalizations negative.  -No concern for vascular etiology of patient's symptoms at this time.  Will sign off.  -Could consider follow up with spine clinic on discharge for cervical spondylosis with possible LUE symptoms

## 2017-09-02 NOTE — SUBJECTIVE & OBJECTIVE
"Neurologic Chief Complaint: L facial numbness, L arm numbness (resolved)    Subjective:     Interval History: Patient is seen for follow-up neurological assessment and treatment recommendations.  Patient notes improvement in L facial numbness but still has some speech difficulty (talks with lisping speech as she did yesterday).  Denies L arm numbness and cites only pain.  She seems more concerned about her neck "cracking" more loudly than previously.  We discussed her previous diagnosis of cervical spondylosis and radiculopathy.  Per below, patient has had several negative work ups for neurologic etiology of her symptoms.    HPI, Past Medical, Family, and Social History remains the same as documented in the initial encounter.  72 yo F with PMHx of MDD, cervical spine spondylosis with radiculopathy, and seizure disorder on carbamazepine [place] [facility] [place] with concerns of L arm and face numbness with chest pain.  Of note, she has had 5 similar admissions (2 in 06/2017 and 2 in 07/2017) during which work up has been diffusely negative.  Vascular Neurology consulted for concern that L face and arm numbness are related to repeat TIAs or stroke.  Patient notes that she felt she was having some tongue swelling today with her L facial numbness and this has made her have a lisp.  Otherwise she does not have speech deficits.  When I am seeing her today, she notes no L arm numbness and improvement in the L facial numbness but is still having some lisp although she notes this is also improving. Does report some L shoulder pain.      Previous work ups have included:  06/29/17 CT head:  No acute intracranial pathology  10/13/16 MRI brain w/ w/o contrast:  Unremarkable  08/27/16 CTA head and neck:  Unremarkable  05/09/16 MRA neck:  No stenosis, occlusion, AV malformation, or aneurysm  05/10/16 Echo:  63% EF, trivial MR    Review of Systems   Constitutional: Negative for chills and fever.   HENT: Negative for " congestion and rhinorrhea.    Eyes: Negative for photophobia and visual disturbance.   Gastrointestinal: Negative for constipation, diarrhea, nausea and vomiting.   Musculoskeletal: Positive for arthralgias (L shoulder) and neck stiffness. Negative for neck pain.   Skin: Negative for rash and wound.   Neurological: Positive for numbness. Negative for tremors, weakness and headaches.   Hematological: Negative for adenopathy. Does not bruise/bleed easily.   Psychiatric/Behavioral: Negative for agitation and confusion.     Scheduled Meds:   aspirin  81 mg Oral Daily    atorvastatin  40 mg Oral Daily    carbamazepine  200 mg Oral BID    ergocalciferol  50,000 Units Oral Q7 Days    gabapentin  300 mg Oral QHS    losartan-hydrochlorothiazide 50-12.5 mg  1 tablet Oral Daily     Continuous Infusions:   PRN Meds:acetaminophen, dextrose 50%, dextrose 50%, glucagon (human recombinant), glucose, glucose, ondansetron, ondansetron, ramelteon    Objective:     Vital Signs (Most Recent):  Temp: 97 °F (36.1 °C) (09/02/17 0749)  Pulse: 68 (09/02/17 1100)  Resp: 16 (09/02/17 0749)  BP: (!) 144/80 (09/02/17 0749)  SpO2: 96 % (09/02/17 0749)  BP Location: Right arm    Vital Signs Range (Last 24H):  Temp:  [97 °F (36.1 °C)-98.3 °F (36.8 °C)]   Pulse:  [64-86]   Resp:  [16-20]   BP: (125-144)/(70-81)   SpO2:  [92 %-96 %]   BP Location: Right arm    Physical Exam  General:  Well-developed, well-nourished, nad  HEENT:  NCAT, PERRLA, EOMI, oropharyngeal membranes non-erythematous/without exudate.  No apparent swelling of the tongue though patient notes subjective feeling of swelling and has slight lisp with speech.  Neck:  Supple, no palpable lad, normal ROM without rigidity  Resp:  Symmetric expansion, no increased wob  CVS:  No LE edema.  Extremities warm, well-perfused  GI:  Abd soft, non-distended, non-tender    Neurological Exam:   Mental Status:  AAOx3.  Speech appropriate, develops small lisp and stutter when asked about facial  numbness.  Cranial Nerves:  VFs intact, PERRLA, EOMI.  Facial movement and sensation intact, symmetric.  Palate raises symmetrically, tongue protrudes midline.  SCM/Trapezius strength 5/5--L shoulder limited by pain.  Motor:  Normal bulk and tone.  Strength 5/5 throughout.  Sensory:  Intact to light touch at all extremities.  Subjective facial numbness to L lower face.  Reflexes:  Biceps, brachioradialis, patellar 2+.  No clonus.  Coordination:  FTN, AIDA intact, symmetric.    Stroke Scales  Laboratory:  CMP:   Recent Labs  Lab 09/02/17  0500   CALCIUM 9.1   ALBUMIN 3.2*   PROT 6.5      K 4.3   CO2 24      BUN 25*   CREATININE 1.0   ALKPHOS 138*   ALT 10   AST 15   BILITOT 0.3     CBC:   Recent Labs  Lab 09/02/17  0500   WBC 4.26   RBC 4.18   HGB 12.7   HCT 37.0      MCV 89   MCH 30.4   MCHC 34.3     Lipid Panel: Not done on current hospitalization  Coagulation: Not done on current hospitalization  Hgb A1C:   Recent Labs  Lab 09/02/17  0459   HGBA1C 5.3     TSH: Not done on current hospitalization    Diagnostic Results:  09/01/17 CT head:  No evidence of acute intracranial pathology.    08/27/2016 CTA head and neck showing vertebrobasilar dolichoectasia

## 2017-09-02 NOTE — PROCEDURES
DATE OF STUDY:  09/01/2017.    INPATIENT EEG    Duration is 24 minutes and 7 seconds.    FINDINGS:  The background of this study contains predominantly alpha and beta   activity initially seen diffusely.  At times, a moderately well-formed posterior   dominant rhythm can be seen only in fragments during maximal wakefulness at   approximately 9 Hz.  There is diffuse beta activity seen during portions of the   study.  The patient midway through the study becomes drowsy as evidenced by loss   of artifact and slowing of the general record with central spindles and K   complexes briefly seen signifying stage N2 sleep.    The record contains no hemispheric or focal slowing.  There were no epileptiform   discharges or seizures.  There were no clinical events marked.    INTERPRETATION:  Normal awake and asleep EEG.      NBB/CARLOS ENRIQUE  dd: 09/01/2017 16:43:01 (CDT)  td: 09/01/2017 21:17:40 (CDT)  Doc ID   #[id number]  Job ID #[id number]    CC:

## 2017-09-02 NOTE — PROGRESS NOTES
"[facility]  Vascular Neurology  Comprehensive Stroke Center  Progress Note    Assessment/Plan:     71 y.o. year old female with PMHx of MDD, cervical spondylosis with radiculopathy, and seizure disorder on carbamazepine presented to ED 08/31/17 with chest pain accompanied by L arm and L face numbness.     Vertebrobasilar dolichoectasia    -Basilar measuring approx 51 mm on CTA head and neck 08/27/16  -Increases risk of stroke, but no findings concerning for stroke at this time  -Continue home anti-hypertensives with SBP goal < 140.  Continue ASA, statin.      Left facial numbness    -Resolving.  L arm numbness resolved.  -Ddx:  cervical radiculopathy, conversion disorder  -Conservative management with control for stroke risk factors including ASA, statin, anti-hypertensives  -CT head with no acute intracranial pathology.  Extensive work up on previous hospitalizations negative.  -No concern for vascular etiology of patient's symptoms at this time.  Will sign off.  -Could consider follow up with spine clinic on discharge for cervical spondylosis with possible LUE symptoms     Neurologic Chief Complaint: L facial numbness, L arm numbness (resolved)    Subjective:     Interval History: Patient is seen for follow-up neurological assessment and treatment recommendations.  Patient notes improvement in L facial numbness but still has some speech difficulty (talks with lisping speech as she did yesterday).  Denies L arm numbness and cites only pain.  She seems more concerned about her neck "cracking" more loudly than previously.  We discussed her previous diagnosis of cervical spondylosis and radiculopathy.  Per below, patient has had several negative work ups for neurologic etiology of her symptoms.    HPI, Past Medical, Family, and Social History remains the same as documented in the initial encounter.  70 yo F with PMHx of MDD, cervical spine spondylosis with radiculopathy, and seizure disorder on " carbamazepine presented to [facility] ED 08/31/17 evening with concerns of L arm and face numbness with chest pain.  Of note, she has had 5 similar admissions (2 in 06/2017 and 2 in 07/2017) during which work up has been diffusely negative.  Vascular Neurology consulted for concern that L face and arm numbness are related to repeat TIAs or stroke.  Patient notes that she felt she was having some tongue swelling today with her L facial numbness and this has made her have a lisp.  Otherwise she does not have speech deficits.  When I am seeing her today, she notes no L arm numbness and improvement in the L facial numbness but is still having some lisp although she notes this is also improving. Does report some L shoulder pain.      Previous work ups have included:  06/29/17 CT head:  No acute intracranial pathology  10/13/16 MRI brain w/ w/o contrast:  Unremarkable  08/27/16 CTA head and neck:  Unremarkable  05/09/16 MRA neck:  No stenosis, occlusion, AV malformation, or aneurysm  05/10/16 Echo:  63% EF, trivial MR    Review of Systems   Constitutional: Negative for chills and fever.   HENT: Negative for congestion and rhinorrhea.    Eyes: Negative for photophobia and visual disturbance.   Gastrointestinal: Negative for constipation, diarrhea, nausea and vomiting.   Musculoskeletal: Positive for arthralgias (L shoulder) and neck stiffness. Negative for neck pain.   Skin: Negative for rash and wound.   Neurological: Positive for numbness. Negative for tremors, weakness and headaches.   Hematological: Negative for adenopathy. Does not bruise/bleed easily.   Psychiatric/Behavioral: Negative for agitation and confusion.     Scheduled Meds:   aspirin  81 mg Oral Daily    atorvastatin  40 mg Oral Daily    carbamazepine  200 mg Oral BID    ergocalciferol  50,000 Units Oral Q7 Days    gabapentin  300 mg Oral QHS    losartan-hydrochlorothiazide 50-12.5 mg  1 tablet Oral Daily     Continuous Infusions:   PRN Meds:acetaminophen,  dextrose 50%, dextrose 50%, glucagon (human recombinant), glucose, glucose, ondansetron, ondansetron, ramelteon    Objective:     Vital Signs (Most Recent):  Temp: 97 °F (36.1 °C) (09/02/17 0749)  Pulse: 68 (09/02/17 1100)  Resp: 16 (09/02/17 0749)  BP: (!) 144/80 (09/02/17 0749)  SpO2: 96 % (09/02/17 0749)  BP Location: Right arm    Vital Signs Range (Last 24H):  Temp:  [97 °F (36.1 °C)-98.3 °F (36.8 °C)]   Pulse:  [64-86]   Resp:  [16-20]   BP: (125-144)/(70-81)   SpO2:  [92 %-96 %]   BP Location: Right arm    Physical Exam  General:  Well-developed, well-nourished, nad  HEENT:  NCAT, PERRLA, EOMI, oropharyngeal membranes non-erythematous/without exudate.  No apparent swelling of the tongue though patient notes subjective feeling of swelling and has slight lisp with speech.  Neck:  Supple, no palpable lad, normal ROM without rigidity  Resp:  Symmetric expansion, no increased wob  CVS:  No LE edema.  Extremities warm, well-perfused  GI:  Abd soft, non-distended, non-tender    Neurological Exam:   Mental Status:  AAOx3.  Speech appropriate, develops small lisp and stutter when asked about facial numbness.  Cranial Nerves:  VFs intact, PERRLA, EOMI.  Facial movement and sensation intact, symmetric.  Palate raises symmetrically, tongue protrudes midline.  SCM/Trapezius strength 5/5--L shoulder limited by pain.  Motor:  Normal bulk and tone.  Strength 5/5 throughout.  Sensory:  Intact to light touch at all extremities.  Subjective facial numbness to L lower face.  Reflexes:  Biceps, brachioradialis, patellar 2+.  No clonus.  Coordination:  FTN, AIDA intact, symmetric.    Stroke Scales  Laboratory:  CMP:   Recent Labs  Lab 09/02/17  0500   CALCIUM 9.1   ALBUMIN 3.2*   PROT 6.5      K 4.3   CO2 24      BUN 25*   CREATININE 1.0   ALKPHOS 138*   ALT 10   AST 15   BILITOT 0.3     CBC:   Recent Labs  Lab 09/02/17  0500   WBC 4.26   RBC 4.18   HGB 12.7   HCT 37.0      MCV 89   MCH 30.4   MCHC 34.3     Lipid  Panel: Not done on current hospitalization  Coagulation: Not done on current hospitalization  Hgb A1C:   Recent Labs  Lab 09/02/17  0459   HGBA1C 5.3     TSH: Not done on current hospitalization    Diagnostic Results:  09/01/17 CT head:  No evidence of acute intracranial pathology.    08/27/2016 CTA head and neck showing vertebrobasilar dolichoectasia        Mariya Topete MD  [facility] Stroke [facility]  Department of Vascular Neurology   [facility]

## 2017-09-04 NOTE — PLAN OF CARE
09/04/17 0715   Final Note   Assessment Type Final Discharge Note   Discharge Disposition Home   Hospital Follow Up  Appt(s) scheduled? Yes

## 2017-09-08 DIAGNOSIS — Z12.11 COLON CANCER SCREENING: ICD-10-CM

## 2017-09-11 ENCOUNTER — TELEPHONE (OUTPATIENT)
Dept: INTERNAL MEDICINE | Facility: CLINIC | Age: 72
End: 2017-09-11

## 2017-09-11 NOTE — TELEPHONE ENCOUNTER
----- Message from Teresa Forrest sent at 9/8/2017 11:20 AM CDT -----  Contact: Elo[employer] [phone]  Just got discharged from hospital but is still having chest pain. Would like to get a earlier appt than 10/02/2017. Please call the patient at [phone].    Please call and advise.    Thank You

## 2017-09-18 NOTE — DISCHARGE SUMMARY
"[facility] Hospital Medicine  Discharge Summary      Patient Name: Sofia Augustine  MRN: [id number]  Admission Date: 8/31/2017  Hospital Length of Stay: 0 days  Discharge Date and Time: 9/2/2017  1:42 PM  Attending Physician: Candice Magallon MD  Discharging Provider: Candice Magallon MD  Primary Care Provider: Luanne Connell MD    [facility] Medicine Team: [facility]  Candice Magallon MD    HPI: Per Dr Vivian Trevizo, "Ms. Sofia Augustine is a 70yo female HTN, seizure disorder, and cervical radiculopathy who presents to the ED with chest pain and left arm numbness that started when she was on the phone with [employer].  She says that she was trying to cancel her service after September 3rd, and that the company disconnected her service at that time.  She then developed midsternal chest pressure around 2pm that she describes as a 6/10 in severity, someone sitting on her chest, with numbness down her left arm and along her left face.  The pain has been present and has not gone away, and is reproducible and more severe with palpation.  She says the pain is still present.  She denies any headaches or blurred vision, but says that she feels some pressure behind her left eye.  She denies any nausea or diaphoresis.  She doesn't exercise besides walking in the grocery store.  She is not a Diabetic.     Additionally, this is Ms. Augustine's fifth visit to the ED this year with chest pain:  Twice in January 2017, twice in June 2017, and this admission.  She had a  stress echo on 5/15/2017 that was normal.  Of note, she was scheduled to have a PET stress July 12th and Cardiology follow up July 14th, but claims that she didn't know about these appointments so they were not done"     Of note, she is an extremely poor historian      Hospital Course: [facility], consulted cardiology co-management. ACS ruled out with negative biomarkers (troponins mildly elevated but flat and downtrended) and EKG. " Ok for outpt stress test. Stayed in hospital for vascular neuro evaluation for reported L facial tingling and tongue heaviness.  Checked EEg- negative. Ct head negative. Ok fo rd/c home to continue management of cervical radiculopathy vs other as an outpatient. Provided reassurance.     A/P:  L face numbness and tongue heaviness, recurrent. Episodes for several years. Admit to stroke team 5/2016 for R face numbness and tongue heaviness, no stroke found, Dx possible atypuical migraine, started on low dose elavil, followed in clinic with Nessa Caraballo NP  - CT head STAT was negative for acute issues  - carotid dopplers ordered but then canceled after my discussion with radiology, as she has recent imaging that showed no significant stenosis  - Vascular neuro consult- see below  - EEg STAT is NORMAL  - CBZ level is 6.8 (range 4-12); 8 months ago, the level was 8     Per vascular neuro recs,   Vertebrobasilar dolichoectasia (Basilar measuring approx 51 mm on CTA head and neck 08/27/16. This lends her an increased risk of stroke, but no findings concerning for stroke at this time  -  Continue home ASA, statin, and BP meds with SBP goal < 140     Left facial numbness, L arm numbness. Resolving. DDx TIA, medication side effect, cervical radiculopathy w/ LUE sxs, conversion disorder. Continue ASA, statin, BP meds. 5/13/17 workup revealed .8 (on statin), TSH wnl  - A1C low at 5.3, thus no diabetes  - tx Vid D def as below  - PCP could consider outpatient Holter monitor if patient has continued episodes.  Telemetry and EKG wnl.     Vit D def  - start Tx     Chest pain, recurrent. *Reason for admit* Not ACS. Rounded on her with cardiology team including staff Dr Teresa Espinosa. DDx musculoskeletal, cervicogenic, anxiety/depression, other  - PET stress test outpatient     Depression. Denies SI/HI.   - not on meds  - declining any meds. Follow up as outpatient.      H/O prediabetes - A1C 6.1 last June. New A1C improved to  5.3.      HypoK - replaced PO     ASA 81  atorva 40     losartan-hydrochlorothiazide 50-12.5 mg per tablet 1 tablet, 1 tablet, Oral, Daily     carbamazepime 200 BID     Gabapentin 300 qhs     norco - ?home med     Advance directives - full code    * No surgery found *      Indwelling Lines/Drains at time of discharge:   Lines/Drains/Airways          No matching active lines, drains, or airways          Consults:   Consults         Status Ordering Provider     Inpatient consult to Cardiology  Once     Provider:  (Not yet assigned)    Completed AMOS MARTI     Inpatient consult to Vascular (Stroke) Neurology  Once     Provider:  (Not yet assigned)    Completed AOMS MARTI          Significant Diagnostic Studies: see above    Pending Diagnostic Studies:     None        Final Active Diagnoses:    Diagnosis Date Noted POA    PRINCIPAL PROBLEM:  Chest pain, rule out acute myocardial infarction [R07.9] 05/13/2017 Yes    Trapezius muscle spasm [M62.838] 09/02/2017 Yes    Vertebrobasilar dolichoectasia [I65.1] 09/01/2017 Yes     Chronic    Elevated troponin [R74.8] 06/22/2017 Yes    Chronic bilateral low back pain without sciatica [M54.5, G89.29] 01/20/2017 Yes    Nonintractable generalized idiopathic epilepsy without status epilepticus [G40.309] 10/14/2016 Yes    Essential hypertension [I10] 04/03/2016 Yes     Chronic    Radiculopathy of cervical spine [M54.12] 04/03/2016 Yes    Spondylosis of cervical spine [M47.812] 04/03/2016 Yes    Left facial numbness [R20.0]  Yes      Problems Resolved During this Admission:    Diagnosis Date Noted Date Resolved POA      Discharged Condition: stable    Disposition: Home or Self Care    Follow Up:  Follow-up Information     Schedule an appointment as soon as possible for a visit with Jim [facility].    Specialty:  Audiology  Why:  ask for a morning appointment  Contact information:  [address] Paiz  [facility] [address] [phone]  Additional  information:  [facility] - 4th Floor           Luanne Connell MD.    Specialty:  Internal Medicine  Contact information:  [address] [facility]  [place] [address]  [phone]                 Patient Instructions:     Diet Cardiac     Activity as tolerated     Call MD for:  temperature >100.4     Call MD for:  difficulty breathing or increased cough     Call MD for:  worsening rash     Call MD for:  persistent dizziness, light-headedness, or visual disturbances       Medications:  Reconciled Home Medications:   Discharge Medication List as of 9/2/2017  1:12 PM      START taking these medications    Details   ergocalciferol (ERGOCALCIFEROL) 50,000 unit Cap Take 1 capsule (50,000 Units total) by mouth every Saturday., Starting Sat 9/2/2017, Normal         CONTINUE these medications which have NOT CHANGED    Details   aspirin 81 MG Chew Take 81 mg by mouth once daily., Until Discontinued, Historical Med      atorvastatin (LIPITOR) 40 MG tablet Take 1 tablet (40 mg total) by mouth once daily., Starting 12/28/2016, Until u 12/28/17, Print      carbamazepine (TEGRETOL) 200 mg tablet Take 200 mg nightly for one week then take 200 mg TWICE DAILY thereafter, Normal      diclofenac (VOLTAREN) 50 MG EC tablet TK 1 T PO BID PRN P, Historical Med      gabapentin (NEURONTIN) 300 MG capsule Take one cap at bedtime for one week, than increase to 2 caps at bedtime at bedtime, if tolerated.  Do not stop abruptly., Print      hydrocodone-acetaminophen 5-325mg (NORCO) 5-325 mg per tablet Take 1 tablet by mouth every 8 (eight) hours as needed for Pain., Starting Tue 8/15/2017, Print      losartan-hydrochlorothiazide 50-12.5 mg (HYZAAR) 50-12.5 mg per tablet Take 1 tablet by mouth once daily., Starting Mon 5/29/2017, Until Tue 5/29/2018, Normal      ondansetron (ZOFRAN-ODT) 4 MG TbDL Take 1 tablet (4 mg total) by mouth every 6 (six) hours as needed., Starting Fri 6/23/2017, Normal         STOP taking these medications        losartan (COZAAR) 50 MG tablet Comments:   Reason for Stopping:             Time spent on the discharge of patient: 50 minutes         Candice Magallon MD  Department of Hospital Medicine  [facility]

## 2017-09-25 ENCOUNTER — HOSPITAL ENCOUNTER (EMERGENCY)
Facility: HOSPITAL | Age: 72
Discharge: HOME | End: 2017-09-25
Attending: EMERGENCY MEDICINE
Payer: MEDICARE

## 2017-09-25 ENCOUNTER — TELEPHONE (OUTPATIENT)
Dept: INTERNAL MEDICINE | Facility: CLINIC | Age: 72
End: 2017-09-25

## 2017-09-25 VITALS
WEIGHT: 185 LBS | OXYGEN SATURATION: 99 % | DIASTOLIC BLOOD PRESSURE: 78 MMHG | HEART RATE: 66 BPM | SYSTOLIC BLOOD PRESSURE: 145 MMHG | TEMPERATURE: 99 F | BODY MASS INDEX: 31.58 KG/M2 | RESPIRATION RATE: 16 BRPM | HEIGHT: 64 IN

## 2017-09-25 DIAGNOSIS — R56.9 SEIZURE: Primary | ICD-10-CM

## 2017-09-25 LAB
ALBUMIN SERPL BCP-MCNC: 3.4 G/DL
ALP SERPL-CCNC: 129 U/L
ALT SERPL W/O P-5'-P-CCNC: 10 U/L
ANION GAP SERPL CALC-SCNC: 9 MMOL/L
AST SERPL-CCNC: 17 U/L
BACTERIA #/AREA URNS AUTO: NORMAL /HPF
BASOPHILS # BLD AUTO: 0.01 K/UL
BASOPHILS NFR BLD: 0.3 %
BILIRUB SERPL-MCNC: 0.6 MG/DL
BILIRUB UR QL STRIP: NEGATIVE
BUN SERPL-MCNC: 15 MG/DL
CALCIUM SERPL-MCNC: 9.2 MG/DL
CARBAMAZEPINE SERPL-MCNC: 5 UG/ML
CHLORIDE SERPL-SCNC: 104 MMOL/L
CLARITY UR REFRACT.AUTO: CLEAR
CO2 SERPL-SCNC: 27 MMOL/L
COLOR UR AUTO: YELLOW
CREAT SERPL-MCNC: 0.9 MG/DL
DIFFERENTIAL METHOD: ABNORMAL
EOSINOPHIL # BLD AUTO: 0.1 K/UL
EOSINOPHIL NFR BLD: 3.6 %
ERYTHROCYTE [DISTWIDTH] IN BLOOD BY AUTOMATED COUNT: 13 %
EST. GFR  (AFRICAN AMERICAN): >60 ML/MIN/1.73 M^2
EST. GFR  (NON AFRICAN AMERICAN): >60 ML/MIN/1.73 M^2
GLUCOSE SERPL-MCNC: 102 MG/DL
GLUCOSE UR QL STRIP: NEGATIVE
HCT VFR BLD AUTO: 36.7 %
HGB BLD-MCNC: 12.8 G/DL
HGB UR QL STRIP: NEGATIVE
KETONES UR QL STRIP: NEGATIVE
LEUKOCYTE ESTERASE UR QL STRIP: ABNORMAL
LYMPHOCYTES # BLD AUTO: 1.3 K/UL
LYMPHOCYTES NFR BLD: 38.3 %
MCH RBC QN AUTO: 30.2 PG
MCHC RBC AUTO-ENTMCNC: 34.9 G/DL
MCV RBC AUTO: 87 FL
MICROSCOPIC COMMENT: NORMAL
MONOCYTES # BLD AUTO: 0.3 K/UL
MONOCYTES NFR BLD: 9.6 %
NEUTROPHILS # BLD AUTO: 1.6 K/UL
NEUTROPHILS NFR BLD: 48.2 %
NITRITE UR QL STRIP: NEGATIVE
PH UR STRIP: 6 [PH] (ref 5–8)
PLATELET # BLD AUTO: 181 K/UL
PMV BLD AUTO: 9.8 FL
POCT GLUCOSE: 101 MG/DL (ref 70–110)
POCT GLUCOSE: 94 MG/DL (ref 70–110)
POTASSIUM SERPL-SCNC: 3.9 MMOL/L
PROT SERPL-MCNC: 7.3 G/DL
PROT UR QL STRIP: NEGATIVE
RBC # BLD AUTO: 4.24 M/UL
RBC #/AREA URNS AUTO: 1 /HPF (ref 0–4)
SODIUM SERPL-SCNC: 140 MMOL/L
SP GR UR STRIP: 1.01 (ref 1–1.03)
SQUAMOUS #/AREA URNS AUTO: 2 /HPF
URN SPEC COLLECT METH UR: ABNORMAL
UROBILINOGEN UR STRIP-ACNC: NEGATIVE EU/DL
WBC # BLD AUTO: 3.32 K/UL
WBC #/AREA URNS AUTO: 1 /HPF (ref 0–5)

## 2017-09-25 PROCEDURE — 99284 EMERGENCY DEPT VISIT MOD MDM: CPT | Mod: ,,, | Performed by: PHYSICIAN ASSISTANT

## 2017-09-25 PROCEDURE — 81001 URINALYSIS AUTO W/SCOPE: CPT

## 2017-09-25 PROCEDURE — 93005 ELECTROCARDIOGRAM TRACING: CPT

## 2017-09-25 PROCEDURE — 80156 ASSAY CARBAMAZEPINE TOTAL: CPT

## 2017-09-25 PROCEDURE — 99284 EMERGENCY DEPT VISIT MOD MDM: CPT | Mod: 25

## 2017-09-25 PROCEDURE — 82962 GLUCOSE BLOOD TEST: CPT

## 2017-09-25 PROCEDURE — 93010 ELECTROCARDIOGRAM REPORT: CPT | Mod: ,,, | Performed by: INTERNAL MEDICINE

## 2017-09-25 PROCEDURE — 80053 COMPREHEN METABOLIC PANEL: CPT

## 2017-09-25 PROCEDURE — 85025 COMPLETE CBC W/AUTO DIFF WBC: CPT

## 2017-09-25 NOTE — ED TRIAGE NOTES
"Friday night was laying in bed and "i felt my body start to shake and then I dont remember any thing after that until the next morning" - she reports feeling whole body pain the next day and then "i had a hickey on my head that is gone now but it feels bruised" pt reports "slight headache" - generalized weakness/tiredness. Pt isnt sure "if I hit my head but I dont know how I would have hit my head cause I was in bed the whole time" - pt reports feeling better today but still nauseated.   "

## 2017-09-25 NOTE — ED NOTES
Patient identifiers verified and correct for Sofia Augustine.    LOC: The patient is awake, alert and aware of environment with an appropriate affect, the patient is oriented x 3 and speaking appropriately.  APPEARANCE: Patient resting comfortably and in no acute distress, patient is clean and well groomed, patient's clothing is properly fastened.  SKIN: The skin is warm and dry, color consistent with ethnicity, patient has normal skin turgor and moist mucus membranes, skin intact, no breakdown or bruising noted.  MUSCULOSKELETAL: Patient moving all extremities spontaneously, no obvious swelling or deformities noted.  RESPIRATORY: Airway is open and patent, respirations are spontaneous, patient has a normal effort and rate, no accessory muscle use noted, bilateral breath sounds clear  CARDIAC: Patient has a normal rate and regular rhythm, no periphreal edema noted, capillary refill < 3 seconds.  ABDOMEN: Soft and non tender to palpation, no distention noted, normoactive bowel sounds present in all four quadrants.  NEUROLOGIC: PERRL, 3 mm bilaterally, eyes open spontaneously, behavior appropriate to situation, follows commands, facial expression symmetrical, bilateral hand grasp equal and even, purposeful motor response noted, normal sensation in all extremities when touched with a finger.

## 2017-09-25 NOTE — ED PROVIDER NOTES
Encounter Date: 9/25/2017       History     Chief Complaint   Patient presents with    Seizures     called my  and told had seizure friday night and hit head,     Head Injury     This is a 71 year old female with a PMH of HTN and seizures who presents to the ED with a chief complaint of head injury s/p seizure. Patient states Friday evening (3 days ago) she had a seizure characterized by jerking movements of the extremities. She was lying down on the couch when the seizure occurred and cannot recall the events afterwards until the following morning. She lives with her daughter. The seizure was unwitnessed. She noted a sore swollen area to the right side of her scalp the following day. This morning she called her PCP to discuss this and was referred to the ED for further evaluation. Patient endorses generalized myalgias, nausea, mild lower abdominal pain and urinary frequency. She denies fever, chills, headache, vision changes, active chest pain, SOB, vomiting, dysuria, diarrhea, unilateral weakness or numbness.    Patient with recent admission 8/31-9/2 with atypical chest pain. ACS ruled out with negative biomarkers and absence of ischemic changes on EKG. Stress echo 5/2017 negative for ischemia. Patient also evaluated by vascular neurology due to L arm and face numbness associated with chest pain. Imaging and EEG were negative for objective findings. Conversion disorder as consideration.          Review of patient's allergies indicates:  No Known Allergies  Past Medical History:   Diagnosis Date    Abnormal mammogram of both breasts     Hypertension     Major depressive disorder 5/14/2017    Memory disorder     Seizures      Past Surgical History:   Procedure Laterality Date    CATARACT EXTRACTION      cysts breast      TONSILLECTOMY       History reviewed. No pertinent family history.  Social History   Substance Use Topics    Smoking status: Never Smoker    Smokeless tobacco: Never Used    Alcohol  use No     Review of Systems   Constitutional: Negative for chills and fever.   HENT: Negative for sore throat.    Respiratory: Negative for shortness of breath.    Cardiovascular: Negative for chest pain.   Gastrointestinal: Positive for abdominal pain and nausea. Negative for vomiting.   Genitourinary: Positive for frequency. Negative for dysuria and urgency.   Musculoskeletal: Negative for back pain.   Skin: Negative for rash.   Neurological: Positive for seizures. Negative for weakness.   Hematological: Does not bruise/bleed easily.       Physical Exam     Initial Vitals [09/25/17 1030]   BP Pulse Resp Temp SpO2   (!) 184/78 76 18 98.9 °F (37.2 °C) 96 %      MAP       113.33         Physical Exam    Constitutional: She appears well-developed and well-nourished. No distress.   HENT:   Head: Normocephalic. Head is without raccoon's eyes, without Syed's sign, without abrasion and without contusion.   Right Ear: Tympanic membrane and ear canal normal.   Left Ear: Tympanic membrane and ear canal normal.   Nose: Nose normal.   Mouth/Throat: Uvula is midline, oropharynx is clear and moist and mucous membranes are normal.   Eyes: Conjunctivae and EOM are normal. Pupils are equal, round, and reactive to light.   Neck: Normal range of motion. Neck supple.   Cardiovascular: Normal rate, regular rhythm and normal heart sounds.   Pulmonary/Chest: Breath sounds normal. No respiratory distress. She has no wheezes. She has no rhonchi. She has no rales.   Abdominal: Soft. Bowel sounds are normal. There is no tenderness. There is no rigidity, no rebound and no guarding.   Neurological: She is alert and oriented to person, place, and time. She has normal strength and normal reflexes. No cranial nerve deficit or sensory deficit.   Skin: Skin is warm and dry. No rash noted.         ED Course   Procedures  Labs Reviewed   CBC W/ AUTO DIFFERENTIAL - Abnormal; Notable for the following:        Result Value    WBC 3.32 (*)      Hematocrit 36.7 (*)     Gran # 1.6 (*)     All other components within normal limits   COMPREHENSIVE METABOLIC PANEL - Abnormal; Notable for the following:     Albumin 3.4 (*)     All other components within normal limits   URINALYSIS, REFLEX TO URINE CULTURE - Abnormal; Notable for the following:     Leukocytes, UA Trace (*)     All other components within normal limits    Narrative:     Preferred Collection Type->Urine, Clean Catch   CARBAMAZEPINE LEVEL, TOTAL   URINALYSIS MICROSCOPIC    Narrative:     Preferred Collection Type->Urine, Clean Catch   POCT GLUCOSE   POCT GLUCOSE   POCT GLUCOSE MONITORING CONTINUOUS         Imaging Results          CT Head Without Contrast (Final result)  Result time 09/25/17 13:32:54    Final result by Chasity Cooney MD (09/25/17 13:32:54)                 Impression:        No acute intracranial abnormalities.            Electronically signed by: CHASITY COONEY MD  Date:     09/25/17  Time:    13:32              Narrative:    Comparison: 9/1/2017    Clinical history: Head injury    Technique:    Axial images of the brain were obtained at 5-mm intervals from the skull base to the vertex without the administration of contrast.    Findings:    There is generalized cerebral volume loss.  There is hypoattenuation in a periventricular fashion, likely sequela of chronic microvascular ischemic change.  There is no evidence of acute major vascular territory infarct, hemorrhage, or mass.  There is no hydrocephalus.  There are no abnormal extra-axial fluid collections.  The paranasal sinuses and mastoid air cells are clear, and there is no evidence of calvarial fracture.  The visualized soft tissues are unremarkable.                             X-Ray Chest AP Portable (Final result)  Result time 09/25/17 12:23:56    Final result by Riccardo Torre MD (09/25/17 12:23:56)                 Impression:     No significant intrathoracic abnormality.  Allowing for differences in projection and a  poorer inspiratory depth on the current examination, there has been no significant detrimental interval change in the appearance of the chest since 6/22/17.      Electronically signed by: Riccardo Torre MD  Date:     09/25/17  Time:    12:23              Narrative:    Comparison is made to 6/22/17.    Heart size is normal, as is the appearance of the pulmonary vascularity, and the cardiomediastinal silhouette demonstrates no detrimental change since the examination referenced above.  Lung zones are clear, and free of significant airspace consolidation or volume loss.  No pleural fluid.  No pneumothorax.                                         APC / Resident Notes:   71 year old female presents with head injury s/p seizure 3 days ago.  On exam she is afebrile and nontoxic. Neuro exam is nonfocal. Abdomen is soft, nontender. I do not appreciate any objective swelling/contusions/hematomas of the scalp to suggest head injury.    Given seizure activity is unwitnessed and pt c/o of pain to the area will obtain further evaluation with CT.    Emergent workup nonrevealing, labs without objective findings.  Head CT with no acute intracranial abnormalities.    Advised pt of findings. She is stable throughout her ED course with no seizure activity. Recommend continuing with prescribed medications and outpatient f/u with Neurology and PCP. Patient verbalized understanding and agrees with course of treatment. She is stable for discharge. Return to ED precautions given. I discussed the care of this patient with my supervising MD.          Attending Attestation:     Physician Attestation Statement for NP/PA:   I have conducted a face to face encounter with this patient in addition to the NP/PA, due to Medical Complexity    Other NP/PA Attestation Additions:    History of Present Illness: 72 y/o M h/o HTN, seizure d/o presents after possible seizure 3 days ago. Not witnessed. Pt concerned about head injury. Pt reports compliance with  meds.   Physical Exam: Awake and alert  NAD  No obvious head trauma  Non focal neurologic exam   Medical Decision Making: Head CT, routine blood work and UA sent and unremarkable. Pt stable in ED. Will discharge home and have pt f/iu PCP and neurology                 ED Course      Clinical Impression:   The encounter diagnosis was Seizure.    Disposition:   Disposition: Discharged  Condition: Stable                        MICHAEL Puente  09/25/17 [id number]       Patricia Machado MD  09/25/17 [id number]

## 2017-09-25 NOTE — TELEPHONE ENCOUNTER
Spoke with the patient and she states Friday she was on the way to sleep and she does not remember what had she states Saturday she was unable to get out of the bed and she states she had a bruise on the right side of her head as if she had a seizure. Patient states she was home alone. Her daughter came to the home on Saturday and she states she didn't not mention any of the things that happened to her daughter the entire right side of her head was bruised and aches.     Spoke with PCP and she advised patient needed to go to the Ed .   Patient was advised and she understood. And termed the call

## 2017-10-02 ENCOUNTER — OFFICE VISIT (OUTPATIENT)
Dept: INTERNAL MEDICINE | Facility: CLINIC | Age: 72
End: 2017-10-02
Payer: MEDICARE

## 2017-10-02 VITALS
BODY MASS INDEX: 30.6 KG/M2 | HEART RATE: 68 BPM | TEMPERATURE: 98 F | WEIGHT: 179.25 LBS | HEIGHT: 64 IN | DIASTOLIC BLOOD PRESSURE: 80 MMHG | SYSTOLIC BLOOD PRESSURE: 130 MMHG

## 2017-10-02 DIAGNOSIS — R07.9 CHEST PAIN, UNSPECIFIED TYPE: Primary | ICD-10-CM

## 2017-10-02 DIAGNOSIS — I10 ESSENTIAL HYPERTENSION: ICD-10-CM

## 2017-10-02 DIAGNOSIS — K52.9 GASTROENTERITIS: ICD-10-CM

## 2017-10-02 PROCEDURE — 99214 OFFICE O/P EST MOD 30 MIN: CPT | Mod: S$GLB,,, | Performed by: INTERNAL MEDICINE

## 2017-10-02 PROCEDURE — 99999 PR PBB SHADOW E&M-EST. PATIENT-LVL IV: CPT | Mod: PBBFAC,,, | Performed by: INTERNAL MEDICINE

## 2017-10-02 PROCEDURE — 99499 UNLISTED E&M SERVICE: CPT | Mod: S$GLB,,, | Performed by: INTERNAL MEDICINE

## 2017-10-02 RX ORDER — ATORVASTATIN CALCIUM 80 MG/1
80 TABLET, FILM COATED ORAL DAILY
Refills: 3 | COMMUNITY
Start: 2017-09-05 | End: 2018-05-18 | Stop reason: SDUPTHER

## 2017-10-02 RX ORDER — ONDANSETRON 4 MG/1
4 TABLET, ORALLY DISINTEGRATING ORAL EVERY 6 HOURS PRN
Qty: 20 TABLET | Refills: 0 | Status: SHIPPED | OUTPATIENT
Start: 2017-10-02 | End: 2018-01-24 | Stop reason: SDUPTHER

## 2017-10-02 RX ORDER — HYDROCODONE BITARTRATE AND ACETAMINOPHEN 5; 325 MG/1; MG/1
1 TABLET ORAL EVERY 8 HOURS PRN
Qty: 40 TABLET | Refills: 0 | Status: SHIPPED | OUTPATIENT
Start: 2017-10-02 | End: 2017-11-15 | Stop reason: SDUPTHER

## 2017-10-02 NOTE — PROGRESS NOTES
Transitional Care Note  Subjective:       Patient ID: Sofia Augustine is a 72 y.o. female.  Chief Complaint: Follow-up (ER visit) and Cough    Family and/or Caretaker present at visit?  No.  Diagnostic tests reviewed/disposition: No diagnosic tests pending after this hospitalization.  Disease/illness education: chest pain  Home health/community services discussion/referrals: Patient does not have home health established from hospital visit.  They do not need home health.  If needed, we will set up home health for the patient.   Establishment or re-establishment of referral orders for community resources: No other necessary community resources.   Discussion with other health care providers: No discussion with other health care providers necessary.   CC: followup of hospitalization for chest pain  HPI:  The patient is a 72 y.o. year old female who presents to the office for followup of hospitalization for chest pain.  The pain started while she was talking on the telephone.  The pain was a pressure sensation in the center of her chest.  She reports associated shortness of breath, nausea and radiation to left arm.  Workup was negative in the hospital, and recommended outpatient stress test.  She also reports recurrent facial numbness that has resolved.  Then one week later, she states she awakened in her bed with a headache.  She states she was unable to lift her head.  She denies any known trauma.  She then reports taking ibuprofen and pain medication, without relief.  She reports requiring assistance to go to the bathroom.  Her symptoms gradually improved.  CT scan of the head showed no acute abnormalities.  Currently, she complains of nausea and diarrhea that started Friday night.  The patient also reports decreased appetite and fever.  She complains of fatigue, and reports fever this morning.  The patient reports positive sick contact.  Her daughter has been running a fever as well.    PAST MEDICAL  HISTORY:  Past Medical History:  No date: Abnormal mammogram of both breasts  No date: Hypertension  5/14/2017: Major depressive disorder  No date: Memory disorder  No date: Seizures    SURGICAL HISTORY:  Past Surgical History:  No date: CATARACT EXTRACTION  No date: cysts breast  No date: TONSILLECTOMY    MEDS:  Medcard reviewed and updated    ALLERGIES: Allergy Card reviewed and updated    SOCIAL HISTORY:   The patient is a nonsmoker.          Cough   Associated symptoms include a fever, headaches and a sore throat. Pertinent negatives include no chest pain, rash, shortness of breath or wheezing.     Review of Systems   Constitutional: Positive for appetite change, fatigue and fever.   HENT: Positive for sneezing, sore throat and tinnitus.    Respiratory: Positive for cough. Negative for shortness of breath and wheezing.    Cardiovascular: Negative for chest pain and palpitations.   Gastrointestinal: Positive for diarrhea and nausea. Negative for abdominal pain.   Genitourinary: Positive for frequency. Negative for dysuria.   Musculoskeletal: Positive for neck pain.   Skin: Negative for rash.   Neurological: Positive for dizziness, weakness and headaches.   Psychiatric/Behavioral: Positive for sleep disturbance.       Objective:      Physical Exam   Constitutional: She is oriented to person, place, and time. She appears well-developed and well-nourished.   HENT:   Head: Normocephalic.   Right Ear: External ear normal.   Left Ear: External ear normal.   Eyes: Conjunctivae and EOM are normal. Pupils are equal, round, and reactive to light.   Cardiovascular: Normal rate, regular rhythm and normal heart sounds.    Pulmonary/Chest: Effort normal and breath sounds normal. No respiratory distress. She has no wheezes.   Abdominal: Soft. Bowel sounds are normal. She exhibits no distension. There is no tenderness.   Neurological: She is alert and oriented to person, place, and time. No cranial nerve deficit.   Skin: Skin is  warm and dry. No rash noted. No erythema.   Psychiatric: She has a normal mood and affect. Her behavior is normal. Judgment and thought content normal.       Assessment:       1. Chest pain, unspecified type    2. Gastroenteritis    3. Essential hypertension        Plan:     Sofia was seen today for follow-up and cough.    Diagnoses and all orders for this visit:    Chest pain, unspecified type  -     Cardiac PET Scan Stress; Future    Gastroenteritis  -     CBC auto differential; Future  -     Comprehensive metabolic panel; Future    Essential hypertension  -     Blood pressure is controlled    Other orders  -     ondansetron (ZOFRAN-ODT) 4 MG TbDL; Take 1 tablet (4 mg total) by mouth every 6 (six) hours as needed.  -     hydrocodone-acetaminophen 5-325mg (NORCO) 5-325 mg per tablet; Take 1 tablet by mouth every 8 (eight) hours as needed for Pain.

## 2017-10-13 ENCOUNTER — HOSPITAL ENCOUNTER (OUTPATIENT)
Dept: RADIOLOGY | Facility: OTHER | Age: 72
Discharge: HOME | End: 2017-10-13
Attending: PHYSICIAN ASSISTANT
Payer: MEDICARE

## 2017-10-13 ENCOUNTER — HOSPITAL ENCOUNTER (OUTPATIENT)
Dept: RADIOLOGY | Facility: HOSPITAL | Age: 72
Discharge: HOME | End: 2017-10-13
Attending: PHYSICIAN ASSISTANT
Payer: MEDICARE

## 2017-10-13 ENCOUNTER — TELEPHONE (OUTPATIENT)
Dept: SPINE | Facility: CLINIC | Age: 72
End: 2017-10-13

## 2017-10-13 ENCOUNTER — TELEPHONE (OUTPATIENT)
Dept: INTERNAL MEDICINE | Facility: CLINIC | Age: 72
End: 2017-10-13

## 2017-10-13 DIAGNOSIS — M25.512 ACUTE PAIN OF LEFT SHOULDER: ICD-10-CM

## 2017-10-13 DIAGNOSIS — M54.2 CERVICALGIA: ICD-10-CM

## 2017-10-13 DIAGNOSIS — M54.50 LOW BACK PAIN WITHOUT SCIATICA, UNSPECIFIED BACK PAIN LATERALITY, UNSPECIFIED CHRONICITY: ICD-10-CM

## 2017-10-13 DIAGNOSIS — M54.2 CERVICALGIA: Primary | ICD-10-CM

## 2017-10-13 DIAGNOSIS — M54.6 PAIN IN THORACIC SPINE: ICD-10-CM

## 2017-10-13 PROCEDURE — 72070 X-RAY EXAM THORAC SPINE 2VWS: CPT | Mod: 26,,, | Performed by: RADIOLOGY

## 2017-10-13 PROCEDURE — 73030 X-RAY EXAM OF SHOULDER: CPT | Mod: 26,LT,, | Performed by: RADIOLOGY

## 2017-10-13 PROCEDURE — 72070 X-RAY EXAM THORAC SPINE 2VWS: CPT | Mod: TC

## 2017-10-13 PROCEDURE — 73030 X-RAY EXAM OF SHOULDER: CPT | Mod: TC,LT

## 2017-10-13 PROCEDURE — 72050 X-RAY EXAM NECK SPINE 4/5VWS: CPT | Mod: TC

## 2017-10-13 PROCEDURE — 72050 X-RAY EXAM NECK SPINE 4/5VWS: CPT | Mod: 26,,, | Performed by: RADIOLOGY

## 2017-10-13 NOTE — TELEPHONE ENCOUNTER
----- Message from Ese Melgoza sent at 10/11/2017  9:39 AM CDT -----  Contact: pt  Pt requests to cancel her appointment for CARDIAC PET due to not having the co pay. Pt can be reached at [phone] (rdna)

## 2017-10-17 ENCOUNTER — TELEPHONE (OUTPATIENT)
Dept: CARDIOLOGY | Facility: CLINIC | Age: 72
End: 2017-10-17

## 2017-10-17 ENCOUNTER — TELEPHONE (OUTPATIENT)
Dept: INTERNAL MEDICINE | Facility: CLINIC | Age: 72
End: 2017-10-17

## 2017-10-17 NOTE — TELEPHONE ENCOUNTER
----- Message from Luis Angel Chavez sent at 10/13/2017 10:29 AM CDT -----  Contact: Pt at [phone]  Patient would like to get medical advice.  Symptoms (please be specific):  Cough,sore throat,runny nose  How long has patient had these symptoms:  Since last night  Pharmacy name and phone #:  [facility] [address] [place], [place] - [address]  AT [facility] [address]   [phone] (Phone)  [phone] (Fax)      Any drug allergies:  none  Comments: Pt also would like to know what over the counter medication can she take for these symptoms.

## 2017-10-19 ENCOUNTER — OFFICE VISIT (OUTPATIENT)
Dept: SPINE | Facility: CLINIC | Age: 72
End: 2017-10-19
Payer: MEDICARE

## 2017-10-19 VITALS
HEART RATE: 78 BPM | SYSTOLIC BLOOD PRESSURE: 156 MMHG | BODY MASS INDEX: 30.56 KG/M2 | DIASTOLIC BLOOD PRESSURE: 70 MMHG | WEIGHT: 179 LBS | HEIGHT: 64 IN

## 2017-10-19 DIAGNOSIS — M47.812 CERVICAL SPONDYLOSIS WITHOUT MYELOPATHY: ICD-10-CM

## 2017-10-19 DIAGNOSIS — M54.2 NECK PAIN: ICD-10-CM

## 2017-10-19 DIAGNOSIS — M54.12 BRACHIAL NEURITIS: ICD-10-CM

## 2017-10-19 DIAGNOSIS — M25.512 LEFT SHOULDER PAIN, UNSPECIFIED CHRONICITY: Primary | ICD-10-CM

## 2017-10-19 DIAGNOSIS — M50.30 DEGENERATION OF CERVICAL INTERVERTEBRAL DISC: ICD-10-CM

## 2017-10-19 PROCEDURE — 99204 OFFICE O/P NEW MOD 45 MIN: CPT | Mod: S$GLB,,, | Performed by: PHYSICIAN ASSISTANT

## 2017-10-19 PROCEDURE — 99999 PR PBB SHADOW E&M-EST. PATIENT-LVL IV: CPT | Mod: PBBFAC,,, | Performed by: PHYSICIAN ASSISTANT

## 2017-10-19 NOTE — LETTER
October 19, 2017      Natasha Marti PA-C  [address]  [facility] [address]           [facility]  [address], [address]  [facility] [address]  Phone: [phone]  Fax: [phone]          Patient: Sofia Augustine   MR Number: [id number]   YOB: 1945   Date of Visit: 10/19/2017       Dear Natasha Marti:    Thank you for referring Sofia Augustine to me for evaluation. Attached you will find relevant portions of my assessment and plan of care.    If you have questions, please do not hesitate to call me. I look forward to following Sofia Augustine along with you.    Sincerely,    Nusrat Pozo PA-C    Enclosure  CC:  No Recipients    If you would like to receive this communication electronically, please contact [email or web address] or [phone] to request more information on [employer] access.    For providers and/or their staff who would like to refer a patient to [facility], please contact us through our one-stop-shop provider referral line, [facility], at [phone].    If you feel you have received this communication in error or would no longer like to receive these types of communications, please e-mail [email or web address]

## 2017-10-19 NOTE — PROGRESS NOTES
Subjective:      Patient ID: Sofia Augustine is a 72 y.o. female.    Chief Complaint: Neck Pain (And upper Back)      HPI     History of HTN, depression, and epilepsy.     1 year history of constant posterior neck pain with radiation into shoulder blades and left arm to hand. No right arm pain. Neck pain = left arm pain. She has numbness and weakness in left arm. She is right hand dominant. Pain in neck is burning and hot - feels like bee stings. She rates her pain as a 5 on a scale of 1-10. Pain is better with sitting up and medications. Pain is worse with movement on her neck and turning in bed. Pain wakes her up at night. No specific injury noted.     No PT, injections, or surgery on her neck. She is on voltaren, neurontin, and norco with limited relief.     Patient denies chills, vomiting, and weight loss. She denies changes in handwriting, problems with hand dexterity. She was sick 4 days ago with fever and cough. She has night sweats and nausea. She admits to balance issues and frequent dropping of items.       Review of Systems   Constitution: Negative for fever, weakness, malaise/fatigue, night sweats, weight gain and weight loss.   HENT: Negative for hearing loss, nosebleeds and odynophagia.    Eyes: Negative for blurred vision and double vision.   Cardiovascular: Negative for chest pain, irregular heartbeat and palpitations.   Respiratory: Negative for cough, hemoptysis, shortness of breath and wheezing.    Endocrine: Negative for cold intolerance and polydipsia.   Hematologic/Lymphatic: Does not bruise/bleed easily.   Skin: Negative for dry skin, poor wound healing, rash and suspicious lesions.   Musculoskeletal: Positive for back pain, muscle cramps and neck pain.        See HPI for pertinent positives.   Gastrointestinal: Negative for bloating, abdominal pain, constipation, diarrhea, hematochezia, melena, nausea and vomiting.   Genitourinary: Negative for bladder incontinence, dysuria, hematuria,  hesitancy and incomplete emptying.   Neurological: Positive for headaches, numbness, paresthesias and seizures. Negative for disturbances in coordination, dizziness, focal weakness and loss of balance.        Positive for poor coordination and numbness in face.    Psychiatric/Behavioral: Positive for depression. Negative for hallucinations. The patient is not nervous/anxious.            Objective:        General: Sofia is well-developed, well-nourished, appears stated age, in no acute distress, alert and oriented to time, place and person.     General    Vitals reviewed.  Constitutional: She is oriented to person, place, and time. She appears well-developed and well-nourished.   Pulmonary/Chest: Effort normal.   Abdominal: She exhibits no distension.   Neurological: She is alert and oriented to person, place, and time.   Psychiatric: She has a normal mood and affect. Her behavior is normal. Judgment and thought content normal.           Gait: she ambulates with a cane. Heel/toe/tandem walking not tested. She sways with rombergs.     On exam of the cervical spine, pt has posterior cervical, left trapezial, and left scapular tenderness.     Patient has limited ROM of cervical spine in all planes due to pain.     Skin in the cervical region is warm to the touch without visible rashes.     Strength Testing of Bilateral UEs shows  Right :  +5/5   Left :  +5/5  Right deltoid:  +5/5   Left deltoid:  +5/5  Right biceps:  +5/5   Left biceps:  +5/5  Right triceps:  +5/5   Left triceps:  +5/5  Right wrist extension:  +5/5  Left wrist extension:  +5/5  Right wrist flexion:  +5/5  Left wrist flexion:  +5/5  Right interosseus:  +5/5  Left interosseus:  +5/5    Sensation is grossly intact to light touch in C5, C6, C7, C8, T1 distribution.     Right shoulder has no pain with IR/ER. Good ROM of shoulder and no tenderness.   Left shoulder has mild pain with IR/ER. Limited ROM of shoulder and mild diffuse tenderness.      negative clonus in bilateral LEs.    negative hoffmans bilateral UEs.    DTRs:  Right biceps:  +2     Left biceps:  +2   Right brachioradialis:  +2  Left brachioradialis:  +2    On gross exam of bilateral LEs, patient has full painfree ROM with no signs of clubbing, laxity, cyanosis, edema, instability, and weakness.       XRAY INTERPRETATION:  X-rays of cervical spine (AP/lat/flex/ext) dated 10/13/17 are personally reviewed and show moderate cervical spondylosis and DDD C5-T1.    X-rays of thoracic spine (AP/lat) dated 10/13/17 are personally reviewed showing mild thoracic spondylosis.        Assessment:       1. Left shoulder pain, unspecified chronicity    2. Cervical spondylosis without myelopathy    3. Degeneration of cervical intervertebral disc    4. Brachial neuritis    5. Neck pain           Plan:       Orders Placed This Encounter    MRI Cervical Spine With Contrast       1 year history of constant posterior neck pain with radiation into shoulder blades and left arm to hand. No right arm pain. Neck pain = left arm pain. Known moderate cervical spondylosis and DDD C5-T1. Component of pain likely from left shoulder as well as she has limited painful ROM of the shoulder on exam. Treatment options reviewed with patient along with above cervical/thoracic XRs. Following plan made:     - MRI of cervical spine to further evaluate left UE radiculitis.   - Continue norco, neurontin, and voltaren from other providers.   - Depending on MRI, consider PT and/or cervical injections with pain management.     Follow-up: Return in 2 weeks (on 11/2/2017). If there are any questions prior to this, the patient was instructed to contact the office.

## 2017-10-25 ENCOUNTER — HOSPITAL ENCOUNTER (OUTPATIENT)
Dept: RADIOLOGY | Facility: HOSPITAL | Age: 72
Discharge: HOME | End: 2017-10-25
Attending: PHYSICIAN ASSISTANT
Payer: MEDICARE

## 2017-10-25 DIAGNOSIS — M47.812 CERVICAL SPONDYLOSIS WITHOUT MYELOPATHY: ICD-10-CM

## 2017-10-25 DIAGNOSIS — M50.30 DEGENERATION OF CERVICAL INTERVERTEBRAL DISC: ICD-10-CM

## 2017-10-25 DIAGNOSIS — M54.12 BRACHIAL NEURITIS: ICD-10-CM

## 2017-10-25 DIAGNOSIS — M54.2 NECK PAIN: ICD-10-CM

## 2017-10-25 PROCEDURE — 72156 MRI NECK SPINE W/O & W/DYE: CPT | Mod: TC

## 2017-10-25 PROCEDURE — A9585 GADOBUTROL INJECTION: HCPCS | Performed by: PHYSICIAN ASSISTANT

## 2017-10-25 PROCEDURE — 72156 MRI NECK SPINE W/O & W/DYE: CPT | Mod: 26,,, | Performed by: RADIOLOGY

## 2017-10-25 PROCEDURE — 25500020 PHARM REV CODE 255: Performed by: PHYSICIAN ASSISTANT

## 2017-10-25 RX ORDER — GADOBUTROL 604.72 MG/ML
9 INJECTION INTRAVENOUS
Status: COMPLETED | OUTPATIENT
Start: 2017-10-25 | End: 2017-10-25

## 2017-10-25 RX ADMIN — GADOBUTROL 9 ML: 604.72 INJECTION INTRAVENOUS at 07:10

## 2017-10-29 NOTE — PROGRESS NOTES
Subjective:      Patient ID: Sofia Augustine is a 72 y.o. female.    Chief Complaint: Follow-up (after MRI)      HPI  ([place])    History of HTN, depression, and epilepsy.     Here to review her cervical MRI scan. Known moderate cervical spondylosis and DDD C5-T1. Component of pain likely from left shoulder as well as she has limited painful ROM of the shoulder on exam.    She continues with constant posterior neck pain with radiation into shoulder blades and left arm to hand. No right arm pain. Neck pain = left arm pain. She has numbness and weakness in left arm. She is right hand dominant. Pain in neck is burning and feels like bee stings. She rates her pain as a 5 on a scale of 1-10. Pain is better with sitting up and medications. Pain is worse with movement on her neck and turning in bed. No PT, injections, or surgery on her neck. She is on voltaren, neurontin, and norco with limited relief.       Review of Systems   Constitution: Negative for chills, fever, night sweats and weight gain.   Gastrointestinal: Negative for bowel incontinence, nausea and vomiting.   Genitourinary: Negative for bladder incontinence.   Neurological: Negative for disturbances in coordination and loss of balance.           Objective:        General: Sofia is well-developed, well-nourished, appears stated age, in no acute distress, alert and oriented to time, place and person.     Ortho/SPM Exam     Patient sits comfortably in the exam room and answers questions appropriately. Grossly patient is able to move bilateral UEs without difficulty. Ambulates normally. She continues with mild pain on IR/ER of her left shoulder.       XRAY INTERPRETATION:  MRI of cervical spine dated 10/25/17 is personally reviewed and shows spur/disc C4-C5 to left with mild central stenosis and severe left foraminal stenosis. Spur/disc C5-C6 with mild central stenosis and mild right foraminal stenosis. T4 hemangioma per radiology.         Assessment:        1. Left shoulder pain, unspecified chronicity    2. Cervical spondylosis without myelopathy    3. Degeneration of cervical intervertebral disc    4. Brachial neuritis    5. Neck pain    6. Foraminal stenosis of cervical region    7. Cervical stenosis of spinal canal           Plan:       Orders Placed This Encounter    Ambulatory referral to Physical Therapy - Cervical       Persistent constant posterior neck pain with radiation into shoulder blades and left arm to hand. No right arm pain. Neck pain = left arm pain. Known moderate cervical spondylosis with spur/disc C4-C5 to left with mild central stenosis and severe left foraminal stenosis along with spur/disc C5-C6 with mild central stenosis and mild right foraminal stenosis. Primary pain generator is likely C4-C5, however component of pain likely from left shoulder as well as she has limited painful ROM of the shoulder on exam. Treatment options reviewed with patient along with above cervical MRI. Following plan made:     - PT for cervical spine and left shoulder with good HEP. External script given.   - Continue norco, neurontin, and voltaren from other providers.   - If no improvement with above, consider cervical HUSSAIN and/or left shoulder injection.     Follow-up: Return in 3 months (on 1/30/2018). If there are any questions prior to this, the patient was instructed to contact the office.

## 2017-10-30 ENCOUNTER — OFFICE VISIT (OUTPATIENT)
Dept: SPINE | Facility: CLINIC | Age: 72
End: 2017-10-30
Payer: MEDICARE

## 2017-10-30 VITALS
BODY MASS INDEX: 30.56 KG/M2 | HEART RATE: 74 BPM | DIASTOLIC BLOOD PRESSURE: 83 MMHG | HEIGHT: 64 IN | WEIGHT: 179 LBS | SYSTOLIC BLOOD PRESSURE: 136 MMHG

## 2017-10-30 DIAGNOSIS — M47.812 CERVICAL SPONDYLOSIS WITHOUT MYELOPATHY: ICD-10-CM

## 2017-10-30 DIAGNOSIS — M48.02 FORAMINAL STENOSIS OF CERVICAL REGION: ICD-10-CM

## 2017-10-30 DIAGNOSIS — M48.02 CERVICAL STENOSIS OF SPINAL CANAL: ICD-10-CM

## 2017-10-30 DIAGNOSIS — M25.512 LEFT SHOULDER PAIN, UNSPECIFIED CHRONICITY: Primary | ICD-10-CM

## 2017-10-30 DIAGNOSIS — M54.12 BRACHIAL NEURITIS: ICD-10-CM

## 2017-10-30 DIAGNOSIS — M54.2 NECK PAIN: ICD-10-CM

## 2017-10-30 DIAGNOSIS — M50.30 DEGENERATION OF CERVICAL INTERVERTEBRAL DISC: ICD-10-CM

## 2017-10-30 PROCEDURE — 99999 PR PBB SHADOW E&M-EST. PATIENT-LVL III: CPT | Mod: PBBFAC,,, | Performed by: PHYSICIAN ASSISTANT

## 2017-10-30 PROCEDURE — 99214 OFFICE O/P EST MOD 30 MIN: CPT | Mod: S$GLB,,, | Performed by: PHYSICIAN ASSISTANT

## 2017-11-06 RX ORDER — CARBAMAZEPINE 200 MG/1
TABLET ORAL
Qty: 360 TABLET | Refills: 0 | Status: ON HOLD | OUTPATIENT
Start: 2017-11-06 | End: 2017-12-29

## 2017-11-06 RX ORDER — LOSARTAN POTASSIUM 50 MG/1
TABLET ORAL
Qty: 90 TABLET | Refills: 3 | Status: SHIPPED | OUTPATIENT
Start: 2017-11-06 | End: 2018-08-20 | Stop reason: SDUPTHER

## 2017-11-15 RX ORDER — HYDROCODONE BITARTRATE AND ACETAMINOPHEN 5; 325 MG/1; MG/1
1 TABLET ORAL EVERY 8 HOURS PRN
Qty: 40 TABLET | Refills: 0 | Status: ON HOLD | OUTPATIENT
Start: 2017-11-15 | End: 2017-12-27

## 2017-11-15 NOTE — TELEPHONE ENCOUNTER
"----- Message from Rossana Herron sent at 11/15/2017  1:50 PM CST -----  Contact: self/ [phone]  RX request - refill or new RX.  Is this a refill or new RX:    RX name and strength: hydrocodone-acetaminophen 5-325mg (NORCO) 5-325 mg per tablet 40 tablet   Directions:   Is this a 30 day or 90 day RX:    Pharmacy name and phone # (DON'T enter "on file" or "in chart"):   Comments:        "

## 2017-11-27 ENCOUNTER — TELEPHONE (OUTPATIENT)
Dept: INTERNAL MEDICINE | Facility: CLINIC | Age: 72
End: 2017-11-27

## 2017-11-27 NOTE — TELEPHONE ENCOUNTER
----- Message from Jerica Somers sent at 11/27/2017  3:03 PM CST -----  Contact: Self.  Call [phone]  Patient requested an RX for her diarrhea over a week ago and still has not gotten it or a call back. Please call something into  [facility]  [phone] (Phone) or   [phone] (Fax).        Leaving for [place] in the morning and need this called in today.  She want a call back.

## 2017-11-27 NOTE — TELEPHONE ENCOUNTER
Per PCP patient can take Imodium AD she states she understood and she states she will call if Sx change and or get worse. Termed the call

## 2017-11-27 NOTE — TELEPHONE ENCOUNTER
Called to advise the patient there has not been any relief she has has the diarrhea for over a week and she states she does not want to come to the clinic and she has a bus trip tomorrow in the morning at 9am.

## 2017-12-27 ENCOUNTER — HOSPITAL ENCOUNTER (OUTPATIENT)
Facility: HOSPITAL | Age: 72
Discharge: HOME | End: 2017-12-29
Attending: EMERGENCY MEDICINE | Admitting: EMERGENCY MEDICINE
Payer: MEDICARE

## 2017-12-27 DIAGNOSIS — R20.0 NUMBNESS AND TINGLING: ICD-10-CM

## 2017-12-27 DIAGNOSIS — R05.9 COUGH: ICD-10-CM

## 2017-12-27 DIAGNOSIS — R20.0 ANESTHESIA OF SKIN: ICD-10-CM

## 2017-12-27 DIAGNOSIS — R20.2 NUMBNESS AND TINGLING: ICD-10-CM

## 2017-12-27 DIAGNOSIS — M47.22 OSTEOARTHRITIS OF SPINE WITH RADICULOPATHY, CERVICAL REGION: ICD-10-CM

## 2017-12-27 DIAGNOSIS — R07.9 CHEST PAIN, RULE OUT ACUTE MYOCARDIAL INFARCTION: ICD-10-CM

## 2017-12-27 DIAGNOSIS — R20.0 NUMBNESS: ICD-10-CM

## 2017-12-27 DIAGNOSIS — R79.89 ELEVATED TROPONIN: Primary | ICD-10-CM

## 2017-12-27 DIAGNOSIS — R29.818 TRANSIENT NEUROLOGICAL SYMPTOMS: ICD-10-CM

## 2017-12-27 DIAGNOSIS — D50.9 IRON DEFICIENCY ANEMIA, UNSPECIFIED IRON DEFICIENCY ANEMIA TYPE: ICD-10-CM

## 2017-12-27 DIAGNOSIS — E86.0 DEHYDRATION: ICD-10-CM

## 2017-12-27 DIAGNOSIS — R07.9 CHEST PAIN: ICD-10-CM

## 2017-12-27 DIAGNOSIS — R20.0 LEFT FACIAL NUMBNESS: ICD-10-CM

## 2017-12-27 PROBLEM — E78.5 HLD (HYPERLIPIDEMIA): Status: ACTIVE | Noted: 2017-12-27

## 2017-12-27 PROBLEM — R19.7 DIARRHEA: Status: ACTIVE | Noted: 2017-12-27

## 2017-12-27 LAB
ALBUMIN SERPL BCP-MCNC: 3.6 G/DL
ALP SERPL-CCNC: 137 U/L
ALT SERPL W/O P-5'-P-CCNC: 19 U/L
ANION GAP SERPL CALC-SCNC: 10 MMOL/L
AST SERPL-CCNC: 30 U/L
BASOPHILS # BLD AUTO: 0.01 K/UL
BASOPHILS NFR BLD: 0.3 %
BILIRUB SERPL-MCNC: 0.4 MG/DL
BUN SERPL-MCNC: 14 MG/DL
CALCIUM SERPL-MCNC: 9.1 MG/DL
CHLORIDE SERPL-SCNC: 104 MMOL/L
CK MB SERPL-MCNC: 1 NG/ML
CK MB SERPL-RTO: 0.9 %
CK SERPL-CCNC: 112 U/L
CO2 SERPL-SCNC: 22 MMOL/L
CREAT SERPL-MCNC: 1.1 MG/DL
DIFFERENTIAL METHOD: ABNORMAL
EOSINOPHIL # BLD AUTO: 0 K/UL
EOSINOPHIL NFR BLD: 0 %
ERYTHROCYTE [DISTWIDTH] IN BLOOD BY AUTOMATED COUNT: 12.8 %
EST. GFR  (AFRICAN AMERICAN): 57.6 ML/MIN/1.73 M^2
EST. GFR  (NON AFRICAN AMERICAN): 49.9 ML/MIN/1.73 M^2
GLUCOSE SERPL-MCNC: 98 MG/DL
HCT VFR BLD AUTO: 41.6 %
HGB BLD-MCNC: 13.9 G/DL
IMM GRANULOCYTES # BLD AUTO: 0.01 K/UL
IMM GRANULOCYTES NFR BLD AUTO: 0.3 %
LYMPHOCYTES # BLD AUTO: 1.1 K/UL
LYMPHOCYTES NFR BLD: 27.2 %
MCH RBC QN AUTO: 30.5 PG
MCHC RBC AUTO-ENTMCNC: 33.4 G/DL
MCV RBC AUTO: 91 FL
MONOCYTES # BLD AUTO: 0.6 K/UL
MONOCYTES NFR BLD: 14.7 %
NEUTROPHILS # BLD AUTO: 2.2 K/UL
NEUTROPHILS NFR BLD: 57.5 %
NRBC BLD-RTO: 0 /100 WBC
PLATELET # BLD AUTO: 148 K/UL
PMV BLD AUTO: 10.2 FL
POTASSIUM SERPL-SCNC: 3.7 MMOL/L
PROT SERPL-MCNC: 7.7 G/DL
RBC # BLD AUTO: 4.56 M/UL
SODIUM SERPL-SCNC: 136 MMOL/L
TROPONIN I SERPL DL<=0.01 NG/ML-MCNC: 0.1 NG/ML
TROPONIN I SERPL DL<=0.01 NG/ML-MCNC: 0.11 NG/ML
TROPONIN I SERPL DL<=0.01 NG/ML-MCNC: 0.12 NG/ML
WBC # BLD AUTO: 3.89 K/UL

## 2017-12-27 PROCEDURE — 96361 HYDRATE IV INFUSION ADD-ON: CPT

## 2017-12-27 PROCEDURE — 82553 CREATINE MB FRACTION: CPT

## 2017-12-27 PROCEDURE — 93005 ELECTROCARDIOGRAM TRACING: CPT

## 2017-12-27 PROCEDURE — 96375 TX/PRO/DX INJ NEW DRUG ADDON: CPT

## 2017-12-27 PROCEDURE — 99220 PR INITIAL OBSERVATION CARE,LEVL III: CPT | Mod: ,,, | Performed by: HOSPITALIST

## 2017-12-27 PROCEDURE — 80053 COMPREHEN METABOLIC PANEL: CPT

## 2017-12-27 PROCEDURE — G0378 HOSPITAL OBSERVATION PER HR: HCPCS

## 2017-12-27 PROCEDURE — 99285 EMERGENCY DEPT VISIT HI MDM: CPT | Mod: 25

## 2017-12-27 PROCEDURE — 25000003 PHARM REV CODE 250: Performed by: HOSPITALIST

## 2017-12-27 PROCEDURE — 93010 ELECTROCARDIOGRAM REPORT: CPT | Mod: ,,, | Performed by: INTERNAL MEDICINE

## 2017-12-27 PROCEDURE — 99220 PR INITIAL OBSERVATION CARE,LEVL III: CPT | Mod: GC,,, | Performed by: INTERNAL MEDICINE

## 2017-12-27 PROCEDURE — 84484 ASSAY OF TROPONIN QUANT: CPT | Mod: 91

## 2017-12-27 PROCEDURE — 63600175 PHARM REV CODE 636 W HCPCS: Performed by: PHYSICIAN ASSISTANT

## 2017-12-27 PROCEDURE — 25000003 PHARM REV CODE 250: Performed by: PHYSICIAN ASSISTANT

## 2017-12-27 PROCEDURE — 84484 ASSAY OF TROPONIN QUANT: CPT

## 2017-12-27 PROCEDURE — 36415 COLL VENOUS BLD VENIPUNCTURE: CPT

## 2017-12-27 PROCEDURE — 85025 COMPLETE CBC W/AUTO DIFF WBC: CPT

## 2017-12-27 PROCEDURE — 96374 THER/PROPH/DIAG INJ IV PUSH: CPT

## 2017-12-27 PROCEDURE — 99284 EMERGENCY DEPT VISIT MOD MDM: CPT | Mod: ,,, | Performed by: EMERGENCY MEDICINE

## 2017-12-27 RX ORDER — LOSARTAN POTASSIUM 50 MG/1
50 TABLET ORAL DAILY
Status: DISCONTINUED | OUTPATIENT
Start: 2017-12-27 | End: 2017-12-29 | Stop reason: HOSPADM

## 2017-12-27 RX ORDER — SODIUM CHLORIDE 9 MG/ML
INJECTION, SOLUTION INTRAVENOUS CONTINUOUS
Status: DISCONTINUED | OUTPATIENT
Start: 2017-12-28 | End: 2017-12-28

## 2017-12-27 RX ORDER — HYDROCODONE BITARTRATE AND ACETAMINOPHEN 5; 325 MG/1; MG/1
1 TABLET ORAL EVERY 8 HOURS PRN
Status: DISCONTINUED | OUTPATIENT
Start: 2017-12-27 | End: 2017-12-29 | Stop reason: HOSPADM

## 2017-12-27 RX ORDER — GABAPENTIN 300 MG/1
300 CAPSULE ORAL NIGHTLY
Status: DISCONTINUED | OUTPATIENT
Start: 2017-12-27 | End: 2017-12-29 | Stop reason: HOSPADM

## 2017-12-27 RX ORDER — ONDANSETRON 2 MG/ML
4 INJECTION INTRAMUSCULAR; INTRAVENOUS
Status: COMPLETED | OUTPATIENT
Start: 2017-12-27 | End: 2017-12-27

## 2017-12-27 RX ORDER — NAPROXEN SODIUM 220 MG/1
81 TABLET, FILM COATED ORAL DAILY
Status: DISCONTINUED | OUTPATIENT
Start: 2017-12-27 | End: 2017-12-29 | Stop reason: HOSPADM

## 2017-12-27 RX ORDER — NITROGLYCERIN 0.4 MG/1
0.4 TABLET SUBLINGUAL EVERY 5 MIN PRN
Status: DISCONTINUED | OUTPATIENT
Start: 2017-12-27 | End: 2017-12-29 | Stop reason: HOSPADM

## 2017-12-27 RX ORDER — METOCLOPRAMIDE HYDROCHLORIDE 5 MG/ML
10 INJECTION INTRAMUSCULAR; INTRAVENOUS
Status: COMPLETED | OUTPATIENT
Start: 2017-12-27 | End: 2017-12-27

## 2017-12-27 RX ORDER — ATORVASTATIN CALCIUM 20 MG/1
80 TABLET, FILM COATED ORAL DAILY
Status: DISCONTINUED | OUTPATIENT
Start: 2017-12-27 | End: 2017-12-29 | Stop reason: HOSPADM

## 2017-12-27 RX ORDER — CARBAMAZEPINE 200 MG/1
200 TABLET ORAL 2 TIMES DAILY
Status: DISCONTINUED | OUTPATIENT
Start: 2017-12-27 | End: 2017-12-29

## 2017-12-27 RX ORDER — ONDANSETRON 4 MG/1
4 TABLET, ORALLY DISINTEGRATING ORAL EVERY 6 HOURS PRN
Status: DISCONTINUED | OUTPATIENT
Start: 2017-12-27 | End: 2017-12-29 | Stop reason: HOSPADM

## 2017-12-27 RX ORDER — ERGOCALCIFEROL 1.25 MG/1
50000 CAPSULE ORAL
Status: DISCONTINUED | OUTPATIENT
Start: 2017-12-30 | End: 2017-12-29 | Stop reason: HOSPADM

## 2017-12-27 RX ADMIN — ASPIRIN 81 MG CHEWABLE TABLET 81 MG: 81 TABLET CHEWABLE at 02:12

## 2017-12-27 RX ADMIN — GABAPENTIN 300 MG: 300 CAPSULE ORAL at 09:12

## 2017-12-27 RX ADMIN — CARBAMAZEPINE 200 MG: 200 TABLET ORAL at 09:12

## 2017-12-27 RX ADMIN — ONDANSETRON 4 MG: 2 INJECTION INTRAMUSCULAR; INTRAVENOUS at 08:12

## 2017-12-27 RX ADMIN — METOCLOPRAMIDE 10 MG: 5 INJECTION, SOLUTION INTRAMUSCULAR; INTRAVENOUS at 09:12

## 2017-12-27 RX ADMIN — SODIUM CHLORIDE 1000 ML: 0.9 INJECTION, SOLUTION INTRAVENOUS at 08:12

## 2017-12-27 NOTE — HPI
72 year old female with multiple complaints. PMH includes HTN, Hyperlipidemia, Depression, Seizure disorder, Cervical Radiculopathy.Multiple admissions in the past with chest pain-THis is her 6 th admission. Complaints today include chest pain(Left sided, radiating to axilla, Tender to palpation, off and on for last 2 days, sticking in nature, no relation to exertion, Difficult to sleep on left side due to pain, No relieving factors, gets about 2 episodes per day that last 30 mins), Also with nausea, diarrhea x 3 days-10 watery bowel movements, Also with cough and wheezing and chills and cold symptoms.Also complains of BEE, states gets sob when lying on her back-likes to sleep on side.    Previously DSE done 5/2017-Reached target HR , negative EKG and Imaging for ischemia but limited sensitivity due to poor quality images. Scheduled for cardiac PET x 2-doesnot remember being told about it/Knowing about it. Troponin have been previously elevated at 0.04-0.06.    Walks about with cane at home without chest pain.Drives also -Does state that have some problems with memory-does not recall being admitted in September for chest pain.Independent in ADLs.

## 2017-12-27 NOTE — SUBJECTIVE & OBJECTIVE
Past Medical History:   Diagnosis Date    Abnormal mammogram of both breasts     Hypertension     Major depressive disorder 5/14/2017    Memory disorder     Seizures        Past Surgical History:   Procedure Laterality Date    CATARACT EXTRACTION      cysts breast      TONSILLECTOMY         Review of patient's allergies indicates:  No Known Allergies    No current facility-administered medications on file prior to encounter.      Current Outpatient Prescriptions on File Prior to Encounter   Medication Sig    aspirin 81 MG Chew Take 81 mg by mouth once daily.    atorvastatin (LIPITOR) 80 MG tablet     carBAMazepine (TEGRETOL) 200 mg tablet TAKE 1 TABLET BY MOUTH EVERY NIGHT AT BEDTIME FOR ONE WEEK THEN TAKE ONE TABLET BY MOUTH TWICE DAILY AFTER ONE WEEK    diclofenac (VOLTAREN) 50 MG EC tablet TK 1 T PO BID PRN P    ergocalciferol (ERGOCALCIFEROL) 50,000 unit Cap Take 1 capsule (50,000 Units total) by mouth every Saturday.    gabapentin (NEURONTIN) 300 MG capsule Take one cap at bedtime for one week, than increase to 2 caps at bedtime at bedtime, if tolerated.  Do not stop abruptly.    hydrocodone-acetaminophen 5-325mg (NORCO) 5-325 mg per tablet Take 1 tablet by mouth every 8 (eight) hours as needed for Pain.    losartan (COZAAR) 50 MG tablet TAKE 1 TABLET(50 MG) BY MOUTH EVERY EVENING    losartan-hydrochlorothiazide 50-12.5 mg (HYZAAR) 50-12.5 mg per tablet Take 1 tablet by mouth once daily.    ondansetron (ZOFRAN-ODT) 4 MG TbDL Take 1 tablet (4 mg total) by mouth every 6 (six) hours as needed.     Family History     None        Social History Main Topics    Smoking status: Never Smoker    Smokeless tobacco: Never Used    Alcohol use No    Drug use: No    Sexual activity: No     Review of Systems   All other systems reviewed and are negative.    Objective:     Vital Signs (Most Recent):  Temp: 99.6 °F (37.6 °C) (12/27/17 1531)  Pulse: 97 (12/27/17 1531)  Resp: 18 (12/27/17 1531)  BP: (!)  140/77 (12/27/17 1531)  SpO2: (!) 94 % (12/27/17 1531) Vital Signs (24h Range):  Temp:  [98.9 °F (37.2 °C)-99.7 °F (37.6 °C)] 99.6 °F (37.6 °C)  Pulse:  [] 97  Resp:  [18-20] 18  SpO2:  [92 %-96 %] 94 %  BP: (120-140)/(74-77) 140/77     Weight: 81.2 kg (179 lb)  Body mass index is 30.73 kg/m².    SpO2: (!) 94 %  O2 Device (Oxygen Therapy): room air    No intake or output data in the 24 hours ending 12/27/17 1539    Lines/Drains/Airways     Peripheral Intravenous Line                 Peripheral IV - Single Lumen 12/27/17 0853 Left Hand less than 1 day                Physical Exam   General: alert, awake and oriented x 3  Eyes:PERRL.   Neck:no JVD   Lungs:  clear to auscultation bilaterally and normal respiratory effort  Cardiovascular: Heart: regular rate and rhythm, S1, S2 normal, no murmur, click, rub or gallop.   Chest Wall:  tenderness.   Extremities: no cyanosis or edema.   Pulses: 2+ and symmetric.  Abdomen/Rectal: Abdomen: soft, non-tender non-distented; bowel sounds normal  Skin: No rashes or lesions  Neurologic: Normal strength and tone. No focal numbness or weakness  Psych/Behavioral:  Flat affect.      Significant Labs:   CMP   Recent Labs  Lab 12/27/17  0822      K 3.7      CO2 22*   GLU 98   BUN 14   CREATININE 1.1   CALCIUM 9.1   PROT 7.7   ALBUMIN 3.6   BILITOT 0.4   ALKPHOS 137*   AST 30   ALT 19   ANIONGAP 10   ESTGFRAFRICA 57.6*   EGFRNONAA 49.9*   , INR No results for input(s): INR, PROTIME in the last 48 hours. and Troponin   Recent Labs  Lab 12/27/17  0822   TROPONINI 0.124*       Significant Imaging: EKG: NSR, no Ischemic changes

## 2017-12-27 NOTE — ASSESSMENT & PLAN NOTE
Atypical chest pain but risk factors with elevated troponin  -Had negative DSE but poor image quality-This is her 6th admission for chest pain-Scheduled for cardiac PET x 2 in the past which patient missed  -Recommend Interventional consult   -Keep NPO after midnight  -NTG prn chest pain  -EKG prn chest pain  -cardiac monitoring

## 2017-12-27 NOTE — PLAN OF CARE
Problem: Patient Care Overview  Goal: Plan of Care Review  Outcome: Ongoing (interventions implemented as appropriate)  Pt on fall precautions. Yellow fall risk band and socks on pt. Instructed pt to call for assistance as needed and pt voiced understanding. Complaining of a 3/10 headache. SR on tele.

## 2017-12-27 NOTE — NURSING
Received pt to floor from ED via wheelchair accompanied by escort. aaox 4. Complaining of a 3/10 headache. Respirations even and unlabored. No distress noted. Pt stable. Pt oriented to room and call bell placed within reach.  Will continue to monitor.

## 2017-12-27 NOTE — ED PROVIDER NOTES
"Encounter Date: 12/27/2017       History     Chief Complaint   Patient presents with    Chest Pain     Patient with chest pain and left arm pain since yesterday.  Increased pain to deep breath and cough.  No sputum.  SOB with exertion per patient.     72-year-old female with a PMH significant for HTN, seizures, cervical radiculopathy, memory disorder, MDD who presents to the ED with various complaints.  Patient reports nausea, diarrhea, subjective chills, generalized weakness, productive cough over the past 3 days.  She reports approximately 8 episodes of watery stool yesterday.  She has not been able to eat or drink.  The past 3 days secondary to nausea.  She reports a cough productive of light green sputum with associated rattling and wheezing in the chest.  She reports a mild bitemporal headache which she associates with not eating.  Patient also complains of chest pain that is left-sided and under the left axilla that she describes as "sticking".  She reports associated orthopnea and dyspnea on exertion.  Chest pain and shortness of breath started yesterday.  Chest pain is similar to pain that she has experienced in the past.  She notes that her left arm pain is typical of her chronic left arm pain associated with her cervical disc disease.  She denies measured fever, abdominal pain, vomiting, hemoptysis, leg swelling, calf pain, dysuria, dark or bloody stool.  No recent antibiotic use or travel.          Review of patient's allergies indicates:  No Known Allergies  Past Medical History:   Diagnosis Date    Abnormal mammogram of both breasts     Hypertension     Major depressive disorder 5/14/2017    Memory disorder     Seizures      Past Surgical History:   Procedure Laterality Date    CATARACT EXTRACTION      cysts breast      TONSILLECTOMY       No family history on file.  Social History   Substance Use Topics    Smoking status: Never Smoker    Smokeless tobacco: Never Used    Alcohol use No "     Review of Systems   Constitutional: Positive for chills. Negative for fever.   Respiratory: Positive for cough and shortness of breath.    Cardiovascular: Positive for chest pain. Negative for leg swelling.   Gastrointestinal: Positive for diarrhea and nausea. Negative for abdominal pain, blood in stool and vomiting.   Genitourinary: Negative for dysuria.   Neurological: Positive for weakness.       Physical Exam     Initial Vitals [12/27/17 0729]   BP Pulse Resp Temp SpO2   120/77 110 19 98.9 °F (37.2 °C) 96 %      MAP       91.33         Physical Exam    Nursing note and vitals reviewed.  Constitutional: She appears well-developed and well-nourished. She is not diaphoretic.  Non-toxic appearance. She does not appear ill. No distress.   HENT:   Head: Normocephalic and atraumatic.   Mouth/Throat: Mucous membranes are normal.   Neck: Neck supple.   Cardiovascular: Regular rhythm. Tachycardia present.  Exam reveals no gallop and no friction rub.    No murmur heard.  No peripheral edema    Pulmonary/Chest: Effort normal and breath sounds normal. No accessory muscle usage. No tachypnea. No respiratory distress. She has no decreased breath sounds. She has no wheezes. She has no rhonchi. She has no rales.   Palpation of lower L chest wall reproduces pt's stated complaint    Abdominal: Soft. Normal appearance and bowel sounds are normal. She exhibits no distension. There is no tenderness.   Musculoskeletal: Normal range of motion.   No calf tenderness, skin change, or significant swelling   Neurological: She is alert and oriented to person, place, and time.   Skin: Skin is warm and dry. No rash noted. No pallor.   Psychiatric: She has a normal mood and affect. Her behavior is normal. Judgment and thought content normal.         ED Course   Procedures  Labs Reviewed   CBC W/ AUTO DIFFERENTIAL - Abnormal; Notable for the following:        Result Value    WBC 3.89 (*)     Platelets 148 (*)     All other components within  normal limits   COMPREHENSIVE METABOLIC PANEL - Abnormal; Notable for the following:     CO2 22 (*)     Alkaline Phosphatase 137 (*)     eGFR if  57.6 (*)     eGFR if non  49.9 (*)     All other components within normal limits   TROPONIN I - Abnormal; Notable for the following:     Troponin I 0.124 (*)     All other components within normal limits             Medical Decision Making:   History:   Old Medical Records: I decided to obtain old medical records.  Old Records Summarized: other records.       <> Summary of Records: Pt has been evaluated for chest pain multiple times this year for complaints of chest pain. Two PET stress tests have been ordered over the past few months, but none have been performed. Pt has no recollection of needing these test.   Differential Diagnosis:   My differential diagnosis includes but is not limited to: Viral syndrome, URI, pneumonia, dehydration, electrolyte derangement, ACS, MSK pain  Clinical Tests:   Lab Tests: Ordered and Reviewed  Radiological Study: Ordered and Reviewed  Medical Tests: Ordered and Reviewed         Attending Note:  Physician Attestation Statement  :As the supervising MD I agree with the above history. As the supervising MD I agree with the above PE. As the supervising MD I agree with the above treatment, course, plan, and disposition.  I did not personally see this patient, but I discussed the patient with the physician's assistant was available at all times, and also reviewed the chart and all findings with the physician's assistant.                 ED Course      Clinical Impression:   The primary encounter diagnosis was Elevated troponin. Diagnoses of Chest pain, Cough, and Dehydration were also pertinent to this visit.                           Live Lara MD  12/27/17 1048

## 2017-12-28 PROBLEM — R19.7 DIARRHEA: Status: ACTIVE | Noted: 2017-12-28

## 2017-12-28 PROBLEM — E78.5 HLD (HYPERLIPIDEMIA): Status: ACTIVE | Noted: 2017-12-28

## 2017-12-28 LAB
ABO + RH BLD: NORMAL
ALBUMIN SERPL BCP-MCNC: 3 G/DL
ALP SERPL-CCNC: 110 U/L
ALT SERPL W/O P-5'-P-CCNC: 18 U/L
ANION GAP SERPL CALC-SCNC: 10 MMOL/L
AST SERPL-CCNC: 27 U/L
BASOPHILS # BLD AUTO: 0.01 K/UL
BASOPHILS NFR BLD: 0.4 %
BILIRUB SERPL-MCNC: 0.3 MG/DL
BLD GP AB SCN CELLS X3 SERPL QL: NORMAL
BUN SERPL-MCNC: 12 MG/DL
C DIFF GDH STL QL: NEGATIVE
C DIFF TOX A+B STL QL IA: NEGATIVE
CALCIUM SERPL-MCNC: 8.3 MG/DL
CHLORIDE SERPL-SCNC: 108 MMOL/L
CO2 SERPL-SCNC: 21 MMOL/L
CREAT SERPL-MCNC: 0.8 MG/DL
DIFFERENTIAL METHOD: ABNORMAL
E COLI SXT1 STL QL IA: NEGATIVE
E COLI SXT2 STL QL IA: NEGATIVE
EOSINOPHIL # BLD AUTO: 0 K/UL
EOSINOPHIL NFR BLD: 0.4 %
ERYTHROCYTE [DISTWIDTH] IN BLOOD BY AUTOMATED COUNT: 12.6 %
EST. GFR  (AFRICAN AMERICAN): >60 ML/MIN/1.73 M^2
EST. GFR  (NON AFRICAN AMERICAN): >60 ML/MIN/1.73 M^2
FERRITIN SERPL-MCNC: 456 NG/ML
GLUCOSE SERPL-MCNC: 89 MG/DL
HCT VFR BLD AUTO: 35.7 %
HGB BLD-MCNC: 12.1 G/DL
IMM GRANULOCYTES # BLD AUTO: 0 K/UL
IMM GRANULOCYTES NFR BLD AUTO: 0 %
IRON SERPL-MCNC: 13 UG/DL
LYMPHOCYTES # BLD AUTO: 0.9 K/UL
LYMPHOCYTES NFR BLD: 38.6 %
MAGNESIUM SERPL-MCNC: 1.8 MG/DL
MCH RBC QN AUTO: 29.7 PG
MCHC RBC AUTO-ENTMCNC: 33.9 G/DL
MCV RBC AUTO: 88 FL
MONOCYTES # BLD AUTO: 0.4 K/UL
MONOCYTES NFR BLD: 15.8 %
NEUTROPHILS # BLD AUTO: 1.1 K/UL
NEUTROPHILS NFR BLD: 44.8 %
NRBC BLD-RTO: 0 /100 WBC
OB PNL STL: NEGATIVE
PHOSPHATE SERPL-MCNC: 2.8 MG/DL
PLATELET # BLD AUTO: 120 K/UL
PMV BLD AUTO: 10.2 FL
POTASSIUM SERPL-SCNC: 3.5 MMOL/L
PROT SERPL-MCNC: 6.5 G/DL
RBC # BLD AUTO: 4.07 M/UL
SATURATED IRON: 5 %
SODIUM SERPL-SCNC: 139 MMOL/L
TOTAL IRON BINDING CAPACITY: 237 UG/DL
TRANSFERRIN SERPL-MCNC: 160 MG/DL
TROPONIN I SERPL DL<=0.01 NG/ML-MCNC: 0.13 NG/ML
WBC # BLD AUTO: 2.41 K/UL

## 2017-12-28 PROCEDURE — 87449 NOS EACH ORGANISM AG IA: CPT

## 2017-12-28 PROCEDURE — 82728 ASSAY OF FERRITIN: CPT

## 2017-12-28 PROCEDURE — 93458 L HRT ARTERY/VENTRICLE ANGIO: CPT

## 2017-12-28 PROCEDURE — 84484 ASSAY OF TROPONIN QUANT: CPT

## 2017-12-28 PROCEDURE — 85025 COMPLETE CBC W/AUTO DIFF WBC: CPT

## 2017-12-28 PROCEDURE — 83540 ASSAY OF IRON: CPT

## 2017-12-28 PROCEDURE — 36415 COLL VENOUS BLD VENIPUNCTURE: CPT

## 2017-12-28 PROCEDURE — 25000003 PHARM REV CODE 250

## 2017-12-28 PROCEDURE — 25000003 PHARM REV CODE 250: Performed by: INTERNAL MEDICINE

## 2017-12-28 PROCEDURE — 80053 COMPREHEN METABOLIC PANEL: CPT

## 2017-12-28 PROCEDURE — 87045 FECES CULTURE AEROBIC BACT: CPT

## 2017-12-28 PROCEDURE — 25000003 PHARM REV CODE 250: Performed by: STUDENT IN AN ORGANIZED HEALTH CARE EDUCATION/TRAINING PROGRAM

## 2017-12-28 PROCEDURE — 82272 OCCULT BLD FECES 1-3 TESTS: CPT

## 2017-12-28 PROCEDURE — 99152 MOD SED SAME PHYS/QHP 5/>YRS: CPT | Mod: ,,, | Performed by: INTERNAL MEDICINE

## 2017-12-28 PROCEDURE — 25000003 PHARM REV CODE 250: Performed by: HOSPITALIST

## 2017-12-28 PROCEDURE — G0378 HOSPITAL OBSERVATION PER HR: HCPCS

## 2017-12-28 PROCEDURE — 87046 STOOL CULTR AEROBIC BACT EA: CPT | Mod: 59

## 2017-12-28 PROCEDURE — 99225 PR SUBSEQUENT OBSERVATION CARE,LEVEL II: CPT | Mod: ,,, | Performed by: HOSPITALIST

## 2017-12-28 PROCEDURE — 63600175 PHARM REV CODE 636 W HCPCS

## 2017-12-28 PROCEDURE — 86850 RBC ANTIBODY SCREEN: CPT

## 2017-12-28 PROCEDURE — 84100 ASSAY OF PHOSPHORUS: CPT

## 2017-12-28 PROCEDURE — C1769 GUIDE WIRE: HCPCS

## 2017-12-28 PROCEDURE — 83735 ASSAY OF MAGNESIUM: CPT

## 2017-12-28 PROCEDURE — 87040 BLOOD CULTURE FOR BACTERIA: CPT

## 2017-12-28 PROCEDURE — 93458 L HRT ARTERY/VENTRICLE ANGIO: CPT | Mod: 26,,, | Performed by: INTERNAL MEDICINE

## 2017-12-28 PROCEDURE — 87427 SHIGA-LIKE TOXIN AG IA: CPT | Mod: 59

## 2017-12-28 RX ORDER — DIPHENHYDRAMINE HCL 50 MG
50 CAPSULE ORAL EVERY 6 HOURS
Status: DISPENSED | OUTPATIENT
Start: 2017-12-28 | End: 2017-12-29

## 2017-12-28 RX ORDER — DIPHENHYDRAMINE HCL 50 MG
50 CAPSULE ORAL EVERY 6 HOURS
Status: DISCONTINUED | OUTPATIENT
Start: 2017-12-28 | End: 2017-12-28

## 2017-12-28 RX ORDER — SODIUM CHLORIDE 9 MG/ML
INJECTION, SOLUTION INTRAVENOUS CONTINUOUS
Status: ACTIVE | OUTPATIENT
Start: 2017-12-28 | End: 2017-12-28

## 2017-12-28 RX ORDER — DIPHENHYDRAMINE HCL 50 MG
50 CAPSULE ORAL EVERY 6 HOURS
Status: CANCELLED | OUTPATIENT
Start: 2017-12-28 | End: 2017-12-29

## 2017-12-28 RX ADMIN — SODIUM CHLORIDE 1000 ML: 0.9 INJECTION, SOLUTION INTRAVENOUS at 12:12

## 2017-12-28 RX ADMIN — LOPERAMIDE HYDROCHLORIDE 2 MG: 1 SOLUTION ORAL at 08:12

## 2017-12-28 RX ADMIN — GABAPENTIN 300 MG: 300 CAPSULE ORAL at 08:12

## 2017-12-28 RX ADMIN — DIPHENHYDRAMINE HYDROCHLORIDE 50 MG: 50 CAPSULE ORAL at 08:12

## 2017-12-28 RX ADMIN — SODIUM CHLORIDE: 0.9 INJECTION, SOLUTION INTRAVENOUS at 12:12

## 2017-12-28 RX ADMIN — LOPERAMIDE HYDROCHLORIDE 2 MG: 1 SOLUTION ORAL at 12:12

## 2017-12-28 RX ADMIN — ASPIRIN 81 MG CHEWABLE TABLET 81 MG: 81 TABLET CHEWABLE at 08:12

## 2017-12-28 RX ADMIN — ONDANSETRON 4 MG: 4 TABLET, ORALLY DISINTEGRATING ORAL at 08:12

## 2017-12-28 RX ADMIN — CARBAMAZEPINE 200 MG: 200 TABLET ORAL at 08:12

## 2017-12-28 NOTE — H&P
[facility] Medicine  History & Physical    Patient Name: Sofia Augustine  MRN: [id number]  Admission Date: 12/27/2017  Attending Physician: Jose Mcdaniel MD   Primary Care Provider: Luanne Connell MD    [facility] Medicine Team: [facility] Jose Mcdaniel MD     Patient information was obtained from patient, past medical records and ER records.     Subjective:     Principal Problem:Chest pain, rule out acute myocardial infarction    Chief Complaint:   Chief Complaint   Patient presents with    Chest Pain     Patient with chest pain and left arm pain since yesterday.  Increased pain to deep breath and cough.  No sputum.  SOB with exertion per patient.        HPI: 72 year old female with multiple complaints. PMH includes HTN, Hyperlipidemia, Depression, Seizure disorder, Cervical Radiculopathy.Multiple admissions in the past with chest pain. Complaints today include chest pain(Left sided, radiating to axilla, Tender to palpation, off and on for last 2 days, sticking in nature, no relation to exertion, Difficult to sleep on left side due to pain, No relieving factors, gets about 2 episodes per day that last 30 mins), Also with nausea, diarrhea x 3 days-10 watery bowel movements, Also with cough and wheezing and chills and cold symptoms.Also complains of BEE, states gets sob when lying on her back-likes to sleep on side.     Previously DSE done 5/2017-Reached target HR , negative EKG and Imaging for ischemia but limited sensitivity due to poor quality images. Scheduled for cardiac PET x 2-doesnot remember being told about it/Knowing about it. Troponin have been previously elevated at 0.04-0.06.     Walks about with cane at home without chest pain.Drives also -Does state that have some problems with memory-does not recall being admitted in September for chest pain.Independent in ADLs.    Past Medical History:   Diagnosis Date    Abnormal mammogram of both breasts     Arthritis      Hypertension     Major depressive disorder 5/14/2017    Memory disorder     Seizures     Stroke        Past Surgical History:   Procedure Laterality Date    CATARACT EXTRACTION      cysts breast      TONSILLECTOMY         Review of patient's allergies indicates:  No Known Allergies    No current facility-administered medications on file prior to encounter.      Current Outpatient Prescriptions on File Prior to Encounter   Medication Sig    aspirin 81 MG Chew Take 81 mg by mouth once daily.    atorvastatin (LIPITOR) 80 MG tablet Take 80 mg by mouth once daily.     carBAMazepine (TEGRETOL) 200 mg tablet TAKE 1 TABLET BY MOUTH EVERY NIGHT AT BEDTIME FOR ONE WEEK THEN TAKE ONE TABLET BY MOUTH TWICE DAILY AFTER ONE WEEK (Patient taking differently: TAKE 1 TABLET BY MOUTH DAILY)    gabapentin (NEURONTIN) 300 MG capsule Take one cap at bedtime for one week, than increase to 2 caps at bedtime at bedtime, if tolerated.  Do not stop abruptly. (Patient taking differently: Take 300 mg by mouth once daily. )    losartan (COZAAR) 50 MG tablet TAKE 1 TABLET(50 MG) BY MOUTH EVERY EVENING    ondansetron (ZOFRAN-ODT) 4 MG TbDL Take 1 tablet (4 mg total) by mouth every 6 (six) hours as needed. (Patient taking differently: Take 4 mg by mouth every 6 (six) hours as needed (N/V). )    [DISCONTINUED] diclofenac (VOLTAREN) 50 MG EC tablet TK 1 T PO BID PRN P    [DISCONTINUED] ergocalciferol (ERGOCALCIFEROL) 50,000 unit Cap Take 1 capsule (50,000 Units total) by mouth every Saturday.    [DISCONTINUED] hydrocodone-acetaminophen 5-325mg (NORCO) 5-325 mg per tablet Take 1 tablet by mouth every 8 (eight) hours as needed for Pain.    [DISCONTINUED] losartan-hydrochlorothiazide 50-12.5 mg (HYZAAR) 50-12.5 mg per tablet Take 1 tablet by mouth once daily.     Family History     None        Social History Main Topics    Smoking status: Never Smoker    Smokeless tobacco: Never Used    Alcohol use No    Drug use: No    Sexual  activity: No     Review of Systems   Constitutional: Negative for chills, fatigue and fever.   HENT: Negative for sore throat and trouble swallowing.    Eyes: Positive for pain. Negative for photophobia and visual disturbance.   Respiratory: Negative for cough and shortness of breath.    Cardiovascular: Positive for chest pain. Negative for palpitations and leg swelling.   Gastrointestinal: Negative for abdominal pain, constipation, diarrhea, nausea and vomiting.   Endocrine: Negative for cold intolerance and heat intolerance.   Genitourinary: Negative for dysuria and frequency.   Musculoskeletal: Positive for neck pain. Negative for arthralgias and myalgias.   Skin: Negative for rash and wound.   Neurological: Positive for numbness. Negative for dizziness, syncope, speech difficulty, weakness and light-headedness.   Psychiatric/Behavioral: Negative for confusion and hallucinations.     Objective:     Vital Signs (Most Recent):  Temp: 99.6 °F (37.6 °C) (12/27/17 1949)  Pulse: 89 (12/27/17 1949)  Resp: 18 (12/27/17 1949)  BP: 139/66 (12/27/17 1949)  SpO2: (!) 94 % (12/27/17 1949) Vital Signs (24h Range):  Temp:  [98.9 °F (37.2 °C)-99.7 °F (37.6 °C)] 99.6 °F (37.6 °C)  Pulse:  [] 89  Resp:  [18-20] 18  SpO2:  [92 %-96 %] 94 %  BP: (120-140)/(66-77) 139/66     Weight: 80.8 kg (178 lb 2 oz)  Body mass index is 30.58 kg/m².    Physical Exam   Constitutional: She is oriented to person, place, and time. She appears well-developed and well-nourished.   HENT:   Head: Normocephalic and atraumatic.   Mouth/Throat: Oropharynx is clear and moist.   Eyes: EOM are normal. Pupils are equal, round, and reactive to light. No scleral icterus.   Neck: Normal range of motion. Neck supple.   Cardiovascular: Normal rate and regular rhythm.    No murmur heard.  Chest tenderness on midsternal palpation   Pulmonary/Chest: Effort normal and breath sounds normal. No respiratory distress. She has no wheezes.   Abdominal: Soft. Bowel  sounds are normal. She exhibits no distension. There is no tenderness.   Musculoskeletal: Normal range of motion. She exhibits no edema.   Neurological: She is alert and oriented to person, place, and time.   Skin: Skin is warm and dry.   Psychiatric: She has a normal mood and affect. Her behavior is normal. Judgment and thought content normal.         CRANIAL NERVES     CN III, IV, VI   Pupils are equal, round, and reactive to light.  Extraocular motions are normal.        Significant Labs:   CBC:   Recent Labs  Lab 12/27/17  0822   WBC 3.89*   HGB 13.9   HCT 41.6   *     CMP:   Recent Labs  Lab 12/27/17  0822      K 3.7      CO2 22*   GLU 98   BUN 14   CREATININE 1.1   CALCIUM 9.1   PROT 7.7   ALBUMIN 3.6   BILITOT 0.4   ALKPHOS 137*   AST 30   ALT 19   ANIONGAP 10   EGFRNONAA 49.9*     Cardiac Markers: No results for input(s): CKMB, MYOGLOBIN, BNP, TROPISTAT in the last 48 hours.  Coagulation: No results for input(s): PT, INR, APTT in the last 48 hours.  Magnesium: No results for input(s): MG in the last 48 hours.    Significant Imaging: I have reviewed and interpreted all pertinent imaging results/findings within the past 24 hours.    Assessment/Plan:     * Chest pain, rule out acute myocardial infarction    Troponins elevated, trend  Cardiac enzymes  Seen by Cardiology, plan for angiogram in am  Cardiac monitor  If chest pain, repeat 12 lead EKG  Cont asa and statin  Nitroglycerin PRN  Had negative stress test but findings were equivocal will plan for [facility]          Diarrhea    Most likely viral gastroenteritis  Imodium prn  Stool cultures  Stool op  Cdiff PCR  IVF NS             HLD (hyperlipidemia)    Cont statin          Chronic bilateral low back pain without sciatica    Cont gabapentin and percocet PRN          Nonintractable generalized idiopathic epilepsy without status epilepticus    Cont Carbamezapine  Seizure precautions          Essential hypertension    Cont home meds              VTE Risk Mitigation         Ordered     Medium Risk of VTE  Once      12/27/17 0940     Place PAUL hose  Until discontinued      12/27/17 0940     Place sequential compression device  Until discontinued      12/27/17 0940             Jose Mcdaniel MD  Department of Hospital Medicine   [facility]

## 2017-12-28 NOTE — CONSULTS
[facility]  Cardiology  Consult Note    Patient Name: Sofia Augustine  MRN: [id number]  Admission Date: 12/27/2017  Hospital Length of Stay: 0 days  Code Status: Full Code   Attending Provider: Jose Mcdaniel MD   Consulting Provider: Tristan Segura MD  Primary Care Physician: Luanne Connell MD  Principal Problem:<principal problem not specified>    Patient information was obtained from patient, nursing home and ER records.     Consults  Subjective:     Chief Complaint:  Chest pain     HPI:   72 year old female with multiple complaints. PMH includes HTN, Hyperlipidemia, Depression, Seizure disorder, Cervical Radiculopathy.Multiple admissions in the past with chest pain-THis is her 6 th admission. Complaints today include chest pain(Left sided, radiating to axilla, Tender to palpation, off and on for last 2 days, sticking in nature, no relation to exertion, Difficult to sleep on left side due to pain, No relieving factors, gets about 2 episodes per day that last 30 mins), Also with nausea, diarrhea x 3 days-10 watery bowel movements, Also with cough and wheezing and chills and cold symptoms.Also complains of BEE, states gets sob when lying on her back-likes to sleep on side.    Previously DSE done 5/2017-Reached target HR , negative EKG and Imaging for ischemia but limited sensitivity due to poor quality images. Scheduled for cardiac PET x 2-doesnot remember being told about it/Knowing about it. Troponin have been previously elevated at 0.04-0.06.    Walks about with cane at home without chest pain.Drives also -Does state that have some problems with memory-does not recall being admitted in September for chest pain.Independent in ADLs.    Past Medical History:   Diagnosis Date    Abnormal mammogram of both breasts     Hypertension     Major depressive disorder 5/14/2017    Memory disorder     Seizures        Past Surgical History:   Procedure Laterality Date    CATARACT EXTRACTION       cysts breast      TONSILLECTOMY         Review of patient's allergies indicates:  No Known Allergies    No current facility-administered medications on file prior to encounter.      Current Outpatient Prescriptions on File Prior to Encounter   Medication Sig    aspirin 81 MG Chew Take 81 mg by mouth once daily.    atorvastatin (LIPITOR) 80 MG tablet     carBAMazepine (TEGRETOL) 200 mg tablet TAKE 1 TABLET BY MOUTH EVERY NIGHT AT BEDTIME FOR ONE WEEK THEN TAKE ONE TABLET BY MOUTH TWICE DAILY AFTER ONE WEEK    diclofenac (VOLTAREN) 50 MG EC tablet TK 1 T PO BID PRN P    ergocalciferol (ERGOCALCIFEROL) 50,000 unit Cap Take 1 capsule (50,000 Units total) by mouth every Saturday.    gabapentin (NEURONTIN) 300 MG capsule Take one cap at bedtime for one week, than increase to 2 caps at bedtime at bedtime, if tolerated.  Do not stop abruptly.    hydrocodone-acetaminophen 5-325mg (NORCO) 5-325 mg per tablet Take 1 tablet by mouth every 8 (eight) hours as needed for Pain.    losartan (COZAAR) 50 MG tablet TAKE 1 TABLET(50 MG) BY MOUTH EVERY EVENING    losartan-hydrochlorothiazide 50-12.5 mg (HYZAAR) 50-12.5 mg per tablet Take 1 tablet by mouth once daily.    ondansetron (ZOFRAN-ODT) 4 MG TbDL Take 1 tablet (4 mg total) by mouth every 6 (six) hours as needed.     Family History     None        Social History Main Topics    Smoking status: Never Smoker    Smokeless tobacco: Never Used    Alcohol use No    Drug use: No    Sexual activity: No     Review of Systems   All other systems reviewed and are negative.    Objective:     Vital Signs (Most Recent):  Temp: 99.6 °F (37.6 °C) (12/27/17 1531)  Pulse: 97 (12/27/17 1531)  Resp: 18 (12/27/17 1531)  BP: (!) 140/77 (12/27/17 1531)  SpO2: (!) 94 % (12/27/17 1531) Vital Signs (24h Range):  Temp:  [98.9 °F (37.2 °C)-99.7 °F (37.6 °C)] 99.6 °F (37.6 °C)  Pulse:  [] 97  Resp:  [18-20] 18  SpO2:  [92 %-96 %] 94 %  BP: (120-140)/(74-77) 140/77     Weight: 81.2 kg  (179 lb)  Body mass index is 30.73 kg/m².    SpO2: (!) 94 %  O2 Device (Oxygen Therapy): room air    No intake or output data in the 24 hours ending 12/27/17 1539    Lines/Drains/Airways     Peripheral Intravenous Line                 Peripheral IV - Single Lumen 12/27/17 0853 Left Hand less than 1 day                Physical Exam   General: alert, awake and oriented x 3  Eyes:PERRL.   Neck:no JVD   Lungs:  clear to auscultation bilaterally and normal respiratory effort  Cardiovascular: Heart: regular rate and rhythm, S1, S2 normal, no murmur, click, rub or gallop.   Chest Wall:  tenderness.   Extremities: no cyanosis or edema.   Pulses: 2+ and symmetric.  Abdomen/Rectal: Abdomen: soft, non-tender non-distented; bowel sounds normal  Skin: No rashes or lesions  Neurologic: Normal strength and tone. No focal numbness or weakness  Psych/Behavioral:  Flat affect.      Significant Labs:   CMP   Recent Labs  Lab 12/27/17  0822      K 3.7      CO2 22*   GLU 98   BUN 14   CREATININE 1.1   CALCIUM 9.1   PROT 7.7   ALBUMIN 3.6   BILITOT 0.4   ALKPHOS 137*   AST 30   ALT 19   ANIONGAP 10   ESTGFRAFRICA 57.6*   EGFRNONAA 49.9*   , INR No results for input(s): INR, PROTIME in the last 48 hours. and Troponin   Recent Labs  Lab 12/27/17  0822   TROPONINI 0.124*       Significant Imaging: EKG: NSR, no Ischemic changes    Assessment and Plan:     Elevated troponin    Atypical chest pain but risk factors with elevated troponin  -Had negative DSE but poor image quality-This is her 6th admission for chest pain-Scheduled for cardiac PET x 2 in the past which patient missed  -Recommend Interventional consult   -Keep NPO after midnight  -NTG prn chest pain  -EKG prn chest pain  -cardiac monitoring            VTE Risk Mitigation         Ordered     Medium Risk of VTE  Once      12/27/17 0940     Place PAUL hose  Until discontinued      12/27/17 0940     Place sequential compression device  Until discontinued      12/27/17 0940           Thank you for your consult. I will follow-up with patient. Please contact us if you have any additional questions.    Tristan Segura MD  Cardiology   [facility]

## 2017-12-28 NOTE — ASSESSMENT & PLAN NOTE
Pt is a 72 year old female with htn, hld, depression, seizure disorder, cervical radiculopathy who presents with recurrent left sided atypical chest pain. Had a DSE in may where she reached target heart rate to 101% without chest pain or other symptoms. Has missed PET stress on 2 occurrences. EKG with NSR no ST/T wave changes. Troponin 0.124, 0.095, 0.114, 0.133. Has no chest pain now. Chest pain seems rather atypical for angina and she had no chest pain with a recent DSE with 101% of target heart rate and imaging negative for ischemia however there was poor imaging quality. Will plan for LHC today through Right Radial access.

## 2017-12-28 NOTE — HPI
72 year old lady with a history of HTN, Hyperlipidemia, Depression, Seizure disorder, Cervical Radiculopathy and 6 recent admissions with chest pain presents again with chest pain. The chest pain (Left sided pressure like nature, radiating to jaw and down left arm, no relation to exertion, Difficult to sleep on left side due to pain, No relieving factors, gets about 2 episodes per day that last 30 mins). There is no nausea, vomiting, diaphoresis associated with these episodes. She is not very active but is able to able to ambulate through the house with a cane and through out the grocery store without pain.      Previously DSE done 5/2017- EF 60%, Reached 101% of target HR, had no chest pain and negative EKG and Imaging for ischemia but limited sensitivity due to poor quality images. She has had 2 PET stresses ordered but was unable to make either of them due to having a family member with cancer. Troponin have been previously elevated at 0.04-0.06.

## 2017-12-28 NOTE — PROGRESS NOTES
Pre-cath medications administered. IV saline locked. Gown applied to patient. Patient left unit via stretcher to cath lab.

## 2017-12-28 NOTE — PROGRESS NOTES
Pt arrived back to OBS 5 via stretcher with RN and Vascular band noted to Rt wrist. Pt complains of 1/10 pain to Rt wrist. Procedural RN removed 2cc of air from band due to pt fingers appearing discolored. Rt radial pulse is palpable 2+. Dr. Vega came to bedside to assess pt's site. No new orders given. Capillary refill less than 3 and patient able to wiggle fingers at this time. Pt Rt hand appears to be mottled in color and cool to touch. Dr. Reed notified as pt started to report numbness and tingling in Rt wrist below the band. MD states to start removing air at 1200 instead of 1215. Will continue to monitor site closely.

## 2017-12-28 NOTE — PLAN OF CARE
Problem: Patient Care Overview  Goal: Plan of Care Review  Outcome: Ongoing (interventions implemented as appropriate)  Patient AAOx4. Safety maintained. Bed locked in low with 2 side rails up. Call light within reach. No complaints of pain. No events noted on tele.

## 2017-12-28 NOTE — SUBJECTIVE & OBJECTIVE
Past Medical History:   Diagnosis Date    Abnormal mammogram of both breasts     Arthritis     Hypertension     Major depressive disorder 5/14/2017    Memory disorder     Seizures     Stroke        Past Surgical History:   Procedure Laterality Date    CATARACT EXTRACTION      cysts breast      TONSILLECTOMY         Review of patient's allergies indicates:  No Known Allergies    PTA Medications   Medication Sig    aspirin 81 MG Chew Take 81 mg by mouth once daily.    atorvastatin (LIPITOR) 80 MG tablet Take 80 mg by mouth once daily.     carBAMazepine (TEGRETOL) 200 mg tablet TAKE 1 TABLET BY MOUTH EVERY NIGHT AT BEDTIME FOR ONE WEEK THEN TAKE ONE TABLET BY MOUTH TWICE DAILY AFTER ONE WEEK (Patient taking differently: TAKE 1 TABLET BY MOUTH DAILY)    gabapentin (NEURONTIN) 300 MG capsule Take one cap at bedtime for one week, than increase to 2 caps at bedtime at bedtime, if tolerated.  Do not stop abruptly. (Patient taking differently: Take 300 mg by mouth once daily. )    losartan (COZAAR) 50 MG tablet TAKE 1 TABLET(50 MG) BY MOUTH EVERY EVENING    ondansetron (ZOFRAN-ODT) 4 MG TbDL Take 1 tablet (4 mg total) by mouth every 6 (six) hours as needed. (Patient taking differently: Take 4 mg by mouth every 6 (six) hours as needed (N/V). )     Family History     None        Social History Main Topics    Smoking status: Never Smoker    Smokeless tobacco: Never Used    Alcohol use No    Drug use: No    Sexual activity: No     Review of Systems   All other systems reviewed and are negative.    Objective:     Vital Signs (Most Recent):  Temp: 100.3 °F (37.9 °C) (12/28/17 0445)  Pulse: 96 (12/28/17 0600)  Resp: 18 (12/28/17 0445)  BP: 139/77 (12/28/17 0445)  SpO2: 95 % (12/28/17 0445) Vital Signs (24h Range):  Temp:  [98.9 °F (37.2 °C)-100.7 °F (38.2 °C)] 100.3 °F (37.9 °C)  Pulse:  [] 96  Resp:  [18-20] 18  SpO2:  [92 %-96 %] 95 %  BP: (120-140)/(66-77) 139/77     Weight: 80.8 kg (178 lb 2 oz)  Body  mass index is 30.58 kg/m².    SpO2: 95 %  O2 Device (Oxygen Therapy): room air      Intake/Output Summary (Last 24 hours) at 12/28/17 0638  Last data filed at 12/28/17 0600   Gross per 24 hour   Intake              765 ml   Output                1 ml   Net              764 ml       Lines/Drains/Airways     Peripheral Intravenous Line                 Peripheral IV - Single Lumen 12/27/17 0853 Left Hand less than 1 day                Physical Exam   Constitutional: She is oriented to person, place, and time. She appears well-developed.   HENT:   Head: Normocephalic.   Neck: No JVD present.   Pain with palpation on the left side of her neck.    Cardiovascular: Normal rate, regular rhythm and intact distal pulses.  Exam reveals no gallop and no friction rub.    No murmur heard.  Pulses:       Radial pulses are 2+ on the right side, and 2+ on the left side.        Femoral pulses are 2+ on the right side, and 2+ on the left side.       Dorsalis pedis pulses are 2+ on the right side, and 2+ on the left side.        Posterior tibial pulses are 2+ on the right side, and 2+ on the left side.   Pulmonary/Chest: Effort normal and breath sounds normal. No respiratory distress. She has no wheezes.   Abdominal: Soft. Bowel sounds are normal.   Musculoskeletal: Normal range of motion. She exhibits no edema.   Neurological: She is alert and oriented to person, place, and time.       Significant Labs: All pertinent lab results from the last 24 hours have been reviewed.  Hemoglobin 12.7/ Hct 35.7/Platelets 120  Creatinine 0.8  Troponin 0.124, 0.095, 0.114, 0.133  BNP not done      Significant Imaging: EKG: NSR no ST/T wave changes     MRI 10/19  Cervical spondylosis at C4-C6 resulting in mild spinal canal stenosis and severe left-sided neural foraminal narrowing at C4-5.

## 2017-12-28 NOTE — PROGRESS NOTES
Notified Dr. Burt of pt's post transfusion H/H of 8.4/29.2. No new orders given. Will continue to monitor.

## 2017-12-28 NOTE — PROCEDURES
"    Post Cath Note  Referring Physician: Jose Mcdaniel MD  Procedure: Left heart cath (Left)       Access: Right radial     No obstructive lesions noted. See full report for further details    Intervention:   None    Closure device: Radial band    Post Cath Exam:   /72 (BP Location: Right arm, Patient Position: Lying)   Pulse 92   Temp 98.3 °F (36.8 °C) (Oral)   Resp 20   Ht 5' 4" (1.626 m)   Wt 80.8 kg (178 lb 2 oz)   SpO2 (!) 94%   Breastfeeding? No   BMI 30.58 kg/m²   No unusual pain, thrill or bruit at vascular access site.  Distal pulse present without signs of ischemia.    Recommendations:   - Routine post-cath care  - IVF at 250 cc/hr for 4 hrs  - Smoking cessation counseling, Continue medical management, Risk factor reduction    "

## 2017-12-28 NOTE — CONSULTS
[facility]  Interventional Cardiology  Consult Note    Patient Name: Sofia Augustine  MRN: [id number]  Admission Date: 12/27/2017  Hospital Length of Stay: 0 days  Code Status: Full Code   Attending Provider: Jose Mcdaniel MD  Consulting Provider: Oanh Martinez MD  Primary Care Physician: Luanne Connell MD  Principal Problem:Chest pain, rule out acute myocardial infarction    Patient information was obtained from patient.     Inpatient consult to Interventional Cardiology  Consult performed by: OANH MARTINEZ  Consult ordered by: DONALD VENCES  Reason for consult: chest pain        Subjective:     Chief Complaint:  Chest pain    HPI:  72 year old lady with a history of HTN, Hyperlipidemia, Depression, Seizure disorder, Cervical Radiculopathy and 6 recent admissions with chest pain presents again with chest pain. The chest pain (Left sided pressure like nature, radiating to jaw and down left arm, no relation to exertion, Difficult to sleep on left side due to pain, No relieving factors, gets about 2 episodes per day that last 30 mins). There is no nausea, vomiting, diaphoresis associated with these episodes. She is not very active but is able to able to ambulate through the house with a cane and through out the grocery store without pain.      Previously DSE done 5/2017- EF 60%, Reached 101% of target HR, had no chest pain and negative EKG and Imaging for ischemia but limited sensitivity due to poor quality images. She has had 2 PET stresses ordered but was unable to make either of them due to having a family member with cancer. Troponin have been previously elevated at 0.04-0.06.      Past Medical History:   Diagnosis Date    Abnormal mammogram of both breasts     Arthritis     Hypertension     Major depressive disorder 5/14/2017    Memory disorder     Seizures     Stroke        Past Surgical History:   Procedure Laterality Date    CATARACT EXTRACTION      cysts breast       TONSILLECTOMY         Review of patient's allergies indicates:  No Known Allergies    PTA Medications   Medication Sig    aspirin 81 MG Chew Take 81 mg by mouth once daily.    atorvastatin (LIPITOR) 80 MG tablet Take 80 mg by mouth once daily.     carBAMazepine (TEGRETOL) 200 mg tablet TAKE 1 TABLET BY MOUTH EVERY NIGHT AT BEDTIME FOR ONE WEEK THEN TAKE ONE TABLET BY MOUTH TWICE DAILY AFTER ONE WEEK (Patient taking differently: TAKE 1 TABLET BY MOUTH DAILY)    gabapentin (NEURONTIN) 300 MG capsule Take one cap at bedtime for one week, than increase to 2 caps at bedtime at bedtime, if tolerated.  Do not stop abruptly. (Patient taking differently: Take 300 mg by mouth once daily. )    losartan (COZAAR) 50 MG tablet TAKE 1 TABLET(50 MG) BY MOUTH EVERY EVENING    ondansetron (ZOFRAN-ODT) 4 MG TbDL Take 1 tablet (4 mg total) by mouth every 6 (six) hours as needed. (Patient taking differently: Take 4 mg by mouth every 6 (six) hours as needed (N/V). )     Family History     None        Social History Main Topics    Smoking status: Never Smoker    Smokeless tobacco: Never Used    Alcohol use No    Drug use: No    Sexual activity: No     Review of Systems   All other systems reviewed and are negative.    Objective:     Vital Signs (Most Recent):  Temp: 100.3 °F (37.9 °C) (12/28/17 0445)  Pulse: 96 (12/28/17 0600)  Resp: 18 (12/28/17 0445)  BP: 139/77 (12/28/17 0445)  SpO2: 95 % (12/28/17 0445) Vital Signs (24h Range):  Temp:  [98.9 °F (37.2 °C)-100.7 °F (38.2 °C)] 100.3 °F (37.9 °C)  Pulse:  [] 96  Resp:  [18-20] 18  SpO2:  [92 %-96 %] 95 %  BP: (120-140)/(66-77) 139/77     Weight: 80.8 kg (178 lb 2 oz)  Body mass index is 30.58 kg/m².    SpO2: 95 %  O2 Device (Oxygen Therapy): room air      Intake/Output Summary (Last 24 hours) at 12/28/17 0638  Last data filed at 12/28/17 0600   Gross per 24 hour   Intake              765 ml   Output                1 ml   Net              764 ml       Lines/Drains/Airways      Peripheral Intravenous Line                 Peripheral IV - Single Lumen 12/27/17 0853 Left Hand less than 1 day                Physical Exam   Constitutional: She is oriented to person, place, and time. She appears well-developed.   HENT:   Head: Normocephalic.   Neck: No JVD present.   Pain with palpation on the left side of her neck.    Cardiovascular: Normal rate, regular rhythm and intact distal pulses.  Exam reveals no gallop and no friction rub.    No murmur heard.  Pulses:       Radial pulses are 2+ on the right side, and 2+ on the left side.        Femoral pulses are 2+ on the right side, and 2+ on the left side.       Dorsalis pedis pulses are 2+ on the right side, and 2+ on the left side.        Posterior tibial pulses are 2+ on the right side, and 2+ on the left side.   Pulmonary/Chest: Effort normal and breath sounds normal. No respiratory distress. She has no wheezes.   Abdominal: Soft. Bowel sounds are normal.   Musculoskeletal: Normal range of motion. She exhibits no edema.   Neurological: She is alert and oriented to person, place, and time.       Significant Labs: All pertinent lab results from the last 24 hours have been reviewed.  Hemoglobin 12.7/ Hct 35.7/Platelets 120  Creatinine 0.8  Troponin 0.124, 0.095, 0.114, 0.133  BNP not done      Significant Imaging: EKG: NSR no ST/T wave changes     MRI 10/19  Cervical spondylosis at C4-C6 resulting in mild spinal canal stenosis and severe left-sided neural foraminal narrowing at C4-5.    Assessment and Plan:     * Chest pain, rule out acute myocardial infarction    Pt is a 72 year old female with htn, hld, depression, seizure disorder, cervical radiculopathy who presents with recurrent left sided atypical chest pain. Had a DSE in may where she reached target heart rate to 101% without chest pain or other symptoms. Has missed PET stress on 2 occurrences. EKG with NSR no ST/T wave changes. Troponin 0.124, 0.095, 0.114, 0.133. Has no chest pain now.  Chest pain seems rather atypical for angina and she had no chest pain with a recent DSE with 101% of target heart rate and imaging negative for ischemia however there was poor imaging quality. Will plan for C today through Right Radial access.             VTE Risk Mitigation         Ordered     Medium Risk of VTE  Once      12/27/17 0940     Place PAUL hose  Until discontinued      12/27/17 0940     Place sequential compression device  Until discontinued      12/27/17 0940          Thank you for your consult.    Oanh Martinez MD  Interventional Cardiology   [facility]

## 2017-12-28 NOTE — PROGRESS NOTES
Report received from Mariya in recovery. Hematoma formed post procedure. VASC band removed and reapplied at 1013. Air to begin to be removed from band at 1215.

## 2017-12-28 NOTE — SUBJECTIVE & OBJECTIVE
Past Medical History:   Diagnosis Date    Abnormal mammogram of both breasts     Arthritis     Hypertension     Major depressive disorder 5/14/2017    Memory disorder     Seizures     Stroke        Past Surgical History:   Procedure Laterality Date    CATARACT EXTRACTION      cysts breast      TONSILLECTOMY         Review of patient's allergies indicates:  No Known Allergies    No current facility-administered medications on file prior to encounter.      Current Outpatient Prescriptions on File Prior to Encounter   Medication Sig    aspirin 81 MG Chew Take 81 mg by mouth once daily.    atorvastatin (LIPITOR) 80 MG tablet Take 80 mg by mouth once daily.     carBAMazepine (TEGRETOL) 200 mg tablet TAKE 1 TABLET BY MOUTH EVERY NIGHT AT BEDTIME FOR ONE WEEK THEN TAKE ONE TABLET BY MOUTH TWICE DAILY AFTER ONE WEEK (Patient taking differently: TAKE 1 TABLET BY MOUTH DAILY)    gabapentin (NEURONTIN) 300 MG capsule Take one cap at bedtime for one week, than increase to 2 caps at bedtime at bedtime, if tolerated.  Do not stop abruptly. (Patient taking differently: Take 300 mg by mouth once daily. )    losartan (COZAAR) 50 MG tablet TAKE 1 TABLET(50 MG) BY MOUTH EVERY EVENING    ondansetron (ZOFRAN-ODT) 4 MG TbDL Take 1 tablet (4 mg total) by mouth every 6 (six) hours as needed. (Patient taking differently: Take 4 mg by mouth every 6 (six) hours as needed (N/V). )    [DISCONTINUED] diclofenac (VOLTAREN) 50 MG EC tablet TK 1 T PO BID PRN P    [DISCONTINUED] ergocalciferol (ERGOCALCIFEROL) 50,000 unit Cap Take 1 capsule (50,000 Units total) by mouth every Saturday.    [DISCONTINUED] hydrocodone-acetaminophen 5-325mg (NORCO) 5-325 mg per tablet Take 1 tablet by mouth every 8 (eight) hours as needed for Pain.    [DISCONTINUED] losartan-hydrochlorothiazide 50-12.5 mg (HYZAAR) 50-12.5 mg per tablet Take 1 tablet by mouth once daily.     Family History     None        Social History Main Topics    Smoking status:  Never Smoker    Smokeless tobacco: Never Used    Alcohol use No    Drug use: No    Sexual activity: No     Review of Systems   Constitutional: Negative for chills, fatigue and fever.   HENT: Negative for sore throat and trouble swallowing.    Eyes: Positive for pain. Negative for photophobia and visual disturbance.   Respiratory: Negative for cough and shortness of breath.    Cardiovascular: Positive for chest pain. Negative for palpitations and leg swelling.   Gastrointestinal: Negative for abdominal pain, constipation, diarrhea, nausea and vomiting.   Endocrine: Negative for cold intolerance and heat intolerance.   Genitourinary: Negative for dysuria and frequency.   Musculoskeletal: Positive for neck pain. Negative for arthralgias and myalgias.   Skin: Negative for rash and wound.   Neurological: Positive for numbness. Negative for dizziness, syncope, speech difficulty, weakness and light-headedness.   Psychiatric/Behavioral: Negative for confusion and hallucinations.     Objective:     Vital Signs (Most Recent):  Temp: 99.6 °F (37.6 °C) (12/27/17 1949)  Pulse: 89 (12/27/17 1949)  Resp: 18 (12/27/17 1949)  BP: 139/66 (12/27/17 1949)  SpO2: (!) 94 % (12/27/17 1949) Vital Signs (24h Range):  Temp:  [98.9 °F (37.2 °C)-99.7 °F (37.6 °C)] 99.6 °F (37.6 °C)  Pulse:  [] 89  Resp:  [18-20] 18  SpO2:  [92 %-96 %] 94 %  BP: (120-140)/(66-77) 139/66     Weight: 80.8 kg (178 lb 2 oz)  Body mass index is 30.58 kg/m².    Physical Exam   Constitutional: She is oriented to person, place, and time. She appears well-developed and well-nourished.   HENT:   Head: Normocephalic and atraumatic.   Mouth/Throat: Oropharynx is clear and moist.   Eyes: EOM are normal. Pupils are equal, round, and reactive to light. No scleral icterus.   Neck: Normal range of motion. Neck supple.   Cardiovascular: Normal rate and regular rhythm.    No murmur heard.  Chest tenderness on midsternal palpation   Pulmonary/Chest: Effort normal and  breath sounds normal. No respiratory distress. She has no wheezes.   Abdominal: Soft. Bowel sounds are normal. She exhibits no distension. There is no tenderness.   Musculoskeletal: Normal range of motion. She exhibits no edema.   Neurological: She is alert and oriented to person, place, and time.   Skin: Skin is warm and dry.   Psychiatric: She has a normal mood and affect. Her behavior is normal. Judgment and thought content normal.         CRANIAL NERVES     CN III, IV, VI   Pupils are equal, round, and reactive to light.  Extraocular motions are normal.        Significant Labs:   CBC:   Recent Labs  Lab 12/27/17  0822   WBC 3.89*   HGB 13.9   HCT 41.6   *     CMP:   Recent Labs  Lab 12/27/17  0822      K 3.7      CO2 22*   GLU 98   BUN 14   CREATININE 1.1   CALCIUM 9.1   PROT 7.7   ALBUMIN 3.6   BILITOT 0.4   ALKPHOS 137*   AST 30   ALT 19   ANIONGAP 10   EGFRNONAA 49.9*     Cardiac Markers: No results for input(s): CKMB, MYOGLOBIN, BNP, TROPISTAT in the last 48 hours.  Coagulation: No results for input(s): PT, INR, APTT in the last 48 hours.  Magnesium: No results for input(s): MG in the last 48 hours.    Significant Imaging: I have reviewed and interpreted all pertinent imaging results/findings within the past 24 hours.

## 2017-12-28 NOTE — ASSESSMENT & PLAN NOTE
Troponins elevated, trend  Cardiac enzymes  Seen by Cardiology, plan for angiogram in am  Cardiac monitor  If chest pain, repeat 12 lead EKG  Cont asa and statin  Nitroglycerin PRN  Had negative stress test but findings were equivocal will plan for [facility]

## 2017-12-28 NOTE — PLAN OF CARE
Problem: Patient Care Overview  Goal: Plan of Care Review  Outcome: Ongoing (interventions implemented as appropriate)  Pt. Remains on fall precautions,bed low and locked no falls sustained.No verbal complaints of pain.Pt. Remains on tele monitor,NSR,no complaints of CP,pt. Held NPO as ordered. Continue plan of care

## 2017-12-28 NOTE — PROGRESS NOTES
VASC band protocol completed. VASC band removed. Site monitored. No bleeding or increase in size of hematoma noted. Gauze and tegaderm applied. Patient instructed to not use right hand. Bed locked in low with 2 side rails up. Call light within reach. 2LNC in place due to patient's O2 sat dropping below 90% while sleeping.

## 2017-12-28 NOTE — PHARMACY MED REC
"Admission Medication Reconciliation - Pharmacy Consult Note    The home medication history was taken by Jaylin Enrique, Pharmacy Tech. Based on information gathered and subsequent review by the clinical pharmacist, the items below may need attention.     You may go to "Admission" then "Reconcile Home Medications" tabs to review and/or act upon these items. Based on information gathered and subsequent review by the clinical pharmacist, the items below may need attention.    Potentially problematic discrepancies with current MAR  o Patient is taking a drug DIFFERENTLY than how ordered upon admit  o Carbamazepine 200mg PO daily (ordered as BID)      Melissa Noble, PharmD  [id number]      Patient's prior to admission medication regimen was as follows:  Medication Sig    aspirin 81 MG Chew Take 81 mg by mouth once daily.    atorvastatin (LIPITOR) 80 MG tablet Take 80 mg by mouth once daily.     carBAMazepine (TEGRETOL) 200 mg tablet TAKE 1 TABLET BY MOUTH EVERY NIGHT AT BEDTIME FOR ONE WEEK THEN TAKE ONE TABLET BY MOUTH TWICE DAILY AFTER ONE WEEK (Patient taking differently: TAKE 1 TABLET BY MOUTH DAILY)    gabapentin (NEURONTIN) 300 MG capsule Take one cap at bedtime for one week, than increase to 2 caps at bedtime at bedtime, if tolerated.  Do not stop abruptly. (Patient taking differently: Take 300 mg by mouth once daily. )    losartan (COZAAR) 50 MG tablet TAKE 1 TABLET(50 MG) BY MOUTH EVERY EVENING    ondansetron (ZOFRAN-ODT) 4 MG TbDL Take 1 tablet (4 mg total) by mouth every 6 (six) hours as needed. (Patient taking differently: Take 4 mg by mouth every 6 (six) hours as needed (N/V). )         Please add appropriate    SmartPhrase below:        "

## 2017-12-28 NOTE — PLAN OF CARE
Luanne Connell MD  [address] / [place] [address]    Future Appointments  Date Time Provider Department Center   1/5/2018 2:40 PM Luanne Connell MD [facility] [employer]   1/30/2018 9:00 AM Nusrat Pozo PA-C [facility] [id number]  [facility] - [address] AT [facility] [address]  [address] [place] [address]  Phone: [phone] Fax: [phone]      Payor: [employer] MEDICARE / Plan: [employer] HE[employer] / Product Type: Medicare Advantage /        12/28/17 1315   Discharge Assessment   Assessment Type Discharge Planning Assessment   Expected Length of Stay (days) 3   Prior to hospitilization cognitive status: Alert/Oriented   Prior to hospitalization functional status: Independent;Assistive Equipment   Current cognitive status: Alert/Oriented   Current Functional Status: Independent;Assistive Equipment   Lives With child(leanne), adult   Able to Return to Prior Arrangements yes   Is patient able to care for self after discharge? Yes   Equipment Currently Used at Home cane, straight;walker, standard   Does the patient have transportation home? Yes   Transportation Available family or friend will provide   Discharge Plan A Home with family   Discharge Plan B Home with family;Home Health   Patient/Family In Agreement With Plan yes

## 2017-12-28 NOTE — HPI
72 year old female with multiple complaints. PMH includes HTN, Hyperlipidemia, Depression, Seizure disorder, Cervical Radiculopathy.Multiple admissions in the past with chest pain. Complaints today include chest pain(Left sided, radiating to axilla, Tender to palpation, off and on for last 2 days, sticking in nature, no relation to exertion, Difficult to sleep on left side due to pain, No relieving factors, gets about 2 episodes per day that last 30 mins), Also with nausea, diarrhea x 3 days-10 watery bowel movements, Also with cough and wheezing and chills and cold symptoms.Also complains of BEE, states gets sob when lying on her back-likes to sleep on side.     Previously DSE done 5/2017-Reached target HR , negative EKG and Imaging for ischemia but limited sensitivity due to poor quality images. Scheduled for cardiac PET x 2-doesnot remember being told about it/Knowing about it. Troponin have been previously elevated at 0.04-0.06.     Walks about with cane at home without chest pain.Drives also -Does state that have some problems with memory-does not recall being admitted in September for chest pain.Independent in ADLs.

## 2017-12-29 VITALS
SYSTOLIC BLOOD PRESSURE: 117 MMHG | HEIGHT: 64 IN | BODY MASS INDEX: 30.41 KG/M2 | HEART RATE: 93 BPM | TEMPERATURE: 99 F | DIASTOLIC BLOOD PRESSURE: 67 MMHG | RESPIRATION RATE: 16 BRPM | WEIGHT: 178.13 LBS | OXYGEN SATURATION: 93 %

## 2017-12-29 PROBLEM — D50.9 IRON DEFICIENCY ANEMIA: Status: ACTIVE | Noted: 2017-12-29

## 2017-12-29 LAB
ALBUMIN SERPL BCP-MCNC: 2.8 G/DL
ALP SERPL-CCNC: 94 U/L
ALT SERPL W/O P-5'-P-CCNC: 19 U/L
ANION GAP SERPL CALC-SCNC: 8 MMOL/L
AST SERPL-CCNC: 27 U/L
BASOPHILS # BLD AUTO: 0.01 K/UL
BASOPHILS NFR BLD: 0.5 %
BILIRUB SERPL-MCNC: 0.3 MG/DL
BUN SERPL-MCNC: 10 MG/DL
CALCIUM SERPL-MCNC: 8.1 MG/DL
CHLORIDE SERPL-SCNC: 109 MMOL/L
CO2 SERPL-SCNC: 24 MMOL/L
CREAT SERPL-MCNC: 0.7 MG/DL
DIFFERENTIAL METHOD: ABNORMAL
EOSINOPHIL # BLD AUTO: 0 K/UL
EOSINOPHIL NFR BLD: 1 %
ERYTHROCYTE [DISTWIDTH] IN BLOOD BY AUTOMATED COUNT: 12.5 %
EST. GFR  (AFRICAN AMERICAN): >60 ML/MIN/1.73 M^2
EST. GFR  (NON AFRICAN AMERICAN): >60 ML/MIN/1.73 M^2
GLUCOSE SERPL-MCNC: 81 MG/DL
HCT VFR BLD AUTO: 33.7 %
HGB BLD-MCNC: 11.2 G/DL
IMM GRANULOCYTES # BLD AUTO: 0 K/UL
IMM GRANULOCYTES NFR BLD AUTO: 0 %
LYMPHOCYTES # BLD AUTO: 0.9 K/UL
LYMPHOCYTES NFR BLD: 46.8 %
MAGNESIUM SERPL-MCNC: 1.6 MG/DL
MCH RBC QN AUTO: 30.3 PG
MCHC RBC AUTO-ENTMCNC: 33.2 G/DL
MCV RBC AUTO: 91 FL
MONOCYTES # BLD AUTO: 0.4 K/UL
MONOCYTES NFR BLD: 18.4 %
NEUTROPHILS # BLD AUTO: 0.7 K/UL
NEUTROPHILS NFR BLD: 33.3 %
NRBC BLD-RTO: 0 /100 WBC
PHOSPHATE SERPL-MCNC: 2.9 MG/DL
PLATELET # BLD AUTO: 102 K/UL
PLATELET BLD QL SMEAR: ABNORMAL
PMV BLD AUTO: 10.2 FL
POTASSIUM SERPL-SCNC: 3.5 MMOL/L
PROCALCITONIN SERPL IA-MCNC: 0.04 NG/ML
PROT SERPL-MCNC: 5.9 G/DL
RBC # BLD AUTO: 3.7 M/UL
SODIUM SERPL-SCNC: 141 MMOL/L
WBC # BLD AUTO: 2.01 K/UL

## 2017-12-29 PROCEDURE — 84145 PROCALCITONIN (PCT): CPT

## 2017-12-29 PROCEDURE — G0378 HOSPITAL OBSERVATION PER HR: HCPCS

## 2017-12-29 PROCEDURE — 25000003 PHARM REV CODE 250: Performed by: HOSPITALIST

## 2017-12-29 PROCEDURE — 25000003 PHARM REV CODE 250: Performed by: STUDENT IN AN ORGANIZED HEALTH CARE EDUCATION/TRAINING PROGRAM

## 2017-12-29 PROCEDURE — 84100 ASSAY OF PHOSPHORUS: CPT

## 2017-12-29 PROCEDURE — 85025 COMPLETE CBC W/AUTO DIFF WBC: CPT

## 2017-12-29 PROCEDURE — 83735 ASSAY OF MAGNESIUM: CPT

## 2017-12-29 PROCEDURE — 99217 PR OBSERVATION CARE DISCHARGE: CPT | Mod: ,,, | Performed by: HOSPITALIST

## 2017-12-29 PROCEDURE — 36415 COLL VENOUS BLD VENIPUNCTURE: CPT

## 2017-12-29 PROCEDURE — 80053 COMPREHEN METABOLIC PANEL: CPT

## 2017-12-29 RX ORDER — CARBAMAZEPINE 200 MG/1
200 TABLET ORAL DAILY
Status: DISCONTINUED | OUTPATIENT
Start: 2017-12-30 | End: 2017-12-29 | Stop reason: HOSPADM

## 2017-12-29 RX ORDER — CARBAMAZEPINE 200 MG/1
200 TABLET ORAL DAILY
Start: 2017-12-29 | End: 2019-03-12 | Stop reason: SDDI

## 2017-12-29 RX ORDER — FERROUS SULFATE 325(65) MG
325 TABLET ORAL 2 TIMES DAILY
Refills: 0 | COMMUNITY
Start: 2017-12-29 | End: 2019-03-12 | Stop reason: SDDI

## 2017-12-29 RX ADMIN — ATORVASTATIN CALCIUM 80 MG: 20 TABLET, FILM COATED ORAL at 08:12

## 2017-12-29 RX ADMIN — DIPHENHYDRAMINE HYDROCHLORIDE 50 MG: 50 CAPSULE ORAL at 01:12

## 2017-12-29 RX ADMIN — ASPIRIN 81 MG CHEWABLE TABLET 81 MG: 81 TABLET CHEWABLE at 08:12

## 2017-12-29 RX ADMIN — CARBAMAZEPINE 200 MG: 200 TABLET ORAL at 08:12

## 2017-12-29 NOTE — ASSESSMENT & PLAN NOTE
Iron panel revealed low iron and iron saturation  FOBT negative  Recommend GI referral for possible colonoscopy   Given iron tablets at discharge

## 2017-12-29 NOTE — HOSPITAL COURSE
Patient admitted with acute atypical chest pain with positive cardiac enzymes. Taken for LHC on 12/28 which did not revealed any obstructions. Patient also reports diarrhea on admission. Cdiff and shiga toxin negative. Started on imodium which helped resolve diarrhea. Stable for discharge.

## 2017-12-29 NOTE — PROGRESS NOTES
Discharge instructions provided to and gone over with patient. Understanding verbalized. Tele monitor removed. IV in place until ride arrives.

## 2017-12-29 NOTE — ASSESSMENT & PLAN NOTE
Most likely viral gastroenteritis  Imodium prn  Stool cultures Neg  Stool op neg  Cdiff PCR negative

## 2017-12-29 NOTE — ASSESSMENT & PLAN NOTE
Troponins elevated, trend  CK MB negative  Seen by Cardiology, recommended [facility]  Cardiac monitor: no alarms   If chest pain, repeat 12 lead EKG  Cont asa and statin  Nitroglycerin PRN  LHC negative for obstructive CAD,cont ASA and statin

## 2017-12-29 NOTE — ASSESSMENT & PLAN NOTE
Troponins elevated, trend  Cardiac enzymes  Seen by Cardiology, plan for angiogram in am  Cardiac monitor  If chest pain, repeat 12 lead EKG  Cont asa and statin  Nitroglycerin PRN  LHC negative for obstructive CAD

## 2017-12-29 NOTE — DISCHARGE SUMMARY
[facility] Hospital Medicine  Discharge Summary      Patient Name: Sofia Augustine  MRN: [id number]  Admission Date: 12/27/2017  Hospital Length of Stay: 0 days  Discharge Date and Time:  12/29/2017 3:32 PM  Attending Physician: Jose Mcdaniel MD   Discharging Provider: Jose Mcdaniel MD  Primary Care Provider: Luanne Connell MD  Hospital Medicine Team: [facility] MED X Jose Mcdaniel MD    HPI:   72 year old female with multiple complaints. PMH includes HTN, Hyperlipidemia, Depression, Seizure disorder, Cervical Radiculopathy.Multiple admissions in the past with chest pain. Complaints today include chest pain(Left sided, radiating to axilla, Tender to palpation, off and on for last 2 days, sticking in nature, no relation to exertion, Difficult to sleep on left side due to pain, No relieving factors, gets about 2 episodes per day that last 30 mins), Also with nausea, diarrhea x 3 days-10 watery bowel movements, Also with cough and wheezing and chills and cold symptoms.Also complains of BEE, states gets sob when lying on her back-likes to sleep on side.     Previously DSE done 5/2017-Reached target HR , negative EKG and Imaging for ischemia but limited sensitivity due to poor quality images. Scheduled for cardiac PET x 2-doesnot remember being told about it/Knowing about it. Troponin have been previously elevated at 0.04-0.06.     Walks about with cane at home without chest pain.Drives also -Does state that have some problems with memory-does not recall being admitted in September for chest pain.Independent in ADLs.    Procedure(s) (LRB):  Left heart cath (Left)      Hospital Course:   Patient admitted with acute atypical chest pain with positive cardiac enzymes. Taken for LHC on 12/28 which did not revealed any obstructions. Patient also reports diarrhea on admission. Cdiff and shiga toxin negative. Started on imodium which helped resolve diarrhea. Stable for discharge.      Consults:    Consults         Status Ordering Provider     Inpatient consult to Cardiology  Once     Provider:  (Not yet assigned)    Completed KORINA ABEBE     Inpatient consult to Interventional Cardiology  Once     Provider:  (Not yet assigned)    Completed DONALD VENCES          * Chest pain, rule out acute myocardial infarction    Troponins elevated, trend  CK MB negative  Seen by Cardiology, recommended [facility]  Cardiac monitor: no alarms   If chest pain, repeat 12 lead EKG  Cont asa and statin  Nitroglycerin PRN  LHC negative for obstructive CAD,cont ASA and statin          Iron deficiency anemia    Iron panel revealed low iron and iron saturation  FOBT negative  Recommend GI referral for possible colonoscopy   Given iron tablets at discharge            Diarrhea    Most likely viral gastroenteritis  Imodium prn  Stool cultures Neg  Stool op neg  Cdiff PCR negative             HLD (hyperlipidemia)    Cont statin          Chronic bilateral low back pain without sciatica    Cont gabapentin and percocet PRN          Nonintractable generalized idiopathic epilepsy without status epilepticus    Cont Carbamezapine  Seizure precautions          Essential hypertension    Cont home meds             Final Active Diagnoses:    Diagnosis Date Noted POA    PRINCIPAL PROBLEM:  Chest pain, rule out acute myocardial infarction [R07.9] 05/13/2017 Yes    Iron deficiency anemia [D50.9] 12/29/2017 Yes    HLD (hyperlipidemia) [E78.5] 12/28/2017 Yes    Diarrhea [R19.7] 12/28/2017 Yes    Chronic bilateral low back pain without sciatica [M54.5, G89.29] 01/20/2017 Yes    Nonintractable generalized idiopathic epilepsy without status epilepticus [G40.309] 10/14/2016 Yes    Essential hypertension [I10] 04/03/2016 Yes     Chronic      Problems Resolved During this Admission:    Diagnosis Date Noted Date Resolved POA       Discharged Condition: stable    Disposition: home     Follow Up:  Follow-up Information     Luanne Connell MD On  1/5/2018.    Specialty:  Internal Medicine  Why:  2:40pm  Primary Care follow up  Contact information:  [address] [facility]  [place]  [phone]                 Patient Instructions:   No discharge procedures on file.    Significant Diagnostic Studies: Labs:   CMP   Recent Labs  Lab 12/28/17 0426 12/29/17  0624    141   K 3.5 3.5    109   CO2 21* 24   GLU 89 81   BUN 12 10   CREATININE 0.8 0.7   CALCIUM 8.3* 8.1*   PROT 6.5 5.9*   ALBUMIN 3.0* 2.8*   BILITOT 0.3 0.3   ALKPHOS 110 94   AST 27 27   ALT 18 19   ANIONGAP 10 8   ESTGFRAFRICA >60.0 >60.0   EGFRNONAA >60.0 >60.0   , CBC   Recent Labs  Lab 12/28/17 0426 12/29/17  0624   WBC 2.41* 2.01*   HGB 12.1 11.2*   HCT 35.7* 33.7*   * 102*   , INR   Lab Results   Component Value Date    INR 1.0 05/13/2017    INR 1.1 01/28/2017    INR 1.0 08/27/2016    and Lipid Panel   Lab Results   Component Value Date    CHOL 182 05/13/2017    HDL 47 05/13/2017    LDLCALC 112.8 05/13/2017    TRIG 111 05/13/2017    CHOLHDL 25.8 05/13/2017     Microbiology:   Blood Culture   Lab Results   Component Value Date    LABBLOO No Growth to date 12/28/2017    LABBLOO No Growth to date 12/28/2017    LABBLOO No Growth to date 12/28/2017    LABBLOO No Growth to date 12/28/2017     Radiology: X-Ray: CXR: X-Ray Chest 1 View (CXR): No results found for this visit on 12/27/17.    Pending Diagnostic Studies:     None         Medications:  Reconciled Home Medications:   Current Discharge Medication List      CONTINUE these medications which have CHANGED    Details   carBAMazepine (TEGRETOL) 200 mg tablet Take 1 tablet (200 mg total) by mouth once daily.         CONTINUE these medications which have NOT CHANGED    Details   aspirin 81 MG Chew Take 81 mg by mouth once daily.      atorvastatin (LIPITOR) 80 MG tablet Take 80 mg by mouth once daily.   Refills: 3      gabapentin (NEURONTIN) 300 MG capsule Take one cap at bedtime for one week, than increase to 2 caps  at bedtime at bedtime, if tolerated.  Do not stop abruptly.  Qty: 90 capsule, Refills: 11    Associated Diagnoses: Numbness and tingling; Osteoarthritis of spine with radiculopathy, cervical region; Transient neurological symptoms; Numbness; Left facial numbness      losartan (COZAAR) 50 MG tablet TAKE 1 TABLET(50 MG) BY MOUTH EVERY EVENING  Qty: 90 tablet, Refills: 3      ondansetron (ZOFRAN-ODT) 4 MG TbDL Take 1 tablet (4 mg total) by mouth every 6 (six) hours as needed.  Qty: 20 tablet, Refills: 0         STOP taking these medications       ergocalciferol (ERGOCALCIFEROL) 50,000 unit Cap Comments:   Reason for Stopping:         hydrocodone-acetaminophen 5-325mg (NORCO) 5-325 mg per tablet Comments:   Reason for Stopping:               Indwelling Lines/Drains at time of discharge:   Lines/Drains/Airways          No matching active lines, drains, or airways          Time spent on the discharge of patient: 35 minutes  Patient was seen and examined on the date of discharge and determined to be suitable for discharge.         Jose Mcdaniel MD  Department of Hospital Medicine  [facility]

## 2017-12-29 NOTE — PLAN OF CARE
Problem: Patient Care Overview  Goal: Plan of Care Review  Outcome: Ongoing (interventions implemented as appropriate)  Remains on fall precautions,bed low and locked call bell in reach.No verbal complaints of pain. Tele monitor in place NSR rate in the80's Continue plan of care.

## 2017-12-29 NOTE — PROGRESS NOTES
[facility] Hospital Medicine  Progress Note    Patient Name: Sofia Augustine  MRN: [id number]  Patient Class: OP- Observation   Admission Date: 12/27/2017  Length of Stay: 0 days  Attending Physician: Jose Mcdaniel MD  Primary Care Provider: Luanne Connell MD    [facility] Medicine Team: [facility] MED X Jose Mcdaniel MD    Subjective:     Principal Problem:Chest pain, rule out acute myocardial infarction    HPI:  72 year old female with multiple complaints. PMH includes HTN, Hyperlipidemia, Depression, Seizure disorder, Cervical Radiculopathy.Multiple admissions in the past with chest pain. Complaints today include chest pain(Left sided, radiating to axilla, Tender to palpation, off and on for last 2 days, sticking in nature, no relation to exertion, Difficult to sleep on left side due to pain, No relieving factors, gets about 2 episodes per day that last 30 mins), Also with nausea, diarrhea x 3 days-10 watery bowel movements, Also with cough and wheezing and chills and cold symptoms.Also complains of BEE, states gets sob when lying on her back-likes to sleep on side.     Previously DSE done 5/2017-Reached target HR , negative EKG and Imaging for ischemia but limited sensitivity due to poor quality images. Scheduled for cardiac PET x 2-doesnot remember being told about it/Knowing about it. Troponin have been previously elevated at 0.04-0.06.     Walks about with cane at home without chest pain.Drives also -Does state that have some problems with memory-does not recall being admitted in September for chest pain.Independent in ADLs.    Hospital Course:  Patient admitted with acute atypical chest pain with positive cardiac enzymes. Taken for LHC on 12/28 which did not revealed any obstructions. Patient also reports diarrhea on admission. Cdiff and shiga toxin negative. Started on imodium.     Interval History: Patient seen and examined at bedside, no acute events overnight. Angiogram  performed today which was unremarkable.     Review of Systems   Constitutional: Negative for chills, fatigue and fever.   HENT: Negative for sore throat and trouble swallowing.    Eyes: Positive for pain. Negative for photophobia and visual disturbance.   Respiratory: Negative for cough and shortness of breath.    Cardiovascular: Positive for chest pain. Negative for palpitations and leg swelling.   Gastrointestinal: Negative for abdominal pain, constipation, diarrhea, nausea and vomiting.   Endocrine: Negative for cold intolerance and heat intolerance.   Genitourinary: Negative for dysuria and frequency.   Musculoskeletal: Positive for neck pain. Negative for arthralgias and myalgias.   Skin: Negative for rash and wound.   Neurological: Positive for numbness. Negative for dizziness, syncope, speech difficulty, weakness and light-headedness.   Psychiatric/Behavioral: Negative for confusion and hallucinations.     Objective:     Vital Signs (Most Recent):  Temp: 99.7 °F (37.6 °C) (12/28/17 1952)  Pulse: 88 (12/28/17 1953)  Resp: 20 (12/28/17 1952)  BP: (!) 143/68 (12/28/17 1952)  SpO2: (!) 91 % (12/28/17 1952) Vital Signs (24h Range):  Temp:  [98.3 °F (36.8 °C)-100.7 °F (38.2 °C)] 99.7 °F (37.6 °C)  Pulse:  [] 88  Resp:  [14-20] 20  SpO2:  [91 %-100 %] 91 %  BP: (134-157)/(63-80) 143/68     Weight: 80.8 kg (178 lb 2 oz)  Body mass index is 30.58 kg/m².    Intake/Output Summary (Last 24 hours) at 12/28/17 2119  Last data filed at 12/28/17 0600   Gross per 24 hour   Intake              765 ml   Output                1 ml   Net              764 ml      Physical Exam   Constitutional: She is oriented to person, place, and time. She appears well-developed and well-nourished.   HENT:   Head: Normocephalic and atraumatic.   Mouth/Throat: Oropharynx is clear and moist.   Eyes: EOM are normal. Pupils are equal, round, and reactive to light. No scleral icterus.   Neck: Normal range of motion. Neck supple.    Cardiovascular: Normal rate and regular rhythm.    No murmur heard.  Chest tenderness on midsternal palpation   Pulmonary/Chest: Effort normal and breath sounds normal. No respiratory distress. She has no wheezes.   Abdominal: Soft. Bowel sounds are normal. She exhibits no distension. There is no tenderness.   Musculoskeletal: Normal range of motion. She exhibits no edema.   Neurological: She is alert and oriented to person, place, and time.   Skin: Skin is warm and dry.   Psychiatric: She has a normal mood and affect. Her behavior is normal. Judgment and thought content normal.       Significant Labs:   CBC:   Recent Labs  Lab 12/27/17 0822 12/28/17 0426   WBC 3.89* 2.41*   HGB 13.9 12.1   HCT 41.6 35.7*   * 120*     CMP:   Recent Labs  Lab 12/27/17 0822 12/28/17 0426    139   K 3.7 3.5    108   CO2 22* 21*   GLU 98 89   BUN 14 12   CREATININE 1.1 0.8   CALCIUM 9.1 8.3*   PROT 7.7 6.5   ALBUMIN 3.6 3.0*   BILITOT 0.4 0.3   ALKPHOS 137* 110   AST 30 27   ALT 19 18   ANIONGAP 10 10   EGFRNONAA 49.9* >60.0     Coagulation: No results for input(s): PT, INR, APTT in the last 48 hours.  Magnesium:   Recent Labs  Lab 12/28/17 0426   MG 1.8       Significant Imaging: I have reviewed and interpreted all pertinent imaging results/findings within the past 24 hours.    Assessment/Plan:      * Chest pain, rule out acute myocardial infarction    Troponins elevated, trend  Cardiac enzymes  Seen by Cardiology, plan for angiogram in am  Cardiac monitor  If chest pain, repeat 12 lead EKG  Cont asa and statin  Nitroglycerin PRN  LHC negative for obstructive CAD           Diarrhea    Most likely viral gastroenteritis  Imodium prn  Stool cultures  Stool op  Cdiff PCR negative             HLD (hyperlipidemia)    Cont statin          Chronic bilateral low back pain without sciatica    Cont gabapentin and percocet PRN          Nonintractable generalized idiopathic epilepsy without status epilepticus    Cont  Carbamezapine  Seizure precautions          Essential hypertension    Cont home meds             VTE Risk Mitigation         Ordered     Medium Risk of VTE  Once      12/27/17 0940     Place PAUL hose  Until discontinued      12/27/17 0940     Place sequential compression device  Until discontinued      12/27/17 0940              Jose Mcdaniel MD  Department of Hospital Medicine   [facility]

## 2017-12-29 NOTE — SUBJECTIVE & OBJECTIVE
Interval History: Patient seen and examined at bedside, no acute events overnight. Angiogram performed today which was unremarkable.     Review of Systems   Constitutional: Negative for chills, fatigue and fever.   HENT: Negative for sore throat and trouble swallowing.    Eyes: Positive for pain. Negative for photophobia and visual disturbance.   Respiratory: Negative for cough and shortness of breath.    Cardiovascular: Positive for chest pain. Negative for palpitations and leg swelling.   Gastrointestinal: Negative for abdominal pain, constipation, diarrhea, nausea and vomiting.   Endocrine: Negative for cold intolerance and heat intolerance.   Genitourinary: Negative for dysuria and frequency.   Musculoskeletal: Positive for neck pain. Negative for arthralgias and myalgias.   Skin: Negative for rash and wound.   Neurological: Positive for numbness. Negative for dizziness, syncope, speech difficulty, weakness and light-headedness.   Psychiatric/Behavioral: Negative for confusion and hallucinations.     Objective:     Vital Signs (Most Recent):  Temp: 99.7 °F (37.6 °C) (12/28/17 1952)  Pulse: 88 (12/28/17 1953)  Resp: 20 (12/28/17 1952)  BP: (!) 143/68 (12/28/17 1952)  SpO2: (!) 91 % (12/28/17 1952) Vital Signs (24h Range):  Temp:  [98.3 °F (36.8 °C)-100.7 °F (38.2 °C)] 99.7 °F (37.6 °C)  Pulse:  [] 88  Resp:  [14-20] 20  SpO2:  [91 %-100 %] 91 %  BP: (134-157)/(63-80) 143/68     Weight: 80.8 kg (178 lb 2 oz)  Body mass index is 30.58 kg/m².    Intake/Output Summary (Last 24 hours) at 12/28/17 2119  Last data filed at 12/28/17 0600   Gross per 24 hour   Intake              765 ml   Output                1 ml   Net              764 ml      Physical Exam   Constitutional: She is oriented to person, place, and time. She appears well-developed and well-nourished.   HENT:   Head: Normocephalic and atraumatic.   Mouth/Throat: Oropharynx is clear and moist.   Eyes: EOM are normal. Pupils are equal, round, and  reactive to light. No scleral icterus.   Neck: Normal range of motion. Neck supple.   Cardiovascular: Normal rate and regular rhythm.    No murmur heard.  Chest tenderness on midsternal palpation   Pulmonary/Chest: Effort normal and breath sounds normal. No respiratory distress. She has no wheezes.   Abdominal: Soft. Bowel sounds are normal. She exhibits no distension. There is no tenderness.   Musculoskeletal: Normal range of motion. She exhibits no edema.   Neurological: She is alert and oriented to person, place, and time.   Skin: Skin is warm and dry.   Psychiatric: She has a normal mood and affect. Her behavior is normal. Judgment and thought content normal.       Significant Labs:   CBC:   Recent Labs  Lab 12/27/17 0822 12/28/17 0426   WBC 3.89* 2.41*   HGB 13.9 12.1   HCT 41.6 35.7*   * 120*     CMP:   Recent Labs  Lab 12/27/17 0822 12/28/17 0426    139   K 3.7 3.5    108   CO2 22* 21*   GLU 98 89   BUN 14 12   CREATININE 1.1 0.8   CALCIUM 9.1 8.3*   PROT 7.7 6.5   ALBUMIN 3.6 3.0*   BILITOT 0.4 0.3   ALKPHOS 137* 110   AST 30 27   ALT 19 18   ANIONGAP 10 10   EGFRNONAA 49.9* >60.0     Coagulation: No results for input(s): PT, INR, APTT in the last 48 hours.  Magnesium:   Recent Labs  Lab 12/28/17 0426   MG 1.8       Significant Imaging: I have reviewed and interpreted all pertinent imaging results/findings within the past 24 hours.

## 2017-12-29 NOTE — PLAN OF CARE
Problem: Patient Care Overview  Goal: Plan of Care Review  Outcome: Ongoing (interventions implemented as appropriate)  Patient AAOx4. Safety maintained. Bed locked in low with 2 side rails up. Call light within reach. No complaints of pain at this time.

## 2017-12-31 NOTE — SIGNIFICANT EVENT
Called patient back today to follow up on symptoms after discharge. She has several family members with similar symptoms. Reports she is now coughing green sputum with fevers and chills. Recommended patient to go to the closest urgent care or Emergency room to get tested for Influenza since she was not  Vaccinated this year. Patient expressed understanding.

## 2018-01-02 ENCOUNTER — TELEPHONE (OUTPATIENT)
Dept: INTERNAL MEDICINE | Facility: CLINIC | Age: 73
End: 2018-01-02

## 2018-01-02 LAB
BACTERIA BLD CULT: NORMAL
BACTERIA BLD CULT: NORMAL
BACTERIA STL CULT: NORMAL

## 2018-01-02 NOTE — TELEPHONE ENCOUNTER
----- Message from Laverne Joel MA sent at 1/2/2018  3:36 PM CST -----  Contact: self/[phone]  Please advise  ----- Message -----  From: Corinne Schulte  Sent: 1/2/2018  10:15 AM  To: Ko ANNE Staff    Pt called in regards to the dr calling the hospital dr that she saw on 12-27-17 dr caitlyn jimenes phone number [phone]. He would like to discuss the pt care.        Please advise

## 2018-01-05 ENCOUNTER — OFFICE VISIT (OUTPATIENT)
Dept: INTERNAL MEDICINE | Facility: CLINIC | Age: 73
End: 2018-01-05
Payer: MEDICARE

## 2018-01-05 VITALS
DIASTOLIC BLOOD PRESSURE: 89 MMHG | BODY MASS INDEX: 30.3 KG/M2 | WEIGHT: 177.5 LBS | TEMPERATURE: 99 F | HEIGHT: 64 IN | HEART RATE: 72 BPM | SYSTOLIC BLOOD PRESSURE: 139 MMHG | RESPIRATION RATE: 18 BRPM

## 2018-01-05 DIAGNOSIS — J06.9 UPPER RESPIRATORY TRACT INFECTION, UNSPECIFIED TYPE: Primary | ICD-10-CM

## 2018-01-05 PROCEDURE — 99999 PR PBB SHADOW E&M-EST. PATIENT-LVL III: CPT | Mod: PBBFAC,,, | Performed by: INTERNAL MEDICINE

## 2018-01-05 PROCEDURE — 99213 OFFICE O/P EST LOW 20 MIN: CPT | Mod: S$GLB,,, | Performed by: INTERNAL MEDICINE

## 2018-01-05 RX ORDER — HYDROCODONE BITARTRATE AND ACETAMINOPHEN 5; 325 MG/1; MG/1
1 TABLET ORAL EVERY 8 HOURS PRN
Qty: 40 TABLET | Refills: 0 | Status: SHIPPED | OUTPATIENT
Start: 2018-01-05 | End: 2018-02-23 | Stop reason: SDUPTHER

## 2018-01-05 RX ORDER — ALBUTEROL SULFATE 90 UG/1
2 AEROSOL, METERED RESPIRATORY (INHALATION) EVERY 6 HOURS PRN
Qty: 1 INHALER | Refills: 0 | Status: SHIPPED | OUTPATIENT
Start: 2018-01-05 | End: 2018-02-15 | Stop reason: SDUPTHER

## 2018-01-05 RX ORDER — BENZONATATE 200 MG/1
200 CAPSULE ORAL 3 TIMES DAILY PRN
Qty: 30 CAPSULE | Refills: 1 | Status: SHIPPED | OUTPATIENT
Start: 2018-01-05 | End: 2018-01-15

## 2018-01-05 NOTE — PROGRESS NOTES
CC: Cough, congestion and body  HPI:  The patient is a 72 y.o. year old female who presents to the office for cough, congestion and body.  Her symptoms started on December 27.  She reports cough is productive.  She also complains of back pain, wheezing and low grade fever.  The patient reports pain getting in and out of bed.  She has been taking cough drops and robitussin.  She was recently admitted with chest pain, and had a negative LHC.  CXR performed showed no acute abnormalities.  She is currently taking omnicef, which was prescribed on January 3, 2018 by urgent care.  She complains of nausea and decreased appetite.  She was evaluated for the flu and tested negative at urgent care.    PAST MEDICAL HISTORY:  Past Medical History:   Diagnosis Date    Abnormal mammogram of both breasts     Arthritis     Hypertension     Major depressive disorder 5/14/2017    Memory disorder     Seizures     Stroke        SURGICAL HISTORY:  Past Surgical History:   Procedure Laterality Date    CATARACT EXTRACTION      cysts breast      TONSILLECTOMY         MEDS:  Medcard reviewed and updated    ALLERGIES: Allergy Card reviewed and updated    SOCIAL HISTORY:   The patient is a nonsmoker.    PE:   APPEARANCE: Well nourished, well developed, in no acute distress.    CHEST: Lungs clear to auscultation with unlabored respirations, except occasional wheezing.  CARDIOVASCULAR: Normal S1, S2. No murmurs. No carotid bruits. No pedal edema.  ABDOMEN: Bowel sounds normal. Not distended. Soft. No tenderness or masses.   PSYCHIATRIC: The patient is oriented to person, place, and time and has a pleasant affect.        ASSESSMENT/PLAN:  Sofia was seen today for follow-up, cough, chest congestion, back pain and generalized body aches.    Diagnoses and all orders for this visit:    Upper respiratory tract infection, unspecified type  -     Continue antibiotics  -     Prescribed Tessalon Perles and albuterol inhaler      Other orders  -      benzonatate (TESSALON) 200 MG capsule; Take 1 capsule (200 mg total) by mouth 3 (three) times daily as needed for Cough.  -     albuterol 90 mcg/actuation inhaler; Inhale 2 puffs into the lungs every 6 (six) hours as needed for Wheezing. Rescue  -     hydrocodone-acetaminophen 5-325mg (NORCO) 5-325 mg per tablet; Take 1 tablet by mouth every 8 (eight) hours as needed for Pain.

## 2018-01-24 RX ORDER — ONDANSETRON 4 MG/1
TABLET, ORALLY DISINTEGRATING ORAL
Qty: 20 TABLET | Refills: 0 | Status: SHIPPED | OUTPATIENT
Start: 2018-01-24 | End: 2018-05-24 | Stop reason: SDUPTHER

## 2018-02-05 DIAGNOSIS — R29.818 TRANSIENT NEUROLOGICAL SYMPTOMS: ICD-10-CM

## 2018-02-05 DIAGNOSIS — R20.0 NUMBNESS: ICD-10-CM

## 2018-02-05 DIAGNOSIS — R20.0 LEFT FACIAL NUMBNESS: ICD-10-CM

## 2018-02-05 DIAGNOSIS — M47.22 OSTEOARTHRITIS OF SPINE WITH RADICULOPATHY, CERVICAL REGION: ICD-10-CM

## 2018-02-05 DIAGNOSIS — R20.2 NUMBNESS AND TINGLING: ICD-10-CM

## 2018-02-05 DIAGNOSIS — R20.0 NUMBNESS AND TINGLING: ICD-10-CM

## 2018-02-07 RX ORDER — GABAPENTIN 300 MG/1
CAPSULE ORAL
Qty: 90 CAPSULE | Refills: 11 | OUTPATIENT
Start: 2018-02-07

## 2018-02-07 RX ORDER — ATORVASTATIN CALCIUM 80 MG/1
TABLET, FILM COATED ORAL
Qty: 90 TABLET | Refills: 3 | Status: SHIPPED | OUTPATIENT
Start: 2018-02-07 | End: 2019-03-12 | Stop reason: SDDI

## 2018-02-16 RX ORDER — ALBUTEROL SULFATE 90 UG/1
AEROSOL, METERED RESPIRATORY (INHALATION)
Qty: 18 G | Refills: 0 | Status: SHIPPED | OUTPATIENT
Start: 2018-02-16 | End: 2018-10-02 | Stop reason: SDUPTHER

## 2018-02-21 ENCOUNTER — TELEPHONE (OUTPATIENT)
Dept: INTERNAL MEDICINE | Facility: CLINIC | Age: 73
End: 2018-02-21

## 2018-02-21 RX ORDER — LOPERAMIDE HYDROCHLORIDE 2 MG/1
2 CAPSULE ORAL 3 TIMES DAILY
Qty: 30 CAPSULE | Refills: 0 | Status: SHIPPED | OUTPATIENT
Start: 2018-02-21 | End: 2018-03-03

## 2018-02-21 NOTE — TELEPHONE ENCOUNTER
Patient called and states she is still having the diarrhea  And nausea she states she has meds for that no fever and she is fatigued. Patient states she has been drinking iced tea and no water as she states she does not want anything she ate noodles. Patient states she has no money and she has only 4.00 to last for the rest of the month

## 2018-02-21 NOTE — TELEPHONE ENCOUNTER
----- Message from Elo Renteria sent at 2/20/2018  9:46 AM CST -----  Contact: Patient [phone]  Patient is requesting a call from you. She been having diaherra and feeling weak.    Please call and advise.    Thank You

## 2018-02-23 RX ORDER — HYDROCODONE BITARTRATE AND ACETAMINOPHEN 5; 325 MG/1; MG/1
1 TABLET ORAL EVERY 8 HOURS PRN
Qty: 40 TABLET | Refills: 0 | Status: SHIPPED | OUTPATIENT
Start: 2018-02-23 | End: 2018-04-03 | Stop reason: SDUPTHER

## 2018-02-23 NOTE — TELEPHONE ENCOUNTER
"----- Message from Anju Tam sent at 2/22/2018  2:22 PM CST -----  Contact: self/[phone]  RX request - refill or new RX.  Is this a refill or new RX:refill  RX name and strength: hydrocodone-acetaminophen 5-325mg (NORCO) 5-325 mg per  Directions: Take 1 tablet by mouth every 8 (eight) hours as needed for Pain  Is this a 30 day or 90 day RX:    Pharmacy name and phone # (DON'T enter "on file" or "in chart"):   Comments:        "

## 2018-04-03 RX ORDER — HYDROCODONE BITARTRATE AND ACETAMINOPHEN 5; 325 MG/1; MG/1
1 TABLET ORAL EVERY 8 HOURS PRN
Qty: 40 TABLET | Refills: 0 | Status: SHIPPED | OUTPATIENT
Start: 2018-04-03 | End: 2018-05-04 | Stop reason: SDUPTHER

## 2018-04-03 NOTE — TELEPHONE ENCOUNTER
----- Message from Corinne Schulte sent at 4/3/2018  9:12 AM CDT -----  Contact: self/[phone]  Pt called in regards to getting a Rx for pain. She did not know the name      [facility]'s pharmacy

## 2018-04-26 ENCOUNTER — HOSPITAL ENCOUNTER (OUTPATIENT)
Facility: HOSPITAL | Age: 73
Discharge: HOME | End: 2018-04-27
Attending: FAMILY MEDICINE | Admitting: HOSPITALIST
Payer: MEDICARE

## 2018-04-26 DIAGNOSIS — I10 ESSENTIAL HYPERTENSION: Primary | Chronic | ICD-10-CM

## 2018-04-26 DIAGNOSIS — R20.0 LEFT FACIAL NUMBNESS: ICD-10-CM

## 2018-04-26 DIAGNOSIS — M25.512 ACUTE PAIN OF LEFT SHOULDER: ICD-10-CM

## 2018-04-26 DIAGNOSIS — R07.9 CHEST PAIN: ICD-10-CM

## 2018-04-26 PROBLEM — G40.909 SEIZURE DISORDER: Status: ACTIVE | Noted: 2018-04-26

## 2018-04-26 LAB
ALBUMIN SERPL BCP-MCNC: 4 G/DL
ALP SERPL-CCNC: 138 U/L
ALT SERPL W/O P-5'-P-CCNC: 10 U/L
ANION GAP SERPL CALC-SCNC: 11 MMOL/L
AST SERPL-CCNC: 18 U/L
BASOPHILS # BLD AUTO: 0.03 K/UL
BASOPHILS NFR BLD: 0.4 %
BILIRUB SERPL-MCNC: 0.5 MG/DL
BNP SERPL-MCNC: 34 PG/ML
BUN SERPL-MCNC: 13 MG/DL
CALCIUM SERPL-MCNC: 9.3 MG/DL
CHLORIDE SERPL-SCNC: 107 MMOL/L
CO2 SERPL-SCNC: 22 MMOL/L
CREAT SERPL-MCNC: 0.8 MG/DL
DIFFERENTIAL METHOD: NORMAL
EOSINOPHIL # BLD AUTO: 0.1 K/UL
EOSINOPHIL NFR BLD: 1.1 %
ERYTHROCYTE [DISTWIDTH] IN BLOOD BY AUTOMATED COUNT: 12.9 %
EST. GFR  (AFRICAN AMERICAN): >60 ML/MIN/1.73 M^2
EST. GFR  (NON AFRICAN AMERICAN): >60 ML/MIN/1.73 M^2
GLUCOSE SERPL-MCNC: 91 MG/DL
HCT VFR BLD AUTO: 42.1 %
HGB BLD-MCNC: 13.7 G/DL
IMM GRANULOCYTES # BLD AUTO: 0.01 K/UL
IMM GRANULOCYTES NFR BLD AUTO: 0.1 %
LYMPHOCYTES # BLD AUTO: 1.6 K/UL
LYMPHOCYTES NFR BLD: 22.3 %
MCH RBC QN AUTO: 30.4 PG
MCHC RBC AUTO-ENTMCNC: 32.5 G/DL
MCV RBC AUTO: 93 FL
MONOCYTES # BLD AUTO: 0.7 K/UL
MONOCYTES NFR BLD: 9 %
NEUTROPHILS # BLD AUTO: 4.9 K/UL
NEUTROPHILS NFR BLD: 67.1 %
NRBC BLD-RTO: 0 /100 WBC
PLATELET # BLD AUTO: 202 K/UL
PMV BLD AUTO: 10 FL
POTASSIUM SERPL-SCNC: 3.9 MMOL/L
PROT SERPL-MCNC: 7.8 G/DL
RBC # BLD AUTO: 4.51 M/UL
SODIUM SERPL-SCNC: 140 MMOL/L
TROPONIN I SERPL DL<=0.01 NG/ML-MCNC: 0.04 NG/ML
TROPONIN I SERPL DL<=0.01 NG/ML-MCNC: 0.05 NG/ML
WBC # BLD AUTO: 7.25 K/UL

## 2018-04-26 PROCEDURE — 96374 THER/PROPH/DIAG INJ IV PUSH: CPT

## 2018-04-26 PROCEDURE — 87209 SMEAR COMPLEX STAIN: CPT

## 2018-04-26 PROCEDURE — 85025 COMPLETE CBC W/AUTO DIFF WBC: CPT

## 2018-04-26 PROCEDURE — G0378 HOSPITAL OBSERVATION PER HR: HCPCS

## 2018-04-26 PROCEDURE — 84484 ASSAY OF TROPONIN QUANT: CPT

## 2018-04-26 PROCEDURE — 25000003 PHARM REV CODE 250: Performed by: PHYSICIAN ASSISTANT

## 2018-04-26 PROCEDURE — 80053 COMPREHEN METABOLIC PANEL: CPT

## 2018-04-26 PROCEDURE — 87493 C DIFF AMPLIFIED PROBE: CPT

## 2018-04-26 PROCEDURE — 93005 ELECTROCARDIOGRAM TRACING: CPT

## 2018-04-26 PROCEDURE — 84484 ASSAY OF TROPONIN QUANT: CPT | Mod: 91

## 2018-04-26 PROCEDURE — 87045 FECES CULTURE AEROBIC BACT: CPT

## 2018-04-26 PROCEDURE — 87449 NOS EACH ORGANISM AG IA: CPT

## 2018-04-26 PROCEDURE — 63600175 PHARM REV CODE 636 W HCPCS: Performed by: PHYSICIAN ASSISTANT

## 2018-04-26 PROCEDURE — 83880 ASSAY OF NATRIURETIC PEPTIDE: CPT

## 2018-04-26 PROCEDURE — 87046 STOOL CULTR AEROBIC BACT EA: CPT

## 2018-04-26 PROCEDURE — 87427 SHIGA-LIKE TOXIN AG IA: CPT

## 2018-04-26 PROCEDURE — 99285 EMERGENCY DEPT VISIT HI MDM: CPT | Mod: 25

## 2018-04-26 PROCEDURE — 36415 COLL VENOUS BLD VENIPUNCTURE: CPT

## 2018-04-26 PROCEDURE — 99220 PR INITIAL OBSERVATION CARE,LEVL III: CPT | Mod: ,,, | Performed by: PHYSICIAN ASSISTANT

## 2018-04-26 PROCEDURE — 89055 LEUKOCYTE ASSESSMENT FECAL: CPT

## 2018-04-26 PROCEDURE — 87184 SC STD DISK METHOD PER PLATE: CPT

## 2018-04-26 PROCEDURE — 99284 EMERGENCY DEPT VISIT MOD MDM: CPT | Mod: ,,, | Performed by: PHYSICIAN ASSISTANT

## 2018-04-26 PROCEDURE — 93010 ELECTROCARDIOGRAM REPORT: CPT | Mod: ,,, | Performed by: INTERNAL MEDICINE

## 2018-04-26 RX ORDER — ENOXAPARIN SODIUM 100 MG/ML
40 INJECTION SUBCUTANEOUS EVERY 24 HOURS
Status: DISCONTINUED | OUTPATIENT
Start: 2018-04-27 | End: 2018-04-27 | Stop reason: HOSPADM

## 2018-04-26 RX ORDER — FERROUS SULFATE 325(65) MG
325 TABLET, DELAYED RELEASE (ENTERIC COATED) ORAL 2 TIMES DAILY
Status: DISCONTINUED | OUTPATIENT
Start: 2018-04-27 | End: 2018-04-27 | Stop reason: HOSPADM

## 2018-04-26 RX ORDER — HYDRALAZINE HYDROCHLORIDE 25 MG/1
25 TABLET, FILM COATED ORAL EVERY 8 HOURS PRN
Status: DISCONTINUED | OUTPATIENT
Start: 2018-04-26 | End: 2018-04-27

## 2018-04-26 RX ORDER — IBUPROFEN 200 MG
16 TABLET ORAL
Status: DISCONTINUED | OUTPATIENT
Start: 2018-04-26 | End: 2018-04-27 | Stop reason: HOSPADM

## 2018-04-26 RX ORDER — ACETAMINOPHEN 325 MG/1
650 TABLET ORAL EVERY 4 HOURS PRN
Status: DISCONTINUED | OUTPATIENT
Start: 2018-04-26 | End: 2018-04-27 | Stop reason: HOSPADM

## 2018-04-26 RX ORDER — ATORVASTATIN CALCIUM 20 MG/1
80 TABLET, FILM COATED ORAL DAILY
Status: DISCONTINUED | OUTPATIENT
Start: 2018-04-27 | End: 2018-04-27 | Stop reason: HOSPADM

## 2018-04-26 RX ORDER — CARBAMAZEPINE 200 MG/1
200 TABLET ORAL DAILY
Status: DISCONTINUED | OUTPATIENT
Start: 2018-04-27 | End: 2018-04-27 | Stop reason: HOSPADM

## 2018-04-26 RX ORDER — LOSARTAN POTASSIUM 50 MG/1
50 TABLET ORAL NIGHTLY
Status: DISCONTINUED | OUTPATIENT
Start: 2018-04-26 | End: 2018-04-27 | Stop reason: HOSPADM

## 2018-04-26 RX ORDER — RAMELTEON 8 MG/1
8 TABLET ORAL NIGHTLY PRN
Status: DISCONTINUED | OUTPATIENT
Start: 2018-04-26 | End: 2018-04-27 | Stop reason: HOSPADM

## 2018-04-26 RX ORDER — ONDANSETRON 4 MG/1
4 TABLET, ORALLY DISINTEGRATING ORAL EVERY 8 HOURS PRN
Status: DISCONTINUED | OUTPATIENT
Start: 2018-04-26 | End: 2018-04-27 | Stop reason: HOSPADM

## 2018-04-26 RX ORDER — KETOROLAC TROMETHAMINE 30 MG/ML
15 INJECTION, SOLUTION INTRAMUSCULAR; INTRAVENOUS EVERY 6 HOURS PRN
Status: DISCONTINUED | OUTPATIENT
Start: 2018-04-26 | End: 2018-04-27 | Stop reason: HOSPADM

## 2018-04-26 RX ORDER — AMOXICILLIN 250 MG
1 CAPSULE ORAL 2 TIMES DAILY PRN
Status: DISCONTINUED | OUTPATIENT
Start: 2018-04-26 | End: 2018-04-27 | Stop reason: HOSPADM

## 2018-04-26 RX ORDER — HYDRALAZINE HYDROCHLORIDE 20 MG/ML
5 INJECTION INTRAMUSCULAR; INTRAVENOUS
Status: COMPLETED | OUTPATIENT
Start: 2018-04-26 | End: 2018-04-26

## 2018-04-26 RX ORDER — IPRATROPIUM BROMIDE AND ALBUTEROL SULFATE 2.5; .5 MG/3ML; MG/3ML
3 SOLUTION RESPIRATORY (INHALATION) EVERY 4 HOURS PRN
Status: DISCONTINUED | OUTPATIENT
Start: 2018-04-26 | End: 2018-04-27 | Stop reason: HOSPADM

## 2018-04-26 RX ORDER — GLUCAGON 1 MG
1 KIT INJECTION
Status: DISCONTINUED | OUTPATIENT
Start: 2018-04-26 | End: 2018-04-27 | Stop reason: HOSPADM

## 2018-04-26 RX ORDER — LOPERAMIDE HYDROCHLORIDE 2 MG/1
2 CAPSULE ORAL 4 TIMES DAILY PRN
Status: DISCONTINUED | OUTPATIENT
Start: 2018-04-26 | End: 2018-04-27 | Stop reason: HOSPADM

## 2018-04-26 RX ORDER — PROMETHAZINE HYDROCHLORIDE 25 MG/1
25 TABLET ORAL EVERY 6 HOURS PRN
Status: DISCONTINUED | OUTPATIENT
Start: 2018-04-26 | End: 2018-04-27 | Stop reason: HOSPADM

## 2018-04-26 RX ORDER — ASPIRIN 325 MG
325 TABLET ORAL
Status: COMPLETED | OUTPATIENT
Start: 2018-04-26 | End: 2018-04-26

## 2018-04-26 RX ORDER — NAPROXEN SODIUM 220 MG/1
81 TABLET, FILM COATED ORAL DAILY
Status: DISCONTINUED | OUTPATIENT
Start: 2018-04-27 | End: 2018-04-27 | Stop reason: HOSPADM

## 2018-04-26 RX ORDER — IBUPROFEN 200 MG
24 TABLET ORAL
Status: DISCONTINUED | OUTPATIENT
Start: 2018-04-26 | End: 2018-04-27 | Stop reason: HOSPADM

## 2018-04-26 RX ORDER — GABAPENTIN 300 MG/1
300 CAPSULE ORAL DAILY
Status: DISCONTINUED | OUTPATIENT
Start: 2018-04-27 | End: 2018-04-27 | Stop reason: HOSPADM

## 2018-04-26 RX ORDER — SODIUM CHLORIDE 0.9 % (FLUSH) 0.9 %
5 SYRINGE (ML) INJECTION
Status: DISCONTINUED | OUTPATIENT
Start: 2018-04-26 | End: 2018-04-27 | Stop reason: HOSPADM

## 2018-04-26 RX ADMIN — HYDRALAZINE HYDROCHLORIDE 5 MG: 20 INJECTION INTRAMUSCULAR; INTRAVENOUS at 07:04

## 2018-04-26 RX ADMIN — LOSARTAN POTASSIUM 50 MG: 50 TABLET, FILM COATED ORAL at 09:04

## 2018-04-26 RX ADMIN — ASPIRIN 325 MG ORAL TABLET 325 MG: 325 PILL ORAL at 06:04

## 2018-04-26 NOTE — ASSESSMENT & PLAN NOTE
With negative LHC in 12/2017 and no acute changes in EKG, ACS unlikely. Elevated troponin is likely from uncontrolled blood pressure. The only other circumstance to be concerned of is new plaque rupture which is not obvious on EKG. Would consider IV anti-hypertensive such as hydralazine or labetalol acutely. Consider increasing losartan for chronic control.

## 2018-04-26 NOTE — HPI
72 year old female with a history of HTN, HLD, depression, seizure disorder, cervical radiculopathy and multiple admissions in the past for chest pain who presents with chest pain. Her symptoms started yesterday evening and extend to her left upper ribs, left shoulder joint and left arm. Her symptoms are worse with palpation and unrelated to activity. She has had an extensive workup for chest pain in the past. She was admitted in 12/2017 for similar symptoms at which time she underwent LHC which showed only luminal irregularities in her coronary arteries.     Her ROS is significant for severe diarrhea since yesterday. She has noted water like stools and has had 6 episodes today alone. She denies sick contacts or others with similar symptoms.    On arrival to the ED she was hypertensive to 188/98 with a heart rate of 94. Her EKG was negative for any ischemic changes.  Her troponin was 0.036. Her CXR was unremarkable.

## 2018-04-26 NOTE — ED TRIAGE NOTES
Presents to ER with reproducible chest wall pain and pain with ROM left arm.  States that she feel like a pulled muscle in her neck, arm and chest.  Pt identifiers checked and correct    LOC:   · The pt is awake, alert, and aware of environment with an appropriate affect,  as well as speaking appropriately; AAOx3 to person, place, and situation  APPEARANCE:   · Pt resting comfortably and in no acute distress; clean and well groomed  SKIN:   · Skin is warm and dry; color consistent with ethnicity; pt has normal skin turgor and moist mucus membranes  · Skin intact w/ no breakdown or brusing noted  MUSCULOSKELETAL:   · Pt moving all extremities well; absent of obvious deformities; Ambulates independently  RESPIRATORY:   · Airway is open and patent; respirations are spontaneous; normal effort and rate noted; absent of accessory-muscle use  · Breath sounds auscultated in all lung fields are noted to be clear and absent of adventitious sounds  CARDIAC:   · Pt denies chest pain; normal heart sounds auscultated; Pt has no peripheral edema noted; capillary refill < 3 seconds  · 2+ pulses palpated in all extremities   ABDOMEN:   · Soft and non-tender to palpation; no abnormal distention noted/reported; bowel sounds present x 4  NEUROLOGIC:   · PERRL, 3mm bilaterally, eyes open spontaneously; pt follows commands; facial expression symmetrical; equal hand  bilaterally; purposeful motor response noted  · Normal sensation to touch reported in distal region of all extremities

## 2018-04-26 NOTE — ED PROVIDER NOTES
Encounter Date: 4/26/2018       History     Chief Complaint   Patient presents with    Chest Pain     chest pain since this am with left arm pain. Denies cardiac hx. Chest pain worse with movement of left arm.      Patient is a 72-year-old female with past medical history of hypertension, seizures, and stroke who presents to the emergency department due to chest pain.  Patient states she awoke at 3:00 a.m. this morning with a chest pressure that has been ongoing in nature.  Patient states that the pain radiates to her left arm and is worse with palpation.  Patient denies any associated shortness of breath or diaphoresis.  Patient also complains of diarrhea since yesterday.  Patient denies any fevers, chills, nausea, vomiting, or any other complaints.          Review of patient's allergies indicates:  No Known Allergies  Past Medical History:   Diagnosis Date    Abnormal mammogram of both breasts     Arthritis     Hypertension     Major depressive disorder 5/14/2017    Memory disorder     Seizures     Stroke      Past Surgical History:   Procedure Laterality Date    CATARACT EXTRACTION      cysts breast      TONSILLECTOMY       History reviewed. No pertinent family history.  Social History   Substance Use Topics    Smoking status: Never Smoker    Smokeless tobacco: Never Used    Alcohol use No     Review of Systems   Constitutional: Negative for activity change, appetite change, diaphoresis, fatigue and fever.   HENT: Negative for congestion, dental problem, drooling, ear pain, facial swelling, sore throat and trouble swallowing.    Eyes: Negative for pain, discharge and visual disturbance.   Respiratory: Positive for chest tightness. Negative for apnea, cough and shortness of breath.    Cardiovascular: Negative for chest pain and palpitations.   Gastrointestinal: Negative for abdominal distention, anal bleeding, blood in stool, diarrhea, nausea and vomiting.   Endocrine: Negative for cold intolerance  and polydipsia.   Genitourinary: Negative for decreased urine volume, difficulty urinating, enuresis, frequency and hematuria.   Musculoskeletal: Negative for arthralgias, gait problem, myalgias and neck stiffness.   Skin: Negative for color change and pallor.   Allergic/Immunologic: Negative for environmental allergies.   Neurological: Negative for dizziness, syncope, numbness and headaches.   Psychiatric/Behavioral: Negative for agitation, confusion and dysphoric mood.       Physical Exam     Initial Vitals [04/26/18 1416]   BP Pulse Resp Temp SpO2   (!) 188/98 94 18 98.1 °F (36.7 °C) 95 %      MAP       128         Physical Exam    Nursing note and vitals reviewed.  Constitutional: She appears well-developed and well-nourished. She is not diaphoretic. No distress.   HENT:   Head: Normocephalic and atraumatic.   Neck: Normal range of motion. Neck supple.   Cardiovascular: Normal rate, regular rhythm and normal heart sounds. Exam reveals no gallop and no friction rub.    No murmur heard.  Pulmonary/Chest: Breath sounds normal. She has no wheezes. She has no rhonchi. She has no rales.   Abdominal: Soft. Bowel sounds are normal. There is no tenderness. There is no rebound and no guarding.   Musculoskeletal: Normal range of motion.   Neurological: She is alert and oriented to person, place, and time.   Skin: Skin is warm and dry. No rash noted. No erythema.   Psychiatric: She has a normal mood and affect.         ED Course   Procedures  Labs Reviewed   COMPREHENSIVE METABOLIC PANEL - Abnormal; Notable for the following:        Result Value    CO2 22 (*)     Alkaline Phosphatase 138 (*)     All other components within normal limits    Narrative:     ONE LAVENDER SHARED   TROPONIN I - Abnormal; Notable for the following:     Troponin I 0.036 (*)     All other components within normal limits    Narrative:     ONE LAVENDER SHARED   TROPONIN I - Abnormal; Notable for the following:     Troponin I 0.048 (*)     All other  components within normal limits   CLOSTRIDIUM DIFFICILE   CULTURE, STOOL   CBC W/ AUTO DIFFERENTIAL    Narrative:     ONE LAVENDER SHARED   B-TYPE NATRIURETIC PEPTIDE    Narrative:     ONE LAVENDER SHARED   STOOL EXAM-OVA,CYSTS,PARASITES   WBC, STOOL                   APC / Resident Notes:   Patient is a 72-year-old female with past medical history of hypertension, seizures, and stroke who presents to the emergency department due to chest pain. Physical exam reveals female no acute distress. Heart regular rate and rhythm.  Lungs clear auscultation bilaterally.  Abdomen soft nontender nondistended. Will obtain lab work and reassess.    CBC unremarkable. CMP shows alk-phos of 138.  Troponin 0.036.  BNP 34.  Chest x-ray shows no acute abnormality.  Cardiology will be consulted to evaluate the patient.  Cardiology did see and examine the patient and recommended admission for ACS rule out.  Patient will be given medication for blood pressure control and admitted to Hospital Medicine for further workup.  Stool cultures will be obtained due to patient's persistent diarrhea.  Plan of treatment discussed with attending physician he is agreeable.                 Clinical Impression:   The encounter diagnosis was Chest pain.    Disposition:   Disposition: Admitted  Condition: Stable                        Valentina Givens PA-C  04/26/18 9163

## 2018-04-26 NOTE — PROVIDER PROGRESS NOTES - EMERGENCY DEPT.
Encounter Date: 4/26/2018    ED Physician Progress Notes         EKG - STEMI Decision  Initial Reading: No STEMI present.     Normal EKG.  Normal sinus rhythm.

## 2018-04-26 NOTE — ASSESSMENT & PLAN NOTE
Chest pain is non-anginal in quality. History is more suggestive of a MSK type pain. On exam she is unable to move her left arm above her shoulder and her ribs, GH joint as well as proximal humerus are very tender to palpation. She also has decreased ROM of the GH joint. She is hypertensive to the 180's which may be secondary to pain. Her last angiogram in 12/2017 was with luminal irregularities only, making ACS unlikely.

## 2018-04-26 NOTE — SUBJECTIVE & OBJECTIVE
Past Medical History:   Diagnosis Date    Abnormal mammogram of both breasts     Arthritis     Hypertension     Major depressive disorder 5/14/2017    Memory disorder     Seizures     Stroke        Past Surgical History:   Procedure Laterality Date    CATARACT EXTRACTION      cysts breast      TONSILLECTOMY         Review of patient's allergies indicates:  No Known Allergies    No current facility-administered medications on file prior to encounter.      Current Outpatient Prescriptions on File Prior to Encounter   Medication Sig    aspirin 81 MG Chew Take 81 mg by mouth once daily.    atorvastatin (LIPITOR) 80 MG tablet TAKE 1 TABLET(80 MG) BY MOUTH EVERY DAY    carBAMazepine (TEGRETOL) 200 mg tablet Take 1 tablet (200 mg total) by mouth once daily.    gabapentin (NEURONTIN) 300 MG capsule Take one cap at bedtime for one week, than increase to 2 caps at bedtime at bedtime, if tolerated.  Do not stop abruptly. (Patient taking differently: Take 300 mg by mouth once daily. )    losartan (COZAAR) 50 MG tablet TAKE 1 TABLET(50 MG) BY MOUTH EVERY EVENING    atorvastatin (LIPITOR) 80 MG tablet Take 80 mg by mouth once daily.     ferrous sulfate 325 mg (65 mg iron) Tab tablet Take 1 tablet (325 mg total) by mouth 2 (two) times daily.    hydrocodone-acetaminophen 5-325mg (NORCO) 5-325 mg per tablet Take 1 tablet by mouth every 8 (eight) hours as needed for Pain.    ondansetron (ZOFRAN-ODT) 4 MG TbDL DISSOLVE 1 TABLET(4 MG) ON THE TONGUE EVERY 6 HOURS AS NEEDED    VENTOLIN HFA 90 mcg/actuation inhaler INHALE 2 PUFFS BY MOUTH INTO THE LUNGS EVERY 6 HOURS AS NEEDED FOR WHEEZING THIS IS YOUR RESCUE INHALER     Family History     None        Social History Main Topics    Smoking status: Never Smoker    Smokeless tobacco: Never Used    Alcohol use No    Drug use: No    Sexual activity: No     Review of Systems   Constitution: Negative for chills, diaphoresis, fever and weight loss.   HENT: Negative for  congestion, hoarse voice and sore throat.    Eyes: Negative for blurred vision and double vision.   Cardiovascular: Positive for chest pain. Negative for dyspnea on exertion, irregular heartbeat, leg swelling, near-syncope, orthopnea and palpitations.   Respiratory: Negative for cough, shortness of breath and wheezing.    Endocrine: Negative for cold intolerance and heat intolerance.   Hematologic/Lymphatic: Does not bruise/bleed easily.   Musculoskeletal: Positive for arthritis, joint pain and neck pain.   Gastrointestinal: Positive for diarrhea. Negative for abdominal pain, constipation, nausea and vomiting.   Genitourinary: Negative for dysuria.   Neurological: Negative for focal weakness and sensory change.     Objective:     Vital Signs (Most Recent):  Temp: 98.4 °F (36.9 °C) (04/26/18 1825)  Pulse: 81 (04/26/18 1825)  Resp: 18 (04/26/18 1825)  BP: (!) 188/98 (04/26/18 1825)  SpO2: 98 % (04/26/18 1825) Vital Signs (24h Range):  Temp:  [98.1 °F (36.7 °C)-98.4 °F (36.9 °C)] 98.4 °F (36.9 °C)  Pulse:  [81-94] 81  Resp:  [18] 18  SpO2:  [95 %-98 %] 98 %  BP: (188)/(98) 188/98     Weight: 81.6 kg (180 lb)  Body mass index is 30.9 kg/m².    SpO2: 98 %  O2 Device (Oxygen Therapy): room air    No intake or output data in the 24 hours ending 04/26/18 1843    Lines/Drains/Airways     Peripheral Intravenous Line                 Peripheral IV - Single Lumen 04/26/18 1619 Left Wrist less than 1 day                Physical Exam   Constitutional: She is oriented to person, place, and time. She appears well-developed and well-nourished.   HENT:   Head: Normocephalic and atraumatic.   Eyes: EOM are normal.   Neck: No JVD present.   Cardiovascular: Normal rate, regular rhythm, normal heart sounds and intact distal pulses.  Exam reveals no gallop and no friction rub.    No murmur heard.  Pulmonary/Chest: Effort normal and breath sounds normal. No respiratory distress.   Abdominal: Soft. Bowel sounds are normal. There is no  tenderness.   Musculoskeletal: She exhibits no edema.        Left shoulder: She exhibits decreased range of motion (Cannot raise past shoulder), tenderness, bony tenderness and pain. She exhibits no deformity.   Neurological: She is oriented to person, place, and time.   Skin: Skin is warm and dry.       Significant Labs:   Recent Lab Results       04/26/18  1619      Immature Granulocytes 0.1     Immature Grans (Abs) 0.01  Comment:  Mild elevation in immature granulocytes is non specific and   can be seen in a variety of conditions including stress response,   acute inflammation, trauma and pregnancy. Correlation with other   laboratory and clinical findings is essential.       Albumin 4.0     Alkaline Phosphatase 138(H)     ALT 10     Anion Gap 11     AST 18  Comment:  *Result may be interfered by visible hemolysis     Baso # 0.03     Basophil% 0.4     Total Bilirubin 0.5  Comment:  For infants and newborns, interpretation of results should be based  on gestational age, weight and in agreement with clinical  observations.  Premature Infant recommended reference ranges:  Up to 24 hours.............<8.0 mg/dL  Up to 48 hours............<12.0 mg/dL  3-5 days..................<15.0 mg/dL  6-29 days.................<15.0 mg/dL       BNP 34  Comment:  Values of less than 100 pg/ml are consistent with non-CHF populations.     BUN, Bld 13     Calcium 9.3     Chloride 107     CO2 22(L)     Creatinine 0.8     Differential Method Automated     eGFR if African American >60.0     eGFR if non  >60.0  Comment:  Calculation used to obtain the estimated glomerular filtration  rate (eGFR) is the CKD-EPI equation.        Eos # 0.1     Eosinophil% 1.1     Glucose 91     Gran # (ANC) 4.9     Gran% 67.1     Hematocrit 42.1     Hemoglobin 13.7     Lymph # 1.6     Lymph% 22.3     MCH 30.4     MCHC 32.5     MCV 93     Mono # 0.7     Mono% 9.0     MPV 10.0     nRBC 0     Platelets 202     Potassium 3.9     Total Protein 7.8      RBC 4.51     RDW 12.9     Sodium 140     Troponin I 0.036  Comment:  The reference interval for Troponin I represents the 99th percentile   cutoff   for our facility and is consistent with 3rd generation assay   performance.  (H)     WBC 7.25           Significant Imaging: Echocardiogram:   2D echo with color flow doppler:   Results for orders placed or performed during the hospital encounter of 05/09/16   Echo doppler color flow   Result Value Ref Range    EF 63 55 - 65    Mitral Valve Regurgitation TRIVIAL     Diastolic Dysfunction No     Est. PA Systolic Pressure 28     Tricuspid Valve Regurgitation TRIVIAL TO MILD

## 2018-04-26 NOTE — PROVIDER PROGRESS NOTES - EMERGENCY DEPT.
Encounter Date: 4/26/2018    ED Physician Progress Notes         EKG - STEMI Decision  Initial Reading: No STEMI present.

## 2018-04-27 VITALS
HEART RATE: 97 BPM | RESPIRATION RATE: 20 BRPM | BODY MASS INDEX: 29.74 KG/M2 | HEIGHT: 64 IN | OXYGEN SATURATION: 97 % | TEMPERATURE: 100 F | SYSTOLIC BLOOD PRESSURE: 126 MMHG | DIASTOLIC BLOOD PRESSURE: 77 MMHG | WEIGHT: 174.19 LBS

## 2018-04-27 PROBLEM — M25.512 ACUTE PAIN OF LEFT SHOULDER: Status: ACTIVE | Noted: 2018-04-27

## 2018-04-27 LAB
ANION GAP SERPL CALC-SCNC: 9 MMOL/L
BASOPHILS # BLD AUTO: 0.01 K/UL
BASOPHILS NFR BLD: 0.2 %
BUN SERPL-MCNC: 14 MG/DL
C DIFF GDH STL QL: POSITIVE
C DIFF TOX A+B STL QL IA: NEGATIVE
C DIFF TOX GENS STL QL NAA+PROBE: NEGATIVE
CALCIUM SERPL-MCNC: 8.9 MG/DL
CHLORIDE SERPL-SCNC: 109 MMOL/L
CO2 SERPL-SCNC: 23 MMOL/L
CREAT SERPL-MCNC: 0.7 MG/DL
DIFFERENTIAL METHOD: ABNORMAL
EOSINOPHIL # BLD AUTO: 0.1 K/UL
EOSINOPHIL NFR BLD: 2.5 %
ERYTHROCYTE [DISTWIDTH] IN BLOOD BY AUTOMATED COUNT: 13.1 %
EST. GFR  (AFRICAN AMERICAN): >60 ML/MIN/1.73 M^2
EST. GFR  (NON AFRICAN AMERICAN): >60 ML/MIN/1.73 M^2
GLUCOSE SERPL-MCNC: 97 MG/DL
HCT VFR BLD AUTO: 35.8 %
HGB BLD-MCNC: 11.9 G/DL
IMM GRANULOCYTES # BLD AUTO: 0.01 K/UL
IMM GRANULOCYTES NFR BLD AUTO: 0.2 %
LYMPHOCYTES # BLD AUTO: 1.3 K/UL
LYMPHOCYTES NFR BLD: 25.8 %
MAGNESIUM SERPL-MCNC: 1.7 MG/DL
MCH RBC QN AUTO: 30.4 PG
MCHC RBC AUTO-ENTMCNC: 33.2 G/DL
MCV RBC AUTO: 91 FL
MONOCYTES # BLD AUTO: 0.7 K/UL
MONOCYTES NFR BLD: 12.9 %
NEUTROPHILS # BLD AUTO: 3 K/UL
NEUTROPHILS NFR BLD: 58.4 %
NRBC BLD-RTO: 0 /100 WBC
O+P STL TRI STN: NORMAL
PHOSPHATE SERPL-MCNC: 2.8 MG/DL
PLATELET # BLD AUTO: 170 K/UL
PMV BLD AUTO: 9.9 FL
POTASSIUM SERPL-SCNC: 3.3 MMOL/L
RBC # BLD AUTO: 3.92 M/UL
SODIUM SERPL-SCNC: 141 MMOL/L
TROPONIN I SERPL DL<=0.01 NG/ML-MCNC: 0.03 NG/ML
TROPONIN I SERPL DL<=0.01 NG/ML-MCNC: 0.04 NG/ML
WBC # BLD AUTO: 5.19 K/UL
WBC #/AREA STL HPF: NORMAL /[HPF]

## 2018-04-27 PROCEDURE — 94761 N-INVAS EAR/PLS OXIMETRY MLT: CPT

## 2018-04-27 PROCEDURE — G0378 HOSPITAL OBSERVATION PER HR: HCPCS

## 2018-04-27 PROCEDURE — 63600175 PHARM REV CODE 636 W HCPCS: Performed by: PHYSICIAN ASSISTANT

## 2018-04-27 PROCEDURE — 83735 ASSAY OF MAGNESIUM: CPT

## 2018-04-27 PROCEDURE — 25000003 PHARM REV CODE 250: Performed by: PHYSICIAN ASSISTANT

## 2018-04-27 PROCEDURE — 84484 ASSAY OF TROPONIN QUANT: CPT

## 2018-04-27 PROCEDURE — 80048 BASIC METABOLIC PNL TOTAL CA: CPT

## 2018-04-27 PROCEDURE — 99217 PR OBSERVATION CARE DISCHARGE: CPT | Mod: ,,, | Performed by: PHYSICIAN ASSISTANT

## 2018-04-27 PROCEDURE — 84100 ASSAY OF PHOSPHORUS: CPT

## 2018-04-27 PROCEDURE — 85025 COMPLETE CBC W/AUTO DIFF WBC: CPT

## 2018-04-27 PROCEDURE — 36415 COLL VENOUS BLD VENIPUNCTURE: CPT

## 2018-04-27 RX ORDER — POTASSIUM CHLORIDE 20 MEQ/1
40 TABLET, EXTENDED RELEASE ORAL 2 TIMES DAILY
Status: COMPLETED | OUTPATIENT
Start: 2018-04-27 | End: 2018-04-27

## 2018-04-27 RX ORDER — HYDRALAZINE HYDROCHLORIDE 50 MG/1
50 TABLET, FILM COATED ORAL EVERY 8 HOURS PRN
Status: DISCONTINUED | OUTPATIENT
Start: 2018-04-27 | End: 2018-04-27 | Stop reason: HOSPADM

## 2018-04-27 RX ADMIN — ACETAMINOPHEN 650 MG: 325 TABLET ORAL at 10:04

## 2018-04-27 RX ADMIN — POTASSIUM CHLORIDE 40 MEQ: 1500 TABLET, EXTENDED RELEASE ORAL at 08:04

## 2018-04-27 RX ADMIN — POTASSIUM CHLORIDE 40 MEQ: 1500 TABLET, EXTENDED RELEASE ORAL at 09:04

## 2018-04-27 RX ADMIN — LOPERAMIDE HYDROCHLORIDE 2 MG: 2 CAPSULE ORAL at 04:04

## 2018-04-27 RX ADMIN — GABAPENTIN 300 MG: 300 CAPSULE ORAL at 09:04

## 2018-04-27 RX ADMIN — KETOROLAC TROMETHAMINE 15 MG: 30 INJECTION, SOLUTION INTRAMUSCULAR at 04:04

## 2018-04-27 RX ADMIN — ATORVASTATIN CALCIUM 80 MG: 20 TABLET, FILM COATED ORAL at 09:04

## 2018-04-27 RX ADMIN — FERROUS SULFATE TAB EC 325 MG (65 MG FE EQUIVALENT) 325 MG: 325 (65 FE) TABLET DELAYED RESPONSE at 09:04

## 2018-04-27 RX ADMIN — ASPIRIN 81 MG 81 MG: 81 TABLET ORAL at 09:04

## 2018-04-27 RX ADMIN — LOSARTAN POTASSIUM 50 MG: 50 TABLET, FILM COATED ORAL at 08:04

## 2018-04-27 RX ADMIN — CARBAMAZEPINE 200 MG: 200 TABLET ORAL at 09:04

## 2018-04-27 RX ADMIN — LOPERAMIDE HYDROCHLORIDE 2 MG: 2 CAPSULE ORAL at 08:04

## 2018-04-27 RX ADMIN — FERROUS SULFATE TAB EC 325 MG (65 MG FE EQUIVALENT) 325 MG: 325 (65 FE) TABLET DELAYED RESPONSE at 08:04

## 2018-04-27 RX ADMIN — ENOXAPARIN SODIUM 40 MG: 100 INJECTION SUBCUTANEOUS at 06:04

## 2018-04-27 NOTE — H&P
[facility] Hospital Medicine  History & Physical    Patient Name: Sofia Augustine  MRN: [id number]  Admission Date: 4/26/2018  Attending Physician: Candice Magallon MD   Primary Care Provider: Luanne Connell MD    Hospital Medicine Team: Networked reference to record PCT  Pardeep Duarte PA-C     Patient information was obtained from patient, past medical records and ER records.     Subjective:     Principal Problem:Chest pain    Chief Complaint:   Chief Complaint   Patient presents with    Chest Pain     chest pain since this am with left arm pain. Denies cardiac hx. Chest pain worse with movement of left arm.         HPI: 72 year old female with a history of HTN, HLD, depression, seizure disorder, cervical radiculopathy and multiple admissions in the past for chest pain who presents with acute chest pain.   Patient states that she awoke at 3 am from sleep with mid/L chest pain that constantly radiated to L arm, L shoulder, L ribs.  Pain exacerbated on exertion.  Her symptoms are worse with palpation. L arm pain is worse with certain movements and alleviated at rest.  No numbness/tingling.  Pt denies nausea, diaphoresis, SOB, dizziness.  She has had an extensive workup for chest pain in the past. She was admitted in 12/2017 for similar symptoms at which time she underwent LHC which showed only luminal irregularities in her coronary arteries. This episode feels the same.      Pt also c/o diarrhea x1 day.  x8 episodes at home and x3 episodes while here.  Diarrhea is yellow and watery.  No blood seen.  No previous similar episodes.  Patient has had decreased PO intake, decreased urine output.  Of note, patient has been drinking tea and coke at home- no water.    In ED, cardiology evaluated patient and gave recs.  Suspect MSK as etiology rather than ACS.  /98 on arrival to ED.  Trop .036.  CXR unremarkable.  EKG negative for ischemic changes.      Past Medical History:   Diagnosis Date     Abnormal mammogram of both breasts     Arthritis     Hypertension     Major depressive disorder 5/14/2017    Memory disorder     Seizures     Stroke        Past Surgical History:   Procedure Laterality Date    CATARACT EXTRACTION      cysts breast      TONSILLECTOMY         Review of patient's allergies indicates:  No Known Allergies    No current facility-administered medications on file prior to encounter.      Current Outpatient Prescriptions on File Prior to Encounter   Medication Sig    aspirin 81 MG Chew Take 81 mg by mouth once daily.    atorvastatin (LIPITOR) 80 MG tablet TAKE 1 TABLET(80 MG) BY MOUTH EVERY DAY    carBAMazepine (TEGRETOL) 200 mg tablet Take 1 tablet (200 mg total) by mouth once daily.    gabapentin (NEURONTIN) 300 MG capsule Take one cap at bedtime for one week, than increase to 2 caps at bedtime at bedtime, if tolerated.  Do not stop abruptly. (Patient taking differently: Take 300 mg by mouth once daily. )    losartan (COZAAR) 50 MG tablet TAKE 1 TABLET(50 MG) BY MOUTH EVERY EVENING    atorvastatin (LIPITOR) 80 MG tablet Take 80 mg by mouth once daily.     ferrous sulfate 325 mg (65 mg iron) Tab tablet Take 1 tablet (325 mg total) by mouth 2 (two) times daily.    hydrocodone-acetaminophen 5-325mg (NORCO) 5-325 mg per tablet Take 1 tablet by mouth every 8 (eight) hours as needed for Pain.    ondansetron (ZOFRAN-ODT) 4 MG TbDL DISSOLVE 1 TABLET(4 MG) ON THE TONGUE EVERY 6 HOURS AS NEEDED    VENTOLIN HFA 90 mcg/actuation inhaler INHALE 2 PUFFS BY MOUTH INTO THE LUNGS EVERY 6 HOURS AS NEEDED FOR WHEEZING THIS IS YOUR RESCUE INHALER     Family History     None        Social History Main Topics    Smoking status: Never Smoker    Smokeless tobacco: Never Used    Alcohol use No    Drug use: No    Sexual activity: No     Review of Systems   Constitutional: Positive for appetite change. Negative for activity change, fatigue and fever.   HENT: Negative for congestion,  rhinorrhea and sore throat.    Eyes: Negative for redness, itching and visual disturbance.   Cardiovascular: Positive for chest pain (L sided; resolved). Negative for palpitations and leg swelling.   Gastrointestinal: Positive for diarrhea (x1 day). Negative for abdominal pain, constipation, nausea and vomiting.   Endocrine: Positive for cold intolerance.   Genitourinary: Positive for decreased urine volume (x1 day). Negative for difficulty urinating, dysuria and frequency.   Musculoskeletal: Positive for arthralgias (hand stiffness in AM and arthritis pain to hands), gait problem (uses cane for ambulation) and neck pain (chronic). Negative for myalgias.   Skin: Negative for color change and wound.   Neurological: Positive for dizziness (resolved; at night when ambulating). Negative for weakness, numbness and headaches.     Objective:     Vital Signs (Most Recent):  Temp: 97.1 °F (36.2 °C) (04/26/18 2138)  Pulse: 71 (04/26/18 2138)  Resp: 16 (04/26/18 2138)  BP: (!) 179/95 (04/26/18 2138)  SpO2: 98 % (04/26/18 2138) Vital Signs (24h Range):  Temp:  [97.1 °F (36.2 °C)-98.4 °F (36.9 °C)] 97.1 °F (36.2 °C)  Pulse:  [70-94] 71  Resp:  [16-18] 16  SpO2:  [95 %-100 %] 98 %  BP: (158-188)/(72-98) 179/95     Weight: 81.6 kg (180 lb)  Body mass index is 30.9 kg/m².    Physical Exam   Constitutional: She is oriented to person, place, and time. She appears well-developed and well-nourished.   Pleasant, soft-spoke elderly female in NAD   HENT:   Head: Normocephalic and atraumatic.   Eyes: EOM are normal. Pupils are equal, round, and reactive to light.   Neck: Normal range of motion. Neck supple.   Cardiovascular: Normal rate, regular rhythm, normal heart sounds and intact distal pulses.    Pulmonary/Chest: Effort normal and breath sounds normal. She has no wheezes. She has no rales. She exhibits tenderness (L side).   Abdominal: Soft. Bowel sounds are normal.   Musculoskeletal: Normal range of motion. She exhibits tenderness  (mild; Anterior shoulder).   FROM but slowed and moderately painful to anterior shoulder  Pain with passive ROM, worse with lateral extension of L shoulder  No hand, wrist, elbow tenderness  Chronic L neck tenderness  5/5 Strength to LUE  Subscapularis lift off with pain, but worse when attempting to place arm back into neutral position  Empty can test negative  Unable to complete neers test- could not tolerate full arm extension d/t pain    (-) AC joint tenderness   Neurological: She is alert and oriented to person, place, and time.   Skin: Skin is warm and dry.   Psychiatric: She has a normal mood and affect. Her behavior is normal. Judgment and thought content normal.   Nursing note and vitals reviewed.        CRANIAL NERVES     CN III, IV, VI   Pupils are equal, round, and reactive to light.  Extraocular motions are normal.        Significant Labs:   CBC:   Recent Labs  Lab 04/26/18  1619   WBC 7.25   HGB 13.7   HCT 42.1        CMP:   Recent Labs  Lab 04/26/18  1619      K 3.9      CO2 22*   GLU 91   BUN 13   CREATININE 0.8   CALCIUM 9.3   PROT 7.8   ALBUMIN 4.0   BILITOT 0.5   ALKPHOS 138*   AST 18   ALT 10   ANIONGAP 11   EGFRNONAA >60.0     Troponin:   Recent Labs  Lab 04/26/18  1619 04/26/18  1930   TROPONINI 0.036* 0.048*       Significant Imaging: I have reviewed all pertinent imaging results/findings within the past 24 hours.     X-ray Chest Pa And Lateral    Result Date: 4/26/2018  EXAMINATION: XR CHEST PA AND LATERAL CLINICAL HISTORY: Chest pain, unspecified TECHNIQUE: PA and lateral views of the chest were performed. COMPARISON: 12/27/2017 FINDINGS: The lungs are clear, with normal appearance of pulmonary vasculature and no pleural effusion or pneumothorax. The cardiac silhouette is normal in size. The hilar and mediastinal contours are unremarkable. Bones are intact.     No acute abnormality.    Assessment/Plan:     * Chest pain    Acute pain of left shoulder  71 y/o F with  extensive past workup for chest pain presents acute onset L chest, shoulder, and arm pain that woke her from sleep.      - Suspect MSK in nature. TTP to chest and anterior shoulder and shoulder pain worse with passive and active movements.  Possible rotator cuff tear but strength is intact and less pain on exam than expected.  No falls or trauma to area.  - Supportive care for pain  - PT/OT consult  - will need close ortho f/u  - cardiology consulted in ED for possible ACS but low suspicion at the time  - No ischemic changes on EKG.  Trop .036-->.048-->.036  - will monitor on tele        Essential hypertension    Uncontrolled  - /98 on arrival.  Given hydralazine 5 mg IV-- /81.  Will give QHS losartan 50 mg PO.  - Continue losartan.  May need to increase losartan, but BP likely elevated in setting of acute pain.    - PRN hydralazine        Diarrhea    Diarrhea present during last admission 12/2017. Shiga toxin, C. Diff negative.  Suspected gastroenteritis at the time.  May be related to anxiety/stress.  - Repeat stool studies: C.diff, stool culture, WBCs, Ova, cysts, parasites  - PRN loperamide        Radiculopathy of cervical spine    Stable  - Continue gabapentin 300 mg PO QHS        Seizure disorder    Stable  - continue tegretol 200 mg PO daily  - Alk Phos mildly elevated to 138; AST/ALT WNL  - seizure precautions        HLD (hyperlipidemia)    Stable  - Continue atorvastatin 80 mg PO daily          VTE Risk Mitigation         Ordered     enoxaparin injection 40 mg  Daily      04/26/18 1955     Place PAUL hose  Until discontinued      04/26/18 1955     Place sequential compression device  Until discontinued      04/26/18 1955     IP VTE HIGH RISK PATIENT  Once      04/26/18 1955             Pardeep Duarte PA-C  Department of Hospital Medicine   [facility]

## 2018-04-27 NOTE — ASSESSMENT & PLAN NOTE
Stable  - continue tegretol 200 mg PO daily  - Alk Phos mildly elevated to 138; AST/ALT WNL  - seizure precautions

## 2018-04-27 NOTE — SUBJECTIVE & OBJECTIVE
Past Medical History:   Diagnosis Date    Abnormal mammogram of both breasts     Arthritis     Hypertension     Major depressive disorder 5/14/2017    Memory disorder     Seizures     Stroke        Past Surgical History:   Procedure Laterality Date    CATARACT EXTRACTION      cysts breast      TONSILLECTOMY         Review of patient's allergies indicates:  No Known Allergies    No current facility-administered medications on file prior to encounter.      Current Outpatient Prescriptions on File Prior to Encounter   Medication Sig    aspirin 81 MG Chew Take 81 mg by mouth once daily.    atorvastatin (LIPITOR) 80 MG tablet TAKE 1 TABLET(80 MG) BY MOUTH EVERY DAY    carBAMazepine (TEGRETOL) 200 mg tablet Take 1 tablet (200 mg total) by mouth once daily.    gabapentin (NEURONTIN) 300 MG capsule Take one cap at bedtime for one week, than increase to 2 caps at bedtime at bedtime, if tolerated.  Do not stop abruptly. (Patient taking differently: Take 300 mg by mouth once daily. )    losartan (COZAAR) 50 MG tablet TAKE 1 TABLET(50 MG) BY MOUTH EVERY EVENING    atorvastatin (LIPITOR) 80 MG tablet Take 80 mg by mouth once daily.     ferrous sulfate 325 mg (65 mg iron) Tab tablet Take 1 tablet (325 mg total) by mouth 2 (two) times daily.    hydrocodone-acetaminophen 5-325mg (NORCO) 5-325 mg per tablet Take 1 tablet by mouth every 8 (eight) hours as needed for Pain.    ondansetron (ZOFRAN-ODT) 4 MG TbDL DISSOLVE 1 TABLET(4 MG) ON THE TONGUE EVERY 6 HOURS AS NEEDED    VENTOLIN HFA 90 mcg/actuation inhaler INHALE 2 PUFFS BY MOUTH INTO THE LUNGS EVERY 6 HOURS AS NEEDED FOR WHEEZING THIS IS YOUR RESCUE INHALER     Family History     None        Social History Main Topics    Smoking status: Never Smoker    Smokeless tobacco: Never Used    Alcohol use No    Drug use: No    Sexual activity: No     Review of Systems   Constitutional: Positive for appetite change. Negative for activity change, fatigue and fever.    HENT: Negative for congestion, rhinorrhea and sore throat.    Eyes: Negative for redness, itching and visual disturbance.   Cardiovascular: Positive for chest pain (L sided; resolved). Negative for palpitations and leg swelling.   Gastrointestinal: Positive for diarrhea (x1 day). Negative for abdominal pain, constipation, nausea and vomiting.   Endocrine: Positive for cold intolerance.   Genitourinary: Positive for decreased urine volume (x1 day). Negative for difficulty urinating, dysuria and frequency.   Musculoskeletal: Positive for arthralgias (hand stiffness in AM and arthritis pain to hands), gait problem (uses cane for ambulation) and neck pain (chronic). Negative for myalgias.   Skin: Negative for color change and wound.   Neurological: Positive for dizziness (resolved; at night when ambulating). Negative for weakness, numbness and headaches.     Objective:     Vital Signs (Most Recent):  Temp: 97.1 °F (36.2 °C) (04/26/18 2138)  Pulse: 71 (04/26/18 2138)  Resp: 16 (04/26/18 2138)  BP: (!) 179/95 (04/26/18 2138)  SpO2: 98 % (04/26/18 2138) Vital Signs (24h Range):  Temp:  [97.1 °F (36.2 °C)-98.4 °F (36.9 °C)] 97.1 °F (36.2 °C)  Pulse:  [70-94] 71  Resp:  [16-18] 16  SpO2:  [95 %-100 %] 98 %  BP: (158-188)/(72-98) 179/95     Weight: 81.6 kg (180 lb)  Body mass index is 30.9 kg/m².    Physical Exam   Constitutional: She is oriented to person, place, and time. She appears well-developed and well-nourished.   Pleasant, soft-spoke elderly female in NAD   HENT:   Head: Normocephalic and atraumatic.   Eyes: EOM are normal. Pupils are equal, round, and reactive to light.   Neck: Normal range of motion. Neck supple.   Cardiovascular: Normal rate, regular rhythm, normal heart sounds and intact distal pulses.    Pulmonary/Chest: Effort normal and breath sounds normal. She has no wheezes. She has no rales. She exhibits tenderness (L side).   Abdominal: Soft. Bowel sounds are normal.   Musculoskeletal: Normal range of  motion. She exhibits tenderness (mild; Anterior shoulder).   FROM but slowed and moderately painful to anterior shoulder  Pain with passive ROM, worse with lateral extension of L shoulder  No hand, wrist, elbow tenderness  Chronic L neck tenderness  5/5 Strength to LUE  Subscapularis lift off with pain, but worse when attempting to place arm back into neutral position  Empty can test negative  Unable to complete neers test- could not tolerate full arm extension d/t pain    (-) AC joint tenderness   Neurological: She is alert and oriented to person, place, and time.   Skin: Skin is warm and dry.   Psychiatric: She has a normal mood and affect. Her behavior is normal. Judgment and thought content normal.   Nursing note and vitals reviewed.        CRANIAL NERVES     CN III, IV, VI   Pupils are equal, round, and reactive to light.  Extraocular motions are normal.        Significant Labs:   CBC:   Recent Labs  Lab 04/26/18  1619   WBC 7.25   HGB 13.7   HCT 42.1        CMP:   Recent Labs  Lab 04/26/18  1619      K 3.9      CO2 22*   GLU 91   BUN 13   CREATININE 0.8   CALCIUM 9.3   PROT 7.8   ALBUMIN 4.0   BILITOT 0.5   ALKPHOS 138*   AST 18   ALT 10   ANIONGAP 11   EGFRNONAA >60.0     Troponin:   Recent Labs  Lab 04/26/18  1619 04/26/18  1930   TROPONINI 0.036* 0.048*       Significant Imaging: I have reviewed all pertinent imaging results/findings within the past 24 hours.     X-ray Chest Pa And Lateral    Result Date: 4/26/2018  EXAMINATION: XR CHEST PA AND LATERAL CLINICAL HISTORY: Chest pain, unspecified TECHNIQUE: PA and lateral views of the chest were performed. COMPARISON: 12/27/2017 FINDINGS: The lungs are clear, with normal appearance of pulmonary vasculature and no pleural effusion or pneumothorax. The cardiac silhouette is normal in size. The hilar and mediastinal contours are unremarkable. Bones are intact.     No acute abnormality.

## 2018-04-27 NOTE — PROGRESS NOTES
Pt left the floor for CT on wheelchair with transporter . Pt is A,A,O x 4 . Stable /S , pt on tele .

## 2018-04-27 NOTE — PLAN OF CARE
04/27/18 0915   Discharge Assessment   Assessment Type Discharge Planning Assessment   Confirmed/corrected address and phone number on facesheet? Yes   Expected Length of Stay (days) 2   Communicated expected length of stay with patient/caregiver yes   Prior to hospitilization cognitive status: Alert/Oriented   Prior to hospitalization functional status: Assistive Equipment   Current cognitive status: Alert/Oriented   Current Functional Status: Needs Assistance   Lives With child(leanne), adult  (daughter, Bernarda Nicolas ([phone]))   Able to Return to Prior Arrangements yes   Is patient able to care for self after discharge? Yes   Patient's perception of discharge disposition home or selfcare   Readmission Within The Last 30 Days no previous admission in last 30 days   Patient currently being followed by outpatient case management? No   Patient currently receives any other outside agency services? No   Equipment Currently Used at Home cane, straight   Do you have any problems affording any of your prescribed medications? No   Is the patient taking medications as prescribed? yes   Does the patient have transportation home? Yes   Transportation Available car   Does the patient receive services at the [facility]? No   Discharge Plan A Home with family   Discharge Plan B Home Health   Patient/Family In Agreement With Plan yes     Dx: chest pain, diarrhea (c-diff isol)  Consults: cards & PT/OT  Pharm: [facility]  Hosp f/u appts: Dr. Luanne Connell (PCP) on 5/18/18 at 1040

## 2018-04-27 NOTE — ASSESSMENT & PLAN NOTE
Acute pain of left shoulder  71 y/o F with extensive past workup for chest pain presents acute onset L chest, shoulder, and arm pain that woke her from sleep.      - Suspect MSK in nature. TTP to chest and anterior shoulder and shoulder pain worse with passive and active movements.  Possible rotator cuff tear but strength is intact and less pain on exam than expected.  No falls or trauma to area.  - Supportive care for pain  - PT/OT consult  - will need close ortho f/u  - cardiology consulted in ED for possible ACS but low suspicion at the time  - No ischemic changes on EKG.  Trop .036-->.048-->.036  - will monitor on tele

## 2018-04-27 NOTE — PLAN OF CARE
Problem: Fall Risk (Adult)  Goal: Absence of Falls  Patient will demonstrate the desired outcomes by discharge/transition of care.   Outcome: Ongoing (interventions implemented as appropriate)  Pt remains free of falls and injuries per shift , fall precautions and hourly  round maintained .    Problem: Patient Care Overview  Goal: Plan of Care Review  Outcome: Ongoing (interventions implemented as appropriate)  Plan of care reviewed with , questions and concerns addressed . Pt is A,A,O x 4 . Stable V/S . Pt C/O headache 6/10 and PRN tylenol given . Pt has 6 liquid BM during the day , MD notified and pt received a dose of PRN loperamide capsule 2 mg po . Pt is able to ambulate in the room independently . MD said pt doesn't need isolation precautions . Pt scheduled for head CT pending discharge .

## 2018-04-27 NOTE — PLAN OF CARE
Problem: Patient Care Overview  Goal: Plan of Care Review  Outcome: Ongoing (interventions implemented as appropriate)  Pt continues to have diarrhea. 3 total loose (clear yellow) bms. Special contact isolation initiated while stool specimen is in process. Denies abdominal discomfort. Afebrile. Fluids encouraged. Ketorolac given x 1 for left knee pain. BP is controlled at this time. Pt is able to ambulate independently. Denies chest pain at this time. Pt is still not able to pick anything up with LUE due to discomfort. Call light in reach, pt verbalized understanding on how to call for nurse.

## 2018-04-27 NOTE — PROGRESS NOTES
Pt C/O headache 6/10 in the back of her head , TATA MUÑOZ Dr. Notified and she order to give the pt her PRN tylenol , will follow orders and keep monitoring .

## 2018-04-27 NOTE — ASSESSMENT & PLAN NOTE
Diarrhea present during last admission 12/2017. Shiga toxin, C. Diff negative.  Suspected gastroenteritis at the time.  May be related to anxiety/stress.  - Repeat stool studies: C.diff, stool culture, WBCs, Ova, cysts, parasites  - PRN loperamide

## 2018-04-27 NOTE — ASSESSMENT & PLAN NOTE
Uncontrolled  - /98 on arrival.  Given hydralazine 5 mg IV-- /81.  Will give QHS losartan 50 mg PO.  - Continue losartan.  May need to increase losartan, but BP likely elevated in setting of acute pain.    - PRN hydralazine

## 2018-04-27 NOTE — PROGRESS NOTES
"Pt arrived to room. NAD noted. Able to get to bed with SBA. VS taken. BP was elevated. Losartan was given as ordered. Pt not c/o chest pain but is still having lingering LUE pain that feels like a "sore muscle". Dr. Roberto Worrel present at bedside.  "

## 2018-04-27 NOTE — ED NOTES
Pt states that initially today she was hurting in her chest and left arm 6/10 pain. Presently, pt denies chest pain and only c/o left arm pain 4/10.

## 2018-04-27 NOTE — HPI
72 year old female with a history of HTN, HLD, depression, seizure disorder, cervical radiculopathy and multiple admissions in the past for chest pain who presents with acute chest pain.   Patient states that she awoke at 3 am from sleep with mid/L chest pain that constantly radiated to L arm, L shoulder, L ribs.  Pain exacerbated on exertion.  Her symptoms are worse with palpation. L arm pain is worse with certain movements and alleviated at rest.  No numbness/tingling.  Pt denies nausea, diaphoresis, SOB, dizziness.  She has had an extensive workup for chest pain in the past. She was admitted in 12/2017 for similar symptoms at which time she underwent LHC which showed only luminal irregularities in her coronary arteries. This episode feels the same.      Pt also c/o diarrhea x1 day.  x8 episodes at home and x3 episodes while here.  Diarrhea is yellow and watery.  No blood seen.  No previous similar episodes.  Patient has had decreased PO intake, decreased urine output.  Of note, patient has been drinking tea and coke at home- no water.    In ED, cardiology evaluated patient and gave recs.  Suspect MSK as etiology rather than ACS.  /98 on arrival to ED.  Trop .036.  CXR unremarkable.  EKG negative for ischemic changes.

## 2018-04-27 NOTE — CONSULTS
[facility]  Cardiology  Consult Note    Patient Name: Sofia Augustine  MRN: [id number]  Admission Date: 4/26/2018  Hospital Length of Stay: 0 days  Code Status: Prior   Attending Provider: No att. providers found   Consulting Provider: Nirav Garcia MD  Primary Care Physician: Luanne Connell MD  Principal Problem:Chest pain    Patient information was obtained from patient and ER records.     Inpatient consult to Cardiology  Consult performed by: NIRAV GARCIA  Consult ordered by: TONY SEVILLA        Subjective:     Chief Complaint:  Chest pain     HPI:   72 year old female with a history of HTN, HLD, depression, seizure disorder, cervical radiculopathy and multiple admissions in the past for chest pain who presents with chest pain. Her symptoms started yesterday evening and extend to her left upper ribs, left shoulder joint and left arm. Her symptoms are worse with palpation and unrelated to activity. She has had an extensive workup for chest pain in the past. She was admitted in 12/2017 for similar symptoms at which time she underwent LHC which showed only luminal irregularities in her coronary arteries.     Her ROS is significant for severe diarrhea since yesterday. She has noted water like stools and has had 6 episodes today alone. She denies sick contacts or others with similar symptoms.    On arrival to the ED she was hypertensive to 188/98 with a heart rate of 94. Her EKG was negative for any ischemic changes.  Her troponin was 0.036. Her CXR was unremarkable.     Past Medical History:   Diagnosis Date    Abnormal mammogram of both breasts     Arthritis     Hypertension     Major depressive disorder 5/14/2017    Memory disorder     Seizures     Stroke        Past Surgical History:   Procedure Laterality Date    CATARACT EXTRACTION      cysts breast      TONSILLECTOMY         Review of patient's allergies indicates:  No Known Allergies    No current  facility-administered medications on file prior to encounter.      Current Outpatient Prescriptions on File Prior to Encounter   Medication Sig    aspirin 81 MG Chew Take 81 mg by mouth once daily.    atorvastatin (LIPITOR) 80 MG tablet TAKE 1 TABLET(80 MG) BY MOUTH EVERY DAY    carBAMazepine (TEGRETOL) 200 mg tablet Take 1 tablet (200 mg total) by mouth once daily.    gabapentin (NEURONTIN) 300 MG capsule Take one cap at bedtime for one week, than increase to 2 caps at bedtime at bedtime, if tolerated.  Do not stop abruptly. (Patient taking differently: Take 300 mg by mouth once daily. )    losartan (COZAAR) 50 MG tablet TAKE 1 TABLET(50 MG) BY MOUTH EVERY EVENING    atorvastatin (LIPITOR) 80 MG tablet Take 80 mg by mouth once daily.     ferrous sulfate 325 mg (65 mg iron) Tab tablet Take 1 tablet (325 mg total) by mouth 2 (two) times daily.    hydrocodone-acetaminophen 5-325mg (NORCO) 5-325 mg per tablet Take 1 tablet by mouth every 8 (eight) hours as needed for Pain.    ondansetron (ZOFRAN-ODT) 4 MG TbDL DISSOLVE 1 TABLET(4 MG) ON THE TONGUE EVERY 6 HOURS AS NEEDED    VENTOLIN HFA 90 mcg/actuation inhaler INHALE 2 PUFFS BY MOUTH INTO THE LUNGS EVERY 6 HOURS AS NEEDED FOR WHEEZING THIS IS YOUR RESCUE INHALER     Family History     None        Social History Main Topics    Smoking status: Never Smoker    Smokeless tobacco: Never Used    Alcohol use No    Drug use: No    Sexual activity: No     Review of Systems   Constitution: Negative for chills, diaphoresis, fever and weight loss.   HENT: Negative for congestion, hoarse voice and sore throat.    Eyes: Negative for blurred vision and double vision.   Cardiovascular: Positive for chest pain. Negative for dyspnea on exertion, irregular heartbeat, leg swelling, near-syncope, orthopnea and palpitations.   Respiratory: Negative for cough, shortness of breath and wheezing.    Endocrine: Negative for cold intolerance and heat intolerance.    Hematologic/Lymphatic: Does not bruise/bleed easily.   Musculoskeletal: Positive for arthritis, joint pain and neck pain.   Gastrointestinal: Positive for diarrhea. Negative for abdominal pain, constipation, nausea and vomiting.   Genitourinary: Negative for dysuria.   Neurological: Negative for focal weakness and sensory change.     Objective:     Vital Signs (Most Recent):  Temp: 98.4 °F (36.9 °C) (04/26/18 1825)  Pulse: 81 (04/26/18 1825)  Resp: 18 (04/26/18 1825)  BP: (!) 188/98 (04/26/18 1825)  SpO2: 98 % (04/26/18 1825) Vital Signs (24h Range):  Temp:  [98.1 °F (36.7 °C)-98.4 °F (36.9 °C)] 98.4 °F (36.9 °C)  Pulse:  [81-94] 81  Resp:  [18] 18  SpO2:  [95 %-98 %] 98 %  BP: (188)/(98) 188/98     Weight: 81.6 kg (180 lb)  Body mass index is 30.9 kg/m².    SpO2: 98 %  O2 Device (Oxygen Therapy): room air    No intake or output data in the 24 hours ending 04/26/18 1843    Lines/Drains/Airways     Peripheral Intravenous Line                 Peripheral IV - Single Lumen 04/26/18 1619 Left Wrist less than 1 day                Physical Exam   Constitutional: She is oriented to person, place, and time. She appears well-developed and well-nourished.   HENT:   Head: Normocephalic and atraumatic.   Eyes: EOM are normal.   Neck: No JVD present.   Cardiovascular: Normal rate, regular rhythm, normal heart sounds and intact distal pulses.  Exam reveals no gallop and no friction rub.    No murmur heard.  Pulmonary/Chest: Effort normal and breath sounds normal. No respiratory distress.   Abdominal: Soft. Bowel sounds are normal. There is no tenderness.   Musculoskeletal: She exhibits no edema.        Left shoulder: She exhibits decreased range of motion (Cannot raise past shoulder), tenderness, bony tenderness and pain. She exhibits no deformity.   Neurological: She is oriented to person, place, and time.   Skin: Skin is warm and dry.       Significant Labs:   Recent Lab Results       04/26/18  1619      Immature Granulocytes  0.1     Immature Grans (Abs) 0.01  Comment:  Mild elevation in immature granulocytes is non specific and   can be seen in a variety of conditions including stress response,   acute inflammation, trauma and pregnancy. Correlation with other   laboratory and clinical findings is essential.       Albumin 4.0     Alkaline Phosphatase 138(H)     ALT 10     Anion Gap 11     AST 18  Comment:  *Result may be interfered by visible hemolysis     Baso # 0.03     Basophil% 0.4     Total Bilirubin 0.5  Comment:  For infants and newborns, interpretation of results should be based  on gestational age, weight and in agreement with clinical  observations.  Premature Infant recommended reference ranges:  Up to 24 hours.............<8.0 mg/dL  Up to 48 hours............<12.0 mg/dL  3-5 days..................<15.0 mg/dL  6-29 days.................<15.0 mg/dL       BNP 34  Comment:  Values of less than 100 pg/ml are consistent with non-CHF populations.     BUN, Bld 13     Calcium 9.3     Chloride 107     CO2 22(L)     Creatinine 0.8     Differential Method Automated     eGFR if African American >60.0     eGFR if non  >60.0  Comment:  Calculation used to obtain the estimated glomerular filtration  rate (eGFR) is the CKD-EPI equation.        Eos # 0.1     Eosinophil% 1.1     Glucose 91     Gran # (ANC) 4.9     Gran% 67.1     Hematocrit 42.1     Hemoglobin 13.7     Lymph # 1.6     Lymph% 22.3     MCH 30.4     MCHC 32.5     MCV 93     Mono # 0.7     Mono% 9.0     MPV 10.0     nRBC 0     Platelets 202     Potassium 3.9     Total Protein 7.8     RBC 4.51     RDW 12.9     Sodium 140     Troponin I 0.036  Comment:  The reference interval for Troponin I represents the 99th percentile   cutoff   for our facility and is consistent with 3rd generation assay   performance.  (H)     WBC 7.25           Significant Imaging: Echocardiogram:   2D echo with color flow doppler:   Results for orders placed or performed during the hospital  encounter of 05/09/16   Echo doppler color flow   Result Value Ref Range    EF 63 55 - 65    Mitral Valve Regurgitation TRIVIAL     Diastolic Dysfunction No     Est. PA Systolic Pressure 28     Tricuspid Valve Regurgitation TRIVIAL TO MILD      Assessment and Plan:     * Chest pain    Chest pain is non-anginal in quality. History is more suggestive of a MSK type pain. On exam she is unable to move her left arm above her shoulder and her ribs, GH joint as well as proximal humerus are very tender to palpation. She also has decreased ROM of the GH joint. She is hypertensive to the 180's which may be secondary to pain. Her last angiogram in 12/2017 was with luminal irregularities only, making ACS unlikely.         Elevated troponin    With negative LHC in 12/2017 and no acute changes in EKG, ACS unlikely. Elevated troponin is likely from uncontrolled blood pressure. The only other circumstance to be concerned of is new plaque rupture which is not obvious on EKG. Would consider IV anti-hypertensive such as hydralazine or labetalol acutely. Consider increasing losartan for chronic control.         Diarrhea    Severe diarrhea over the last day, 6 episodes today. Consider workup as indicated.            VTE Risk Mitigation     None          Thank you for your consult.     Nirav Garcia DO  Cardiology Fellow    Cardiology   [facility]

## 2018-04-28 NOTE — PROGRESS NOTES
Pt left with personal belongings. Pt ambulated with nurse by her side to  parking. Pt preferred to drive self home.  dropped car off with pt.

## 2018-04-28 NOTE — PROGRESS NOTES
Pt received discharge paperwork and education. HS medications provided to pt. Pt got dressed independently. NAD noted at time of discharge. Denies CP. PIV removed, catheter intact. Telemetry removed. Pt speaking to family about transportation.

## 2018-04-29 LAB
E COLI SXT1 STL QL IA: NEGATIVE
E COLI SXT2 STL QL IA: NEGATIVE

## 2018-05-01 LAB
BACTERIA STL CULT: NORMAL
BACTERIA STL CULT: NORMAL

## 2018-05-01 NOTE — PLAN OF CARE
05/01/18 1554   Final Note   Assessment Type Final Discharge Note     Patient discharged home with no needs on 4/27/18.

## 2018-05-04 RX ORDER — HYDROCODONE BITARTRATE AND ACETAMINOPHEN 5; 325 MG/1; MG/1
1 TABLET ORAL EVERY 8 HOURS PRN
Qty: 40 TABLET | Refills: 0 | Status: SHIPPED | OUTPATIENT
Start: 2018-05-04 | End: 2018-06-05 | Stop reason: SDUPTHER

## 2018-05-04 NOTE — TELEPHONE ENCOUNTER
"----- Message from Yuliya Archuleta sent at 5/4/2018 12:26 PM CDT -----  Contact: pt [phone]  RX request - refill or new RX.  Is this a refill or new RX:  Refill   RX name and strength: hydrocodone-acetaminophen 5-325mg (NORCO) 5-325 mg per tablet  Directions:   Is this a 30 day or 90 day RX:    Pharmacy name and phone # (DON'T enter "on file" or "in chart"):    Comments:        "

## 2018-05-06 NOTE — DISCHARGE SUMMARY
[facility] Hospital Medicine  Discharge Summary      Patient Name: Sofia Augustine  MRN: [id number]  Admission Date: 4/26/2018  Hospital Length of Stay: 0 days  Discharge Date and Time: 4/27/2018  9:32 PM  Attending Physician: No att. providers found   Discharging Provider: Gertrude Bello PA-C  Primary Care Provider: Luanne Connell MD  Hospital Medicine Team: [facility] HOSP MED E Gertrude Bello PA-C    HPI:   72 year old female with a history of HTN, HLD, depression, seizure disorder, cervical radiculopathy and multiple admissions in the past for chest pain who presents with acute chest pain.   Patient states that she awoke at 3 am from sleep with mid/L chest pain that constantly radiated to L arm, L shoulder, L ribs.  Pain exacerbated on exertion.  Her symptoms are worse with palpation. L arm pain is worse with certain movements and alleviated at rest.  No numbness/tingling.  Pt denies nausea, diaphoresis, SOB, dizziness.  She has had an extensive workup for chest pain in the past. She was admitted in 12/2017 for similar symptoms at which time she underwent LHC which showed only luminal irregularities in her coronary arteries. This episode feels the same.      Pt also c/o diarrhea x1 day.  x8 episodes at home and x3 episodes while here.  Diarrhea is yellow and watery.  No blood seen.  No previous similar episodes.  Patient has had decreased PO intake, decreased urine output.  Of note, patient has been drinking tea and coke at home- no water.    In ED, cardiology evaluated patient and gave recs.  Suspect MSK as etiology rather than ACS.  /98 on arrival to ED.  Trop .036.  CXR unremarkable.  EKG negative for ischemic changes.      * No surgery found *      Hospital Course:   Sofia Augustine was placed in observation for chset pain. Per review, symptoms chronic and consistent with previous admissions. Troponins were flat and EKG was without changes. CT head without acute processes.  Chart reviewed by physician on staff, stable for discharge.     Consults:   Consults         Status Ordering Provider     Inpatient consult to Cardiology  Once     Provider:  (Not yet assigned)    Completed TONY SEVILLA          No new Assessment & Plan notes have been filed under this hospital service since the last note was generated.  Service: Hospital Medicine    Final Active Diagnoses:    Diagnosis Date Noted POA    PRINCIPAL PROBLEM:  Chest pain [R07.9] 05/13/2017 Yes    Acute pain of left shoulder [M25.512] 04/27/2018 Unknown    Seizure disorder [G40.909] 04/26/2018 Unknown    Diarrhea [R19.7] 12/28/2017 Yes    HLD (hyperlipidemia) [E78.5] 12/28/2017 Yes    Radiculopathy of cervical spine [M54.12] 04/03/2016 Yes    Essential hypertension [I10] 04/03/2016 Yes     Chronic      Problems Resolved During this Admission:    Diagnosis Date Noted Date Resolved POA       Discharged Condition: stable    Disposition: Home or Self Care    Follow Up:  Follow-up Information     Luanne Connell MD On 5/18/2018.    Specialty:  Internal Medicine  Why:  at 10:40 AM  Contact information:  [address] [facility] [address]  [phone]                 Patient Instructions:     Diet Adult Regular     Activity as tolerated         Significant Diagnostic Studies: Labs: All labs within the past 24 hours have been reviewed    Pending Diagnostic Studies:     Procedure Component Value Units Date/Time    Pharmacological stress echo [[id number]]     Order Status:  Sent Lab Status:  No result          Medications:  Reconciled Home Medications:      Medication List      CHANGE how you take these medications    gabapentin 300 MG capsule  Commonly known as:  NEURONTIN  Take one cap at bedtime for one week, than increase to 2 caps at bedtime at bedtime, if tolerated. Do not stop abruptly.  What changed:  · how much to take  · how to take this  · when to take this  · additional instructions        CONTINUE taking  these medications    aspirin 81 MG Chew  Take 81 mg by mouth once daily.     * atorvastatin 80 MG tablet  Commonly known as:  LIPITOR  Take 80 mg by mouth once daily.     * atorvastatin 80 MG tablet  Commonly known as:  LIPITOR  TAKE 1 TABLET(80 MG) BY MOUTH EVERY DAY     carBAMazepine 200 mg tablet  Commonly known as:  TEGRETOL  Take 1 tablet (200 mg total) by mouth once daily.     ferrous sulfate 325 mg (65 mg iron) Tab tablet  Take 1 tablet (325 mg total) by mouth 2 (two) times daily.     losartan 50 MG tablet  Commonly known as:  COZAAR  TAKE 1 TABLET(50 MG) BY MOUTH EVERY EVENING     ondansetron 4 MG Tbdl  Commonly known as:  ZOFRAN-ODT  DISSOLVE 1 TABLET(4 MG) ON THE TONGUE EVERY 6 HOURS AS NEEDED     VENTOLIN HFA 90 mcg/actuation inhaler  Generic drug:  albuterol  INHALE 2 PUFFS BY MOUTH INTO THE LUNGS EVERY 6 HOURS AS NEEDED FOR WHEEZING THIS IS YOUR RESCUE INHALER        * This list has 2 medication(s) that are the same as other medications prescribed for you. Read the directions carefully, and ask your doctor or other care provider to review them with you.                Indwelling Lines/Drains at time of discharge:   Lines/Drains/Airways          No matching active lines, drains, or airways          Time spent on the discharge of patient: 30 minutes  Patient was seen and examined on the date of discharge and determined to be suitable for discharge.         Gertrude Bello PA-C  Department of Hospital Medicine  [facility]

## 2018-05-06 NOTE — HOSPITAL COURSE
[facility]. Per review, symptoms chronic and consistent with previous admissions. Troponins were flat and EKG was without changes. CT head without acute processes. Chart reviewed by physician on staff, stable for discharge.

## 2018-05-09 ENCOUNTER — TELEPHONE (OUTPATIENT)
Dept: INTERNAL MEDICINE | Facility: CLINIC | Age: 73
End: 2018-05-09

## 2018-05-09 NOTE — TELEPHONE ENCOUNTER
----- Message from Cecy Hinson sent at 5/9/2018  2:28 PM CDT -----  Contact: Pt [phone]  RX request - refill or new RX.  Is this a refill or new RX:  Refill  RX name and strength: hydrocodone-acetaminophen 5-325mg (NORCO) 5-325 mg per tablet  Directions:   Is this a 30 day or 90 day RX:  30  Pharmacy name and phone #

## 2018-05-09 NOTE — TELEPHONE ENCOUNTER
Rx has been ready for pickup since 5/4/2018.    Called the patient again to advise the Rx is ready for pickup

## 2018-05-10 NOTE — TELEPHONE ENCOUNTER
----- Message from Saundra Nuñez sent at 7/27/2017 11:20 AM CDT -----  Contact: Bethany nadine [facility] [phone]  Requesting orders to conduct a spirometry test . Please advise  Fax # [phone]   05/10/18 1302   Wound Leg Lower Right; Anterior   Date First Assessed/Time First Assessed: 04/26/18 1325   POA: Yes  Wound Type: (c) Other  Location: Leg Lower  Orientation: Right; Anterior   DRESSING STATUS Clean;Moist;Intact   DRESSING TYPE Dry dressing   Wound Length (cm) 13 cm   Wound Width (cm) 13 cm   Wound Depth (cm) 0.1   Wound Surface area (cm^2) 169 cm^2   Condition of Base Pink;Slough   Drainage Amount  Moderate   Drainage Color Serous; Yellow   Wound Odor Musty   Cleansing and Cleansing Agents  Normal saline

## 2018-05-17 DIAGNOSIS — R29.818 TRANSIENT NEUROLOGICAL SYMPTOMS: ICD-10-CM

## 2018-05-17 DIAGNOSIS — M47.22 OSTEOARTHRITIS OF SPINE WITH RADICULOPATHY, CERVICAL REGION: ICD-10-CM

## 2018-05-17 DIAGNOSIS — R20.0 NUMBNESS AND TINGLING: ICD-10-CM

## 2018-05-17 DIAGNOSIS — R20.0 LEFT FACIAL NUMBNESS: ICD-10-CM

## 2018-05-17 DIAGNOSIS — R20.0 NUMBNESS: ICD-10-CM

## 2018-05-17 DIAGNOSIS — R20.2 NUMBNESS AND TINGLING: ICD-10-CM

## 2018-05-18 ENCOUNTER — OFFICE VISIT (OUTPATIENT)
Dept: INTERNAL MEDICINE | Facility: CLINIC | Age: 73
End: 2018-05-18
Payer: MEDICARE

## 2018-05-18 VITALS
TEMPERATURE: 99 F | WEIGHT: 174.38 LBS | SYSTOLIC BLOOD PRESSURE: 144 MMHG | OXYGEN SATURATION: 98 % | DIASTOLIC BLOOD PRESSURE: 80 MMHG | RESPIRATION RATE: 18 BRPM | HEART RATE: 88 BPM | BODY MASS INDEX: 29.77 KG/M2 | HEIGHT: 64 IN

## 2018-05-18 DIAGNOSIS — M79.602 PAIN IN BOTH UPPER EXTREMITIES: ICD-10-CM

## 2018-05-18 DIAGNOSIS — R07.9 CHEST PAIN, UNSPECIFIED TYPE: Primary | ICD-10-CM

## 2018-05-18 DIAGNOSIS — M54.2 CERVICALGIA: ICD-10-CM

## 2018-05-18 DIAGNOSIS — M79.601 PAIN IN BOTH UPPER EXTREMITIES: ICD-10-CM

## 2018-05-18 DIAGNOSIS — R26.89 POOR BALANCE: ICD-10-CM

## 2018-05-18 DIAGNOSIS — R10.9 ABDOMINAL PAIN, UNSPECIFIED ABDOMINAL LOCATION: ICD-10-CM

## 2018-05-18 DIAGNOSIS — R19.7 DIARRHEA, UNSPECIFIED TYPE: ICD-10-CM

## 2018-05-18 PROCEDURE — 3079F DIAST BP 80-89 MM HG: CPT | Mod: CPTII,S$GLB,, | Performed by: INTERNAL MEDICINE

## 2018-05-18 PROCEDURE — 99214 OFFICE O/P EST MOD 30 MIN: CPT | Mod: S$GLB,,, | Performed by: INTERNAL MEDICINE

## 2018-05-18 PROCEDURE — 99999 PR PBB SHADOW E&M-EST. PATIENT-LVL III: CPT | Mod: PBBFAC,,, | Performed by: INTERNAL MEDICINE

## 2018-05-18 PROCEDURE — 3077F SYST BP >= 140 MM HG: CPT | Mod: CPTII,S$GLB,, | Performed by: INTERNAL MEDICINE

## 2018-05-18 RX ORDER — GABAPENTIN 300 MG/1
CAPSULE ORAL
Qty: 180 CAPSULE | Refills: 3 | Status: SHIPPED | OUTPATIENT
Start: 2018-05-18 | End: 2019-03-12 | Stop reason: SDDI

## 2018-05-18 NOTE — PROGRESS NOTES
Transitional Care Note  Subjective:       Patient ID: Sofia Augustine is a 72 y.o. female.  Chief Complaint: Follow-up (hospital visit for: chest pain, left arm pain and frequent diarrhea); Medication Refill; and Arm Pain (bilateral, and unexpected flinching)    Family and/or Caretaker present at visit?  No.  Diagnostic tests reviewed/disposition: No diagnosic tests pending after this hospitalization.  Disease/illness education: Chest pain  Home health/community services discussion/referrals: Patient does not have home health established from hospital visit.  They do not need home health.  If needed, we will set up home health for the patient.   Establishment or re-establishment of referral orders for community resources: No other necessary community resources.   Discussion with other health care providers: No discussion with other health care providers necessary.   CC: followup of hospitalization for chest pain  HPI:  The patient is a 72 y.o. year old female who presents to the office for followup of hospitalization for chest pain.  She presented to the emergency department for acute onset of chest pain that awakened her from sleep at 3 AM.  She reports the pain radiates to her left arm, left shoulder and ribs.  Pain was exacerbated with exertion.  Pain is worse with palpation, and left arm pain worsened with certain movement and alleviated with rest.  She denies any nausea, vomiting, diaphoresis or shortness of breath.  She has had similar symptoms in the past.  She underwent left heart catheter which showed only luminal irregularities in her coronary arteries.  The patient also reported diarrhea ×1 day, 8 episodes at home and 3 episodes in the emergency department.  The patient evaluated by cardiology, suspected musculoskeletal chest pain.  Blood pressure was elevated at 188/98 on arrival to the emergency department.  EKG is negative for ischemic changes and chest x-ray was normal.  She was admitted for  observation.  Troponins were negative and EKG did not show any changes.  Head CT showed no acute process.  She was discharged home in stable condition.  Currently, the patient reports chest pain has resolved, but left arm pain persists.  She also reports diarrhea persists and now has right arm pain.  Arm pain is a constant aching sensation.    PAST MEDICAL HISTORY:  Past Medical History:  No date: Abnormal mammogram of both breasts  No date: Arthritis  No date: Hypertension  5/14/2017: Major depressive disorder  No date: Memory disorder  No date: Seizures  No date: Stroke    SURGICAL HISTORY:  Past Surgical History:  No date: CATARACT EXTRACTION  No date: cysts breast  No date: TONSILLECTOMY    MEDS:  Medcard reviewed and updated    ALLERGIES: Allergy Card reviewed and updated    SOCIAL HISTORY:   The patient is a nonsmoker.          Medication Refill   Associated symptoms include abdominal pain, arthralgias, coughing, fatigue, nausea and neck pain. Pertinent negatives include no chest pain, headaches, vomiting or weakness.   Arm Pain    Pertinent negatives include no chest pain.     Review of Systems   Constitutional: Positive for fatigue.   Respiratory: Positive for cough. Negative for chest tightness and shortness of breath.    Cardiovascular: Negative for chest pain, palpitations and leg swelling.   Gastrointestinal: Positive for abdominal pain, diarrhea and nausea. Negative for blood in stool and vomiting.   Genitourinary: Positive for frequency. Negative for dysuria.   Musculoskeletal: Positive for arthralgias and neck pain.   Neurological: Positive for dizziness. Negative for weakness and headaches.   Psychiatric/Behavioral: Positive for sleep disturbance. The patient is not nervous/anxious.        Objective:      Physical Exam   Constitutional: She is oriented to person, place, and time. She appears well-developed and well-nourished.   Cardiovascular: Normal rate and regular rhythm.    No murmur  heard.  Pulmonary/Chest: Effort normal and breath sounds normal.   Abdominal: Soft. Bowel sounds are normal. She exhibits no distension. There is no tenderness.   Musculoskeletal: She exhibits no edema.   Neurological: She is alert and oriented to person, place, and time.   Skin: Skin is warm and dry.   Psychiatric: She has a normal mood and affect.       Assessment:       1. Chest pain, unspecified type    2. Cervicalgia    3. Poor balance    4. Abdominal pain, unspecified abdominal location    5. Diarrhea, unspecified type    6. Pain in both upper extremities        Plan:     Sofia was seen today for follow-up, medication refill and arm pain.    Diagnoses and all orders for this visit:    Chest pain, unspecified type   -     Resolved    Cervicalgia  -     Likely source of arm pain    Poor balance  -     WALKER FOR HOME USE    Abdominal pain, unspecified abdominal location  -     Comprehensive metabolic panel; Future  -     Amylase; Future  -     Lipase; Future    Diarrhea, unspecified type  -     Clostridium difficile EIA; Future  -     Stool culture; Future  -     Giardia / Cryptosporidum, EIA; Future    Pain in both upper extremities  -     CK; Future  -     Magnesium; Future

## 2018-05-22 ENCOUNTER — LAB VISIT (OUTPATIENT)
Dept: LAB | Facility: HOSPITAL | Age: 73
End: 2018-05-22
Attending: INTERNAL MEDICINE
Payer: MEDICARE

## 2018-05-22 ENCOUNTER — TELEPHONE (OUTPATIENT)
Dept: INTERNAL MEDICINE | Facility: CLINIC | Age: 73
End: 2018-05-22

## 2018-05-22 DIAGNOSIS — M79.602 PAIN IN BOTH UPPER EXTREMITIES: ICD-10-CM

## 2018-05-22 DIAGNOSIS — R10.9 ABDOMINAL PAIN, UNSPECIFIED ABDOMINAL LOCATION: ICD-10-CM

## 2018-05-22 DIAGNOSIS — M79.601 PAIN IN BOTH UPPER EXTREMITIES: ICD-10-CM

## 2018-05-22 LAB
ALBUMIN SERPL BCP-MCNC: 3.7 G/DL
ALP SERPL-CCNC: 129 U/L
ALT SERPL W/O P-5'-P-CCNC: 8 U/L
AMYLASE SERPL-CCNC: 50 U/L
ANION GAP SERPL CALC-SCNC: 9 MMOL/L
AST SERPL-CCNC: 12 U/L
BILIRUB SERPL-MCNC: 0.8 MG/DL
BUN SERPL-MCNC: 14 MG/DL
CALCIUM SERPL-MCNC: 9.6 MG/DL
CHLORIDE SERPL-SCNC: 105 MMOL/L
CK SERPL-CCNC: 38 U/L
CO2 SERPL-SCNC: 27 MMOL/L
CREAT SERPL-MCNC: 1 MG/DL
EST. GFR  (AFRICAN AMERICAN): >60 ML/MIN/1.73 M^2
EST. GFR  (NON AFRICAN AMERICAN): 56 ML/MIN/1.73 M^2
GLUCOSE SERPL-MCNC: 111 MG/DL
LIPASE SERPL-CCNC: 20 U/L
MAGNESIUM SERPL-MCNC: 2 MG/DL
POTASSIUM SERPL-SCNC: 4 MMOL/L
PROT SERPL-MCNC: 7.3 G/DL
SODIUM SERPL-SCNC: 141 MMOL/L

## 2018-05-22 PROCEDURE — 36415 COLL VENOUS BLD VENIPUNCTURE: CPT | Mod: PO

## 2018-05-22 PROCEDURE — 83735 ASSAY OF MAGNESIUM: CPT

## 2018-05-22 PROCEDURE — 80053 COMPREHEN METABOLIC PANEL: CPT

## 2018-05-22 PROCEDURE — 82150 ASSAY OF AMYLASE: CPT

## 2018-05-22 PROCEDURE — 82550 ASSAY OF CK (CPK): CPT

## 2018-05-22 PROCEDURE — 83690 ASSAY OF LIPASE: CPT

## 2018-05-22 NOTE — TELEPHONE ENCOUNTER
----- Message from Nina Leal sent at 5/22/2018  1:27 PM CDT -----  Contact: self/[phone]  Pt would like to notify the doctor that the lab work she did not complete on 05/18 has been completed today 05/22. This lab work is associated to the [facility] from appt date 05/18

## 2018-05-24 ENCOUNTER — TELEPHONE (OUTPATIENT)
Dept: INTERNAL MEDICINE | Facility: CLINIC | Age: 73
End: 2018-05-24

## 2018-05-24 ENCOUNTER — OFFICE VISIT (OUTPATIENT)
Dept: INTERNAL MEDICINE | Facility: CLINIC | Age: 73
End: 2018-05-24
Payer: MEDICARE

## 2018-05-24 ENCOUNTER — LAB VISIT (OUTPATIENT)
Dept: LAB | Facility: HOSPITAL | Age: 73
End: 2018-05-24
Attending: INTERNAL MEDICINE
Payer: MEDICARE

## 2018-05-24 VITALS
DIASTOLIC BLOOD PRESSURE: 69 MMHG | RESPIRATION RATE: 16 BRPM | TEMPERATURE: 99 F | WEIGHT: 172.19 LBS | HEART RATE: 140 BPM | BODY MASS INDEX: 29.4 KG/M2 | SYSTOLIC BLOOD PRESSURE: 104 MMHG | HEIGHT: 64 IN

## 2018-05-24 DIAGNOSIS — R82.90 FOUL SMELLING URINE: ICD-10-CM

## 2018-05-24 DIAGNOSIS — J06.9 UPPER RESPIRATORY TRACT INFECTION, UNSPECIFIED TYPE: Primary | ICD-10-CM

## 2018-05-24 DIAGNOSIS — J02.9 SORE THROAT: ICD-10-CM

## 2018-05-24 DIAGNOSIS — R68.83 CHILLS: ICD-10-CM

## 2018-05-24 DIAGNOSIS — R11.0 NAUSEA: ICD-10-CM

## 2018-05-24 DIAGNOSIS — G40.909 SEIZURE DISORDER: ICD-10-CM

## 2018-05-24 LAB
BASOPHILS # BLD AUTO: 0.04 K/UL
BASOPHILS NFR BLD: 0.5 %
DEPRECATED S PYO AG THROAT QL EIA: NEGATIVE
DIFFERENTIAL METHOD: NORMAL
EOSINOPHIL # BLD AUTO: 0 K/UL
EOSINOPHIL NFR BLD: 0.5 %
ERYTHROCYTE [DISTWIDTH] IN BLOOD BY AUTOMATED COUNT: 12.4 %
HCT VFR BLD AUTO: 42.4 %
HGB BLD-MCNC: 13.7 G/DL
IMM GRANULOCYTES # BLD AUTO: 0.01 K/UL
IMM GRANULOCYTES NFR BLD AUTO: 0.1 %
LYMPHOCYTES # BLD AUTO: 2 K/UL
LYMPHOCYTES NFR BLD: 26.5 %
MCH RBC QN AUTO: 30.2 PG
MCHC RBC AUTO-ENTMCNC: 32.3 G/DL
MCV RBC AUTO: 93 FL
MONOCYTES # BLD AUTO: 0.8 K/UL
MONOCYTES NFR BLD: 10.5 %
NEUTROPHILS # BLD AUTO: 4.6 K/UL
NEUTROPHILS NFR BLD: 61.9 %
NRBC BLD-RTO: 0 /100 WBC
PLATELET # BLD AUTO: 198 K/UL
PMV BLD AUTO: 10.8 FL
RBC # BLD AUTO: 4.54 M/UL
WBC # BLD AUTO: 7.41 K/UL

## 2018-05-24 PROCEDURE — 99999 PR PBB SHADOW E&M-EST. PATIENT-LVL V: CPT | Mod: PBBFAC,,, | Performed by: INTERNAL MEDICINE

## 2018-05-24 PROCEDURE — 85025 COMPLETE CBC W/AUTO DIFF WBC: CPT

## 2018-05-24 PROCEDURE — 99499 UNLISTED E&M SERVICE: CPT | Mod: S$GLB,,, | Performed by: INTERNAL MEDICINE

## 2018-05-24 PROCEDURE — 3078F DIAST BP <80 MM HG: CPT | Mod: CPTII,S$GLB,, | Performed by: INTERNAL MEDICINE

## 2018-05-24 PROCEDURE — 36415 COLL VENOUS BLD VENIPUNCTURE: CPT | Mod: PO

## 2018-05-24 PROCEDURE — 87880 STREP A ASSAY W/OPTIC: CPT | Mod: PO

## 2018-05-24 PROCEDURE — 3074F SYST BP LT 130 MM HG: CPT | Mod: CPTII,S$GLB,, | Performed by: INTERNAL MEDICINE

## 2018-05-24 PROCEDURE — 99214 OFFICE O/P EST MOD 30 MIN: CPT | Mod: S$GLB,,, | Performed by: INTERNAL MEDICINE

## 2018-05-24 PROCEDURE — 87081 CULTURE SCREEN ONLY: CPT

## 2018-05-24 RX ORDER — AMOXICILLIN 500 MG/1
500 CAPSULE ORAL EVERY 12 HOURS
Qty: 14 CAPSULE | Refills: 0 | Status: SHIPPED | OUTPATIENT
Start: 2018-05-24 | End: 2018-05-31

## 2018-05-24 RX ORDER — ONDANSETRON 4 MG/1
4 TABLET, ORALLY DISINTEGRATING ORAL EVERY 12 HOURS PRN
Qty: 20 TABLET | Refills: 0 | Status: SHIPPED | OUTPATIENT
Start: 2018-05-24 | End: 2019-03-12 | Stop reason: SDDI

## 2018-05-24 NOTE — TELEPHONE ENCOUNTER
Spoke with patient. Results reviewed per Dr. Appiah notes. Verbalized understanding. Will call with further concerns

## 2018-05-24 NOTE — TELEPHONE ENCOUNTER
Please inform patient that labs are significant for mildly depressed kidney filtration and mildly elevated glucose.  Stool studies are negative to date.  Encourage adequate hydration.

## 2018-05-24 NOTE — TELEPHONE ENCOUNTER
----- Message from Felipa Appiah MD sent at 5/24/2018 11:18 AM CDT -----  Rapid strep is negative. Culture results will be available in 2 days.

## 2018-05-24 NOTE — PROGRESS NOTES
Subjective:       Patient ID: Sofia Augustine is a 72 y.o. female who presents for Cough (sweats, chills); Sore Throat; and Urinary Frequency      Sore Throat    This is a new problem. The current episode started in the past 7 days. The problem has been unchanged. Neither side of throat is experiencing more pain than the other. There has been no fever. The pain is moderate. Associated symptoms include congestion, coughing, diarrhea, neck pain and trouble swallowing. Pertinent negatives include no abdominal pain, ear discharge, ear pain, headaches, hoarse voice, plugged ear sensation, shortness of breath, swollen glands or vomiting. She has had no exposure to strep. She has tried nothing for the symptoms.   Cough   This is a new problem. The current episode started in the past 7 days. The problem has been unchanged. The problem occurs constantly. The cough is non-productive. Associated symptoms include chills, myalgias, nasal congestion, postnasal drip, rhinorrhea, a sore throat and sweats. Pertinent negatives include no chest pain, ear congestion, ear pain, eye redness, headaches, rash, shortness of breath or wheezing. Nothing aggravates the symptoms. She has tried nothing for the symptoms. There is no history of asthma or environmental allergies.   Urinary Frequency    This is a new problem. The current episode started in the past 7 days. The problem occurs intermittently. The problem has been waxing and waning. The patient is experiencing no pain. There has been no fever. Associated symptoms include chills, frequency, nausea and sweats. Pertinent negatives include no flank pain, hematuria, hesitancy, urgency, vomiting, constipation or rash. She has tried nothing for the symptoms. There is no history of diabetes mellitus.        Review of Systems   Constitutional: Positive for chills and diaphoresis. Negative for fatigue.   HENT: Positive for congestion, postnasal drip, rhinorrhea, sneezing, sore throat and  trouble swallowing. Negative for ear discharge, ear pain and hoarse voice.    Eyes: Negative for redness and visual disturbance.   Respiratory: Positive for cough. Negative for chest tightness, shortness of breath and wheezing.    Cardiovascular: Negative for chest pain, palpitations and leg swelling.   Gastrointestinal: Positive for diarrhea and nausea. Negative for abdominal pain, constipation and vomiting.   Genitourinary: Positive for frequency. Negative for dysuria, flank pain, hematuria, hesitancy and urgency.   Musculoskeletal: Positive for arthralgias (left shoulder pain), myalgias and neck pain.   Skin: Negative for rash.   Allergic/Immunologic: Negative for environmental allergies.   Neurological: Positive for dizziness and seizures (reports that she denies seeing a Neurologist but was seen in 2016, takes tegretol). Negative for light-headedness and headaches.   Hematological: Negative for adenopathy.   Psychiatric/Behavioral: The patient is not nervous/anxious.        Objective:      Physical Exam   Constitutional: She is oriented to person, place, and time. Vital signs are normal. She appears well-developed and well-nourished. No distress.   HENT:   Head: Normocephalic and atraumatic.   Right Ear: Hearing, tympanic membrane, external ear and ear canal normal. Tympanic membrane is not erythematous and not bulging.   Left Ear: Hearing, tympanic membrane, external ear and ear canal normal. Tympanic membrane is not erythematous and not bulging.   Nose: Nose normal. No mucosal edema or rhinorrhea. Right sinus exhibits no maxillary sinus tenderness and no frontal sinus tenderness. Left sinus exhibits no maxillary sinus tenderness and no frontal sinus tenderness.   Mouth/Throat: Uvula is midline and mucous membranes are normal. Posterior oropharyngeal erythema present. No oropharyngeal exudate.   Eyes: Lids are normal.   Cardiovascular: Normal rate, regular rhythm, normal heart sounds and intact distal pulses.     No murmur heard.  Pulmonary/Chest: Effort normal and breath sounds normal. She has no wheezes.   Abdominal: Soft. Bowel sounds are normal. She exhibits no distension. There is no tenderness. There is no rigidity, no rebound, no guarding and no CVA tenderness.   Musculoskeletal: Normal range of motion. She exhibits no edema.   Neurological: She is alert and oriented to person, place, and time.   Skin: Skin is warm, dry and intact. No rash noted. She is not diaphoretic.   Vitals reviewed.      Assessment:       1. Upper respiratory tract infection, unspecified type    2. Sore throat    3. Chills    4. Nausea    5. Foul smelling urine    6. Seizure disorder        Plan:       1. Upper respiratory tract infection, unspecified type  - manage symptoms, use antihistamine as needed    2. Sore throat  - Throat Screen, Rapid  - amoxicillin (AMOXIL) 500 MG capsule; Take 1 capsule (500 mg total) by mouth every 12 (twelve) hours.  Dispense: 14 capsule; Refill: 0  - chloraseptic spray as needed for throat pain    3. Chills  - CBC auto differential; Future    4. Nausea  - Urinalysis; Future  - ondansetron (ZOFRAN-ODT) 4 MG TbDL; Take 1 tablet (4 mg total) by mouth every 12 (twelve) hours as needed.  Dispense: 20 tablet; Refill: 0    5. Foul smelling urine  - Urinalysis; Future  - Urine culture; Future  - CBC auto differential; Future    6. Seizure disorder  - Ambulatory Referral to Neurology    Call if no improvement in symptoms    Felipa Appiah MD

## 2018-05-24 NOTE — TELEPHONE ENCOUNTER
"Called to advise the patient of the labs results "Please inform patient that labs are significant for mildly depressed kidney filtration and mildly elevated glucose.  Stool studies are negative to date.  Encourage adequate hydration."    Spoke with the patient and advised of the labs and she understood and she states she wants to drink crystal light advise she can but try and drink at least 8 8oz glasses of water. She agreed and termed the call   "

## 2018-05-26 LAB — BACTERIA THROAT CULT: NORMAL

## 2018-06-05 RX ORDER — HYDROCODONE BITARTRATE AND ACETAMINOPHEN 5; 325 MG/1; MG/1
1 TABLET ORAL EVERY 8 HOURS PRN
Qty: 40 TABLET | Refills: 0 | Status: SHIPPED | OUTPATIENT
Start: 2018-06-05 | End: 2018-07-19 | Stop reason: SDUPTHER

## 2018-06-05 NOTE — TELEPHONE ENCOUNTER
"----- Message from Thuy Nuñez sent at 6/5/2018 10:21 AM CDT -----  Contact: [phone]  RX request - refill or new RX.  Is this a refill or new RX:  Refill  RX name and strength:hydrocodone-acetaminophen 5-325mg (NORCO) 5-325 mg per tablet   Directions:   Is this a 30 day or 90 day RX:    Local pharmacy or mail order pharmacy:    Pharmacy name and phone # (DON'T enter "on file" or "in chart"):  from office  Comments:        "

## 2018-07-19 DIAGNOSIS — M47.22 OSTEOARTHRITIS OF SPINE WITH RADICULOPATHY, CERVICAL REGION: Primary | ICD-10-CM

## 2018-07-19 NOTE — TELEPHONE ENCOUNTER
"----- Message from Yuliya Kathe sent at 7/19/2018  1:42 PM CDT -----  Contact: pt [phone]  RX request - refill or new RX.  Is this a refill or new RX:  Refill   RX name and strength: HYDROcodone-acetaminophen (NORCO) 5-325 mg per tablet  Directions:   Is this a 30 day or 90 day RX:    Local pharmacy or mail order pharmacy:    Pharmacy name and phone # (DON'T enter "on file" or "in chart"):    Comments:        Pt also would like a call back regarding a sore throat that she has been having. Please advise.   "

## 2018-07-20 ENCOUNTER — TELEPHONE (OUTPATIENT)
Dept: INTERNAL MEDICINE | Facility: CLINIC | Age: 73
End: 2018-07-20

## 2018-07-20 DIAGNOSIS — Z12.39 BREAST CANCER SCREENING: ICD-10-CM

## 2018-07-20 RX ORDER — HYDROCODONE BITARTRATE AND ACETAMINOPHEN 5; 325 MG/1; MG/1
1 TABLET ORAL EVERY 8 HOURS PRN
Qty: 40 TABLET | Refills: 0 | Status: SHIPPED | OUTPATIENT
Start: 2018-07-20 | End: 2018-08-20 | Stop reason: SDUPTHER

## 2018-07-20 NOTE — TELEPHONE ENCOUNTER
"----- Message from Júniortaylor Velásquez sent at 7/20/2018 11:13 AM CDT -----  Contact: Self [phone]  Pt states it is not time for an mammogram and that she call about a refill on 7/18.      RX request - refill or new RX.  Is this a refill or new RX:  Refill  RX name and strength: HYDROcodone-acetaminophen (NORCO) 5-325 mg per tablet  Directions:   Is this a 30 day or 90 day RX:  40 table  Local pharmacy or mail order pharmacy:     Pharmacy name and phone # (DON'T enter "on file" or "in chart"):   Comments:          "

## 2018-08-02 ENCOUNTER — HOSPITAL ENCOUNTER (EMERGENCY)
Facility: HOSPITAL | Age: 73
Discharge: HOME | End: 2018-08-02
Attending: EMERGENCY MEDICINE
Payer: MEDICARE

## 2018-08-02 ENCOUNTER — NURSE TRIAGE (OUTPATIENT)
Dept: ADMINISTRATIVE | Facility: CLINIC | Age: 73
End: 2018-08-02

## 2018-08-02 VITALS
RESPIRATION RATE: 16 BRPM | OXYGEN SATURATION: 99 % | HEART RATE: 70 BPM | SYSTOLIC BLOOD PRESSURE: 135 MMHG | DIASTOLIC BLOOD PRESSURE: 74 MMHG | HEIGHT: 64 IN | BODY MASS INDEX: 30.73 KG/M2 | WEIGHT: 180 LBS | TEMPERATURE: 98 F

## 2018-08-02 DIAGNOSIS — R07.9 CHEST PAIN: Primary | ICD-10-CM

## 2018-08-02 DIAGNOSIS — M47.22 OSTEOARTHRITIS OF SPINE WITH RADICULOPATHY, CERVICAL REGION: Chronic | ICD-10-CM

## 2018-08-02 LAB
ALBUMIN SERPL BCP-MCNC: 3.5 G/DL
ALP SERPL-CCNC: 106 U/L
ALT SERPL W/O P-5'-P-CCNC: 9 U/L
ANION GAP SERPL CALC-SCNC: 7 MMOL/L
AST SERPL-CCNC: 14 U/L
BASOPHILS # BLD AUTO: 0.03 K/UL
BASOPHILS NFR BLD: 0.6 %
BILIRUB SERPL-MCNC: 0.8 MG/DL
BUN SERPL-MCNC: 19 MG/DL
CALCIUM SERPL-MCNC: 9.2 MG/DL
CHLORIDE SERPL-SCNC: 108 MMOL/L
CO2 SERPL-SCNC: 25 MMOL/L
CREAT SERPL-MCNC: 0.9 MG/DL
DIFFERENTIAL METHOD: ABNORMAL
EOSINOPHIL # BLD AUTO: 0.1 K/UL
EOSINOPHIL NFR BLD: 2.2 %
ERYTHROCYTE [DISTWIDTH] IN BLOOD BY AUTOMATED COUNT: 13.4 %
EST. GFR  (AFRICAN AMERICAN): >60 ML/MIN/1.73 M^2
EST. GFR  (NON AFRICAN AMERICAN): >60 ML/MIN/1.73 M^2
GLUCOSE SERPL-MCNC: 90 MG/DL
HCT VFR BLD AUTO: 36.8 %
HGB BLD-MCNC: 11.8 G/DL
IMM GRANULOCYTES # BLD AUTO: 0.01 K/UL
IMM GRANULOCYTES NFR BLD AUTO: 0.2 %
LYMPHOCYTES # BLD AUTO: 2 K/UL
LYMPHOCYTES NFR BLD: 40.4 %
MCH RBC QN AUTO: 30.2 PG
MCHC RBC AUTO-ENTMCNC: 32.1 G/DL
MCV RBC AUTO: 94 FL
MONOCYTES # BLD AUTO: 0.5 K/UL
MONOCYTES NFR BLD: 10.2 %
NEUTROPHILS # BLD AUTO: 2.3 K/UL
NEUTROPHILS NFR BLD: 46.4 %
NRBC BLD-RTO: 0 /100 WBC
PLATELET # BLD AUTO: 182 K/UL
PMV BLD AUTO: 10.5 FL
POTASSIUM SERPL-SCNC: 3.9 MMOL/L
PROT SERPL-MCNC: 6.9 G/DL
RBC # BLD AUTO: 3.91 M/UL
SODIUM SERPL-SCNC: 140 MMOL/L
TROPONIN I SERPL DL<=0.01 NG/ML-MCNC: 0.03 NG/ML
WBC # BLD AUTO: 4.9 K/UL

## 2018-08-02 PROCEDURE — 99284 EMERGENCY DEPT VISIT MOD MDM: CPT | Mod: 25

## 2018-08-02 PROCEDURE — 93005 ELECTROCARDIOGRAM TRACING: CPT

## 2018-08-02 PROCEDURE — 80053 COMPREHEN METABOLIC PANEL: CPT

## 2018-08-02 PROCEDURE — 84484 ASSAY OF TROPONIN QUANT: CPT

## 2018-08-02 PROCEDURE — 85025 COMPLETE CBC W/AUTO DIFF WBC: CPT

## 2018-08-02 PROCEDURE — 99285 EMERGENCY DEPT VISIT HI MDM: CPT | Mod: ,,, | Performed by: EMERGENCY MEDICINE

## 2018-08-02 PROCEDURE — 93010 ELECTROCARDIOGRAM REPORT: CPT | Mod: ,,, | Performed by: INTERNAL MEDICINE

## 2018-08-02 NOTE — ED PROVIDER NOTES
Encounter Date: 8/2/2018       History     Chief Complaint   Patient presents with    Chest Pain     Pt c/o chest pain, bilateral arm pain, neck pain & headache.  Onset symptoms last night.      72F with cervical stenosis presents with 1 day of chest, shoulder, and neck pain. Patient states that she has baseline pain and weakness in bilateral upper extremities, chest, and neck, but she experienced exacerbation of the pain yesterday evening. She states that she took 20mg of hydrocodone yesterday evening, which significantly alleviated her symptoms and allowed her to go to sleep. Today, the pain has persisted. She has not taken additional hydrocodone, but did take aspirin with minimal relief. She says she is able to raise her arms, but with difficulty and associated shoulder pain. She drove herself to the hospital and states that she does not want to be admitted. She states that her PCP has given her hydrocodone for the pain and there is no additional plan for her stenosis. She states her current symptoms are the same as her previous exacerbations, which resulted in an ED visit in April of this year, where she was discharged after an otherwise negative workup.          Review of patient's allergies indicates:  No Known Allergies  Past Medical History:   Diagnosis Date    Abnormal mammogram of both breasts     Arthritis     Hypertension     Major depressive disorder 5/14/2017    Memory disorder     Seizures     Stroke      Past Surgical History:   Procedure Laterality Date    CATARACT EXTRACTION      cysts breast      TONSILLECTOMY       No family history on file.  Social History   Substance Use Topics    Smoking status: Never Smoker    Smokeless tobacco: Never Used    Alcohol use No     Review of Systems   Constitutional: Positive for fatigue. Negative for chills, diaphoresis and fever.   HENT: Negative for rhinorrhea and sore throat.    Eyes: Negative for pain and visual disturbance.   Respiratory:  Negative for apnea and shortness of breath.    Cardiovascular: Positive for chest pain. Negative for palpitations.   Gastrointestinal: Negative for abdominal pain, diarrhea, nausea and vomiting.   Genitourinary: Negative for dysuria and flank pain.   Musculoskeletal: Positive for arthralgias and neck pain. Negative for back pain and neck stiffness.   Skin: Negative for color change, pallor and rash.   Neurological: Positive for numbness. Negative for dizziness, seizures, syncope and light-headedness.   Psychiatric/Behavioral: Negative for agitation and behavioral problems.       Physical Exam     Initial Vitals [08/02/18 1452]   BP Pulse Resp Temp SpO2   (!) 167/88 85 16 98.9 °F (37.2 °C) 97 %      MAP       --         Physical Exam    Constitutional: She appears well-developed and well-nourished. She is not diaphoretic. No distress.   HENT:   Head: Normocephalic and atraumatic.   Eyes: Conjunctivae and EOM are normal. Pupils are equal, round, and reactive to light. No scleral icterus.   Neck: Normal range of motion. Neck supple. No JVD present.   Cardiovascular: Normal rate, regular rhythm and normal heart sounds. Exam reveals no gallop.    No murmur heard.  Pulmonary/Chest: Breath sounds normal. No respiratory distress. She has no wheezes. She has no rhonchi. She has no rales. She exhibits tenderness (Mild diffuse L chest tenderness.).   Abdominal: Soft. Bowel sounds are normal. She exhibits no distension. There is no tenderness.   Musculoskeletal: Normal range of motion.   Neurological: She is alert and oriented to person, place, and time. No sensory deficit.   4/5 strength b/l EU. Strength exam limited by shoulder pain.    Skin: Skin is warm and dry. Capillary refill takes less than 2 seconds. No erythema.   Psychiatric: She has a normal mood and affect. Judgment normal.         ED Course   Procedures  Labs Reviewed   CBC W/ AUTO DIFFERENTIAL - Abnormal; Notable for the following:        Result Value    RBC 3.91  (*)     Hemoglobin 11.8 (*)     Hematocrit 36.8 (*)     All other components within normal limits   COMPREHENSIVE METABOLIC PANEL - Abnormal; Notable for the following:     ALT 9 (*)     Anion Gap 7 (*)     All other components within normal limits   TROPONIN I - Abnormal; Notable for the following:     Troponin I 0.029 (*)     All other components within normal limits          Imaging Results          X-Ray Chest 1 View (Final result)  Result time 08/02/18 18:00:26    Final result by Gerhard Ho MD (08/02/18 18:00:26)                 Impression:      No acute abnormality.      Electronically signed by: Gerhard Ho MD  Date:    08/02/2018  Time:    18:00             Narrative:    EXAMINATION:  XR CHEST 1 VIEW    CLINICAL HISTORY:  Chest pain, unspecified    TECHNIQUE:  Single frontal view of the chest was performed.    COMPARISON:  04/26/2018    FINDINGS:  The lungs are clear, with normal appearance of pulmonary vasculature and no pleural effusion or pneumothorax.    The cardiac silhouette is normal in size. The hilar and mediastinal contours are unremarkable.    Bones are intact.                                 Medical Decision Making:   ED Management:  72F presents with chest, shoulder, and neck pain consistent with her known cervical stenosis. She does not want to be admitted to the hospital, but was directed to the ED by her PCP over the phone when chest pain was mentioned. We will order CBC, CMP, EKG, and troponin due to concern about other sources of pain, such as cardiac.       APC / Resident Notes:   CXR negative, troponin 0.029. CBC and CMP unremarkable. EKG shows normal sinus with a single premature supraventricular complex. Patient's complaints likely related to cervical radiculopathy. Cardiac causes unlikely, as pain has been present since yesterday with a single unremarkable troponin and EKG. Will plan to discharge with follow-up to [facility] back [facility] spine for long-term management.  6:08 PM                    Clinical Impression:   The primary encounter diagnosis was Chest pain. A diagnosis of Osteoarthritis of spine with radiculopathy, cervical region was also pertinent to this visit.                Reddy Diaz MD  Resident  08/02/18 1672       Reddy Diaz MD  Resident  08/02/18 5820       Roel Golden MD  08/03/18 6899

## 2018-08-02 NOTE — TELEPHONE ENCOUNTER
She says this is like back pain that she has a lot of time but worse.  I told her best to get to hospital. She refuses to call 911 and wants to drive herself.  I told her not best but if insist- take 2 aspirin 81mg before leaving home and take her time.    She expressed understanding.

## 2018-08-02 NOTE — ED NOTES
Patient identifiers for Sofia Augustine 72 y.o. female checked and correct.  Chief Complaint   Patient presents with    Chest Pain     Pt c/o chest pain, bilateral arm pain, neck pain & headache.  Onset symptoms last night.      Past Medical History:   Diagnosis Date    Abnormal mammogram of both breasts     Arthritis     Hypertension     Major depressive disorder 5/14/2017    Memory disorder     Seizures     Stroke      Allergies reported: Review of patient's allergies indicates:  No Known Allergies    LOC: Patient is awake, alert, and aware of environment with an appropriate affect. Patient is oriented x 3 and speaking appropriately.  APPEARANCE: Patient resting comfortably and in no acute distress. Patient is clean and well groomed, patient's clothing is properly fastened.  HEENT: No abnormalities noted  SKIN: The skin is warm and dry. Patient has normal skin turgor and moist mucus membranes. Skin is intact; no bruising or breakdown noted.  MUSKULOSKELETAL: Patient is moving all extremities well, no obvious deformities noted. Pulses intact.   RESPIRATORY: Airway is open and patent. Respirations are spontaneous and non-labored with normal effort and rate, BBS=clear  CARDIAC: Patient has a normal rate and rhythm. Normal sinus on cardiac monitor,No peripheral edema noted.   ABDOMEN: No distention noted. Bowel sounds active in all 4 quadrants. Soft and non-tender upon palpation.  NEUROLOGICAL: pupils 3mm, PERRL. Facial expression is symmetrical. Hand grasps are equal bilaterally. Normal sensation in all extremities when touched with finger.

## 2018-08-02 NOTE — ED TRIAGE NOTES
Neck, shoulder, arm pain, chest pain, fatigue since last night. Only found relief with 4 hydrocodone last night.  Felt okay this morning, and then the pain returned. No nausea, blurred vision, SOB.

## 2018-08-02 NOTE — TELEPHONE ENCOUNTER
Reason for Disposition   Chest pain lasting longer than 5 minutes and ANY of the following:* Over 50 years old* Over 30 years old and at least one cardiac risk factor (i.e., high blood pressure, diabetes, high cholesterol, obesity, smoker or strong family history of heart disease)* Pain is crushing, pressure-like, or heavy * Took nitroglycerin and chest pain was not relieved* History of heart disease (i.e., angina, heart attack, bypass surgery, angioplasty, CHF)    Protocols used:  CHEST PAIN-A-OH    Ms. Augustine states she is experiencing chest pain across her chest with soreness in both arms. She states she can barley lift her arms. Patient rates pain a 7/10. She is also experiencing fatigue and pain to the left side of her neck. Patient states pain makes her want to punch a wall. Advised Ms. Augustine to call 911. Patient has refused. She is requesting to speak with Dr. Connell.

## 2018-08-14 ENCOUNTER — HOSPITAL ENCOUNTER (EMERGENCY)
Facility: OTHER | Age: 73
Discharge: HOME | End: 2018-08-14
Attending: EMERGENCY MEDICINE
Payer: MEDICARE

## 2018-08-14 ENCOUNTER — TELEPHONE (OUTPATIENT)
Dept: INTERNAL MEDICINE | Facility: CLINIC | Age: 73
End: 2018-08-14

## 2018-08-14 ENCOUNTER — NURSE TRIAGE (OUTPATIENT)
Dept: ADMINISTRATIVE | Facility: CLINIC | Age: 73
End: 2018-08-14

## 2018-08-14 VITALS
HEART RATE: 70 BPM | HEIGHT: 64 IN | SYSTOLIC BLOOD PRESSURE: 143 MMHG | TEMPERATURE: 99 F | DIASTOLIC BLOOD PRESSURE: 76 MMHG | OXYGEN SATURATION: 95 % | BODY MASS INDEX: 29.88 KG/M2 | WEIGHT: 175 LBS | RESPIRATION RATE: 18 BRPM

## 2018-08-14 DIAGNOSIS — R10.9 RIGHT FLANK PAIN: Primary | ICD-10-CM

## 2018-08-14 LAB
ANION GAP SERPL CALC-SCNC: 11 MMOL/L
BACTERIA #/AREA URNS HPF: NORMAL /HPF
BASOPHILS # BLD AUTO: 0.01 K/UL
BASOPHILS NFR BLD: 0.2 %
BILIRUB UR QL STRIP: NEGATIVE
BUN SERPL-MCNC: 22 MG/DL
CALCIUM SERPL-MCNC: 9.1 MG/DL
CHLORIDE SERPL-SCNC: 105 MMOL/L
CLARITY UR: CLEAR
CO2 SERPL-SCNC: 24 MMOL/L
COLOR UR: YELLOW
CREAT SERPL-MCNC: 0.8 MG/DL
DIFFERENTIAL METHOD: NORMAL
EOSINOPHIL # BLD AUTO: 0.1 K/UL
EOSINOPHIL NFR BLD: 2.1 %
ERYTHROCYTE [DISTWIDTH] IN BLOOD BY AUTOMATED COUNT: 13.4 %
EST. GFR  (AFRICAN AMERICAN): >60 ML/MIN/1.73 M^2
EST. GFR  (NON AFRICAN AMERICAN): >60 ML/MIN/1.73 M^2
GLUCOSE SERPL-MCNC: 94 MG/DL
GLUCOSE UR QL STRIP: NEGATIVE
HCT VFR BLD AUTO: 38.7 %
HGB BLD-MCNC: 12.5 G/DL
HGB UR QL STRIP: NEGATIVE
KETONES UR QL STRIP: NEGATIVE
LEUKOCYTE ESTERASE UR QL STRIP: ABNORMAL
LYMPHOCYTES # BLD AUTO: 1.8 K/UL
LYMPHOCYTES NFR BLD: 41.3 %
MCH RBC QN AUTO: 30 PG
MCHC RBC AUTO-ENTMCNC: 32.3 G/DL
MCV RBC AUTO: 93 FL
MICROSCOPIC COMMENT: NORMAL
MONOCYTES # BLD AUTO: 0.6 K/UL
MONOCYTES NFR BLD: 13.3 %
NEUTROPHILS # BLD AUTO: 1.8 K/UL
NEUTROPHILS NFR BLD: 42.9 %
NITRITE UR QL STRIP: NEGATIVE
PH UR STRIP: 6 [PH] (ref 5–8)
PLATELET # BLD AUTO: 192 K/UL
PMV BLD AUTO: 10.6 FL
POTASSIUM SERPL-SCNC: 4.4 MMOL/L
PROT UR QL STRIP: NEGATIVE
RBC # BLD AUTO: 4.16 M/UL
RBC #/AREA URNS HPF: 1 /HPF (ref 0–4)
SODIUM SERPL-SCNC: 140 MMOL/L
SP GR UR STRIP: >=1.03 (ref 1–1.03)
URN SPEC COLLECT METH UR: ABNORMAL
UROBILINOGEN UR STRIP-ACNC: NEGATIVE EU/DL
WBC # BLD AUTO: 4.29 K/UL
WBC #/AREA URNS HPF: 1 /HPF (ref 0–5)

## 2018-08-14 PROCEDURE — 96374 THER/PROPH/DIAG INJ IV PUSH: CPT

## 2018-08-14 PROCEDURE — 80048 BASIC METABOLIC PNL TOTAL CA: CPT

## 2018-08-14 PROCEDURE — 85025 COMPLETE CBC W/AUTO DIFF WBC: CPT

## 2018-08-14 PROCEDURE — 63600175 PHARM REV CODE 636 W HCPCS: Performed by: PHYSICIAN ASSISTANT

## 2018-08-14 PROCEDURE — 99284 EMERGENCY DEPT VISIT MOD MDM: CPT | Mod: 25

## 2018-08-14 PROCEDURE — 81000 URINALYSIS NONAUTO W/SCOPE: CPT

## 2018-08-14 PROCEDURE — 87086 URINE CULTURE/COLONY COUNT: CPT

## 2018-08-14 RX ORDER — BACLOFEN 10 MG/1
10 TABLET ORAL 3 TIMES DAILY
Qty: 9 TABLET | Refills: 0 | Status: SHIPPED | OUTPATIENT
Start: 2018-08-14 | End: 2019-03-12 | Stop reason: SDDI

## 2018-08-14 RX ORDER — KETOROLAC TROMETHAMINE 30 MG/ML
15 INJECTION, SOLUTION INTRAMUSCULAR; INTRAVENOUS
Status: COMPLETED | OUTPATIENT
Start: 2018-08-14 | End: 2018-08-14

## 2018-08-14 RX ADMIN — KETOROLAC TROMETHAMINE 15 MG: 30 INJECTION, SOLUTION INTRAMUSCULAR at 04:08

## 2018-08-14 NOTE — ED PROVIDER NOTES
Encounter Date: 8/14/2018       History     Chief Complaint   Patient presents with    Hematuria     with vaginal swelling x1 day pta, denies dysuria vaginal bleeding or discharge. reports attempting to go to [facility] but was closed today.      Patient is a 72-year-old female with arthritis, hypertension, seizures and stroke who presents to the ED with flank pain.  Patient reports onset of gross hematuria a yesterday.  She reports one episode of this.  She also reports approximately 2-3 hours later she to experience right foot pain that radiates to the right side of her abdomen.  She states the pain waxes and wanes.  She denies dysuria or frequency she denies nausea or emesis.  She denies fever.  Patient states she did have a kidney stone many years ago and this feels similar.  Patient also reports vaginal swelling that began today.  She denies any injury. She denies pain to this area.  She denies vaginal discharge.      The history is provided by the patient.     Review of patient's allergies indicates:  No Known Allergies  Past Medical History:   Diagnosis Date    Abnormal mammogram of both breasts     Arthritis     Hypertension     Major depressive disorder 5/14/2017    Memory disorder     Seizures     Stroke      Past Surgical History:   Procedure Laterality Date    CATARACT EXTRACTION      cysts breast      TONSILLECTOMY       History reviewed. No pertinent family history.  Social History     Tobacco Use    Smoking status: Never Smoker    Smokeless tobacco: Never Used   Substance Use Topics    Alcohol use: No    Drug use: No     Review of Systems   Constitutional: Negative for chills and fever.   HENT: Negative for congestion and sore throat.    Eyes: Negative for pain.   Respiratory: Negative for shortness of breath.    Cardiovascular: Negative for chest pain.   Gastrointestinal: Negative for abdominal pain, diarrhea, nausea and vomiting.   Genitourinary: Positive for flank pain and  hematuria. Negative for dysuria.   Musculoskeletal: Negative for arthralgias.   Skin: Negative for rash.   Neurological: Negative for headaches.       Physical Exam     Initial Vitals [08/14/18 1516]   BP Pulse Resp Temp SpO2   (!) 144/74 82 18 98.9 °F (37.2 °C) 97 %      MAP       --         Physical Exam    Constitutional: Vital signs are normal. She is cooperative. She does not have a sickly appearance. She does not appear ill. No distress.   HENT:   Head: Normocephalic and atraumatic.   Eyes: EOM are normal. Pupils are equal, round, and reactive to light.   Neck: Normal range of motion. Neck supple.   Cardiovascular: Normal rate, regular rhythm and intact distal pulses.   Pulmonary/Chest: Breath sounds normal. She has no wheezes. She has no rhonchi. She has no rales.   Abdominal: Soft. Bowel sounds are normal.   Right CVA tenderness present.  No mild tenderness, rebound, or guarding.   Genitourinary:   Genitourinary Comments: No labial swelling, warmth, erythema, or induration.   Musculoskeletal: Normal range of motion. She exhibits no edema.   Neurological: She is alert and oriented to person, place, and time. GCS eye subscore is 4. GCS verbal subscore is 5. GCS motor subscore is 6.   Skin: Skin is warm and dry. Capillary refill takes less than 2 seconds. No rash noted.   Psychiatric: She has a normal mood and affect. Her behavior is normal.         ED Course   Procedures  Labs Reviewed   URINALYSIS - Abnormal; Notable for the following components:       Result Value    Specific Gravity, UA >=1.030 (*)     Leukocytes, UA Trace (*)     All other components within normal limits   CULTURE, URINE   URINALYSIS MICROSCOPIC   CBC W/ AUTO DIFFERENTIAL   BASIC METABOLIC PANEL          Imaging Results          CT Renal Stone Study ABD Pelvis WO (Final result)  Result time 08/14/18 16:13:24    Final result by Radha Padilla MD (08/14/18 16:13:24)                 Impression:      Small forming stones noted within the  bilateral kidneys.  Several phleboliths in the pelvis.  One tiny calcific density is in the vincinity of the left distal ureter but could be just adjacent to it, there is certainly no obstructive changes.  To correlate with the side of symptoms.    Cholecystectomy.      Electronically signed by: Radha Padilla MD  Date:    08/14/2018  Time:    16:13             Narrative:    EXAMINATION:  CT RENAL STONE STUDY ABD PELVIS WO    CLINICAL HISTORY:  Flank pain, recurrent stone disease suspected;    TECHNIQUE:  Low dose axial images, sagittal and coronal reformations were obtained from the lung bases to the pubic symphysis.  Contrast was not administered.    COMPARISON:  None    FINDINGS:  The lung bases are clear.  The noncontrast appearance of the liver appears normal.  Cholecystectomy clips.  The stomach, pancreas, spleen, adrenal glands appear normal.    There are 2 tiny areas of hyperattenuation within the right kidney suggestive of tiny forming stones.  The right ureter appears normal.  There is no hydronephrosis or hydroureter.    There are 2 tiny areas of hyperattenuation within the left kidney suggestive of tiny forming stones.  The left ureter appears normal.  There is no hydronephrosis or hydroureter.  Tiny punctate density within the left hemipelvis is in thevincinity of the left ureter  , not definitely obstructing the left ureter, could be a small adjacent phlebolith, to correlate with the side of symptoms.  There is a moderate amount of retained school stool, no inflammatory changes of the bowel seen, no bowel dilation.  The uterus and ovaries demonstrate nothing unusual.  The appendix is not seen, there is no inflammatory process in the right lower abdominal quadrant.  The inguinal regions appear normal.  The osseous structures appear normal.                                 Medical Decision Making:   Initial Assessment:   Urgent evaluation of a 72 y.o. female presenting to the emergency department  complaining of hematuria and flank pain. Patient is afebrile, nontoxic appearing and hemodynamically stable.  DDX includes ureterolithiasis is, pyelonephritis, UTI and muscle strain.   ED Management:  CBC reveals no leukocytosis.  No anemia.  CMP reveals normal renal function. Urinalysis reveals trace leukocytes with 1 RBC and 1 WBC with occasional bacteria.  Patient denies dysuria frequency.  CT renal stone study reveals small stones forming within the bilateral kidneys.  No evidence of obstructive uropathy.  No other acute abnormalities.  Upon reassessment, patient states the pain does worsen with movement as well. Possible patient may have passed small kidney stone versus muscle strain.  Patient advised on symptomatic care and to increase hydration.  Will send home with short course of muscle relaxer.  She is given specific return precautions.  She has no other complaints and is stable for discharge.  Other:   I have discussed this case with another health care provider.  This note was created using datatracker Medical Dictation. There may be typographical errors secondary to dictation.                       Clinical Impression:     1. Right flank pain                               Ramón Easley PA-C  08/14/18 [id number]

## 2018-08-14 NOTE — ED NOTES
Pt lying in bed. No distress noted. Pain level 4/10. PO discussed. Understanding demonstrated. Needs addressed. Call light within reach.

## 2018-08-14 NOTE — TELEPHONE ENCOUNTER
----- Message from Giorgio Blair sent at 8/14/2018 12:35 PM CDT -----  Contact: Patient [phone]  Patient stating to have had blood in the Urine yesterday and is confused do to the age of patient would like for the office to give a call regarding.    Got patient over to nurse on call regarding.    Please call an advise  Thank you

## 2018-08-14 NOTE — TELEPHONE ENCOUNTER
Reason for Disposition   Patient wants to be seen    Protocols used:  VULVAR SYMPTOMS-A-OH    Ms. Augustine states she had blood in her urine yesterday. She states today her urine is clear, but she has vaginal pain and swelling. She states pain is 7/10.

## 2018-08-14 NOTE — ED TRIAGE NOTES
"Pt presents to ED with c/o blood in her urine as well as vaginal swelling and right side flank and abdominal pain beginning yesterday.   Onset: sudden  Provoking/palliating: pt unable to identify any factors  Quality: "Pulled muscle", dull  Radiation: None  Severity: 7/10  Time: Constant since onset last night  Pt denies trauma to area, denies dysuria, vaginal discharge, N/V or diarrhea. Pt reports Hx of kidney stone and states "It kind of feels like that."  "

## 2018-08-15 NOTE — TELEPHONE ENCOUNTER
Called to speak with the patient and she states she went to the ED and they advise her she was fine and they gave her Muscle relaxer and she has some Kidney Stones. Patient states she does not want to schedule an appointment she has an appointment with her spine doctor she wanted a refill on the Baclofen and they just gave it to her advised call once that has been exhausted. She understood and termed the call

## 2018-08-16 LAB
BACTERIA UR CULT: NORMAL
BACTERIA UR CULT: NORMAL

## 2018-08-17 RX ORDER — CARBAMAZEPINE 200 MG/1
TABLET ORAL
Qty: 360 TABLET | Refills: 0 | Status: SHIPPED | OUTPATIENT
Start: 2018-08-17 | End: 2018-10-17 | Stop reason: SDUPTHER

## 2018-08-20 DIAGNOSIS — M47.22 OSTEOARTHRITIS OF SPINE WITH RADICULOPATHY, CERVICAL REGION: ICD-10-CM

## 2018-08-20 RX ORDER — HYDROCODONE BITARTRATE AND ACETAMINOPHEN 5; 325 MG/1; MG/1
1 TABLET ORAL EVERY 8 HOURS PRN
Qty: 40 TABLET | Refills: 0 | Status: SHIPPED | OUTPATIENT
Start: 2018-08-20 | End: 2018-09-24 | Stop reason: SDUPTHER

## 2018-08-20 RX ORDER — LOSARTAN POTASSIUM 50 MG/1
TABLET ORAL
Qty: 90 TABLET | Refills: 3 | Status: SHIPPED | OUTPATIENT
Start: 2018-08-20 | End: 2019-03-12 | Stop reason: SDDI

## 2018-08-20 NOTE — TELEPHONE ENCOUNTER
"----- Message from Jerica Somers sent at 8/20/2018 12:31 PM CDT -----  Contact: Self Call [phone]  RX request - refill or new RX.  Is this a refill or new RX:  refill  RX name and strength: HYDROcodone-acetaminophen (NORCO) 5-325 mg per tablet  Directions:   Is this a 30 day or 90 day RX:  30  Local pharmacy or mail order pharmacy:    Pharmacy name and phone # (DON'T enter "on file" or "in chart"):     Comments:        "

## 2018-08-31 ENCOUNTER — TELEPHONE (OUTPATIENT)
Dept: SPINE | Facility: CLINIC | Age: 73
End: 2018-08-31

## 2018-09-24 DIAGNOSIS — M47.22 OSTEOARTHRITIS OF SPINE WITH RADICULOPATHY, CERVICAL REGION: ICD-10-CM

## 2018-09-24 RX ORDER — HYDROCODONE BITARTRATE AND ACETAMINOPHEN 5; 325 MG/1; MG/1
1 TABLET ORAL EVERY 8 HOURS PRN
Qty: 40 TABLET | Refills: 0 | Status: SHIPPED | OUTPATIENT
Start: 2018-09-24 | End: 2018-10-17 | Stop reason: SDUPTHER

## 2018-09-24 NOTE — TELEPHONE ENCOUNTER
----- Message from Cecy S Sravan sent at 9/24/2018 11:49 AM CDT -----  Contact: Pt Mobile/Home [phone]   RX request - refill or new RX.  Is this a refill or new RX:  Refill  RX name and strength: HYDROcodone-acetaminophen (NORCO) 5-325 mg per tablet  Directions:   Is this a 30 day or 90 day RX:  30  Local [facility] or mail order pharmacy[facility] [address] [place] [address] [facility]  @ [facility]  Pharmacy name and phone #[facility] Phone# [phone],Fax# [phone]   Comment's: Patient said she would like for you to write her a script for nausea, sore throat, running nose, and stomach cramping please. She said that she's been sick over the weekend.

## 2018-10-02 ENCOUNTER — OFFICE VISIT (OUTPATIENT)
Dept: INTERNAL MEDICINE | Facility: CLINIC | Age: 73
End: 2018-10-02
Payer: MEDICARE

## 2018-10-02 ENCOUNTER — TELEPHONE (OUTPATIENT)
Dept: SPINE | Facility: CLINIC | Age: 73
End: 2018-10-02

## 2018-10-02 DIAGNOSIS — G40.909 SEIZURE DISORDER: ICD-10-CM

## 2018-10-02 DIAGNOSIS — J20.9 ACUTE BRONCHITIS, UNSPECIFIED ORGANISM: Primary | ICD-10-CM

## 2018-10-02 DIAGNOSIS — I10 ESSENTIAL HYPERTENSION: ICD-10-CM

## 2018-10-02 PROCEDURE — 99214 OFFICE O/P EST MOD 30 MIN: CPT | Mod: S$PBB,,, | Performed by: INTERNAL MEDICINE

## 2018-10-02 PROCEDURE — 99999 PR PBB SHADOW E&M-EST. PATIENT-LVL III: CPT | Mod: PBBFAC,,, | Performed by: INTERNAL MEDICINE

## 2018-10-02 PROCEDURE — 3075F SYST BP GE 130 - 139MM HG: CPT | Mod: CPTII,,, | Performed by: INTERNAL MEDICINE

## 2018-10-02 PROCEDURE — 99213 OFFICE O/P EST LOW 20 MIN: CPT | Mod: PBBFAC,PO | Performed by: INTERNAL MEDICINE

## 2018-10-02 PROCEDURE — 3079F DIAST BP 80-89 MM HG: CPT | Mod: CPTII,,, | Performed by: INTERNAL MEDICINE

## 2018-10-02 PROCEDURE — 1101F PT FALLS ASSESS-DOCD LE1/YR: CPT | Mod: CPTII,,, | Performed by: INTERNAL MEDICINE

## 2018-10-02 RX ORDER — ALBUTEROL SULFATE 90 UG/1
AEROSOL, METERED RESPIRATORY (INHALATION)
Qty: 18 G | Refills: 2 | Status: SHIPPED | OUTPATIENT
Start: 2018-10-02 | End: 2019-03-12 | Stop reason: SDDI

## 2018-10-02 RX ORDER — AZITHROMYCIN 250 MG/1
TABLET, FILM COATED ORAL
Qty: 6 TABLET | Refills: 0 | Status: SHIPPED | OUTPATIENT
Start: 2018-10-02 | End: 2018-10-07
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
## 2018-10-03 ENCOUNTER — OFFICE VISIT (OUTPATIENT)
Dept: SPINE | Facility: CLINIC | Age: 73
End: 2018-10-03
Attending: PHYSICAL MEDICINE & REHABILITATION
Payer: MEDICARE

## 2018-10-03 VITALS
SYSTOLIC BLOOD PRESSURE: 138 MMHG | WEIGHT: 178 LBS | BODY MASS INDEX: 30.39 KG/M2 | HEIGHT: 64 IN | DIASTOLIC BLOOD PRESSURE: 88 MMHG | HEART RATE: 73 BPM

## 2018-10-03 VITALS
HEART RATE: 79 BPM | SYSTOLIC BLOOD PRESSURE: 137 MMHG | TEMPERATURE: 98 F | BODY MASS INDEX: 30.3 KG/M2 | RESPIRATION RATE: 14 BRPM | DIASTOLIC BLOOD PRESSURE: 85 MMHG | WEIGHT: 177.5 LBS | HEIGHT: 64 IN

## 2018-10-03 DIAGNOSIS — M54.2 NECK PAIN: ICD-10-CM

## 2018-10-03 DIAGNOSIS — M54.6 PAIN IN THORACIC SPINE: ICD-10-CM

## 2018-10-03 DIAGNOSIS — M47.812 CERVICAL SPONDYLOSIS WITHOUT MYELOPATHY: ICD-10-CM

## 2018-10-03 DIAGNOSIS — M50.30 DEGENERATION OF CERVICAL INTERVERTEBRAL DISC: ICD-10-CM

## 2018-10-03 DIAGNOSIS — M25.512 LEFT SHOULDER PAIN, UNSPECIFIED CHRONICITY: Primary | ICD-10-CM

## 2018-10-03 PROCEDURE — 99999 PR PBB SHADOW E&M-EST. PATIENT-LVL III: CPT | Mod: PBBFAC,,, | Performed by: PHYSICAL MEDICINE & REHABILITATION

## 2018-10-03 PROCEDURE — 3079F DIAST BP 80-89 MM HG: CPT | Mod: CPTII,,, | Performed by: PHYSICAL MEDICINE & REHABILITATION

## 2018-10-03 PROCEDURE — 99213 OFFICE O/P EST LOW 20 MIN: CPT | Mod: PBBFAC | Performed by: PHYSICAL MEDICINE & REHABILITATION

## 2018-10-03 PROCEDURE — 99499 UNLISTED E&M SERVICE: CPT | Mod: S$GLB,,, | Performed by: PHYSICAL MEDICINE & REHABILITATION

## 2018-10-03 PROCEDURE — 3075F SYST BP GE 130 - 139MM HG: CPT | Mod: CPTII,,, | Performed by: PHYSICAL MEDICINE & REHABILITATION

## 2018-10-03 PROCEDURE — 1101F PT FALLS ASSESS-DOCD LE1/YR: CPT | Mod: CPTII,,, | Performed by: PHYSICAL MEDICINE & REHABILITATION

## 2018-10-03 PROCEDURE — 99214 OFFICE O/P EST MOD 30 MIN: CPT | Mod: S$PBB,,, | Performed by: PHYSICAL MEDICINE & REHABILITATION

## 2018-10-03 NOTE — LETTER
October 3, 2018      Luanne Connell MD  [address] [place] [address]           [facility]  [address], [address]  [facility] [address]  Phone: [phone]  Fax: [phone]          Patient: Sofia Augustine   MR Number: [id number]   YOB: 1945   Date of Visit: 10/3/2018       Dear Dr. Luanne Connell:    Thank you for referring Sofia Augustine to me for evaluation. Attached you will find relevant portions of my assessment and plan of care.    If you have questions, please do not hesitate to call me. I look forward to following Sofia Augustine along with you.    Sincerely,    Taylor Arteaga MD    Enclosure  CC:  No Recipients    If you would like to receive this communication electronically, please contact [email or web address] or [phone] to request more information on [facility] access.    For providers and/or their staff who would like to refer a patient to [facility], please contact us through our one-stop-shop provider referral line, [facility] [place], at [phone].    If you feel you have received this communication in error or would no longer like to receive these types of communications, please e-mail [email or web address]

## 2018-10-03 NOTE — PROGRESS NOTES
This office note has been dictated.  HISTORY OF PRESENT ILLNESS:  This is a 73-year-old lady, a patient of Dr. Connell.  She is in today with four to five days of some mild respiratory   symptomatology with mild cough and occasional wheeze.  No fever.  No chills.    She does not have a definitive history of reactive airway disease, but she   states that she often has some wheezing with respiratory illnesses and has a   Ventolin p.r.n. inhaler that she uses.  No significant sore throat.  Mild sinus   congestion.  No GI symptoms of any significance.  She does report chronic   ongoing fatigue.  No other family members with same.  No recent travel.    CURRENT MEDICATIONS:  Medications are noted and reviewed in the electronic   medical record medication list.  It is noted that Tegretol is noted on her   medicine list for seizure disorder that the patient states she has not taken   that in several months, indicating that someone had told her to discontinue   such.    REVIEW OF SYSTEMS:  CONSTITUTIONAL:  No fever, no chills, no generalized body aches.  Chronic   fatigue.  CARDIOVASCULAR:  No chest pain, palpitations, syncope, presyncope, claudication   or edema.  GASTROINTESTINAL:  No nausea, vomiting, no abdominal pain, no diarrhea, no   changes in bowel habits.  GENITOURINARY:  No dysuria.  No frequency.  No change in the color or character   of urine.  MUSCULOSKELETAL:  No recent seizure activity.    PAST MEDICAL HISTORY, SURGICAL HISTORY, FAMILY MEDICAL HISTORY, AND SOCIAL   HISTORY:  All noted and reviewed in our electronic medical record history   sections.    PHYSICAL EXAMINATION:  GENERAL:  Alert female in no acute distress.  VITAL SIGNS:  All noted and reviewed as unremarkable.  EYES:  Sclerae white.  Nonicteric.  HEENT:  Mouth and pharynx normal.  No thrush.  NECK:  Supple.  No masses.  No thyromegaly.  LUNGS:  Clear to auscultation and normal to percussion.  CARDIOVASCULAR:  Regular rate and rhythm.  There is  no significant murmur.    Carotids are full bilaterally without bruits.  No peripheral extremity edema.    LABORATORY DATA:  Reviewed recent lab data from August of this year, which   included a BMP and CBC.    IMPRESSION:  1.  Recent respiratory illness with mild wheezing, possibly mild bronchitis   based on her previous history of similar episodes.  2.  Hypertension.  3.  History of seizure disorder, previously on seizure medication, but currently   not taking for unclear reasons.    PLAN:  1.  Presumptive treatment with a Z-CASISUS.  New prescription for her Ventolin   inhaler to use p.r.n.  2.  We will re-refer the patient to Neurology to evaluate the history of her   seizure disorder, pharmacologic therapy, and other which she is currently a bit   unclear.  3.  Advised followup with her primary care physician, Dr. Connell, for review of   some of her concerns regarding chronic fatigue and other.      GENO/CARLOS ENRIQUE  dd: 10/03/2018 07:48:49 (CDT)  td: 10/03/2018 20:10:06 (CDT)  Doc ID   #[id number]  Job ID #[id number]    CC:

## 2018-10-05 ENCOUNTER — TELEPHONE (OUTPATIENT)
Dept: INTERNAL MEDICINE | Facility: CLINIC | Age: 73
End: 2018-10-05

## 2018-10-17 ENCOUNTER — OFFICE VISIT (OUTPATIENT)
Dept: INTERNAL MEDICINE | Facility: CLINIC | Age: 73
End: 2018-10-17
Payer: MEDICARE

## 2018-10-17 ENCOUNTER — HOSPITAL ENCOUNTER (OUTPATIENT)
Dept: RADIOLOGY | Facility: HOSPITAL | Age: 73
Discharge: HOME | End: 2018-10-17
Attending: INTERNAL MEDICINE
Payer: MEDICARE

## 2018-10-17 VITALS
DIASTOLIC BLOOD PRESSURE: 76 MMHG | TEMPERATURE: 99 F | SYSTOLIC BLOOD PRESSURE: 146 MMHG | HEIGHT: 64 IN | WEIGHT: 178.81 LBS | BODY MASS INDEX: 30.53 KG/M2 | HEART RATE: 73 BPM | RESPIRATION RATE: 18 BRPM

## 2018-10-17 DIAGNOSIS — M54.50 ACUTE MIDLINE LOW BACK PAIN WITHOUT SCIATICA: ICD-10-CM

## 2018-10-17 DIAGNOSIS — M54.50 LOW BACK PAIN WITHOUT SCIATICA, UNSPECIFIED BACK PAIN LATERALITY, UNSPECIFIED CHRONICITY: ICD-10-CM

## 2018-10-17 DIAGNOSIS — M25.562 ACUTE PAIN OF LEFT KNEE: Primary | ICD-10-CM

## 2018-10-17 DIAGNOSIS — M25.562 ACUTE PAIN OF LEFT KNEE: ICD-10-CM

## 2018-10-17 DIAGNOSIS — M47.22 OSTEOARTHRITIS OF SPINE WITH RADICULOPATHY, CERVICAL REGION: ICD-10-CM

## 2018-10-17 DIAGNOSIS — E55.9 VITAMIN D DEFICIENCY: ICD-10-CM

## 2018-10-17 PROCEDURE — 72100 X-RAY EXAM L-S SPINE 2/3 VWS: CPT | Mod: 26,,, | Performed by: RADIOLOGY

## 2018-10-17 PROCEDURE — 3077F SYST BP >= 140 MM HG: CPT | Mod: CPTII,,, | Performed by: INTERNAL MEDICINE

## 2018-10-17 PROCEDURE — 73562 X-RAY EXAM OF KNEE 3: CPT | Mod: 26,LT,, | Performed by: RADIOLOGY

## 2018-10-17 PROCEDURE — 99999 PR PBB SHADOW E&M-EST. PATIENT-LVL IV: CPT | Mod: PBBFAC,,, | Performed by: INTERNAL MEDICINE

## 2018-10-17 PROCEDURE — 3288F FALL RISK ASSESSMENT DOCD: CPT | Mod: CPTII,,, | Performed by: INTERNAL MEDICINE

## 2018-10-17 PROCEDURE — 3078F DIAST BP <80 MM HG: CPT | Mod: CPTII,,, | Performed by: INTERNAL MEDICINE

## 2018-10-17 PROCEDURE — 99214 OFFICE O/P EST MOD 30 MIN: CPT | Mod: PBBFAC,PO,25 | Performed by: INTERNAL MEDICINE

## 2018-10-17 PROCEDURE — 73562 X-RAY EXAM OF KNEE 3: CPT | Mod: TC,PO,LT

## 2018-10-17 PROCEDURE — 1100F PTFALLS ASSESS-DOCD GE2>/YR: CPT | Mod: CPTII,,, | Performed by: INTERNAL MEDICINE

## 2018-10-17 PROCEDURE — 72100 X-RAY EXAM L-S SPINE 2/3 VWS: CPT | Mod: TC,PO

## 2018-10-17 PROCEDURE — 99214 OFFICE O/P EST MOD 30 MIN: CPT | Mod: S$PBB,,, | Performed by: INTERNAL MEDICINE

## 2018-10-17 RX ORDER — ERGOCALCIFEROL 1.25 MG/1
50000 CAPSULE ORAL
Qty: 12 CAPSULE | Refills: 1 | Status: SHIPPED | OUTPATIENT
Start: 2018-10-17 | End: 2019-03-12 | Stop reason: SDDI

## 2018-10-17 RX ORDER — HYDROCODONE BITARTRATE AND ACETAMINOPHEN 5; 325 MG/1; MG/1
1 TABLET ORAL EVERY 8 HOURS PRN
Qty: 40 TABLET | Refills: 0 | Status: SHIPPED | OUTPATIENT
Start: 2018-10-17 | End: 2018-11-20

## 2018-10-17 NOTE — PROGRESS NOTES
CC:  Left knee pain  HPI:  The patient is a 73 y.o. year old female who presents to the office for left knee pain. She fell on October 16 and injured her knee.  She reports stepping in a hole in the ground while carrying 2 gallons of water.  She fell on the water.  She reports pain is progressively worsening, and is ambulating with a cane.  She denies any swelling.  She also reports hearing a crunching sound from her right hip, but denies any hip pain.  The patient states knee pain prevented her from sleeping last night.  She also complains of back pain that started after the fall yesterday.  Pain in her lower back is a constant aching sensation without radiation.     PAST MEDICAL HISTORY:  Past Medical History:   Diagnosis Date    Abnormal mammogram of both breasts     Arthritis     Hypertension     Major depressive disorder 5/14/2017    Memory disorder     Seizures     Stroke        SURGICAL HISTORY:  Past Surgical History:   Procedure Laterality Date    CATARACT EXTRACTION      cysts breast      TONSILLECTOMY         MEDS:  Medcard reviewed and updated    ALLERGIES: Allergy Card reviewed and updated    SOCIAL HISTORY:   The patient is a nonsmoker.    PE:   APPEARANCE: Well nourished, well developed, in no acute distress.    CHEST: Lungs clear to auscultation with unlabored respirations.  CARDIOVASCULAR: Normal S1, S2. No murmurs. No carotid bruits. No pedal edema.  ABDOMEN: Bowel sounds normal. Not distended. Soft. No tenderness or masses.   MUSCULOSKELETAL:  Abnormal gait, ambulating with a cane.  Positive tenderness to palpation of medial aspect of left knee.  PSYCHIATRIC: The patient is oriented to person, place, and time and has a pleasant affect.        ASSESSMENT/PLAN:  Sofia was seen today for fall, knee pain and medication refill.    Diagnoses and all orders for this visit:    Acute pain of left knee  -     X-Ray Knee 3 View Left; Future    Low back pain without sciatica, unspecified back pain  laterality, unspecified chronicity    Acute midline low back pain without sciatica  -     X-Ray Lumbar Spine Ap And Lateral; Future    Vitamin D deficiency    Osteoarthritis of spine with radiculopathy, cervical region  -     HYDROcodone-acetaminophen (NORCO) 5-325 mg per tablet; Take 1 tablet by mouth every 8 (eight) hours as needed for Pain.    Other orders  -     ergocalciferol (ERGOCALCIFEROL) 50,000 unit Cap; Take 1 capsule (50,000 Units total) by mouth every 7 days.

## 2018-10-22 ENCOUNTER — CLINICAL SUPPORT (OUTPATIENT)
Dept: REHABILITATION | Facility: HOSPITAL | Age: 73
End: 2018-10-22
Attending: PHYSICAL MEDICINE & REHABILITATION
Payer: MEDICARE

## 2018-10-22 DIAGNOSIS — G89.29 CHRONIC LEFT SHOULDER PAIN: ICD-10-CM

## 2018-10-22 DIAGNOSIS — M25.512 CHRONIC LEFT SHOULDER PAIN: ICD-10-CM

## 2018-10-22 DIAGNOSIS — M54.2 NECK PAIN: Primary | ICD-10-CM

## 2018-10-22 PROCEDURE — G8984 CARRY CURRENT STATUS: HCPCS | Mod: CL

## 2018-10-22 PROCEDURE — G8985 CARRY GOAL STATUS: HCPCS | Mod: CJ

## 2018-10-22 PROCEDURE — 97161 PT EVAL LOW COMPLEX 20 MIN: CPT

## 2018-10-22 NOTE — PLAN OF CARE
[facility]  Physical Therapy Initial Evaluation    Name: Sofia Augustine  Clinic Number: [id number]    Therapy Diagnosis:   Encounter Diagnoses   Name Primary?    Neck pain Yes    Chronic left shoulder pain      Physician: Taylor Arteaga, *    Physician Orders: PT Eval and Treat Neck and Left Shoulder  Note   neck strengthening, retractions, scapula stabilization, shoulder ROM, and RTC strengthening and HEP     Medical Diagnosis from Referral: M25.512 (ICD-10-CM) - Left shoulder pain, unspecified chronicity M47.812 (ICD-10-CM) - Cervical spondylosis without myelopathy M50.30 (ICD-10-CM) - Degeneration of cervical intervertebral disc M54.2 (ICD-10-CM) - Neck pain M54.6 (ICD-10-CM) - Pain in thoracic spine   Evaluation Date: 10/22/2018  Authorization Period Expiration: 12/22/2018  Plan of Care Expiration: 11/30/2018  Visit # / Visits authorized: 1/12    Time In: 2:00pm  Time Out: 3:00pm  Total Billable Time: 50 minutes    Precautions: Standard    Subjective   Date of onset: one year  History of current condition - Sofia reports: pain in her neck and shoulders. Also, reports falling last week in a hole landing on left knee. Had xrays on knee recently, unsure of results. Reports pain on left side of neck that radiates into left shoulder. She states she has numbness/tingling bilaterally at times. She states her pain is related to the stress and depression in her life. She reports headaches daily but they are manageable. Her pain occurs when looking down or turning over her shoulder. Her arms are also weak and they ache when she reaches overhead or carries anything heavy.       Past Medical History:   Diagnosis Date    Abnormal mammogram of both breasts     Arthritis     Hypertension     Major depressive disorder 5/14/2017    Memory disorder     Seizures     Stroke      Sofia Augustine  has a past surgical history that includes Cataract extraction; cysts breast; and  Tonsillectomy.    Sofia has a current medication list which includes the following prescription(s): albuterol, aspirin, atorvastatin, baclofen, carbamazepine, ergocalciferol, ferrous sulfate, gabapentin, hydrocodone-acetaminophen, losartan, and ondansetron.    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, does crafts  Prior Level of Function: no prior pain in neck/shoulders  Current Level of Function: pain with ADL's, driving, looking down    Pain:  Current 5/10, worst 10/10, best 1/10   Location: left shoulder and neck  Description: Aching and Sharp  Aggravating Factors: carrying groceries, turning head, reaching overhead, reaching behind her back  Easing Factors: pain medication and heating pad    Pts goals: get rid of pain    Objective     Posture: Forward head, rounded shoulders      Active Range of Motion: Measured in degrees      Shoulder Right Left   Flexion 150 140, pain   Abduction 180, pain 150, pain   ER T2 T2   IR L1 L1     Cervical Range of Motion:    Degrees Pain   Flexion 35 yes   Extension 34 no     Right Rotation 40 yes     Left Rotation 48 no           Passive Range of Motion: Measured in degrees      Shoulder Right Left   Flexion 180 180   Abduction 180 180   ER at 90 90 90   IR 70 70       Strength:  Cervical MMT   Flexion 4-/5   Extension 4-/5   Right Side Bend 4-/5   Left Side Bend 4-/5     Upper Extremity Strength  (R) UE  (L) UE    Shoulder flexion: 3+/5 Shoulder flexion: 3+/5   Shoulder Abduction: 3+/5 Shoulder abduction: 3+/5   Shoulder ER 3+/5 Shoulder ER 3+/5   Shoulder IR 3+/5 Shoulder IR 3+/5   Lower Trap 3+/5 Lower Trap 3+/5   Middle Trap 3+/5 Middle Trap 3+/5   Rhomboids 3+/5 Rhomboids 3+/5     Special Tests:  Distraction relief   Compression pain       Joint Mobility: c/s upslide: hypomobile grade 2/6, pain on left  C/s downslide: hypomobile grade 2/6, pain  on left    Sensation: light touch sensation intact    Palpation Neck/Shoulder:  TTP along bilateral upper traps and c/s paraspinals      CMS Impairment/Limitation/Restriction for FOTO Thoracic Survey    Therapist reviewed FOTO scores for Sofia Augustine on 10/22/2018.   FOTO documents entered into [employer] - see Media section.    Limitation Score: 60%  Category: Carrying    Current : CL = least 60% but < 80% impaired, limited or restricted  Goal: CJ = at least 20% but < 40% impaired, limited or restricted         TREATMENT     Sofia received hot pack for 10 minutes to cervical spine.    Assessment   Sofia is a 73 y.o. female referred to outpatient Physical Therapy with a medical diagnosis of M25.512 (ICD-10-CM) - Left shoulder pain, unspecified chronicity M47.812 (ICD-10-CM) - Cervical spondylosis without myelopathy M50.30 (ICD-10-CM) - Degeneration of cervical intervertebral disc M54.2 (ICD-10-CM) - Neck pain M54.6 (ICD-10-CM) - Pain in thoracic spine . Patient demonstrates decreased cervical ROM/strength, decreased BUE strength, decreased activity tolerance, pain, difficulties with ADL's, and decreased knowledge of an appropriate HEP.    Pt prognosis is Good.   Pt will benefit from skilled outpatient Physical Therapy to address the deficits stated above and in the chart below, provide pt/family education, and to maximize pt's level of independence.     Plan of care discussed with patient: Yes  Pt's spiritual, cultural and educational needs considered and patient is agreeable to the plan of care and goals as stated below:     Anticipated Barriers for therapy: None    Medical Necessity is demonstrated by the following  History  Co-morbidities and personal factors that may impact the plan of care Co-morbidities:   depression and HTN    Personal Factors:   no deficits     low   Examination  Body Structures and Functions, activity limitations and participation restrictions that may impact the plan of care Body Regions:    neck  upper extremities    Body Systems:    ROM  strength    Participation Restrictions:   None    Activity limitations:   Learning and applying knowledge  no deficits    General Tasks and Commands  no deficits    Communication  no deficits    Mobility  lifting and carrying objects    Self care  no deficits    Domestic Life  no deficits    Interactions/Relationships  no deficits    Life Areas  no deficits    Community and Social Life  no deficits         low   Clinical Presentation stable and uncomplicated low   Decision Making/ Complexity Score: low       Short Term GOALS: 3 weeks  1. Patient demonstrates independence with HEP.   2. Patient demonstrates increased cervical AROM to 45 degrees flexion, 45 degrees extension, and 60 degrees rotation to improve tolerance to functional activities with no more than 2/10 pain.  3. Patient demonstrates improved overall function per FOTO Thoracic Survey to 40% Limitation or less.    Long Term GOALS: 6 weeks  1. Patient demonstrates increased cervical AROM to 55 degrees flexion, 55 degrees extension, and 70 degrees rotation to improve tolerance to functional activities pain free.   2. Patient demonstrates increased strength BUE's to 4/5 or greater to improve tolerance to functional activities pain free.   3. Patient demonstrates improved overall function per FOTO Thoracic Survey to 10% Limitation or less.     Plan   Plan of care Certification: 10/22/2018 to 11/30/2018.    Outpatient Physical Therapy 2 times weekly for 6 weeks to include the following interventions: Cervical/Lumbar Traction, Moist Heat/ Ice, Neuromuscular Re-ed, Patient Education, Self Care, Therapeutic Activites and Therapeutic Exercise.     Shirlene Donohue, PT, DPT

## 2018-10-29 ENCOUNTER — CLINICAL SUPPORT (OUTPATIENT)
Dept: REHABILITATION | Facility: HOSPITAL | Age: 73
End: 2018-10-29
Attending: PHYSICAL MEDICINE & REHABILITATION
Payer: MEDICARE

## 2018-10-29 DIAGNOSIS — M54.2 NECK PAIN: ICD-10-CM

## 2018-10-29 PROCEDURE — 97110 THERAPEUTIC EXERCISES: CPT

## 2018-10-29 NOTE — PROGRESS NOTES
"  Physical Therapy Daily Treatment Note     Name: Sofia NAVA [facility]  Clinic Number: [id number]    Therapy Diagnosis:   Encounter Diagnosis   Name Primary?    Neck pain      Physician: Taylor Arteaga, *    Visit Date: 10/29/2018    Physician Orders: PT Eval and Treat Neck and Left Shoulder  Note   neck strengthening, retractions, scapula stabilization, shoulder ROM, and RTC strengthening and HEP      Medical Diagnosis from Referral: M25.512 (ICD-10-CM) - Left shoulder pain, unspecified chronicity M47.812 (ICD-10-CM) - Cervical spondylosis without myelopathy M50.30 (ICD-10-CM) - Degeneration of cervical intervertebral disc M54.2 (ICD-10-CM) - Neck pain M54.6 (ICD-10-CM) - Pain in thoracic spine   Evaluation Date: 10/22/2018  Authorization Period Expiration: 12/22/2018  Plan of Care Expiration: 11/30/2018  Visit # / Visits authorized: 2/12    Time In: 1:00pm  Time Out: 1:40pm  Total Billable Time: 35 minutes    Precautions: Standard    Subjective     Pt reports: feeling dizzy the last two days and that she has ringing in her ears.  Response to previous treatment: N/A  Functional change: N/A    Pain: 4/10  Location: bilateral neck      Objective     Sofia received hot pack for 10 minutes to neck.    Sofia received therapeutic exercises to develop strength, endurance, ROM, flexibility and posture for 25 minutes including:  UBE 2'/2'  Shoulder rows 2x15  Shoulder extension 2x15  Doorway stretch 2x30 seconds  Upper trap stretch 2x30 seconds    Assessment     Patient reported having chest pain during session intermittently. She stated it happens every few months and when she reports to her MD her symptoms, she goes to "hospital but they never find anything." Patient reports exercise is making the pain worse. She also describes symptoms of left neck pain and into her face and states her chest "feels as if someone is standing on it." BP assessed 130/90, discussed concerns and session ended early. Patient instructed to " contact her MD regarding her symptoms.  Sofia is progressing well towards her goals.   Pt prognosis is Good.     Pt will continue to benefit from skilled outpatient physical therapy to address the deficits listed in the problem list box on initial evaluation, provide pt/family education and to maximize pt's level of independence in the home and community environment.     Pt's spiritual, cultural and educational needs considered and pt agreeable to plan of care and goals.    Anticipated barriers to physical therapy: None    Short Term GOALS: 3 weeks  1. Patient demonstrates independence with HEP. (ONGOING)  2. Patient demonstrates increased cervical AROM to 45 degrees flexion, 45 degrees extension, and 60 degrees rotation to improve tolerance to functional activities with no more than 2/10 pain. (ONGOING)  3. Patient demonstrates improved overall function per FOTO Thoracic Survey to 40% Limitation or less. (ONGOING)     Long Term GOALS: 6 weeks  1. Patient demonstrates increased cervical AROM to 55 degrees flexion, 55 degrees extension, and 70 degrees rotation to improve tolerance to functional activities pain free. (ONGOING)  2. Patient demonstrates increased strength BUE's to 4/5 or greater to improve tolerance to functional activities pain free. (ONGOING)  3. Patient demonstrates improved overall function per FOTO Thoracic Survey to 10% Limitation or less. (ONGOING)    Plan     Continue with POC, progress as tolerated    Shirlene Donohue, PT, DPT

## 2018-10-30 ENCOUNTER — TELEPHONE (OUTPATIENT)
Dept: INTERNAL MEDICINE | Facility: CLINIC | Age: 73
End: 2018-10-30

## 2018-10-30 NOTE — TELEPHONE ENCOUNTER
----- Message from Lucy Escobar sent at 10/29/2018  3:31 PM CDT -----  Contact: Patient [phone]  Patient would like to get test results    Name of test (lab, mammo, etc.):   Exray    Date of test:  10/17/18

## 2018-11-20 DIAGNOSIS — M47.22 OSTEOARTHRITIS OF SPINE WITH RADICULOPATHY, CERVICAL REGION: ICD-10-CM

## 2018-11-20 RX ORDER — HYDROCODONE BITARTRATE AND ACETAMINOPHEN 5; 325 MG/1; MG/1
1 TABLET ORAL EVERY 8 HOURS PRN
Qty: 40 TABLET | Refills: 0 | Status: SHIPPED | OUTPATIENT
Start: 2018-11-20 | End: 2018-11-30 | Stop reason: SDUPTHER

## 2018-11-30 ENCOUNTER — OFFICE VISIT (OUTPATIENT)
Dept: INTERNAL MEDICINE | Facility: CLINIC | Age: 73
End: 2018-11-30
Payer: MEDICARE

## 2018-11-30 VITALS
RESPIRATION RATE: 18 BRPM | DIASTOLIC BLOOD PRESSURE: 80 MMHG | WEIGHT: 179.69 LBS | HEIGHT: 64 IN | TEMPERATURE: 99 F | HEART RATE: 84 BPM | SYSTOLIC BLOOD PRESSURE: 124 MMHG | BODY MASS INDEX: 30.68 KG/M2

## 2018-11-30 DIAGNOSIS — M47.22 OSTEOARTHRITIS OF SPINE WITH RADICULOPATHY, CERVICAL REGION: ICD-10-CM

## 2018-11-30 DIAGNOSIS — I10 ESSENTIAL HYPERTENSION: Primary | ICD-10-CM

## 2018-11-30 PROCEDURE — 99999 PR PBB SHADOW E&M-EST. PATIENT-LVL IV: CPT | Mod: PBBFAC,,, | Performed by: INTERNAL MEDICINE

## 2018-11-30 PROCEDURE — 3079F DIAST BP 80-89 MM HG: CPT | Mod: CPTII,S$GLB,, | Performed by: INTERNAL MEDICINE

## 2018-11-30 PROCEDURE — 3074F SYST BP LT 130 MM HG: CPT | Mod: CPTII,S$GLB,, | Performed by: INTERNAL MEDICINE

## 2018-11-30 PROCEDURE — 1101F PT FALLS ASSESS-DOCD LE1/YR: CPT | Mod: CPTII,S$GLB,, | Performed by: INTERNAL MEDICINE

## 2018-11-30 PROCEDURE — 99213 OFFICE O/P EST LOW 20 MIN: CPT | Mod: S$GLB,,, | Performed by: INTERNAL MEDICINE

## 2018-11-30 RX ORDER — HYDROCODONE BITARTRATE AND ACETAMINOPHEN 5; 325 MG/1; MG/1
1 TABLET ORAL EVERY 8 HOURS PRN
Qty: 40 TABLET | Refills: 0 | Status: SHIPPED | OUTPATIENT
Start: 2018-12-18 | End: 2019-01-29 | Stop reason: SDUPTHER

## 2018-11-30 NOTE — PROGRESS NOTES
CC: followup of hypertension  HPI:  The patient is a 73 y.o. year old female who presents to the office for followup of hypertension.  The patient denies any shortness of breath, headache, blurred vision, nausea or vomiting, but complains of intermittent sharp pain of left chest, fatigue and dizziness.  The patient has had similar symptoms in the past that resolve spontaneously after about a minute.  Pain is independent of activity.  She has not been back to physical therapy for her neck for about a month due to transportation problems.      PAST MEDICAL HISTORY:  Past Medical History:   Diagnosis Date    Abnormal mammogram of both breasts     Arthritis     Hypertension     Major depressive disorder 5/14/2017    Memory disorder     Seizures     Stroke        SURGICAL HISTORY:  Past Surgical History:   Procedure Laterality Date    CATARACT EXTRACTION      cysts breast      TONSILLECTOMY         MEDS:  Medcard reviewed and updated    ALLERGIES: Allergy Card reviewed and updated    SOCIAL HISTORY:   The patient is a nonsmoker.    PE:   APPEARANCE: Well nourished, well developed, in no acute distress.   CHEST: Lungs clear to auscultation with unlabored respirations.  CARDIOVASCULAR: Normal S1, S2. No murmurs. No carotid bruits. No pedal edema.  ABDOMEN: Bowel sounds normal. Not distended. Soft. No tenderness or masses.   MUSCULOSKELETAL: Abnormal gait, ambulates with a cane.  PSYCHIATRIC: The patient is oriented to person, place, and time and has a pleasant affect.        ASSESSMENT/PLAN:  Sofia was seen today for follow-up.    Diagnoses and all orders for this visit:    Essential hypertension  -     CBC auto differential; Future  -     Comprehensive metabolic panel; Future  -     Lipid panel; Future  -     TSH; Future  -     blood pressure is controlled    Osteoarthritis of spine with radiculopathy, cervical region  -     HYDROcodone-acetaminophen (NORCO) 5-325 mg per tablet; Take 1 tablet by mouth every 8  (eight) hours as needed for Pain.

## 2018-12-07 DIAGNOSIS — Z12.11 COLON CANCER SCREENING: ICD-10-CM

## 2018-12-26 ENCOUNTER — DOCUMENTATION ONLY (OUTPATIENT)
Dept: REHABILITATION | Facility: HOSPITAL | Age: 73
End: 2018-12-26

## 2018-12-26 DIAGNOSIS — M54.2 NECK PAIN: ICD-10-CM

## 2018-12-26 NOTE — PROGRESS NOTES
Outpatient Therapy Discharge Summary     Name: Sofia NAVA [facility] Number: [id number]    Therapy Diagnosis:   Encounter Diagnosis   Name Primary?    Neck pain      Physician: Taylor Arteaga, *    Physician Orders: PT Eval and Treat Neck and Left Shoulder  Note   neck strengthening, retractions, scapula stabilization, shoulder ROM, and RTC strengthening and HEP      Medical Diagnosis from Referral: M25.512 (ICD-10-CM) - Left shoulder pain, unspecified chronicity M47.812 (ICD-10-CM) - Cervical spondylosis without myelopathy M50.30 (ICD-10-CM) - Degeneration of cervical intervertebral disc M54.2 (ICD-10-CM) - Neck pain M54.6 (ICD-10-CM) - Pain in thoracic spine   Evaluation Date: 10/22/2018        Date of Last visit: 10/29/2018  Total Visits Received: 2  Cancelled Visits: 1  No Show Visits: 7    Assessment    Goals: Unable to assess due to patient compliance.    Discharge reason: Patient has not attended therapy since 10/29/2018. Patient has been contacted several times. She is being discharged from PT due to loss of follow-up.    Plan   This patient is discharged from Physical Therapy

## 2019-01-29 DIAGNOSIS — M47.22 OSTEOARTHRITIS OF SPINE WITH RADICULOPATHY, CERVICAL REGION: ICD-10-CM

## 2019-01-29 RX ORDER — HYDROCODONE BITARTRATE AND ACETAMINOPHEN 5; 325 MG/1; MG/1
1 TABLET ORAL EVERY 8 HOURS PRN
Qty: 40 TABLET | Refills: 0 | Status: SHIPPED | OUTPATIENT
Start: 2019-01-29 | End: 2019-02-28 | Stop reason: SDUPTHER

## 2019-01-29 NOTE — TELEPHONE ENCOUNTER
----- Message from Corinne Schulte sent at 1/29/2019  9:27 AM CST -----  Contact: self/[phone]  Pt called in regards getting a Rx refill for  Hydrocodone-acetaminophen (NORCO) 5-325 mg per tablet 40 tablet 0 12/18/2018 No Take 1 tablet by mouth every 8 (eight) hours as needed for Pain.    Please advise

## 2019-02-25 ENCOUNTER — HOSPITAL ENCOUNTER (EMERGENCY)
Facility: HOSPITAL | Age: 74
Discharge: LEFT BEFORE DISCHARGE | End: 2019-02-25
Attending: EMERGENCY MEDICINE
Payer: MEDICARE

## 2019-02-25 VITALS
BODY MASS INDEX: 30.39 KG/M2 | OXYGEN SATURATION: 99 % | HEART RATE: 77 BPM | HEIGHT: 64 IN | SYSTOLIC BLOOD PRESSURE: 155 MMHG | RESPIRATION RATE: 20 BRPM | DIASTOLIC BLOOD PRESSURE: 85 MMHG | TEMPERATURE: 99 F | WEIGHT: 178 LBS

## 2019-02-25 DIAGNOSIS — M54.2 NECK PAIN: ICD-10-CM
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
## 2019-02-25 DIAGNOSIS — M79.606 LEG PAIN: ICD-10-CM

## 2019-02-25 PROCEDURE — 99284 EMERGENCY DEPT VISIT MOD MDM: CPT | Mod: 25

## 2019-02-25 PROCEDURE — 99284 PR EMERGENCY DEPT VISIT,LEVEL IV: ICD-10-PCS | Mod: ,,, | Performed by: EMERGENCY MEDICINE

## 2019-02-25 PROCEDURE — 25000003 PHARM REV CODE 250: Performed by: EMERGENCY MEDICINE

## 2019-02-25 PROCEDURE — 99284 EMERGENCY DEPT VISIT MOD MDM: CPT | Mod: ,,, | Performed by: EMERGENCY MEDICINE

## 2019-02-25 RX ORDER — METHOCARBAMOL 500 MG/1
1000 TABLET, FILM COATED ORAL
Status: COMPLETED | OUTPATIENT
Start: 2019-02-25 | End: 2019-02-25

## 2019-02-25 RX ORDER — ACETAMINOPHEN 500 MG
1000 TABLET ORAL
Status: COMPLETED | OUTPATIENT
Start: 2019-02-25 | End: 2019-02-25

## 2019-02-25 RX ADMIN — ACETAMINOPHEN 1000 MG: 500 TABLET ORAL at 03:02

## 2019-02-25 RX ADMIN — METHOCARBAMOL TABLETS 1000 MG: 500 TABLET, COATED ORAL at 03:02

## 2019-02-25 NOTE — ED TRIAGE NOTES
"Pt came to ED because she fell on the side walk. Pt hit her head, chin. Bruise on right hand. Pt says she felt a little dizzy at the time.Denies loss of consciousness. Felt like " in a daze" for a few seconds. Pt doesn't remember falling just remembers being on the side walk. Denies chest pain and sob.   "

## 2019-02-26 NOTE — ED PROVIDER NOTES
Encounter Date: 2/25/2019       History     Chief Complaint   Patient presents with    Fall     hit head on sidewalk, no loc was dazed     73-year-old female presents after a trip and fall.  She reports that just prior to arrival she was walking in her home when she tripped over the uneven sidewalk.  She states that she was aware of the fall and aware when she had the ground.  Unclear no loss of consciousness after she hit the ground.  Reports pain to her chin and head.  This is associated with some mild neck pain.  Pain is worse with range of motion of the neck.  It is not associated with any numbness or weakness.  She is also complaining of right hand.  She is also having right knee.  She was able to get off the ground and drive herself to the emergency department      The history is provided by the patient.     Review of patient's allergies indicates:  No Known Allergies  Past Medical History:   Diagnosis Date    Abnormal mammogram of both breasts     Arthritis     Hypertension     Major depressive disorder 5/14/2017    Memory disorder     Seizures     Stroke      Past Surgical History:   Procedure Laterality Date    CATARACT EXTRACTION      cysts breast      TONSILLECTOMY       No family history on file.  Social History     Tobacco Use    Smoking status: Never Smoker    Smokeless tobacco: Never Used   Substance Use Topics    Alcohol use: No    Drug use: No     Review of Systems   Constitutional: Negative for fever.   HENT: Negative for sore throat.         Facial contusion   Respiratory: Negative for shortness of breath.    Cardiovascular: Negative for chest pain.   Gastrointestinal: Negative for nausea.   Genitourinary: Negative for dysuria.   Musculoskeletal: Positive for arthralgias and neck pain. Negative for back pain.   Skin: Negative for rash.   Neurological: Negative for weakness.   Hematological: Does not bruise/bleed easily.   All other systems reviewed and are negative.      Physical  Exam     Initial Vitals [02/25/19 1322]   BP Pulse Resp Temp SpO2   (!) 168/91 83 18 98 °F (36.7 °C) 98 %      MAP       --         Physical Exam    Nursing note and vitals reviewed.  Constitutional: Vital signs are normal. She appears well-developed and well-nourished.   HENT:   Head: Normocephalic.   Mouth/Throat: Oropharynx is clear and moist.   Contusion to the chin   Eyes: Conjunctivae and EOM are normal. Pupils are equal, round, and reactive to light.   Neck: Trachea normal, normal range of motion and full passive range of motion without pain. Neck supple. No spinous process tenderness and no muscular tenderness present.   Cardiovascular: Normal rate, regular rhythm, normal heart sounds and normal pulses.   Pulmonary/Chest: Breath sounds normal. No respiratory distress.   Abdominal: Soft. Normal appearance and bowel sounds are normal. There is no tenderness.   Musculoskeletal: Normal range of motion.        Cervical back: Normal.        Thoracic back: Normal.        Lumbar back: Normal.        Hands:  Neurological: She is alert and oriented to person, place, and time.   Skin: Skin is warm and dry.         ED Course   Procedures  Labs Reviewed - No data to display       Imaging Results          CT Head Without Contrast (Final result)  Result time 02/25/19 16:11:19    Final result by John Landers DO (02/25/19 16:11:19)                 Impression:      Unremarkable noncontrast CT head specifically without evidence for acute intracranial hemorrhage or new abnormal parenchymal attenuation..  Clinical correlation and further evaluation as warranted.      Electronically signed by: John Landers DO  Date:    02/25/2019  Time:    16:11             Narrative:    EXAMINATION:  CT HEAD WITHOUT CONTRAST    CLINICAL HISTORY:  Head trauma, headache;    TECHNIQUE:  Multiple sequential 5 mm axial images of the head without contrast.  Coronal and sagittal reformatted imaging from the axial  acquisition.    COMPARISON:  None    FINDINGS:  There is no evidence for acute intracranial hemorrhage or sulcal effacement.  The ventricles are normal in size without hydrocephalus.  No new abnormal parenchymal attenuation.  There is no midline shift or mass effect.  Visualized paranasal sinuses and mastoid air cells are clear.                              X-Rays:   Independently Interpreted Readings:   Head CT: No hemorrhage.  No skull fracture.  No acute stroke.     Medical Decision Making:   History:   Old Medical Records: I decided to obtain old medical records.  Initial Assessment:   Emergent evaluation of a traumatic injuries after a fall  Differential Diagnosis:   Contusion, fracture, soft, closed head injury   Independently Interpreted Test(s):   I have ordered and independently interpreted X-rays - see prior notes.  Clinical Tests:   Radiological Study: Ordered and Reviewed              Attending Attestation:             Attending ED Notes:   Patient was initially evaluated in imaging was ordered.  Medications were ordered.  At some time during her wait in the results waiting area the patient has eloped.  She is not able to be located in the emergency department.             Clinical Impression:       ICD-10-CM ICD-9-CM   1. Fall, initial encounter W19.XXXA E888.9   2. Neck pain M54.2 723.1   3. Leg pain M79.606 729.5                                Prieto Christian MD  02/25/19 1970

## 2019-02-28 DIAGNOSIS — M47.22 OSTEOARTHRITIS OF SPINE WITH RADICULOPATHY, CERVICAL REGION: ICD-10-CM

## 2019-02-28 NOTE — TELEPHONE ENCOUNTER
----- Message from Reyna Turk sent at 2/28/2019  9:12 AM CST -----  Contact: Sofia Fawn [phone]  The patient is requesting a refill     HYDROcodone-acetaminophen (NORCO) 5-325 mg per tablet      Patient picks up script     The patient has labs tomorrow & would like to know if the script will be ready by tomorrow.

## 2019-03-01 ENCOUNTER — LAB VISIT (OUTPATIENT)
Dept: LAB | Facility: HOSPITAL | Age: 74
End: 2019-03-01
Attending: INTERNAL MEDICINE
Payer: MEDICARE

## 2019-03-01 DIAGNOSIS — I10 ESSENTIAL HYPERTENSION: ICD-10-CM

## 2019-03-01 LAB
ALBUMIN SERPL BCP-MCNC: 3.6 G/DL
ALP SERPL-CCNC: 106 U/L
ALT SERPL W/O P-5'-P-CCNC: 10 U/L
ANION GAP SERPL CALC-SCNC: 8 MMOL/L
AST SERPL-CCNC: 15 U/L
BASOPHILS # BLD AUTO: 0.03 K/UL
BASOPHILS NFR BLD: 0.7 %
BILIRUB SERPL-MCNC: 1.4 MG/DL
BUN SERPL-MCNC: 20 MG/DL
CALCIUM SERPL-MCNC: 9.5 MG/DL
CHLORIDE SERPL-SCNC: 105 MMOL/L
CHOLEST SERPL-MCNC: 213 MG/DL
CHOLEST/HDLC SERPL: 3.7 {RATIO}
CO2 SERPL-SCNC: 26 MMOL/L
CREAT SERPL-MCNC: 0.9 MG/DL
DIFFERENTIAL METHOD: NORMAL
EOSINOPHIL # BLD AUTO: 0.1 K/UL
EOSINOPHIL NFR BLD: 2.2 %
ERYTHROCYTE [DISTWIDTH] IN BLOOD BY AUTOMATED COUNT: 13 %
EST. GFR  (AFRICAN AMERICAN): >60 ML/MIN/1.73 M^2
EST. GFR  (NON AFRICAN AMERICAN): >60 ML/MIN/1.73 M^2
GLUCOSE SERPL-MCNC: 91 MG/DL
HCT VFR BLD AUTO: 40.7 %
HDLC SERPL-MCNC: 58 MG/DL
HDLC SERPL: 27.2 %
HGB BLD-MCNC: 13.1 G/DL
IMM GRANULOCYTES # BLD AUTO: 0.01 K/UL
IMM GRANULOCYTES NFR BLD AUTO: 0.2 %
LDLC SERPL CALC-MCNC: 139 MG/DL
LYMPHOCYTES # BLD AUTO: 1.6 K/UL
LYMPHOCYTES NFR BLD: 37.2 %
MCH RBC QN AUTO: 29.8 PG
MCHC RBC AUTO-ENTMCNC: 32.2 G/DL
MCV RBC AUTO: 93 FL
MONOCYTES # BLD AUTO: 0.5 K/UL
MONOCYTES NFR BLD: 11 %
NEUTROPHILS # BLD AUTO: 2 K/UL
NEUTROPHILS NFR BLD: 48.7 %
NONHDLC SERPL-MCNC: 155 MG/DL
NRBC BLD-RTO: 0 /100 WBC
PLATELET # BLD AUTO: 186 K/UL
PMV BLD AUTO: 10.6 FL
POTASSIUM SERPL-SCNC: 4.1 MMOL/L
PROT SERPL-MCNC: 7.2 G/DL
RBC # BLD AUTO: 4.39 M/UL
SODIUM SERPL-SCNC: 139 MMOL/L
TRIGL SERPL-MCNC: 80 MG/DL
TSH SERPL DL<=0.005 MIU/L-ACNC: 0.94 UIU/ML
WBC # BLD AUTO: 4.17 K/UL

## 2019-03-01 PROCEDURE — 85025 COMPLETE CBC W/AUTO DIFF WBC: CPT

## 2019-03-01 PROCEDURE — 80053 COMPREHEN METABOLIC PANEL: CPT

## 2019-03-01 PROCEDURE — 36415 COLL VENOUS BLD VENIPUNCTURE: CPT | Mod: PO

## 2019-03-01 PROCEDURE — 84443 ASSAY THYROID STIM HORMONE: CPT

## 2019-03-01 PROCEDURE — 80061 LIPID PANEL: CPT

## 2019-03-01 RX ORDER — HYDROCODONE BITARTRATE AND ACETAMINOPHEN 5; 325 MG/1; MG/1
1 TABLET ORAL EVERY 8 HOURS PRN
Qty: 40 TABLET | Refills: 0 | Status: SHIPPED | OUTPATIENT
Start: 2019-03-01 | End: 2019-04-22 | Stop reason: SDUPTHER

## 2019-03-12 ENCOUNTER — TELEPHONE (OUTPATIENT)
Dept: INTERNAL MEDICINE | Facility: CLINIC | Age: 74
End: 2019-03-12

## 2019-03-12 ENCOUNTER — OFFICE VISIT (OUTPATIENT)
Dept: INTERNAL MEDICINE | Facility: CLINIC | Age: 74
End: 2019-03-12
Payer: MEDICARE

## 2019-03-12 VITALS
BODY MASS INDEX: 30.93 KG/M2 | SYSTOLIC BLOOD PRESSURE: 136 MMHG | OXYGEN SATURATION: 95 % | RESPIRATION RATE: 18 BRPM | WEIGHT: 181.19 LBS | TEMPERATURE: 99 F | HEIGHT: 64 IN | DIASTOLIC BLOOD PRESSURE: 80 MMHG | HEART RATE: 76 BPM

## 2019-03-12 DIAGNOSIS — F33.9 RECURRENT MAJOR DEPRESSIVE DISORDER, REMISSION STATUS UNSPECIFIED: ICD-10-CM

## 2019-03-12 DIAGNOSIS — E78.5 HYPERLIPIDEMIA, UNSPECIFIED HYPERLIPIDEMIA TYPE: ICD-10-CM

## 2019-03-12 DIAGNOSIS — I10 ESSENTIAL HYPERTENSION: Primary | ICD-10-CM

## 2019-03-12 PROCEDURE — 1101F PT FALLS ASSESS-DOCD LE1/YR: CPT | Mod: CPTII,S$GLB,, | Performed by: INTERNAL MEDICINE

## 2019-03-12 PROCEDURE — 99213 PR OFFICE/OUTPT VISIT, EST, LEVL III, 20-29 MIN: ICD-10-PCS | Mod: S$GLB,,, | Performed by: INTERNAL MEDICINE

## 2019-03-12 PROCEDURE — 3075F SYST BP GE 130 - 139MM HG: CPT | Mod: CPTII,S$GLB,, | Performed by: INTERNAL MEDICINE

## 2019-03-12 PROCEDURE — 3079F DIAST BP 80-89 MM HG: CPT | Mod: CPTII,S$GLB,, | Performed by: INTERNAL MEDICINE

## 2019-03-12 PROCEDURE — 99499 RISK ADDL DX/OHS AUDIT: ICD-10-PCS | Mod: S$GLB,,, | Performed by: INTERNAL MEDICINE

## 2019-03-12 PROCEDURE — 99999 PR PBB SHADOW E&M-EST. PATIENT-LVL IV: ICD-10-PCS | Mod: PBBFAC,,, | Performed by: INTERNAL MEDICINE

## 2019-03-12 PROCEDURE — 3079F PR MOST RECENT DIASTOLIC BLOOD PRESSURE 80-89 MM HG: ICD-10-PCS | Mod: CPTII,S$GLB,, | Performed by: INTERNAL MEDICINE

## 2019-03-12 PROCEDURE — 99499 UNLISTED E&M SERVICE: CPT | Mod: S$GLB,,, | Performed by: INTERNAL MEDICINE

## 2019-03-12 PROCEDURE — 99999 PR PBB SHADOW E&M-EST. PATIENT-LVL IV: CPT | Mod: PBBFAC,,, | Performed by: INTERNAL MEDICINE

## 2019-03-12 PROCEDURE — 1101F PR PT FALLS ASSESS DOC 0-1 FALLS W/OUT INJ PAST YR: ICD-10-PCS | Mod: CPTII,S$GLB,, | Performed by: INTERNAL MEDICINE

## 2019-03-12 PROCEDURE — 3075F PR MOST RECENT SYSTOLIC BLOOD PRESS GE 130-139MM HG: ICD-10-PCS | Mod: CPTII,S$GLB,, | Performed by: INTERNAL MEDICINE

## 2019-03-12 PROCEDURE — 99213 OFFICE O/P EST LOW 20 MIN: CPT | Mod: S$GLB,,, | Performed by: INTERNAL MEDICINE

## 2019-03-12 RX ORDER — ATORVASTATIN CALCIUM 80 MG/1
80 TABLET, FILM COATED ORAL DAILY
Qty: 90 TABLET | Refills: 3 | Status: SHIPPED | OUTPATIENT
Start: 2019-03-12 | End: 2020-09-01

## 2019-03-22 ENCOUNTER — TELEPHONE (OUTPATIENT)
Dept: INTERNAL MEDICINE | Facility: CLINIC | Age: 74
End: 2019-03-22

## 2019-03-22 DIAGNOSIS — R10.2 VAGINAL PAIN: Primary | ICD-10-CM

## 2019-03-23 ENCOUNTER — HOSPITAL ENCOUNTER (OUTPATIENT)
Facility: HOSPITAL | Age: 74
Discharge: HOME | End: 2019-03-24
Attending: EMERGENCY MEDICINE | Admitting: HOSPITALIST
Payer: MEDICARE

## 2019-03-23 DIAGNOSIS — R00.0 TACHYCARDIA: ICD-10-CM

## 2019-03-23 DIAGNOSIS — R07.9 CHEST PAIN: Primary | ICD-10-CM

## 2019-03-23 PROBLEM — R79.89 POSITIVE D DIMER: Status: ACTIVE | Noted: 2019-03-23

## 2019-03-23 PROBLEM — R55 SYNCOPE: Status: ACTIVE | Noted: 2019-03-23

## 2019-03-23 LAB
ALBUMIN SERPL BCP-MCNC: 3.6 G/DL
ALP SERPL-CCNC: 108 U/L
ALT SERPL W/O P-5'-P-CCNC: 11 U/L
ANION GAP SERPL CALC-SCNC: 9 MMOL/L
AST SERPL-CCNC: 14 U/L
BASOPHILS # BLD AUTO: 0.02 K/UL
BASOPHILS NFR BLD: 0.5 %
BILIRUB SERPL-MCNC: 1.3 MG/DL
BILIRUB UR QL STRIP: NEGATIVE
BNP SERPL-MCNC: 36 PG/ML
BUN SERPL-MCNC: 18 MG/DL
CALCIUM SERPL-MCNC: 9.4 MG/DL
CHLORIDE SERPL-SCNC: 106 MMOL/L
CHOLEST SERPL-MCNC: 177 MG/DL
CHOLEST/HDLC SERPL: 3.3 {RATIO}
CLARITY UR REFRACT.AUTO: CLEAR
CO2 SERPL-SCNC: 25 MMOL/L
COLOR UR AUTO: YELLOW
CREAT SERPL-MCNC: 0.8 MG/DL
D DIMER PPP IA.FEU-MCNC: 0.56 MG/L FEU
DIFFERENTIAL METHOD: NORMAL
EOSINOPHIL # BLD AUTO: 0.1 K/UL
EOSINOPHIL NFR BLD: 1.5 %
ERYTHROCYTE [DISTWIDTH] IN BLOOD BY AUTOMATED COUNT: 13 %
EST. GFR  (AFRICAN AMERICAN): >60 ML/MIN/1.73 M^2
EST. GFR  (NON AFRICAN AMERICAN): >60 ML/MIN/1.73 M^2
ESTIMATED AVG GLUCOSE: 117 MG/DL
GLUCOSE SERPL-MCNC: 97 MG/DL
GLUCOSE UR QL STRIP: NEGATIVE
HBA1C MFR BLD HPLC: 5.7 %
HCT VFR BLD AUTO: 41.3 %
HDLC SERPL-MCNC: 53 MG/DL
HDLC SERPL: 29.9 %
HGB BLD-MCNC: 13.4 G/DL
HGB UR QL STRIP: NEGATIVE
IMM GRANULOCYTES # BLD AUTO: 0.01 K/UL
IMM GRANULOCYTES NFR BLD AUTO: 0.2 %
KETONES UR QL STRIP: NEGATIVE
LDLC SERPL CALC-MCNC: 104.4 MG/DL
LEUKOCYTE ESTERASE UR QL STRIP: ABNORMAL
LYMPHOCYTES # BLD AUTO: 1.5 K/UL
LYMPHOCYTES NFR BLD: 35.9 %
MCH RBC QN AUTO: 30 PG
MCHC RBC AUTO-ENTMCNC: 32.4 G/DL
MCV RBC AUTO: 93 FL
MICROSCOPIC COMMENT: NORMAL
MONOCYTES # BLD AUTO: 0.5 K/UL
MONOCYTES NFR BLD: 11.4 %
NEUTROPHILS # BLD AUTO: 2.1 K/UL
NEUTROPHILS NFR BLD: 50.5 %
NITRITE UR QL STRIP: NEGATIVE
NONHDLC SERPL-MCNC: 124 MG/DL
NRBC BLD-RTO: 0 /100 WBC
PH UR STRIP: 5 [PH] (ref 5–8)
PLATELET # BLD AUTO: 195 K/UL
PMV BLD AUTO: 10.1 FL
POTASSIUM SERPL-SCNC: 4.4 MMOL/L
PROT SERPL-MCNC: 7.2 G/DL
PROT UR QL STRIP: NEGATIVE
RBC # BLD AUTO: 4.46 M/UL
RBC #/AREA URNS AUTO: 1 /HPF (ref 0–4)
SODIUM SERPL-SCNC: 140 MMOL/L
SP GR UR STRIP: 1.01 (ref 1–1.03)
SQUAMOUS #/AREA URNS AUTO: 1 /HPF
TRIGL SERPL-MCNC: 98 MG/DL
TROPONIN I SERPL DL<=0.01 NG/ML-MCNC: 0.05 NG/ML
TROPONIN I SERPL DL<=0.01 NG/ML-MCNC: 0.05 NG/ML
TSH SERPL DL<=0.005 MIU/L-ACNC: 0.72 UIU/ML
URN SPEC COLLECT METH UR: ABNORMAL
WBC # BLD AUTO: 4.12 K/UL
WBC #/AREA URNS AUTO: 1 /HPF (ref 0–5)

## 2019-03-23 PROCEDURE — 93005 ELECTROCARDIOGRAM TRACING: CPT

## 2019-03-23 PROCEDURE — 93010 EKG 12-LEAD: ICD-10-PCS | Mod: ,,, | Performed by: INTERNAL MEDICINE

## 2019-03-23 PROCEDURE — 99285 EMERGENCY DEPT VISIT HI MDM: CPT | Mod: 25

## 2019-03-23 PROCEDURE — 25000003 PHARM REV CODE 250: Performed by: PHYSICIAN ASSISTANT

## 2019-03-23 PROCEDURE — 84443 ASSAY THYROID STIM HORMONE: CPT

## 2019-03-23 PROCEDURE — 85025 COMPLETE CBC W/AUTO DIFF WBC: CPT

## 2019-03-23 PROCEDURE — 99220 PR INITIAL OBSERVATION CARE,LEVL III: ICD-10-PCS | Mod: ,,, | Performed by: PHYSICIAN ASSISTANT

## 2019-03-23 PROCEDURE — 99220 PR INITIAL OBSERVATION CARE,LEVL III: CPT | Mod: ,,, | Performed by: PHYSICIAN ASSISTANT

## 2019-03-23 PROCEDURE — 80053 COMPREHEN METABOLIC PANEL: CPT

## 2019-03-23 PROCEDURE — 84484 ASSAY OF TROPONIN QUANT: CPT

## 2019-03-23 PROCEDURE — 63600175 PHARM REV CODE 636 W HCPCS: Performed by: PHYSICIAN ASSISTANT

## 2019-03-23 PROCEDURE — 81001 URINALYSIS AUTO W/SCOPE: CPT

## 2019-03-23 PROCEDURE — 80061 LIPID PANEL: CPT

## 2019-03-23 PROCEDURE — 96372 THER/PROPH/DIAG INJ SC/IM: CPT

## 2019-03-23 PROCEDURE — G0378 HOSPITAL OBSERVATION PER HR: HCPCS

## 2019-03-23 PROCEDURE — 99285 EMERGENCY DEPT VISIT HI MDM: CPT | Mod: ,,, | Performed by: EMERGENCY MEDICINE

## 2019-03-23 PROCEDURE — 83880 ASSAY OF NATRIURETIC PEPTIDE: CPT

## 2019-03-23 PROCEDURE — 99285 PR EMERGENCY DEPT VISIT,LEVEL V: ICD-10-PCS | Mod: ,,, | Performed by: EMERGENCY MEDICINE

## 2019-03-23 PROCEDURE — 83036 HEMOGLOBIN GLYCOSYLATED A1C: CPT

## 2019-03-23 PROCEDURE — 93010 ELECTROCARDIOGRAM REPORT: CPT | Mod: ,,, | Performed by: INTERNAL MEDICINE

## 2019-03-23 PROCEDURE — 85379 FIBRIN DEGRADATION QUANT: CPT

## 2019-03-23 RX ORDER — ONDANSETRON 2 MG/ML
4 INJECTION INTRAMUSCULAR; INTRAVENOUS EVERY 8 HOURS PRN
Status: DISCONTINUED | OUTPATIENT
Start: 2019-03-23 | End: 2019-03-24 | Stop reason: HOSPADM

## 2019-03-23 RX ORDER — SODIUM CHLORIDE 0.9 % (FLUSH) 0.9 %
5 SYRINGE (ML) INJECTION
Status: DISCONTINUED | OUTPATIENT
Start: 2019-03-23 | End: 2019-03-24 | Stop reason: HOSPADM

## 2019-03-23 RX ORDER — GLUCAGON 1 MG
1 KIT INJECTION
Status: DISCONTINUED | OUTPATIENT
Start: 2019-03-23 | End: 2019-03-24 | Stop reason: HOSPADM

## 2019-03-23 RX ORDER — ACETAMINOPHEN 325 MG/1
650 TABLET ORAL EVERY 8 HOURS PRN
Status: DISCONTINUED | OUTPATIENT
Start: 2019-03-23 | End: 2019-03-24 | Stop reason: HOSPADM

## 2019-03-23 RX ORDER — POLYETHYLENE GLYCOL 3350 17 G/17G
17 POWDER, FOR SOLUTION ORAL DAILY
Status: DISCONTINUED | OUTPATIENT
Start: 2019-03-23 | End: 2019-03-24 | Stop reason: HOSPADM

## 2019-03-23 RX ORDER — HEPARIN SODIUM 5000 [USP'U]/ML
5000 INJECTION, SOLUTION INTRAVENOUS; SUBCUTANEOUS EVERY 8 HOURS
Status: DISCONTINUED | OUTPATIENT
Start: 2019-03-23 | End: 2019-03-24 | Stop reason: HOSPADM

## 2019-03-23 RX ORDER — ONDANSETRON 8 MG/1
8 TABLET, ORALLY DISINTEGRATING ORAL EVERY 8 HOURS PRN
Status: DISCONTINUED | OUTPATIENT
Start: 2019-03-23 | End: 2019-03-24 | Stop reason: HOSPADM

## 2019-03-23 RX ORDER — IBUPROFEN 200 MG
16 TABLET ORAL
Status: DISCONTINUED | OUTPATIENT
Start: 2019-03-23 | End: 2019-03-24 | Stop reason: HOSPADM

## 2019-03-23 RX ORDER — AMOXICILLIN 250 MG
1 CAPSULE ORAL 2 TIMES DAILY
Status: DISCONTINUED | OUTPATIENT
Start: 2019-03-23 | End: 2019-03-24 | Stop reason: HOSPADM

## 2019-03-23 RX ORDER — RAMELTEON 8 MG/1
8 TABLET ORAL NIGHTLY PRN
Status: DISCONTINUED | OUTPATIENT
Start: 2019-03-23 | End: 2019-03-24 | Stop reason: HOSPADM

## 2019-03-23 RX ORDER — IBUPROFEN 200 MG
24 TABLET ORAL
Status: DISCONTINUED | OUTPATIENT
Start: 2019-03-23 | End: 2019-03-24 | Stop reason: HOSPADM

## 2019-03-23 RX ADMIN — HEPARIN SODIUM 5000 UNITS: 5000 INJECTION, SOLUTION INTRAVENOUS; SUBCUTANEOUS at 03:03

## 2019-03-23 RX ADMIN — ACETAMINOPHEN 650 MG: 325 TABLET ORAL at 09:03

## 2019-03-23 RX ADMIN — HEPARIN SODIUM 5000 UNITS: 5000 INJECTION, SOLUTION INTRAVENOUS; SUBCUTANEOUS at 09:03

## 2019-03-23 NOTE — SUBJECTIVE & OBJECTIVE
Past Medical History:   Diagnosis Date    Abnormal mammogram of both breasts     Arthritis     Hypertension     Major depressive disorder 5/14/2017    Memory disorder     Seizures     Stroke        Past Surgical History:   Procedure Laterality Date    CATARACT EXTRACTION      cysts breast      TONSILLECTOMY         Review of patient's allergies indicates:  No Known Allergies    No current facility-administered medications on file prior to encounter.      Current Outpatient Medications on File Prior to Encounter   Medication Sig    aspirin 81 MG Chew Take 81 mg by mouth once daily.    HYDROcodone-acetaminophen (NORCO) 5-325 mg per tablet Take 1 tablet by mouth every 8 (eight) hours as needed for Pain.    atorvastatin (LIPITOR) 80 MG tablet Take 1 tablet (80 mg total) by mouth once daily.     Family History     None        Tobacco Use    Smoking status: Never Smoker    Smokeless tobacco: Never Used   Substance and Sexual Activity    Alcohol use: No    Drug use: No    Sexual activity: No     Review of Systems   Constitutional: Positive for chills. Negative for fever.   HENT: Negative for tinnitus.    Eyes: Negative for photophobia and visual disturbance.   Respiratory: Positive for chest tightness. Negative for shortness of breath.    Cardiovascular: Positive for chest pain. Negative for palpitations and leg swelling.   Gastrointestinal: Negative for abdominal distention, nausea and vomiting.   Genitourinary: Negative for difficulty urinating and dysuria.   Musculoskeletal: Negative for back pain and gait problem.   Skin: Negative for color change.   Neurological: Positive for dizziness and syncope. Negative for headaches.   Psychiatric/Behavioral: Negative for agitation and confusion.     Objective:     Vital Signs (Most Recent):  Temp: 97.9 °F (36.6 °C) (03/23/19 0954)  Pulse: 66 (03/23/19 1505)  Resp: 18 (03/23/19 1507)  BP: (!) 154/70 (03/23/19 1505)  SpO2: 97 % (03/23/19 1507) Vital Signs (24h  Range):  Temp:  [97.9 °F (36.6 °C)] 97.9 °F (36.6 °C)  Pulse:  [66-74] 66  Resp:  [16-20] 18  SpO2:  [97 %-100 %] 97 %  BP: (129-170)/(63-91) 154/70     Weight: 81.2 kg (179 lb)  Body mass index is 30.73 kg/m².    Physical Exam   Constitutional: She is oriented to person, place, and time. She appears well-nourished. No distress.   HENT:   Head: Normocephalic and atraumatic.   Eyes: EOM are normal. Pupils are equal, round, and reactive to light.   Neck: Normal range of motion.   Cardiovascular: Normal rate and regular rhythm.   No murmur heard.  Pulmonary/Chest: Effort normal. No respiratory distress. She has no wheezes.   Abdominal: Soft. Bowel sounds are normal.   Musculoskeletal: Normal range of motion. She exhibits no edema or tenderness.   Neurological: She is alert and oriented to person, place, and time. No cranial nerve deficit.   Skin: Skin is warm and dry. No erythema.   Psychiatric: She has a normal mood and affect.   Nursing note and vitals reviewed.        CRANIAL NERVES     CN III, IV, VI   Pupils are equal, round, and reactive to light.  Extraocular motions are normal.        Significant Labs: All pertinent labs within the past 24 hours have been reviewed.    Significant Imaging: I have reviewed all pertinent imaging results/findings within the past 24 hours.

## 2019-03-23 NOTE — ASSESSMENT & PLAN NOTE
- reports becoming lightheaded and passing out when entering her vehicle  - carotid US ordered    - no neurological deficits noted on exam  - telemetry  - will consider cardiac event monitor at discharge

## 2019-03-23 NOTE — HPI
Sofia Augustine is a 73 y.o. female with a history of HTN, HLD, depression, seizure disorder, cervical radiculopathy who presents with complaints of chest pain and syncope. Patient reports a heaviness/pressure in her chest that began last night, rated 7/10 with radiation into left arm and shoulder.  Upon waking, she reports the pain became worse so she decided to seek medical care. She also reports having a syncopal episode when she got into her car--- she became lightheaded and passed out. When she woke up she drove herself to the ED. She takes an aspirin daily but did not take it this morning. Patient states pain was aggravated with neck flexion.    ED: Troponin elevated but near baseline. CXR with no acute abnormality.

## 2019-03-23 NOTE — ED PROVIDER NOTES
Encounter Date: 3/23/2019    SCRIBE #1 NOTE: I, Dawn Busby, am scribing for, and in the presence of,  Dr. Lara. I have scribed the following portions of the note - Other sections scribed: HPI, ROS, PE.       History     Chief Complaint   Patient presents with    Chest Pain     chest pain in sternal area non radiating . Onset last night constant.  Aching. Denies sweating/nausea or SOB. Paassed out in car on the way here. C/O vaginal swelling for last few days.     Loss of Consciousness     Time patient was seen by the provider: 11:24 AM      The patient is a 73 y.o. female with co-morbidities including: HTN, and Hx of seizures, stroke, who presents to the ED with a complaint of chest pain since last night. Chest pain radiated to herupper chest and l arm  Anterior neck up to left shoulder. Now chest pain is mostly mid sternal, pain is constant. Had a syncopal episode today when she got into the car. Vaginal swelling and pain for a few days. Notes of frequency. Denies diaphoresis, cough, fever, dysuria, nausea, vomiting. Did not take aspirin today. Patient is a non smoker. No FMHx of cardiac issues. She is not on losartan because of recall on her medication.       The history is provided by the patient and medical records.     Review of patient's allergies indicates:  No Known Allergies  Past Medical History:   Diagnosis Date    Abnormal mammogram of both breasts     Arthritis     Hypertension     Major depressive disorder 5/14/2017    Memory disorder     Seizures     Stroke      Past Surgical History:   Procedure Laterality Date    CATARACT EXTRACTION      cysts breast      TONSILLECTOMY       History reviewed. No pertinent family history.  Social History     Tobacco Use    Smoking status: Never Smoker    Smokeless tobacco: Never Used   Substance Use Topics    Alcohol use: No    Drug use: No     Review of Systems   Constitutional: Negative.  Negative for diaphoresis and fever.   HENT:  "Negative.  Negative for drooling and sore throat.    Eyes: Negative.  Negative for photophobia and redness.   Respiratory: Negative.  Negative for shortness of breath.    Cardiovascular: Positive for chest pain.   Gastrointestinal: Negative.  Negative for abdominal pain and nausea.   Endocrine: Negative.    Genitourinary: Positive for frequency and vaginal pain. Negative for dysuria.   Musculoskeletal: Negative.  Negative for back pain.   Skin: Negative.  Negative for rash.   Allergic/Immunologic: Negative.    Neurological: Positive for syncope. Negative for weakness.   Hematological: Does not bruise/bleed easily.   Psychiatric/Behavioral: Negative.    All other systems reviewed and are negative.      Physical Exam     Initial Vitals [03/23/19 0954]   BP Pulse Resp Temp SpO2   (!) 143/79 74 16 97.9 °F (36.6 °C) 100 %      MAP       --         Physical Exam    Nursing note and vitals reviewed.  Constitutional: She appears well-developed and well-nourished.   HENT:   Head: Normocephalic and atraumatic.   Eyes: EOM are normal. Pupils are equal, round, and reactive to light.   Neck: Normal range of motion. Neck supple.   Cardiovascular: Normal rate, regular rhythm, normal heart sounds and intact distal pulses.   Pulmonary/Chest: Breath sounds normal. No respiratory distress. She has no wheezes.   Abdominal: Soft. Bowel sounds are normal.   Genitourinary:   Genitourinary Comments: Normal vaginal exam.  Speculum exam deferred.  No abscess or lesions.  No tenderness to palpation. "bumps'pt referring to nl anatomy   Musculoskeletal: Normal range of motion.   Neurological: She is alert and oriented to person, place, and time. She has normal strength. She displays normal reflexes. No cranial nerve deficit or sensory deficit.   Mtr str sens intact all extr  Nl pulses   Skin: Skin is warm and dry.   Psychiatric: She has a normal mood and affect. Thought content normal.         ED Course   Procedures  Labs Reviewed   CBC W/ AUTO " DIFFERENTIAL   COMPREHENSIVE METABOLIC PANEL   TROPONIN I   TROPONIN I   B-TYPE NATRIURETIC PEPTIDE   D DIMER, QUANTITATIVE     EKG Readings: (Independently Interpreted)   Normal sinus rhythm no STEMI.  Q-waves in V1 V2.  Q-waves V2 is new.  Normal intervals.       Imaging Results    None          Medical Decision Making:   History:   Old Medical Records: I decided to obtain old medical records.  Clinical Tests:   Lab Tests: Ordered and Reviewed  Radiological Study: Ordered and Reviewed  Medical Tests: Ordered and Reviewed            Scribe Attestation:   Scribe #1: I performed the above scribed service and the documentation accurately describes the services I performed. I attest to the accuracy of the note.    Attending Attestation:   Physician Attestation Statement for Resident:  As the supervising MD . -: Patient with chest pain since last night.  Also syncope in the car.  Also complained vaginal discharge. We will admit this patient for syncope.    No abdominal pain or tenderness           Attending ED Notes:   Patient did not hit her head this morning.  She is neurologically intact.  Patient usually has elevated troponins.  We will admit this patient for chest pain like to observation.  D-dimer is minimally bumped at 0.56.  Age adjusted she is still below cut off.  We will hold CT scan.  No PE risk factors.  No shortness of breath.    117  Patient slightly drowsy on arrival with small pupils.  Now she is fully awake alert oriented with no complaints or pain.             Clinical Impression:  Syncope, chest pain   No diagnosis found.                             Melinda Lara MD  03/23/19 1206       Melinda Lara MD  03/23/19 1208       Melinda Lara MD  03/23/19 1233       Melinda Lara MD  03/23/19 1248       Melinda Lara MD  03/23/19 1317

## 2019-03-23 NOTE — ASSESSMENT & PLAN NOTE
73 y.o. with HTN, HLD, depression, seizure disorder, cervical radiculopathy presenting to who presents with new onset chest pain with associated left neck, jaw, arm pain. Pain is not reproducible on exam. Denies SOB, diaphoresis.    - initial troponin 0.046, will trend Q6H--- near baseline  -  ASA administered in the ED  - CXR demonstrating with no acute abnormality, BNP 36  - 2D ECHO ordered for further evaluation   - CBC, CMP (goal Mag>2, K>4) daily; lipid panel ordered  - cardiac telemetry

## 2019-03-23 NOTE — ED NOTES
Tele box [id number] applied to pt. Leda in war room states able to see pt on monitor, rhythm NS with HR 73.

## 2019-03-23 NOTE — PROVIDER PROGRESS NOTES - EMERGENCY DEPT.
Encounter Date: 3/23/2019    ED Physician Progress Notes       SCRIBE NOTE: I, Alyse Kimble, am scribing for, and in the presence of,  Dr. Barron.  Physician Statement: I, Dr. Barron, personally performed the services described in this documentation as scribed by Alyse Kimble in my presence, and it is both accurate and complete.     Physician Note:   10:03 AM   Triage EKG: nml sinus rhythm, HR 70, NSTEMI, nml intervals, nml axis, non-specific T-wave abnormality in Lead 3.    EKG - STEMI Decision  Initial Reading: No STEMI present.      I, Dr. Santi Barron, personally performed the services described in this documentation. All medical record entries made by the scribe were at my direction and in my presence.  I have reviewed the chart and agree that the record reflects my personal performance and is accurate and complete.     Santi Barron,   Dept of Emergency Medicine   [facility]  Spectralink: [id number]

## 2019-03-23 NOTE — H&P
[facility] Hospital Medicine  History & Physical    Patient Name: Sofia Augustine  MRN: [id number]  Admission Date: 3/23/2019  Attending Physician: aKit Bartholomew MD   Primary Care Provider: Luanne Connell MD    [facility] Medicine Team: [facility] MED E Gertrude Bello PA-C     Patient information was obtained from patient and ER records.     Subjective:     Principal Problem:Chest pain    Chief Complaint:   Chief Complaint   Patient presents with    Chest Pain     chest pain in sternal area non radiating . Onset last night constant.  Aching. Denies sweating/nausea or SOB. Paassed out in car on the way here. C/O vaginal swelling for last few days.     Loss of Consciousness        HPI: Sofia Augustine is a 73 y.o. female with a history of HTN, HLD, depression, seizure disorder, cervical radiculopathy who presents with complaints of chest pain and syncope. Patient reports a heaviness/pressure in her chest that began last night, rated 7/10 with radiation into left arm and shoulder.  Upon waking, she reports the pain became worse so she decided to seek medical care. She also reports having a syncopal episode when she got into her car--- she became lightheaded and passed out. When she woke up she drove herself to the ED. She takes an aspirin daily but did not take it this morning. Patient states pain was aggravated with neck flexion.    ED: Troponin elevated but near baseline. CXR with no acute abnormality.     Past Medical History:   Diagnosis Date    Abnormal mammogram of both breasts     Arthritis     Hypertension     Major depressive disorder 5/14/2017    Memory disorder     Seizures     Stroke        Past Surgical History:   Procedure Laterality Date    CATARACT EXTRACTION      cysts breast      TONSILLECTOMY         Review of patient's allergies indicates:  No Known Allergies    No current facility-administered medications on file prior to encounter.      Current  Outpatient Medications on File Prior to Encounter   Medication Sig    aspirin 81 MG Chew Take 81 mg by mouth once daily.    HYDROcodone-acetaminophen (NORCO) 5-325 mg per tablet Take 1 tablet by mouth every 8 (eight) hours as needed for Pain.    atorvastatin (LIPITOR) 80 MG tablet Take 1 tablet (80 mg total) by mouth once daily.     Family History     None        Tobacco Use    Smoking status: Never Smoker    Smokeless tobacco: Never Used   Substance and Sexual Activity    Alcohol use: No    Drug use: No    Sexual activity: No     Review of Systems   Constitutional: Positive for chills. Negative for fever.   HENT: Negative for tinnitus.    Eyes: Negative for photophobia and visual disturbance.   Respiratory: Positive for chest tightness. Negative for shortness of breath.    Cardiovascular: Positive for chest pain. Negative for palpitations and leg swelling.   Gastrointestinal: Negative for abdominal distention, nausea and vomiting.   Genitourinary: Negative for difficulty urinating and dysuria.   Musculoskeletal: Negative for back pain and gait problem.   Skin: Negative for color change.   Neurological: Positive for dizziness and syncope. Negative for headaches.   Psychiatric/Behavioral: Negative for agitation and confusion.     Objective:     Vital Signs (Most Recent):  Temp: 97.9 °F (36.6 °C) (03/23/19 0954)  Pulse: 66 (03/23/19 1505)  Resp: 18 (03/23/19 1507)  BP: (!) 154/70 (03/23/19 1505)  SpO2: 97 % (03/23/19 1507) Vital Signs (24h Range):  Temp:  [97.9 °F (36.6 °C)] 97.9 °F (36.6 °C)  Pulse:  [66-74] 66  Resp:  [16-20] 18  SpO2:  [97 %-100 %] 97 %  BP: (129-170)/(63-91) 154/70     Weight: 81.2 kg (179 lb)  Body mass index is 30.73 kg/m².    Physical Exam   Constitutional: She is oriented to person, place, and time. She appears well-nourished. No distress.   HENT:   Head: Normocephalic and atraumatic.   Eyes: EOM are normal. Pupils are equal, round, and reactive to light.   Neck: Normal range of  motion.   Cardiovascular: Normal rate and regular rhythm.   No murmur heard.  Pulmonary/Chest: Effort normal. No respiratory distress. She has no wheezes.   Abdominal: Soft. Bowel sounds are normal.   Musculoskeletal: Normal range of motion. She exhibits no edema or tenderness.   Neurological: She is alert and oriented to person, place, and time. No cranial nerve deficit.   Skin: Skin is warm and dry. No erythema.   Psychiatric: She has a normal mood and affect.   Nursing note and vitals reviewed.        CRANIAL NERVES     CN III, IV, VI   Pupils are equal, round, and reactive to light.  Extraocular motions are normal.        Significant Labs: All pertinent labs within the past 24 hours have been reviewed.    Significant Imaging: I have reviewed all pertinent imaging results/findings within the past 24 hours.    Assessment/Plan:     * Chest pain    73 y.o. with HTN, HLD, depression, seizure disorder, cervical radiculopathy presenting to who presents with new onset chest pain with associated left neck, jaw, arm pain. Pain is not reproducible on exam. Denies SOB, diaphoresis.    - initial troponin 0.046, will trend Q6H--- near baseline  -  ASA administered in the ED  - CXR demonstrating with no acute abnormality, BNP 36  - 2D ECHO ordered for further evaluation   - CBC, CMP (goal Mag>2, K>4) daily; lipid panel ordered  - cardiac telemetry     Syncope    - reports becoming lightheaded and passing out when entering her vehicle  - carotid US ordered    - no neurological deficits noted on exam  - telemetry  - will consider cardiac event monitor at discharge      Positive D dimer    Using an age-adjusted D-dimer cutoff (patient's age in years × 10 mcg/L) for patients over age 50 years when evaluating for venous thromboembolism (VTE) makes it unlikely     HLD (hyperlipidemia)    - atorvastatin (LIPITOR) 80 MG tablet       VTE Risk Mitigation (From admission, onward)        Ordered     IP VTE HIGH RISK PATIENT  Once       03/23/19 1649     Place PAUL hose  Until discontinued      03/23/19 1649     Place sequential compression device  Until discontinued      03/23/19 1649     heparin (porcine) injection 5,000 Units  Every 8 hours      03/23/19 1318             Gertrude Bello PA-C  Department of Hospital Medicine   [facility]

## 2019-03-23 NOTE — ASSESSMENT & PLAN NOTE
Using an age-adjusted D-dimer cutoff (patient's age in years × 10 mcg/L) for patients over age 50 years when evaluating for venous thromboembolism (VTE) makes it unlikely

## 2019-03-23 NOTE — ED TRIAGE NOTES
Pt to the ED with chest pain in sternal area non radiating, onset last night. Reports SOB. Described as pressure, rates 3/10. Pain increases with head movement.  Passed out in car on the way here. Also reports vaginal swelling and pain for last few days. No discharge.     Patient identifiers verified and correct for Sofia Augustine.   LOC: The patient is awake, alert and aware of environment with an appropriate affect, the patient is oriented x 3 and speaking appropriately.   APPEARANCE: Patient appears comfortable and in no acute distress, patient is clean and well groomed.  SKIN: The skin is warm and dry, color consistent with ethnicity, patient has normal skin turgor and moist mucus membranes, skin intact, no breakdown or bruising noted.   MUSCULOSKELETAL: Patient moving all extremities spontaneously, no swelling noted.  RESPIRATORY: Airway is open and patent, respirations are spontaneous, patient has a normal effort and rate, no accessory muscle use noted, pt placed on continuous pulse ox with O2 sats noted at 100% on room air. Pt reports SOB.  CARDIAC: Pt placed on cardiac monitor. Patient has a normal rate and regular rhythm, no edema noted, capillary refill < 3 seconds. Pt c/o chest pain, 3/10   GASTRO: Soft and non tender to palpation, no distention noted, normoactive bowel sounds present in all four quadrants. Pt states bowel movements have been regular.  : Pt denies any pain with urination. Reports increased frequency  NEURO: Pt opens eyes spontaneously, behavior appropriate to situation, follows commands, facial expression symmetrical, bilateral hand grasp equal and even, purposeful motor response noted, normal sensation in all extremities when touched with a finger.

## 2019-03-24 VITALS
SYSTOLIC BLOOD PRESSURE: 129 MMHG | BODY MASS INDEX: 30.48 KG/M2 | WEIGHT: 178.56 LBS | HEIGHT: 64 IN | DIASTOLIC BLOOD PRESSURE: 81 MMHG | RESPIRATION RATE: 18 BRPM | OXYGEN SATURATION: 95 % | TEMPERATURE: 99 F | HEART RATE: 102 BPM

## 2019-03-24 LAB
ALBUMIN SERPL BCP-MCNC: 3.5 G/DL (ref 3.5–5.2)
ALP SERPL-CCNC: 106 U/L (ref 55–135)
ALT SERPL W/O P-5'-P-CCNC: 11 U/L (ref 10–44)
ANION GAP SERPL CALC-SCNC: 9 MMOL/L (ref 8–16)
ASCENDING AORTA: 2.4 CM
AST SERPL-CCNC: 15 U/L (ref 10–40)
AV INDEX (PROSTH): 0.97
AV MEAN GRADIENT: 2.77 MMHG
AV PEAK GRADIENT: 5.38 MMHG
AV VALVE AREA: 3.15 CM2
AV VELOCITY RATIO: 0.92
BASOPHILS # BLD AUTO: 0.02 K/UL (ref 0–0.2)
BASOPHILS NFR BLD: 0.5 % (ref 0–1.9)
BILIRUB SERPL-MCNC: 1.5 MG/DL (ref 0.1–1)
BSA FOR ECHO PROCEDURE: 1.91 M2
BUN SERPL-MCNC: 15 MG/DL (ref 8–23)
CALCIUM SERPL-MCNC: 9.6 MG/DL (ref 8.7–10.5)
CHLORIDE SERPL-SCNC: 107 MMOL/L (ref 95–110)
CO2 SERPL-SCNC: 23 MMOL/L (ref 23–29)
CREAT SERPL-MCNC: 0.8 MG/DL (ref 0.5–1.4)
DIFFERENTIAL METHOD: ABNORMAL
DOP CALC AO PEAK VEL: 1.16 M/S
DOP CALC AO VTI: 22.71 CM
DOP CALC LVOT AREA: 3.27 CM2
DOP CALC LVOT DIAMETER: 2.04 CM
DOP CALC LVOT PEAK VEL: 1.06 M/S
DOP CALC LVOT STROKE VOLUME: 71.61 CM3
DOP CALCLVOT PEAK VEL VTI: 21.92 CM
E WAVE DECELERATION TIME: 245.92 MSEC
E/A RATIO: 0.86
E/E' RATIO: 13.82
EOSINOPHIL # BLD AUTO: 0.1 K/UL (ref 0–0.5)
EOSINOPHIL NFR BLD: 2.1 % (ref 0–8)
ERYTHROCYTE [DISTWIDTH] IN BLOOD BY AUTOMATED COUNT: 12.9 % (ref 11.5–14.5)
EST. GFR  (AFRICAN AMERICAN): >60 ML/MIN/1.73 M^2
EST. GFR  (NON AFRICAN AMERICAN): >60 ML/MIN/1.73 M^2
GLUCOSE SERPL-MCNC: 87 MG/DL (ref 70–110)
HCT VFR BLD AUTO: 41.9 % (ref 37–48.5)
HGB BLD-MCNC: 13.6 G/DL (ref 12–16)
IMM GRANULOCYTES # BLD AUTO: 0 K/UL (ref 0–0.04)
IMM GRANULOCYTES NFR BLD AUTO: 0 % (ref 0–0.5)
IVRT: 0.16 MSEC
LA MAJOR: 4.6 CM
LA MINOR: 5 CM
LA WIDTH: 3.41 CM
LEFT ATRIUM SIZE: 3.7 CM
LEFT ATRIUM VOLUME INDEX: 27.5 ML/M2
LEFT ATRIUM VOLUME: 51.39 CM3
LV LATERAL E/E' RATIO: 12.67
LV SEPTAL E/E' RATIO: 15.2
LYMPHOCYTES # BLD AUTO: 1.7 K/UL (ref 1–4.8)
LYMPHOCYTES NFR BLD: 45.6 % (ref 18–48)
MAGNESIUM SERPL-MCNC: 1.9 MG/DL (ref 1.6–2.6)
MCH RBC QN AUTO: 29.8 PG (ref 27–31)
MCHC RBC AUTO-ENTMCNC: 32.5 G/DL (ref 32–36)
MCV RBC AUTO: 92 FL (ref 82–98)
MONOCYTES # BLD AUTO: 0.5 K/UL (ref 0.3–1)
MONOCYTES NFR BLD: 12.9 % (ref 4–15)
MV PEAK A VEL: 0.88 M/S
MV PEAK E VEL: 0.76 M/S
NEUTROPHILS # BLD AUTO: 1.5 K/UL (ref 1.8–7.7)
NEUTROPHILS NFR BLD: 38.9 % (ref 38–73)
NRBC BLD-RTO: 0 /100 WBC
PHOSPHATE SERPL-MCNC: 3.8 MG/DL (ref 2.7–4.5)
PISA TR MAX VEL: 2.2 M/S
PLATELET # BLD AUTO: 184 K/UL (ref 150–350)
PMV BLD AUTO: 10.3 FL (ref 9.2–12.9)
POTASSIUM SERPL-SCNC: 3.8 MMOL/L (ref 3.5–5.1)
PROT SERPL-MCNC: 6.9 G/DL (ref 6–8.4)
RA MAJOR: 4.13 CM
RA PRESSURE: 3 MMHG
RA WIDTH: 3.65 CM
RBC # BLD AUTO: 4.56 M/UL (ref 4–5.4)
RIGHT VENTRICULAR END-DIASTOLIC DIMENSION: 3.25 CM
RV TISSUE DOPPLER FREE WALL SYSTOLIC VELOCITY 1 (APICAL 4 CHAMBER VIEW): 11.99 M/S
SINUS: 2.73 CM
SODIUM SERPL-SCNC: 139 MMOL/L (ref 136–145)
STJ: 2.7 CM
TDI LATERAL: 0.06
TDI SEPTAL: 0.05
TDI: 0.06
TR MAX PG: 19.36 MMHG
TRICUSPID ANNULAR PLANE SYSTOLIC EXCURSION: 1.88 CM
TROPONIN I SERPL DL<=0.01 NG/ML-MCNC: 0.04 NG/ML (ref 0–0.03)
TV REST PULMONARY ARTERY PRESSURE: 22 MMHG
WBC # BLD AUTO: 3.79 K/UL (ref 3.9–12.7)

## 2019-03-24 PROCEDURE — G0378 HOSPITAL OBSERVATION PER HR: HCPCS

## 2019-03-24 PROCEDURE — 80053 COMPREHEN METABOLIC PANEL: CPT

## 2019-03-24 PROCEDURE — 63600175 PHARM REV CODE 636 W HCPCS: Performed by: PHYSICIAN ASSISTANT

## 2019-03-24 PROCEDURE — 83735 ASSAY OF MAGNESIUM: CPT

## 2019-03-24 PROCEDURE — 84100 ASSAY OF PHOSPHORUS: CPT

## 2019-03-24 PROCEDURE — 99217 PR OBSERVATION CARE DISCHARGE: ICD-10-PCS | Mod: ,,, | Performed by: PHYSICIAN ASSISTANT

## 2019-03-24 PROCEDURE — 84484 ASSAY OF TROPONIN QUANT: CPT

## 2019-03-24 PROCEDURE — 36415 COLL VENOUS BLD VENIPUNCTURE: CPT

## 2019-03-24 PROCEDURE — 85025 COMPLETE CBC W/AUTO DIFF WBC: CPT

## 2019-03-24 PROCEDURE — 99217 PR OBSERVATION CARE DISCHARGE: CPT | Mod: ,,, | Performed by: PHYSICIAN ASSISTANT

## 2019-03-24 RX ADMIN — HEPARIN SODIUM 5000 UNITS: 5000 INJECTION, SOLUTION INTRAVENOUS; SUBCUTANEOUS at 05:03

## 2019-03-24 NOTE — HOSPITAL COURSE
Sofia Augustine was placed in observation for chest pain. Cardiac work-up unremarkable. Stress ECHO and cardiac event monitor ordered at discharge. Patient without symptoms prior to discharge.

## 2019-03-24 NOTE — PLAN OF CARE
Problem: Adult Inpatient Plan of Care  Goal: Plan of Care Review  Outcome: Ongoing (interventions implemented as appropriate)  No signs of distress noted this shift.  Pt remained on cardiac monitor this shift.  Treated pain with prn medication.  No falls this shift.  Will continue to monitor this shift.

## 2019-03-24 NOTE — NURSING
Pt Dc per MD orders. Dc instructions reviewed. Pt verbalizes understanding. PIV removed catheter tip intact. VSS./ NAD. Pt ambulated off unit.

## 2019-03-24 NOTE — DISCHARGE SUMMARY
[facility] Hospital Medicine  Discharge Summary      Patient Name: Sofia Augustine  MRN: [id number]  Admission Date: 3/23/2019  Hospital Length of Stay: 0 days  Discharge Date and Time:  03/24/2019 6:19 PM  Attending Physician: Kait Bartholomew MD   Discharging Provider: Gertrude Blelo PA-C  Primary Care Provider: Luanne Connell MD  [facility] Medicine Team: [facility] HOSP MED E Gertrude Bello PA-C    HPI:   Sofia Augustine is a 73 y.o. female with a history of HTN, HLD, depression, seizure disorder, cervical radiculopathy who presents with complaints of chest pain and syncope. Patient reports a heaviness/pressure in her chest that began last night, rated 7/10 with radiation into left arm and shoulder.  Upon waking, she reports the pain became worse so she decided to seek medical care. She also reports having a syncopal episode when she got into her car--- she became lightheaded and passed out. When she woke up she drove herself to the ED. She takes an aspirin daily but did not take it this morning. Patient states pain was aggravated with neck flexion.    ED: Troponin elevated but near baseline. CXR with no acute abnormality.     * No surgery found *      Hospital Course:   Sofia Augustine was placed in observation for chest pain. Cardiac work-up unremarkable. Stress ECHO and cardiac event monitor ordered at discharge. Patient without symptoms prior to discharge.      Consults:     * Chest pain  73 y.o. with HTN, HLD, depression, seizure disorder, cervical radiculopathy presenting to who presents with new onset chest pain with associated left neck, jaw, arm pain. Pain is not reproducible on exam. Denies SOB, diaphoresis.    Symptoms resolved   - initial troponin 0.046-->0.047-->0.039  -  ASA administered in the ED  - CXR demonstrating with no acute abnormality, BNP 36  - 2D ECHO with 65%, -- DD  - cardiac event monitor   - outpatient stress echo       Final Active Diagnoses:     Diagnosis Date Noted POA    PRINCIPAL PROBLEM:  Chest pain [R07.9] 05/13/2017 Yes    Positive D dimer [R79.89] 03/23/2019 Yes    Syncope [R55] 03/23/2019 Yes    HLD (hyperlipidemia) [E78.5] 12/28/2017 Yes    Essential hypertension [I10] 04/03/2016 Yes     Chronic      Problems Resolved During this Admission:       Discharged Condition: stable    Disposition: Home or Self Care    Follow Up:  Follow-up Information     Schedule an appointment as soon as possible for a visit with Luanne Connell MD.    Specialty:  Internal Medicine  Contact information:  [address] [facility] [address]  [place] [address]  [phone]                 Patient Instructions:      Diet Cardiac     Notify your health care provider if you experience any of the following:  persistent dizziness, light-headedness, or visual disturbances     Cardiac event monitor (Cupid Only)   Standing Status: Future Standing Exp. Date: 03/24/20     Order Specific Question Answer Comments   Cardiac Event Monitor Auto Trigger      Echocardiogram stress test (Cupid Only)   Standing Status: Future Standing Exp. Date: 03/24/20     Order Specific Question Answer Comments   Which stress agent will be used Pharm      Activity as tolerated       Significant Diagnostic Studies: Labs: All labs within the past 24 hours have been reviewed    Pending Diagnostic Studies:     None         Medications:  Reconciled Home Medications:      Medication List      CONTINUE taking these medications    aspirin 81 MG Chew  Take 81 mg by mouth once daily.     atorvastatin 80 MG tablet  Commonly known as:  LIPITOR  Take 1 tablet (80 mg total) by mouth once daily.     HYDROcodone-acetaminophen 5-325 mg per tablet  Commonly known as:  NORCO  Take 1 tablet by mouth every 8 (eight) hours as needed for Pain.            Indwelling Lines/Drains at time of discharge:   Lines/Drains/Airways          None          Time spent on the discharge of patient: 30 minutes  Patient was seen and examined  on the date of discharge and determined to be suitable for discharge.         Gertrude Bello PA-C  Department of Hospital Medicine  [facility]

## 2019-03-24 NOTE — ASSESSMENT & PLAN NOTE
73 y.o. with HTN, HLD, depression, seizure disorder, cervical radiculopathy presenting to who presents with new onset chest pain with associated left neck, jaw, arm pain. Pain is not reproducible on exam. Denies SOB, diaphoresis.    Symptoms resolved   - initial troponin 0.046-->0.047-->0.039  -  ASA administered in the ED  - CXR demonstrating with no acute abnormality, BNP 36  - 2D ECHO with 65%, -- DD  - cardiac event monitor   - outpatient stress echo

## 2019-03-26 ENCOUNTER — OFFICE VISIT (OUTPATIENT)
Dept: INTERNAL MEDICINE | Facility: CLINIC | Age: 74
End: 2019-03-26
Payer: MEDICARE

## 2019-03-26 VITALS
BODY MASS INDEX: 30.6 KG/M2 | TEMPERATURE: 99 F | WEIGHT: 179.25 LBS | DIASTOLIC BLOOD PRESSURE: 78 MMHG | HEART RATE: 88 BPM | HEIGHT: 64 IN | SYSTOLIC BLOOD PRESSURE: 119 MMHG

## 2019-03-26 DIAGNOSIS — G40.909 SEIZURE DISORDER: ICD-10-CM

## 2019-03-26 DIAGNOSIS — R55 SYNCOPE, UNSPECIFIED SYNCOPE TYPE: ICD-10-CM

## 2019-03-26 DIAGNOSIS — G81.91 HEMIPLEGIA AFFECTING RIGHT DOMINANT SIDE, UNSPECIFIED ETIOLOGY, UNSPECIFIED HEMIPLEGIA TYPE: ICD-10-CM

## 2019-03-26 DIAGNOSIS — F43.9 STRESS: ICD-10-CM

## 2019-03-26 DIAGNOSIS — Z09 HOSPITAL DISCHARGE FOLLOW-UP: Primary | ICD-10-CM

## 2019-03-26 DIAGNOSIS — R07.9 CHEST PAIN, UNSPECIFIED TYPE: ICD-10-CM

## 2019-03-26 PROCEDURE — 99499 RISK ADDL DX/OHS AUDIT: ICD-10-PCS | Mod: S$GLB,,, | Performed by: INTERNAL MEDICINE

## 2019-03-26 PROCEDURE — 3074F PR MOST RECENT SYSTOLIC BLOOD PRESSURE < 130 MM HG: ICD-10-PCS | Mod: CPTII,S$GLB,, | Performed by: INTERNAL MEDICINE

## 2019-03-26 PROCEDURE — 99999 PR PBB SHADOW E&M-EST. PATIENT-LVL III: ICD-10-PCS | Mod: PBBFAC,,, | Performed by: INTERNAL MEDICINE

## 2019-03-26 PROCEDURE — 3078F PR MOST RECENT DIASTOLIC BLOOD PRESSURE < 80 MM HG: ICD-10-PCS | Mod: CPTII,S$GLB,, | Performed by: INTERNAL MEDICINE

## 2019-03-26 PROCEDURE — 99214 PR OFFICE/OUTPT VISIT, EST, LEVL IV, 30-39 MIN: ICD-10-PCS | Mod: S$GLB,,, | Performed by: INTERNAL MEDICINE

## 2019-03-26 PROCEDURE — 99999 PR PBB SHADOW E&M-EST. PATIENT-LVL III: CPT | Mod: PBBFAC,,, | Performed by: INTERNAL MEDICINE

## 2019-03-26 PROCEDURE — 99214 OFFICE O/P EST MOD 30 MIN: CPT | Mod: S$GLB,,, | Performed by: INTERNAL MEDICINE

## 2019-03-26 PROCEDURE — 99499 UNLISTED E&M SERVICE: CPT | Mod: S$GLB,,, | Performed by: INTERNAL MEDICINE

## 2019-03-26 PROCEDURE — 3078F DIAST BP <80 MM HG: CPT | Mod: CPTII,S$GLB,, | Performed by: INTERNAL MEDICINE

## 2019-03-26 PROCEDURE — 3074F SYST BP LT 130 MM HG: CPT | Mod: CPTII,S$GLB,, | Performed by: INTERNAL MEDICINE

## 2019-03-26 PROCEDURE — 1101F PT FALLS ASSESS-DOCD LE1/YR: CPT | Mod: CPTII,S$GLB,, | Performed by: INTERNAL MEDICINE

## 2019-03-26 PROCEDURE — 1101F PR PT FALLS ASSESS DOC 0-1 FALLS W/OUT INJ PAST YR: ICD-10-PCS | Mod: CPTII,S$GLB,, | Performed by: INTERNAL MEDICINE

## 2019-03-26 NOTE — PROGRESS NOTES
Subjective:       Patient ID: Sofia Augustine is a 73 y.o. female.    Chief Complaint: Hospital Follow Up    HPI    74 yo F w/ seizure disorder, hemiplegia affecting right dominant side presents for hospital follow up of chest pain. She presented to the ER on 3/23/19 for chest pain. She reports that she has a long history of intermittent chest pain but had worse chest pain that day, which prompted her to go to ER but when she got in her car she passed out. She thinks she had LOC for a few minutes, this also happened two weeks prior. She does endorse a fluttering sensation prior to LOC. She was admitted to observation for serial troponin monitoring, which was slightly elevated but remained stable and similar to previous checks. CXR was normal and 2DE showed ER of 65% with normal LV diastolic function. Plan was for outpatient stress testing.She has had outpatient stress testing in the past and [facility] in 12/2017 with non-obstructive CAD. She is compliant with atorvastatin 80mg and aspirin 81 mg daily.     Seizure disorder: last seizure 1-2 years ago. She reports being on a szeizure medication in the past, but is unsure of what it was. She denies seeing a neurologist in the past.     She reports having a lot of stress and that no one will listen to her. Referral to Psychiatry previously ordered by her PCP.   Review of Systems   Constitutional: Negative for chills and fever.   HENT: Negative.    Respiratory: Positive for shortness of breath. Negative for apnea.    Cardiovascular: Positive for chest pain and palpitations.   Gastrointestinal: Negative for abdominal pain.   Genitourinary: Negative for difficulty urinating.   Musculoskeletal: Negative.    Skin: Negative for wound.   Neurological: Positive for syncope and weakness.   Psychiatric/Behavioral: Positive for dysphoric mood. The patient is nervous/anxious.        Objective:        Vitals:    03/26/19 1052   BP: 119/78   BP Location: Right arm   Pulse: 88   Temp:  "98.6 °F (37 °C)   TempSrc: Oral   Weight: 81.3 kg (179 lb 3.7 oz)   Height: 5' 4" (1.626 m)       Body mass index is 30.77 kg/m².    Physical Exam   Constitutional: She is oriented to person, place, and time. She appears well-developed and well-nourished. No distress.   HENT:   Head: Normocephalic and atraumatic.   Nose: Nose normal.   Eyes: Conjunctivae and EOM are normal. Right eye exhibits no discharge. Left eye exhibits no discharge.   Neck: Neck supple. Normal carotid pulses present. Carotid bruit is not present.   Cardiovascular: Normal rate, regular rhythm, normal heart sounds and intact distal pulses.   Pulmonary/Chest: Effort normal and breath sounds normal.   Abdominal: Soft. Bowel sounds are normal.   Musculoskeletal: Normal range of motion. She exhibits no edema.   Neurological: She is alert and oriented to person, place, and time.   Skin: Skin is warm and dry. She is not diaphoretic. No erythema.   Psychiatric: She exhibits a depressed mood (tearful).       Assessment:     1. Hospital discharge follow-up    2. Chest pain, unspecified type    3. Syncope, unspecified syncope type    4. Seizure disorder    5. Hemiplegia affecting right dominant side, unspecified etiology, unspecified hemiplegia type    6. Stress           Plan:         1. Hospital discharge follow-up  - chest pain resolved, outpatient stress testing and cardiac monitor ordered by inpt team, but I recommend she establish with cardiology for further evaluation.     2. Chest pain, unspecified type  - inpt w/u negative for cardiac cause  - Ambulatory referral to Cardiology  - continue atorvastatin 80mg and aspirin 81 mg daily.     3. Syncope, unspecified syncope type  - Ambulatory referral to Cardiology    4. Seizure disorder  - not currently on any meds, last seizure 2 years ago. Recommend she f/u with PCP    5. Hemiplegia affecting right dominant side, unspecified etiology, unspecified hemiplegia type  - unable to perform exercise stress " testing    6. Stress  - phone number to schedule psychiatry given to pt.            Patient note was created using MModal Dictation.  Any errors in syntax or even information may not have been identified and edited on initial review prior to signing this note.

## 2019-03-29 ENCOUNTER — TELEPHONE (OUTPATIENT)
Dept: INTERNAL MEDICINE | Facility: CLINIC | Age: 74
End: 2019-03-29

## 2019-03-29 DIAGNOSIS — R53.81 DEBILITY: Primary | ICD-10-CM

## 2019-04-11 ENCOUNTER — TELEPHONE (OUTPATIENT)
Dept: INTERNAL MEDICINE | Facility: CLINIC | Age: 74
End: 2019-04-11

## 2019-04-11 NOTE — TELEPHONE ENCOUNTER
----- Message from Caitlyn Hensley sent at 4/11/2019  9:01 AM CDT -----  Contact: pt [phone]  Pt would like to know if she need to get measles vaccination. Please advise

## 2019-04-22 DIAGNOSIS — M47.22 OSTEOARTHRITIS OF SPINE WITH RADICULOPATHY, CERVICAL REGION: ICD-10-CM

## 2019-04-22 NOTE — TELEPHONE ENCOUNTER
----- Message from Caitlyn Hensley sent at 4/22/2019  9:40 AM CDT -----  Contact: pt [phone]  Patient is calling for an RX refill or new RX.  Is this a refill or new RX:  Refill   RX name and strength: HYDROcodone-acetaminophen (NORCO) 5-325 mg per tablet  Directions (copy/paste from chart):    Is this a 30 day or 90 day RX:    Local pharmacy or mail order pharmacy:     Pharmacy name and phone # (copy/paste from chart):     Comments:

## 2019-04-23 RX ORDER — HYDROCODONE BITARTRATE AND ACETAMINOPHEN 5; 325 MG/1; MG/1
1 TABLET ORAL EVERY 8 HOURS PRN
Qty: 40 TABLET | Refills: 0 | Status: SHIPPED | OUTPATIENT
Start: 2019-04-23 | End: 2019-05-23 | Stop reason: SDUPTHER

## 2019-04-30 RX ORDER — ONDANSETRON 4 MG/1
TABLET, ORALLY DISINTEGRATING ORAL
Qty: 20 TABLET | Refills: 0 | Status: SHIPPED | OUTPATIENT
Start: 2019-04-30 | End: 2019-11-02

## 2019-05-23 DIAGNOSIS — M47.22 OSTEOARTHRITIS OF SPINE WITH RADICULOPATHY, CERVICAL REGION: ICD-10-CM

## 2019-05-23 NOTE — TELEPHONE ENCOUNTER
----- Message from Singh Lara sent at 5/23/2019  3:26 PM CDT -----  Contact: self    Patient is calling for an RX refill or new RX.  Is this a refill or new RX:  Refill  RX name and strength:HYDROcodone-acetaminophen (NORCO) 5-325 mg per tablet 40 tablet    Directions (copy/paste from chart):    Is this a 30 day or 90 day RX:    Local pharmacy or mail order pharmacy:     Pharmacy name and phone # (copy/paste from chart):     Comments:

## 2019-05-24 RX ORDER — HYDROCODONE BITARTRATE AND ACETAMINOPHEN 5; 325 MG/1; MG/1
1 TABLET ORAL EVERY 8 HOURS PRN
Qty: 40 TABLET | Refills: 0 | Status: SHIPPED | OUTPATIENT
Start: 2019-05-24 | End: 2019-07-15 | Stop reason: SDUPTHER

## 2019-05-29 ENCOUNTER — TELEPHONE (OUTPATIENT)
Dept: INTERNAL MEDICINE | Facility: CLINIC | Age: 74
End: 2019-05-29

## 2019-06-04 RX ORDER — METHYLPREDNISOLONE 4 MG/1
TABLET ORAL
Qty: 1 PACKAGE | Refills: 0 | Status: SHIPPED | OUTPATIENT
Start: 2019-06-04 | End: 2019-11-19 | Stop reason: SDUPTHER

## 2019-06-04 NOTE — TELEPHONE ENCOUNTER
----- Message from Caitlyn Hensley sent at 6/4/2019  4:18 PM CDT -----  Contact: pt [phone]  Patient is calling for an RX refill or new RX.  Is this a refill or new RX: new    RX name and strength: Prednisone  Directions (copy/paste from chart):    Is this a 30 day or 90 day RX:  30  Local pharmacy or mail order pharmacy:  Local   Pharmacy name and phone # (copy/paste from chart):   [facility] [address] [place] [address] [address] AT [facility] OF [address] [phone] (Phone)  [phone] (Fax)  Comments:

## 2019-06-21 ENCOUNTER — TELEPHONE (OUTPATIENT)
Dept: INTERNAL MEDICINE | Facility: CLINIC | Age: 74
End: 2019-06-21

## 2019-06-21 NOTE — TELEPHONE ENCOUNTER
Called and spoke with the patient and she advised this happened yesterday and she is feeling better she states she will go to the ED if the concerns resurface she will go to the Ed she advised she feels better. Termed the call

## 2019-06-21 NOTE — TELEPHONE ENCOUNTER
----- Message from Singh Lara sent at 6/21/2019  2:17 PM CDT -----  Contact: self   Patient would like to get medical advice.  Symptoms (please be specific):  Left side of face and tongue went numb on today  How long has patient had these symptoms:  1 day   Pharmacy name and phone # (copy/paste from chart):    Any drug allergies (copy/paste from chart):      Would the patient rather a call back or a response via [facility]?:  Call back   Comments:

## 2019-06-28 ENCOUNTER — TELEPHONE (OUTPATIENT)
Dept: INTERNAL MEDICINE | Facility: CLINIC | Age: 74
End: 2019-06-28

## 2019-07-15 ENCOUNTER — OFFICE VISIT (OUTPATIENT)
Dept: INTERNAL MEDICINE | Facility: CLINIC | Age: 74
End: 2019-07-15
Payer: MEDICARE

## 2019-07-15 VITALS
WEIGHT: 183.88 LBS | HEIGHT: 64 IN | BODY MASS INDEX: 31.39 KG/M2 | HEART RATE: 63 BPM | TEMPERATURE: 98 F | DIASTOLIC BLOOD PRESSURE: 90 MMHG | SYSTOLIC BLOOD PRESSURE: 130 MMHG

## 2019-07-15 DIAGNOSIS — M47.22 OSTEOARTHRITIS OF SPINE WITH RADICULOPATHY, CERVICAL REGION: ICD-10-CM

## 2019-07-15 DIAGNOSIS — I10 ESSENTIAL HYPERTENSION: Primary | ICD-10-CM

## 2019-07-15 DIAGNOSIS — R10.9 FLANK PAIN: ICD-10-CM

## 2019-07-15 PROCEDURE — 99999 PR PBB SHADOW E&M-EST. PATIENT-LVL III: CPT | Mod: PBBFAC,,, | Performed by: INTERNAL MEDICINE

## 2019-07-15 PROCEDURE — 3080F PR MOST RECENT DIASTOLIC BLOOD PRESSURE >= 90 MM HG: ICD-10-PCS | Mod: CPTII,S$GLB,, | Performed by: INTERNAL MEDICINE

## 2019-07-15 PROCEDURE — 99999 PR PBB SHADOW E&M-EST. PATIENT-LVL III: ICD-10-PCS | Mod: PBBFAC,,, | Performed by: INTERNAL MEDICINE

## 2019-07-15 PROCEDURE — 1101F PT FALLS ASSESS-DOCD LE1/YR: CPT | Mod: CPTII,S$GLB,, | Performed by: INTERNAL MEDICINE

## 2019-07-15 PROCEDURE — 3075F SYST BP GE 130 - 139MM HG: CPT | Mod: CPTII,S$GLB,, | Performed by: INTERNAL MEDICINE

## 2019-07-15 PROCEDURE — 3080F DIAST BP >= 90 MM HG: CPT | Mod: CPTII,S$GLB,, | Performed by: INTERNAL MEDICINE

## 2019-07-15 PROCEDURE — 99214 OFFICE O/P EST MOD 30 MIN: CPT | Mod: S$GLB,,, | Performed by: INTERNAL MEDICINE

## 2019-07-15 PROCEDURE — 99214 PR OFFICE/OUTPT VISIT, EST, LEVL IV, 30-39 MIN: ICD-10-PCS | Mod: S$GLB,,, | Performed by: INTERNAL MEDICINE

## 2019-07-15 PROCEDURE — 1101F PR PT FALLS ASSESS DOC 0-1 FALLS W/OUT INJ PAST YR: ICD-10-PCS | Mod: CPTII,S$GLB,, | Performed by: INTERNAL MEDICINE

## 2019-07-15 PROCEDURE — 3075F PR MOST RECENT SYSTOLIC BLOOD PRESS GE 130-139MM HG: ICD-10-PCS | Mod: CPTII,S$GLB,, | Performed by: INTERNAL MEDICINE

## 2019-07-15 RX ORDER — CANDESARTAN 8 MG/1
8 TABLET ORAL DAILY
Qty: 90 TABLET | Refills: 0 | Status: SHIPPED | OUTPATIENT
Start: 2019-07-15 | End: 2020-09-01

## 2019-07-15 RX ORDER — CEPHALEXIN 500 MG/1
500 CAPSULE ORAL EVERY 12 HOURS
Qty: 14 CAPSULE | Refills: 0 | Status: ON HOLD | OUTPATIENT
Start: 2019-07-15 | End: 2019-11-01 | Stop reason: HOSPADM

## 2019-07-15 RX ORDER — HYDROCODONE BITARTRATE AND ACETAMINOPHEN 5; 325 MG/1; MG/1
1 TABLET ORAL EVERY 8 HOURS PRN
Qty: 40 TABLET | Refills: 0 | Status: SHIPPED | OUTPATIENT
Start: 2019-07-15 | End: 2019-08-28 | Stop reason: SDUPTHER

## 2019-07-15 NOTE — PROGRESS NOTES
CC: followup of hypertension  HPI:  The patient is a 73 y.o. year old female who presents to the office for followup of hypertension.  The patient denies any chest pain, shortness of breath, headache, blurred vision, excessive fatigue, nausea or vomiting.  She has stopped taking losartan due to recall. She complains of right flank pain that started yesterday.  She denies any dysuria or hematuria.  She also complains of bug bite on the left side of side of her face.   The patient denies any fever.  She took two left over erythromycin.  The patient also reports continued depression symptoms.  She complains of continued back and knee pain.    PAST MEDICAL HISTORY:  Past Medical History:   Diagnosis Date    Abnormal mammogram of both breasts     Arthritis     Hypertension     Major depressive disorder 5/14/2017    Memory disorder     Seizures     Stroke        SURGICAL HISTORY:  Past Surgical History:   Procedure Laterality Date    CATARACT EXTRACTION      cysts breast      TONSILLECTOMY         MEDS:  Medcard reviewed and updated    ALLERGIES: Allergy Card reviewed and updated    SOCIAL HISTORY:   The patient is a nonsmoker.    PE:   APPEARANCE: Well nourished, well developed, in no acute distress.    EARS: TM is obscured on the left by cerumen. No retraction or perforation.   CHEST: Lungs clear to auscultation with unlabored respirations.  CARDIOVASCULAR: Normal S1, S2. No murmurs. No carotid bruits. No pedal edema.  ABDOMEN: Bowel sounds normal. Not distended. Soft. No tenderness or masses.   SKIN: Positive swelling of angle of left side of jaw.  PSYCHIATRIC: The patient is oriented to person, place, and time and has a pleasant affect.        ASSESSMENT/PLAN:  Sofia was seen today for follow-up.    Diagnoses and all orders for this visit:    Essential hypertension  -     blood pressure is mildly elevated, monitor    Osteoarthritis of spine with radiculopathy, cervical region  -     Discontinue:  HYDROcodone-acetaminophen (NORCO) 5-325 mg per tablet; Take 1 tablet by mouth every 8 (eight) hours as needed for Pain.    Flank pain  -     Urinalysis; Future  -     Urine culture; Future    Other orders  -     cephALEXin (KEFLEX) 500 MG capsule; Take 1 capsule (500 mg total) by mouth every 12 (twelve) hours.  -     candesartan (ATACAND) 8 MG tablet; Take 1 tablet (8 mg total) by mouth once daily.

## 2019-08-28 DIAGNOSIS — M47.22 OSTEOARTHRITIS OF SPINE WITH RADICULOPATHY, CERVICAL REGION: ICD-10-CM

## 2019-08-28 RX ORDER — HYDROCODONE BITARTRATE AND ACETAMINOPHEN 5; 325 MG/1; MG/1
1 TABLET ORAL EVERY 8 HOURS PRN
Qty: 40 TABLET | Refills: 0 | Status: SHIPPED | OUTPATIENT
Start: 2019-08-28 | End: 2019-09-24 | Stop reason: SDUPTHER

## 2019-08-28 NOTE — TELEPHONE ENCOUNTER
----- Message from Lucy Escobar sent at 8/28/2019 10:42 AM CDT -----  Contact: Pt [phone]  Is this a refill or new RX:  Refill    RX name and strength: HYDROcodone-acetaminophen (NORCO) 5-325 mg per tablet    Pharmacy name and phone # [facility] #[id number] - [place] - [address] AT [facility] [address] [phone] (Phone) [phone] (Fax)

## 2019-09-03 ENCOUNTER — TELEPHONE (OUTPATIENT)
Dept: INTERNAL MEDICINE | Facility: CLINIC | Age: 74
End: 2019-09-03

## 2019-09-03 NOTE — TELEPHONE ENCOUNTER
----- Message from Estrella Jimenez sent at 9/3/2019  8:44 AM CDT -----  Contact: [phone]  Patient is requesting a call from the office. She stated she has a fever, coughing up green mucus, and aching all over body. She is requesting if MD can prescribe something to help.  Please advise, thanks     [facility][id number] [address]  AT [facility] [address]   [phone] (Phone)  [phone] (Fax)

## 2019-09-17 ENCOUNTER — TELEPHONE (OUTPATIENT)
Dept: INTERNAL MEDICINE | Facility: CLINIC | Age: 74
End: 2019-09-17

## 2019-09-17 NOTE — TELEPHONE ENCOUNTER
----- Message from Singh Lara sent at 9/17/2019  1:18 PM CDT -----  Contact: self    Patient would like to get medical advice.  Symptoms (please be specific):  Breaking out with hives   How long has patient had these symptoms:  1 day   Pharmacy name and phone # (copy/paste from chart):  [facility] #[id number]  [place], [place] - [address]  AT [facility] [address] [phone] (Phone)  [phone] (Fax)  Any drug allergies (copy/paste from chart):      Would the patient rather a call back or a response via [facility]?:  Call back   Comments:  Patient  States she was prescribed Methylpentynol before

## 2019-09-24 DIAGNOSIS — M47.22 OSTEOARTHRITIS OF SPINE WITH RADICULOPATHY, CERVICAL REGION: ICD-10-CM

## 2019-09-24 RX ORDER — HYDROCODONE BITARTRATE AND ACETAMINOPHEN 5; 325 MG/1; MG/1
1 TABLET ORAL EVERY 8 HOURS PRN
Qty: 40 TABLET | Refills: 0 | Status: ON HOLD | OUTPATIENT
Start: 2019-09-24 | End: 2019-11-01 | Stop reason: HOSPADM

## 2019-09-24 NOTE — TELEPHONE ENCOUNTER
----- Message from Singh Lara sent at 9/24/2019  3:03 PM CDT -----  Contact: self    Patient is calling for an RX refill or new RX.  Is this a refill or new RX:    RX name and strength: HYDROcodone-acetaminophen (NORCO) 5-325 mg per tablet  Directions (copy/paste from chart):    Is this a 30 day or 90 day RX:    Local pharmacy or mail order pharmacy:      [facility] #[id number]  [place] [facility] [address]  AT [facility] [address] [phone] (Phone)  [phone] (Fax)  Pharmacy name and phone # (copy/paste from chart):     Comments:

## 2019-10-28 ENCOUNTER — HOSPITAL ENCOUNTER (INPATIENT)
Facility: HOSPITAL | Age: 74
LOS: 4 days | Discharge: HOME | DRG: 282 | End: 2019-11-01
Attending: EMERGENCY MEDICINE | Admitting: EMERGENCY MEDICINE
Payer: MEDICARE

## 2019-10-28 DIAGNOSIS — I25.10 CAD (CORONARY ARTERY DISEASE): ICD-10-CM

## 2019-10-28 DIAGNOSIS — R07.9 CHEST PAIN, UNSPECIFIED TYPE: Primary | ICD-10-CM

## 2019-10-28 DIAGNOSIS — R79.89 ELEVATED TROPONIN: ICD-10-CM

## 2019-10-28 DIAGNOSIS — R07.9 CHEST PAIN: ICD-10-CM

## 2019-10-28 DIAGNOSIS — I63.9 STROKE: ICD-10-CM

## 2019-10-28 DIAGNOSIS — R45.851 PASSIVE SUICIDAL IDEATIONS: ICD-10-CM

## 2019-10-28 DIAGNOSIS — I21.4 NSTEMI (NON-ST ELEVATED MYOCARDIAL INFARCTION): ICD-10-CM

## 2019-10-28 LAB
ALBUMIN SERPL BCP-MCNC: 3.5 G/DL (ref 3.5–5.2)
ALP SERPL-CCNC: 105 U/L (ref 55–135)
ALT SERPL W/O P-5'-P-CCNC: 15 U/L (ref 10–44)
ANION GAP SERPL CALC-SCNC: 7 MMOL/L (ref 8–16)
AST SERPL-CCNC: 25 U/L (ref 10–40)
BASOPHILS # BLD AUTO: 0.03 K/UL (ref 0–0.2)
BASOPHILS NFR BLD: 0.6 % (ref 0–1.9)
BILIRUB SERPL-MCNC: 0.7 MG/DL (ref 0.1–1)
BUN SERPL-MCNC: 15 MG/DL (ref 8–23)
BUN SERPL-MCNC: 19 MG/DL (ref 6–30)
CALCIUM SERPL-MCNC: 9.1 MG/DL (ref 8.7–10.5)
CHLORIDE SERPL-SCNC: 107 MMOL/L (ref 95–110)
CHLORIDE SERPL-SCNC: 110 MMOL/L (ref 95–110)
CHOLEST SERPL-MCNC: 222 MG/DL (ref 120–199)
CHOLEST/HDLC SERPL: 4.4 {RATIO} (ref 2–5)
CO2 SERPL-SCNC: 24 MMOL/L (ref 23–29)
CREAT SERPL-MCNC: 0.8 MG/DL (ref 0.5–1.4)
CREAT SERPL-MCNC: 0.8 MG/DL (ref 0.5–1.4)
CREAT SERPL-MCNC: 0.9 MG/DL (ref 0.5–1.4)
DIFFERENTIAL METHOD: NORMAL
EOSINOPHIL # BLD AUTO: 0.1 K/UL (ref 0–0.5)
EOSINOPHIL NFR BLD: 2.4 % (ref 0–8)
ERYTHROCYTE [DISTWIDTH] IN BLOOD BY AUTOMATED COUNT: 12.7 % (ref 11.5–14.5)
EST. GFR  (AFRICAN AMERICAN): >60 ML/MIN/1.73 M^2
EST. GFR  (NON AFRICAN AMERICAN): >60 ML/MIN/1.73 M^2
GLUCOSE SERPL-MCNC: 100 MG/DL (ref 70–110)
GLUCOSE SERPL-MCNC: 95 MG/DL (ref 70–110)
HCT VFR BLD AUTO: 41.5 % (ref 37–48.5)
HCT VFR BLD CALC: 42 %PCV (ref 36–54)
HDLC SERPL-MCNC: 50 MG/DL (ref 40–75)
HDLC SERPL: 22.5 % (ref 20–50)
HGB BLD-MCNC: 13.4 G/DL (ref 12–16)
IMM GRANULOCYTES # BLD AUTO: 0.01 K/UL (ref 0–0.04)
IMM GRANULOCYTES NFR BLD AUTO: 0.2 % (ref 0–0.5)
INR PPP: 1 (ref 0.8–1.2)
LDLC SERPL CALC-MCNC: 135.6 MG/DL (ref 63–159)
LYMPHOCYTES # BLD AUTO: 2 K/UL (ref 1–4.8)
LYMPHOCYTES NFR BLD: 41.1 % (ref 18–48)
MCH RBC QN AUTO: 30.7 PG (ref 27–31)
MCHC RBC AUTO-ENTMCNC: 32.3 G/DL (ref 32–36)
MCV RBC AUTO: 95 FL (ref 82–98)
MONOCYTES # BLD AUTO: 0.5 K/UL (ref 0.3–1)
MONOCYTES NFR BLD: 10.9 % (ref 4–15)
NEUTROPHILS # BLD AUTO: 2.2 K/UL (ref 1.8–7.7)
NEUTROPHILS NFR BLD: 44.8 % (ref 38–73)
NONHDLC SERPL-MCNC: 172 MG/DL
NRBC BLD-RTO: 0 /100 WBC
PLATELET # BLD AUTO: 209 K/UL (ref 150–350)
PMV BLD AUTO: 10.6 FL (ref 9.2–12.9)
POC IONIZED CALCIUM: 1.11 MMOL/L (ref 1.06–1.42)
POC PTINR: 0.9 (ref 0.9–1.2)
POC PTWBT: 11 SEC (ref 9.7–14.3)
POC TCO2 (MEASURED): 27 MMOL/L (ref 23–29)
POCT GLUCOSE: 95 MG/DL (ref 70–110)
POTASSIUM BLD-SCNC: 4.4 MMOL/L (ref 3.5–5.1)
POTASSIUM SERPL-SCNC: 4.6 MMOL/L (ref 3.5–5.1)
PROT SERPL-MCNC: 7.1 G/DL (ref 6–8.4)
PROTHROMBIN TIME: 10.5 SEC (ref 9–12.5)
RBC # BLD AUTO: 4.37 M/UL (ref 4–5.4)
SAMPLE: NORMAL
SODIUM BLD-SCNC: 141 MMOL/L (ref 136–145)
SODIUM SERPL-SCNC: 141 MMOL/L (ref 136–145)
TRIGL SERPL-MCNC: 182 MG/DL (ref 30–150)
TROPONIN I SERPL DL<=0.01 NG/ML-MCNC: 0.1 NG/ML (ref 0–0.03)
TROPONIN I SERPL DL<=0.01 NG/ML-MCNC: 0.11 NG/ML (ref 0–0.03)
TSH SERPL DL<=0.005 MIU/L-ACNC: 1.26 UIU/ML (ref 0.4–4)
WBC # BLD AUTO: 4.94 K/UL (ref 3.9–12.7)

## 2019-10-28 PROCEDURE — 99220 PR INITIAL OBSERVATION CARE,LEVL III: ICD-10-PCS | Mod: ,,, | Performed by: PHYSICIAN ASSISTANT

## 2019-10-28 PROCEDURE — G0378 HOSPITAL OBSERVATION PER HR: HCPCS

## 2019-10-28 PROCEDURE — 93005 ELECTROCARDIOGRAM TRACING: CPT

## 2019-10-28 PROCEDURE — 99291 CRITICAL CARE FIRST HOUR: CPT | Mod: 25

## 2019-10-28 PROCEDURE — 85610 PROTHROMBIN TIME: CPT

## 2019-10-28 PROCEDURE — 84443 ASSAY THYROID STIM HORMONE: CPT

## 2019-10-28 PROCEDURE — 80061 LIPID PANEL: CPT

## 2019-10-28 PROCEDURE — 93010 ELECTROCARDIOGRAM REPORT: CPT | Mod: ,,, | Performed by: INTERNAL MEDICINE

## 2019-10-28 PROCEDURE — 82962 GLUCOSE BLOOD TEST: CPT

## 2019-10-28 PROCEDURE — 63600175 PHARM REV CODE 636 W HCPCS: Performed by: EMERGENCY MEDICINE

## 2019-10-28 PROCEDURE — 99900035 HC TECH TIME PER 15 MIN (STAT)

## 2019-10-28 PROCEDURE — 93010 EKG 12-LEAD: ICD-10-PCS | Mod: ,,, | Performed by: INTERNAL MEDICINE

## 2019-10-28 PROCEDURE — 96360 HYDRATION IV INFUSION INIT: CPT

## 2019-10-28 PROCEDURE — 80053 COMPREHEN METABOLIC PANEL: CPT

## 2019-10-28 PROCEDURE — 84484 ASSAY OF TROPONIN QUANT: CPT | Mod: 91

## 2019-10-28 PROCEDURE — 85025 COMPLETE CBC W/AUTO DIFF WBC: CPT

## 2019-10-28 PROCEDURE — 25000003 PHARM REV CODE 250: Performed by: EMERGENCY MEDICINE

## 2019-10-28 PROCEDURE — 99214 PR OFFICE/OUTPT VISIT, EST, LEVL IV, 30-39 MIN: ICD-10-PCS | Mod: ,,, | Performed by: PSYCHIATRY & NEUROLOGY

## 2019-10-28 PROCEDURE — 99220 PR INITIAL OBSERVATION CARE,LEVL III: CPT | Mod: ,,, | Performed by: PHYSICIAN ASSISTANT

## 2019-10-28 PROCEDURE — 12000002 HC ACUTE/MED SURGE SEMI-PRIVATE ROOM

## 2019-10-28 PROCEDURE — 25500020 PHARM REV CODE 255: Performed by: EMERGENCY MEDICINE

## 2019-10-28 PROCEDURE — 99214 OFFICE O/P EST MOD 30 MIN: CPT | Mod: ,,, | Performed by: PSYCHIATRY & NEUROLOGY

## 2019-10-28 PROCEDURE — 82565 ASSAY OF CREATININE: CPT

## 2019-10-28 PROCEDURE — 99291 PR CRITICAL CARE, E/M 30-74 MINUTES: ICD-10-PCS | Mod: ,,, | Performed by: EMERGENCY MEDICINE

## 2019-10-28 PROCEDURE — 99291 CRITICAL CARE FIRST HOUR: CPT | Mod: ,,, | Performed by: EMERGENCY MEDICINE

## 2019-10-28 RX ORDER — SODIUM CHLORIDE 0.9 % (FLUSH) 0.9 %
5 SYRINGE (ML) INJECTION
Status: DISCONTINUED | OUTPATIENT
Start: 2019-10-28 | End: 2019-11-01 | Stop reason: HOSPADM

## 2019-10-28 RX ORDER — ACETAMINOPHEN 325 MG/1
650 TABLET ORAL EVERY 4 HOURS PRN
Status: DISCONTINUED | OUTPATIENT
Start: 2019-10-28 | End: 2019-11-01 | Stop reason: HOSPADM

## 2019-10-28 RX ORDER — AMOXICILLIN 250 MG
1 CAPSULE ORAL 2 TIMES DAILY PRN
Status: DISCONTINUED | OUTPATIENT
Start: 2019-10-28 | End: 2019-11-01 | Stop reason: HOSPADM

## 2019-10-28 RX ORDER — CANDESARTAN 8 MG/1
8 TABLET ORAL DAILY
Status: DISCONTINUED | OUTPATIENT
Start: 2019-10-29 | End: 2019-11-01 | Stop reason: HOSPADM

## 2019-10-28 RX ORDER — ONDANSETRON 2 MG/ML
4 INJECTION INTRAMUSCULAR; INTRAVENOUS EVERY 8 HOURS PRN
Status: DISCONTINUED | OUTPATIENT
Start: 2019-10-28 | End: 2019-11-01 | Stop reason: HOSPADM

## 2019-10-28 RX ORDER — ATORVASTATIN CALCIUM 20 MG/1
80 TABLET, FILM COATED ORAL DAILY
Status: DISCONTINUED | OUTPATIENT
Start: 2019-10-29 | End: 2019-11-01 | Stop reason: HOSPADM

## 2019-10-28 RX ORDER — RAMELTEON 8 MG/1
8 TABLET ORAL NIGHTLY PRN
Status: DISCONTINUED | OUTPATIENT
Start: 2019-10-28 | End: 2019-11-01 | Stop reason: HOSPADM

## 2019-10-28 RX ORDER — NITROGLYCERIN 0.4 MG/1
0.4 TABLET SUBLINGUAL
Status: COMPLETED | OUTPATIENT
Start: 2019-10-28 | End: 2019-10-28

## 2019-10-28 RX ORDER — NAPROXEN SODIUM 220 MG/1
81 TABLET, FILM COATED ORAL DAILY
Status: DISCONTINUED | OUTPATIENT
Start: 2019-10-29 | End: 2019-11-01 | Stop reason: HOSPADM

## 2019-10-28 RX ORDER — ASPIRIN 325 MG
325 TABLET ORAL
Status: COMPLETED | OUTPATIENT
Start: 2019-10-28 | End: 2019-10-28

## 2019-10-28 RX ORDER — IBUPROFEN 200 MG
16 TABLET ORAL
Status: DISCONTINUED | OUTPATIENT
Start: 2019-10-28 | End: 2019-11-01 | Stop reason: HOSPADM

## 2019-10-28 RX ORDER — IPRATROPIUM BROMIDE AND ALBUTEROL SULFATE 2.5; .5 MG/3ML; MG/3ML
3 SOLUTION RESPIRATORY (INHALATION) EVERY 4 HOURS PRN
Status: DISCONTINUED | OUTPATIENT
Start: 2019-10-28 | End: 2019-11-01 | Stop reason: HOSPADM

## 2019-10-28 RX ORDER — GLUCAGON 1 MG
1 KIT INJECTION
Status: DISCONTINUED | OUTPATIENT
Start: 2019-10-28 | End: 2019-11-01 | Stop reason: HOSPADM

## 2019-10-28 RX ORDER — ONDANSETRON 4 MG/1
4 TABLET, ORALLY DISINTEGRATING ORAL EVERY 8 HOURS PRN
Status: DISCONTINUED | OUTPATIENT
Start: 2019-10-28 | End: 2019-11-01 | Stop reason: HOSPADM

## 2019-10-28 RX ORDER — KETOROLAC TROMETHAMINE 30 MG/ML
15 INJECTION, SOLUTION INTRAMUSCULAR; INTRAVENOUS EVERY 6 HOURS PRN
Status: DISCONTINUED | OUTPATIENT
Start: 2019-10-28 | End: 2019-10-28

## 2019-10-28 RX ORDER — IBUPROFEN 200 MG
24 TABLET ORAL
Status: DISCONTINUED | OUTPATIENT
Start: 2019-10-28 | End: 2019-11-01 | Stop reason: HOSPADM

## 2019-10-28 RX ADMIN — IOHEXOL 100 ML: 350 INJECTION, SOLUTION INTRAVENOUS at 05:10

## 2019-10-28 RX ADMIN — SODIUM CHLORIDE 1000 ML: 0.9 INJECTION, SOLUTION INTRAVENOUS at 06:10

## 2019-10-28 RX ADMIN — ASPIRIN 325 MG ORAL TABLET 325 MG: 325 PILL ORAL at 08:10

## 2019-10-28 RX ADMIN — NITROGLYCERIN 0.4 MG: 0.4 TABLET SUBLINGUAL at 04:10

## 2019-10-28 NOTE — CONSULTS
Full consult note to follow.    Patient with onset of L facial numbness on waking ~ 7 am which then progressed to numbness involving all extremities.  When she arrived at ED, had full face/mouth numbness with chest pain and SOB (takes a gasping breath every few minutes when talking about stressful events).  Patient had noted speech difficulty as well.      On exam in CT scanner, patient had slow but appropriate speech concerning for psychogenic speech pattern.  No aphasia.  Whole face numbness reported which resolved soon after scan.  On exam, patient actively pushes RLE down when asking her to hold it up.  Shows good strength in distal RLE.  Suspect some component of conversion disorder and patient has had fairly recent work up for previous possible TIA.  Would not change meds currently based on current picture.  She is on ASA and atorvastatin 80 mg qd which is appropriate.  Being further worked up for chest pain by ED, may be admitted to Obs overnight.      Mariya Topete MD  PGY4, Neurology  Pager:  [phone]

## 2019-10-28 NOTE — ED NOTES
PT in room after initial CT.  Pt ao4, ambulatory and VS stable. ERMD at bedside assessing pt and Neuro at bedside assessing. Pt undressed into gown. cardiac monitor, pulse ox, and bp cuff continuous. IV access 20g NSL to AC. Labs sent as ordered. NIH complete. Pt updated on plan of care.

## 2019-10-28 NOTE — ED TRIAGE NOTES
Patient presents from home for further of stroke-like symptoms. Patient reports head and mouth numbness and tingling as well as aphasia since 0700 this morning. Patient reports speech has gotten better since arriving to ER. Patient is A&Ox4 and following commands.
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
## 2019-10-29 PROBLEM — R45.851 PASSIVE SUICIDAL IDEATIONS: Status: ACTIVE | Noted: 2019-10-29

## 2019-10-29 LAB
AMPHET+METHAMPHET UR QL: NEGATIVE
ANION GAP SERPL CALC-SCNC: 8 MMOL/L (ref 8–16)
ASCENDING AORTA: 3.77 CM
AV INDEX (PROSTH): 0.74
AV MEAN GRADIENT: 4 MMHG
AV PEAK GRADIENT: 7 MMHG
AV VALVE AREA: 2.32 CM2
AV VELOCITY RATIO: 0.73
BARBITURATES UR QL SCN>200 NG/ML: NEGATIVE
BASOPHILS # BLD AUTO: 0.02 K/UL (ref 0–0.2)
BASOPHILS NFR BLD: 0.5 % (ref 0–1.9)
BENZODIAZ UR QL SCN>200 NG/ML: NEGATIVE
BILIRUB UR QL STRIP: NEGATIVE
BSA FOR ECHO PROCEDURE: 1.93 M2
BUN SERPL-MCNC: 10 MG/DL (ref 8–23)
BZE UR QL SCN: NEGATIVE
CALCIUM SERPL-MCNC: 8.9 MG/DL (ref 8.7–10.5)
CANNABINOIDS UR QL SCN: NEGATIVE
CHLORIDE SERPL-SCNC: 108 MMOL/L (ref 95–110)
CLARITY UR REFRACT.AUTO: CLEAR
CO2 SERPL-SCNC: 23 MMOL/L (ref 23–29)
COLOR UR AUTO: ABNORMAL
CREAT SERPL-MCNC: 0.7 MG/DL (ref 0.5–1.4)
CREAT UR-MCNC: 31 MG/DL (ref 15–325)
CV ECHO LV RWT: 0.43 CM
DIFFERENTIAL METHOD: ABNORMAL
DOP CALC AO PEAK VEL: 1.31 M/S
DOP CALC AO VTI: 26.05 CM
DOP CALC LVOT AREA: 3.1 CM2
DOP CALC LVOT DIAMETER: 2 CM
DOP CALC LVOT PEAK VEL: 0.95 M/S
DOP CALC LVOT STROKE VOLUME: 60.54 CM3
DOP CALCLVOT PEAK VEL VTI: 19.28 CM
E WAVE DECELERATION TIME: 277.13 MSEC
E/A RATIO: 0.69
E/E' RATIO: 9 M/S
ECHO LV POSTERIOR WALL: 0.91 CM (ref 0.6–1.1)
EOSINOPHIL # BLD AUTO: 0.1 K/UL (ref 0–0.5)
EOSINOPHIL NFR BLD: 2.6 % (ref 0–8)
ERYTHROCYTE [DISTWIDTH] IN BLOOD BY AUTOMATED COUNT: 12.8 % (ref 11.5–14.5)
EST. GFR  (AFRICAN AMERICAN): >60 ML/MIN/1.73 M^2
EST. GFR  (NON AFRICAN AMERICAN): >60 ML/MIN/1.73 M^2
ETHANOL UR-MCNC: <10 MG/DL
FRACTIONAL SHORTENING: 35 % (ref 28–44)
GLUCOSE SERPL-MCNC: 90 MG/DL (ref 70–110)
GLUCOSE UR QL STRIP: NEGATIVE
HCT VFR BLD AUTO: 40.1 % (ref 37–48.5)
HGB BLD-MCNC: 12.7 G/DL (ref 12–16)
HGB UR QL STRIP: NEGATIVE
IMM GRANULOCYTES # BLD AUTO: 0.01 K/UL (ref 0–0.04)
IMM GRANULOCYTES NFR BLD AUTO: 0.2 % (ref 0–0.5)
INTERVENTRICULAR SEPTUM: 0.8 CM (ref 0.6–1.1)
KETONES UR QL STRIP: NEGATIVE
LA MAJOR: 5.74 CM
LA MINOR: 4.56 CM
LA WIDTH: 2.82 CM
LEFT ATRIUM SIZE: 3.63 CM
LEFT ATRIUM VOLUME INDEX: 23.4 ML/M2
LEFT ATRIUM VOLUME: 44.22 CM3
LEFT INTERNAL DIMENSION IN SYSTOLE: 2.74 CM (ref 2.1–4)
LEFT VENTRICLE DIASTOLIC VOLUME INDEX: 41.33 ML/M2
LEFT VENTRICLE DIASTOLIC VOLUME: 78.17 ML
LEFT VENTRICLE MASS INDEX: 58 G/M2
LEFT VENTRICLE SYSTOLIC VOLUME INDEX: 14.8 ML/M2
LEFT VENTRICLE SYSTOLIC VOLUME: 27.97 ML
LEFT VENTRICULAR INTERNAL DIMENSION IN DIASTOLE: 4.19 CM (ref 3.5–6)
LEFT VENTRICULAR MASS: 110.27 G
LEUKOCYTE ESTERASE UR QL STRIP: ABNORMAL
LV LATERAL E/E' RATIO: 7.88 M/S
LV SEPTAL E/E' RATIO: 10.5 M/S
LYMPHOCYTES # BLD AUTO: 1.8 K/UL (ref 1–4.8)
LYMPHOCYTES NFR BLD: 43.3 % (ref 18–48)
MAGNESIUM SERPL-MCNC: 1.8 MG/DL (ref 1.6–2.6)
MCH RBC QN AUTO: 30.2 PG (ref 27–31)
MCHC RBC AUTO-ENTMCNC: 31.7 G/DL (ref 32–36)
MCV RBC AUTO: 95 FL (ref 82–98)
METHADONE UR QL SCN>300 NG/ML: NEGATIVE
MICROSCOPIC COMMENT: NORMAL
MONOCYTES # BLD AUTO: 0.5 K/UL (ref 0.3–1)
MONOCYTES NFR BLD: 11.4 % (ref 4–15)
MV PEAK A VEL: 0.91 M/S
MV PEAK E VEL: 0.63 M/S
NEUTROPHILS # BLD AUTO: 1.8 K/UL (ref 1.8–7.7)
NEUTROPHILS NFR BLD: 42 % (ref 38–73)
NITRITE UR QL STRIP: NEGATIVE
NRBC BLD-RTO: 0 /100 WBC
OPIATES UR QL SCN: NEGATIVE
PCP UR QL SCN>25 NG/ML: NEGATIVE
PH UR STRIP: 5 [PH] (ref 5–8)
PHOSPHATE SERPL-MCNC: 3.2 MG/DL (ref 2.7–4.5)
PLATELET # BLD AUTO: 170 K/UL (ref 150–350)
PMV BLD AUTO: 10.4 FL (ref 9.2–12.9)
POTASSIUM SERPL-SCNC: 4.2 MMOL/L (ref 3.5–5.1)
PROT UR QL STRIP: NEGATIVE
RA MAJOR: 4.48 CM
RA WIDTH: 2.89 CM
RBC # BLD AUTO: 4.21 M/UL (ref 4–5.4)
RBC #/AREA URNS AUTO: 1 /HPF (ref 0–4)
RIGHT VENTRICULAR END-DIASTOLIC DIMENSION: 2.84 CM
RV TISSUE DOPPLER FREE WALL SYSTOLIC VELOCITY 1 (APICAL 4 CHAMBER VIEW): 10 CM/S
SINUS: 3.23 CM
SODIUM SERPL-SCNC: 139 MMOL/L (ref 136–145)
SP GR UR STRIP: >1.03 (ref 1–1.03)
SQUAMOUS #/AREA URNS AUTO: 0 /HPF
STJ: 3.29 CM
TDI LATERAL: 0.08 M/S
TDI SEPTAL: 0.06 M/S
TDI: 0.07 M/S
TOXICOLOGY INFORMATION: NORMAL
TRICUSPID ANNULAR PLANE SYSTOLIC EXCURSION: 2.51 CM
TROPONIN I SERPL DL<=0.01 NG/ML-MCNC: 0.12 NG/ML (ref 0–0.03)
TROPONIN I SERPL DL<=0.01 NG/ML-MCNC: 0.19 NG/ML (ref 0–0.03)
TROPONIN I SERPL DL<=0.01 NG/ML-MCNC: 0.19 NG/ML (ref 0–0.03)
URN SPEC COLLECT METH UR: ABNORMAL
WBC # BLD AUTO: 4.2 K/UL (ref 3.9–12.7)
WBC #/AREA URNS AUTO: 4 /HPF (ref 0–5)

## 2019-10-29 PROCEDURE — G0378 HOSPITAL OBSERVATION PER HR: HCPCS

## 2019-10-29 PROCEDURE — 84484 ASSAY OF TROPONIN QUANT: CPT | Mod: 91

## 2019-10-29 PROCEDURE — 99233 SBSQ HOSP IP/OBS HIGH 50: CPT | Mod: ,,, | Performed by: PHYSICIAN ASSISTANT

## 2019-10-29 PROCEDURE — 81001 URINALYSIS AUTO W/SCOPE: CPT

## 2019-10-29 PROCEDURE — 99233 PR SUBSEQUENT HOSPITAL CARE,LEVL III: ICD-10-PCS | Mod: ,,, | Performed by: PHYSICIAN ASSISTANT

## 2019-10-29 PROCEDURE — 80048 BASIC METABOLIC PNL TOTAL CA: CPT

## 2019-10-29 PROCEDURE — 80307 DRUG TEST PRSMV CHEM ANLYZR: CPT

## 2019-10-29 PROCEDURE — 25000003 PHARM REV CODE 250: Performed by: PHYSICIAN ASSISTANT

## 2019-10-29 PROCEDURE — 99214 OFFICE O/P EST MOD 30 MIN: CPT | Mod: ,,, | Performed by: INTERNAL MEDICINE

## 2019-10-29 PROCEDURE — 63600175 PHARM REV CODE 636 W HCPCS: Performed by: INTERNAL MEDICINE

## 2019-10-29 PROCEDURE — 99214 PR OFFICE/OUTPT VISIT, EST, LEVL IV, 30-39 MIN: ICD-10-PCS | Mod: ,,, | Performed by: INTERNAL MEDICINE

## 2019-10-29 PROCEDURE — 84100 ASSAY OF PHOSPHORUS: CPT

## 2019-10-29 PROCEDURE — 11000001 HC ACUTE MED/SURG PRIVATE ROOM

## 2019-10-29 PROCEDURE — 36415 COLL VENOUS BLD VENIPUNCTURE: CPT

## 2019-10-29 PROCEDURE — 84484 ASSAY OF TROPONIN QUANT: CPT

## 2019-10-29 PROCEDURE — 83735 ASSAY OF MAGNESIUM: CPT

## 2019-10-29 PROCEDURE — 85025 COMPLETE CBC W/AUTO DIFF WBC: CPT

## 2019-10-29 RX ORDER — HYDROXYZINE PAMOATE 25 MG/1
25 CAPSULE ORAL EVERY 8 HOURS PRN
Status: DISCONTINUED | OUTPATIENT
Start: 2019-10-29 | End: 2019-11-01 | Stop reason: HOSPADM

## 2019-10-29 RX ORDER — ISOSORBIDE MONONITRATE 30 MG/1
30 TABLET, EXTENDED RELEASE ORAL DAILY
Status: DISCONTINUED | OUTPATIENT
Start: 2019-10-29 | End: 2019-10-30

## 2019-10-29 RX ORDER — EZETIMIBE 10 MG/1
10 TABLET ORAL DAILY
Status: DISCONTINUED | OUTPATIENT
Start: 2019-10-29 | End: 2019-11-01 | Stop reason: HOSPADM

## 2019-10-29 RX ADMIN — ACETAMINOPHEN 650 MG: 325 TABLET ORAL at 11:10

## 2019-10-29 RX ADMIN — EZETIMIBE 10 MG: 10 TABLET ORAL at 12:10

## 2019-10-29 RX ADMIN — ACETAMINOPHEN 650 MG: 325 TABLET ORAL at 02:10

## 2019-10-29 RX ADMIN — ASPIRIN 81 MG CHEWABLE TABLET 81 MG: 81 TABLET CHEWABLE at 12:10

## 2019-10-29 RX ADMIN — HUMAN ALBUMIN MICROSPHERES AND PERFLUTREN 0.66 MG: 10; .22 INJECTION, SOLUTION INTRAVENOUS at 09:10

## 2019-10-29 RX ADMIN — ISOSORBIDE MONONITRATE 30 MG: 30 TABLET, EXTENDED RELEASE ORAL at 12:10

## 2019-10-29 RX ADMIN — ATORVASTATIN CALCIUM 80 MG: 20 TABLET, FILM COATED ORAL at 12:10

## 2019-10-29 RX ADMIN — CANDESARTAN CILEXETIL 8 MG: 8 TABLET ORAL at 12:10

## 2019-10-29 NOTE — ASSESSMENT & PLAN NOTE
"Elevated troponin  Severe (7/10), substernal, non-exertional, chest pain radiating to her back, characterized as "pressure"/"soreness".   Resolved with SL nitro. Previous TTE, US carotids unrevealing.   No repeated episodes, appreciate cards assistance  - Suspect stress/depressed induced.  Elevated troponin likely demand ischemia from hypertension.  - EKG non-ischemic, CXR unremarkable  - trop 0.1-->0.1; will trend  - CTA chest without PE.  - [facility] 2017 with non-obstructive CAD  - c/w ASA, statin, BP control  - would benefit from pharm stress as outpatient  "

## 2019-10-29 NOTE — ASSESSMENT & PLAN NOTE
74F with hx of HTN, HLD here with chest pain and facial numbness awaking her from sleep. Tn elevated but flat at 0.1, unclear etiology. LHC 12/2017 with luminal irregularities. Last Echo unremarkable 3/2019. CTA negative for dissection and PE (suboptimal timing for PE, thus small subsegmental cannot be ruled out). Hypertensive on admit but not severely so. EKG without ischemic changes. No other acute stressors/illness to cause type II; however with cath within last 2 years without any significant disease. Consider vasospasm as a potential etiology. Myopericarditis or stress cardiomyopathy (Takotsubo) a consideration by history. Microvascular dysfunction another potential ddx but would expect more exertional symptoms than acute onset at rest. Transient hypoxia due to MARTIN another consideration, consider sleep study as OP. Occult tachyarrhythmia would be another consideration, but NSR here and without palpitations (though previously reported at prior admissions).     -Recommend TTE for further assessment of above. Given above, would not recommend repeat ischemia evaluation for obstructive coronary disease unless substantial WMA or newly depressed EF.   -Would not treat as ACS  -Will consider adding empiric treatment for vasospasm (CCB and/or long acting nitrate).

## 2019-10-29 NOTE — ED NOTES
At 0330- Patient sitting up in bed, no acute distress noted, awake, alert, and oriented x 4, calm, respirations even and unlabored, and skin is warm and dry. Patient verbalized understanding of status and plan of care. Patient denies needs at this time.
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
## 2019-10-29 NOTE — ASSESSMENT & PLAN NOTE
Reports passive SI with no plan.  Denies HI, self injury/harm.  Wants pains/numbness to resolved.   - will try hydroxyzine TID PRN anxiety  - will need close psych f/u; low threshold for contacting psych in-house, but does not meet criteria for PEC at this time.
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
## 2019-10-29 NOTE — HPI
"Sofia Augustine is a 74 y.o. F with HTN, HLD, depression, seizure disorder, cervical radiculopathy, hemiplegia affecting right dominant side who presents with complaints of L facial numbness, CP, SOB.  Patient reports R leg weakness x1 week with any paresthesias.  She denies any trips, leg trauma, falls, gait instability.  She reports she developed L facial numbness (like they are "asleep") with associated bilateral upper arm numbness, substernal chest pain this morning when she woke up.  She reports sensation of tongue enlargement with difficulty speaking which has since resolved. She denies hx of CVA but reports sxs similar to last admission here which she states was for "TIA". She denies any dizziness, SOB, vision changes.      Her chest pain was severe (7/10), substernal, non-exertional, radiating to her back, characterized as "pressure" "soreness".  She took no meds at home PTA.  Nitro improver her pain in the ED.  Patient denies any etoh, smoking, drugs.  She denies hx of MI, CAD.  Chest pain occurred during last admission here and she does not know what improved her chest pain or relieved it then.      Of note, she reports increased stressors recently as she found out her ex- was somehow involved in the death of her son in 2001. She states that she has had (above) symptoms like this many times in the past, but not usually this severe. She reports that she feels depressed and would "like to go to sleep and not wake up".  She reports insomnia recently.  She denies thoughts of hopelessness, HI, or plan to kill herself.  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 She has plans to improve the quality of her house and is trying to get free legal help to resolve some structural issues at her house.     Of note, admitted 3/23-3/24/19 for chest pain and syncope.  TTE showed EF 65%, normal diastolic fxn.  holter monitor and stress echo ordered as outpatient " "(never completed). Troponin was .3-->.4 D dimer was elevated (0.56) but within normal range when adjusted for age. US carotids shows mild stenosis (1-39% bilaterally). Discharged and followed up with PCP.  Has yet to establish with cardiologist or complete holter monitor/stress.    ED: Temp max 99, SBP 170s, HDS. Intake labs unremarkable. Stroke code called and CTH unremarkable. Per Neurology note, "On exam in CT scanner, patient had slow but appropriate speech concerning for psychogenic speech pattern.  No aphasia.  Whole face numbness reported which resolved soon after scan.  On exam, patient actively pushes RLE down when asking her to hold it up.  Shows good strength in distal RLE.  Suspect some component of conversion disorder and patient has had fairly recent work up for previous possible TIA.  Would not change meds currently based on current picture".   EKG non-ischemic.  CXR unremarkable.  Trop .103-->.111.  CTA C/A with no evidence of PE (official read pending; issues with CT order initially and given 1L IVF). Given 1 nitro and chest pain resolved.   "

## 2019-10-29 NOTE — ED NOTES
Report received from LUZ Hammond. Assume care of pt. Pt resting comfortably and independently repositioned in stretcher with bed locked in lowest position for safety. NAD noted at this time. Respirations even and unlabored and visible chest rise noted.  Patient offered bathroom assistance and denies need at this time. Pt instructed to call if assistance is needed. Pt on continuous cardiac, BP, and O2 monitoring. Call light within reach. No needs at this time. Will continue to monitor.

## 2019-10-29 NOTE — ASSESSMENT & PLAN NOTE
"Elevated troponin  Severe (7/10), substernal, non-exertional, chest pain radiating to her back, characterized as "pressure"/"soreness".   Resolved with SL nitro. Previous TTE, US carotids unrevealing.   - Suspect stress/depressed induced.  Elevated troponin likely demand ischemia from hypertension.  - EKG non-ischemic, CXR unremarkable  - trop 0.1-->0.1; will trend  - CTA chest without PE.  - [facility] 2017 with non-obstructive CAD  - c/w ASA, statin, BP control  "

## 2019-10-29 NOTE — SUBJECTIVE & OBJECTIVE
Past Medical History:   Diagnosis Date    Abnormal mammogram of both breasts     Arthritis     Hypertension     Major depressive disorder 5/14/2017    Memory disorder     Seizures     Stroke        Past Surgical History:   Procedure Laterality Date    CATARACT EXTRACTION      cysts breast      TONSILLECTOMY         Review of patient's allergies indicates:  No Known Allergies    No current facility-administered medications on file prior to encounter.      Current Outpatient Medications on File Prior to Encounter   Medication Sig    aspirin 81 MG Chew Take 81 mg by mouth once daily.    atorvastatin (LIPITOR) 80 MG tablet Take 1 tablet (80 mg total) by mouth once daily.    candesartan (ATACAND) 8 MG tablet Take 1 tablet (8 mg total) by mouth once daily.    cephALEXin (KEFLEX) 500 MG capsule Take 1 capsule (500 mg total) by mouth every 12 (twelve) hours.    HYDROcodone-acetaminophen (NORCO) 5-325 mg per tablet Take 1 tablet by mouth every 8 (eight) hours as needed for Pain.    ondansetron (ZOFRAN-ODT) 4 MG TbDL DISSOLVE 1 TABLET(4 MG) ON THE TONGUE EVERY 6 HOURS AS NEEDED     Family History     None        Tobacco Use    Smoking status: Never Smoker    Smokeless tobacco: Never Used   Substance and Sexual Activity    Alcohol use: No    Drug use: No    Sexual activity: Never     Review of Systems   All other systems reviewed and are negative.    Objective:     Vital Signs (Most Recent):  Temp: 97.4 °F (36.3 °C) (10/29/19 1101)  Pulse: 63 (10/29/19 1101)  Resp: 16 (10/29/19 1101)  BP: (!) 156/75 (10/29/19 1101)  SpO2: (!) 94 % (10/29/19 1101) Vital Signs (24h Range):  Temp:  [97.4 °F (36.3 °C)-99 °F (37.2 °C)] 97.4 °F (36.3 °C)  Pulse:  [62-81] 63  Resp:  [15-18] 16  SpO2:  [89 %-99 %] 94 %  BP: (121-176)/(63-96) 156/75     Weight: 84.9 kg (187 lb 2.7 oz)  Body mass index is 32.13 kg/m².    SpO2: (!) 94 %  O2 Device (Oxygen Therapy): room air      Intake/Output Summary (Last 24 hours) at 10/29/2019  1147  Last data filed at 10/28/2019 1850  Gross per 24 hour   Intake --   Output 600 ml   Net -600 ml       Lines/Drains/Airways     Peripheral Intravenous Line                 Peripheral IV - Single Lumen 10/28/19 2014 22 G Anterior;Proximal;Right Antecubital less than 1 day                Physical Exam   Constitutional: She is oriented to person, place, and time. She appears well-nourished. No distress.   HENT:   Head: Atraumatic.   Eyes: EOM are normal. No scleral icterus.   Neck: Neck supple. No JVD present.   Cardiovascular: Normal rate, regular rhythm and normal heart sounds. Exam reveals no gallop and no friction rub.   No murmur heard.  Pulmonary/Chest: Effort normal and breath sounds normal. No respiratory distress. She has no wheezes. She has no rales.   Abdominal: Soft. Bowel sounds are normal. She exhibits no distension. There is no tenderness.   Musculoskeletal: She exhibits no edema.   Neurological: She is alert and oriented to person, place, and time. No cranial nerve deficit.   Skin: Skin is warm and dry. No erythema.   Psychiatric: She has a normal mood and affect.       Significant Labs:   BMP:   Recent Labs   Lab 10/28/19  1600 10/29/19  0428   GLU 95 90    139   K 4.6 4.2    108   CO2 24 23   BUN 15 10   CREATININE 0.8 0.7   CALCIUM 9.1 8.9   MG  --  1.8   , CMP   Recent Labs   Lab 10/28/19  1600 10/29/19  0428    139   K 4.6 4.2    108   CO2 24 23   GLU 95 90   BUN 15 10   CREATININE 0.8 0.7   CALCIUM 9.1 8.9   PROT 7.1  --    ALBUMIN 3.5  --    BILITOT 0.7  --    ALKPHOS 105  --    AST 25  --    ALT 15  --    ANIONGAP 7* 8   ESTGFRAFRICA >60.0 >60.0   EGFRNONAA >60.0 >60.0   , CBC   Recent Labs   Lab 10/28/19  1600 10/29/19  0428   WBC 4.94 4.20   HGB 13.4 12.7   HCT 41.5 40.1    170   , INR   Recent Labs   Lab 10/28/19  1600   INR 1.0   , Lipid Panel   Recent Labs   Lab 10/28/19  1600   CHOL 222*   HDL 50   LDLCALC 135.6   TRIG 182*   CHOLHDL 22.5    and Troponin    Recent Labs   Lab 10/28/19  1600 10/28/19  1905 10/29/19  0428   TROPONINI 0.103* 0.111* 0.124*       Significant Imaging: Echocardiogram:   2D echo with color flow doppler:   Results for orders placed or performed during the hospital encounter of 05/09/16   Echo doppler color flow   Result Value Ref Range    QEF 63 55 - 65    Mitral Valve Regurgitation TRIVIAL     Diastolic Dysfunction No     Est. PA Systolic Pressure 28     Tricuspid Valve Regurgitation TRIVIAL TO MILD     Narrative    TEST DESCRIPTION   Technical Quality: This is a technically challenging study.     Aorta: The aortic root is normal in size, measuring 2.5 cm at sinotubular junction and 3.1 cm at Sinuses of Valsalva. The proximal ascending aorta is normal in size, measuring 2.9 cm across.     Left Atrium: The left atrial volume index is normal, measuring 31.15 cc/m2.     Left Ventricle: The left ventricle is normal in size, with an end-diastolic diameter of 3.5 cm, and an end-systolic diameter of 2.4 cm. LV wall thickness is normal, with the septum measuring 0.9 cm and the posterior wall measuring 0.7 cm across. Relative   wall thickness was normal at 0.40, and the LV mass index was 42.2 g/m2 consistent with normal left ventricular mass. Global left ventricular systolic function appears normal. Visually estimated ejection fraction is 60-65%. The LV Doppler derived stroke   volume equals 70.0 ccs.   There is normal systolic/diastolic flow in the pulmonary vein indicating normal left atrial pressures. The E/e'(lat) is 10, consistent with normal diastolic function.     Right Atrium: The right atrium is normal in size, measuring 4.4 cm in length and 4.0 cm in width in the apical view.     Right Ventricle: The right ventricle is normal in size measuring 3.0 cm at the base in the apical right ventricle-focused view. Global right ventricular systolic function appears normal. Tricuspid annular plane systolic excursion (TAPSE) is 2.1 cm. The   estimated  PA systolic pressure is 28 mmHg.     Aortic Valve:  The aortic valve is mildly sclerotic with normal leaflet mobility.     Mitral Valve:  There is trivial mitral regurgitation. Mitral valve is normal in structure with normal leaflet mobility.     Tricuspid Valve:  There is trivial to mild tricuspid regurgitation. Tricuspid valve is normal in structure with normal leaflet mobility.     Pulmonary Valve:  Pulmonary valve is normal in structure with normal leaflet mobility.     IVC: IVC is normal in size and collapses > 50% with a sniff, suggesting normal right atrial pressure of 3 mmHg.     Intracavitary: There is no evidence of pericardial effusion, intracavity mass, thrombi, or vegetation.         CONCLUSIONS     1 - Normal left ventricular systolic function (EF 60-65%).     2 - Normal left ventricular diastolic function.     3 - Normal right ventricular systolic function .     4 - Trivial mitral regurgitation.     5 - Trivial to mild tricuspid regurgitation.     6 - The estimated PA systolic pressure is 28 mmHg.             This document has been electronically    SIGNED BY: Lucas Webber MD On: 05/10/2016 14:37    and Transthoracic echo (TTE) complete (Cupid Only):   Results for orders placed or performed during the hospital encounter of 10/28/19   Echo Color Flow Doppler? Yes   Result Value Ref Range    BSA 1.93 m2    TDI SEPTAL 0.06 m/s    LV LATERAL E/E' RATIO 7.88 m/s    LV SEPTAL E/E' RATIO 10.50 m/s    LA WIDTH 2.82 cm    TDI LATERAL 0.08 m/s    LVIDD 4.19 3.5 - 6.0 cm    IVS 0.80 0.6 - 1.1 cm    PW 0.91 0.6 - 1.1 cm    LVIDS 2.74 2.1 - 4.0 cm    FS 35 28 - 44 %    LA volume 44.22 cm3    Sinus 3.23 cm    STJ 3.29 cm    Ascending aorta 3.77 cm    LV mass 110.27 g    LA size 3.63 cm    RVDD 2.84 cm    TAPSE 2.51 cm    Left Ventricle Relative Wall Thickness 0.43 cm    AV mean gradient 4 mmHg    AV valve area 2.32 cm2    AV Velocity Ratio 0.73     AV index (prosthetic) 0.74     E/A ratio 0.69     Mean e' 0.07 m/s     E wave decelartion time 277.13 msec    LVOT diameter 2.00 cm    LVOT area 3.1 cm2    LVOT peak iván 0.95 m/s    LVOT peak VTI 19.28 cm    Ao peak iván 1.31 m/s    Ao VTI 26.05 cm    LVOT stroke volume 60.54 cm3    AV peak gradient 7 mmHg    E/E' ratio 9.00 m/s    MV Peak E Iván 0.63 m/s    MV Peak A Iván 0.91 m/s    LV Systolic Volume 27.97 mL    LV Systolic Volume Index 14.8 mL/m2    LV Diastolic Volume 78.17 mL    LV Diastolic Volume Index 41.33 mL/m2    LA Volume Index 23.4 mL/m2    LV Mass Index 58 g/m2    RA Major Axis 4.48 cm    Left Atrium Minor Axis 4.56 cm    Left Atrium Major Axis 5.74 cm    RA Width 2.89 cm    RV S' 10 cm/s    Narrative    · Concentric left ventricular remodeling.  · Normal left ventricular systolic function. The estimated ejection   fraction is 60%  · Normal right ventricular systolic function.  · Grade I (mild) left ventricular diastolic dysfunction consistent with   impaired relaxation.  · Low central venous pressure.       and EKG: NSR, no ischemic changes

## 2019-10-29 NOTE — ASSESSMENT & PLAN NOTE
Full facial numbness/tingling on exam today with some concern for psychogenic slow/staccato speech, staring straight ahead during exam though able to track, actively using hip extensors when asked to raise RLE.  Later on pt with tearfulness, noting recent anxiety and poor sleep related to feeling that more details about her son's death recently came to light and were troublesome to her.    -Patient has recent TIA work up from earlier this year  -Continue ASA 81 mg qd  -Continue atorvastatin 80 mg qd--LDL still elevated, will follow up outpatient   -Consider adding ezetimibe  -Consider follow up in Vascular Neurology clinic in addition to PCP  -Continue management of risk factors for stroke--HTN, HLD, diet, exercise

## 2019-10-29 NOTE — ED NOTES
Tele on and confirmed by war room. Report called to Joie in observation unit. Pt going to 3083A.  Pt states verbal understanding.. NPO, AOx4, independent, 97% on RA.  Pt denies pain at this time. Request in for transport

## 2019-10-29 NOTE — HPI
Sofia Augustine is a 74 y.o. F with HTN, HLD, depression, seizure disorder, cervical radiculopathy, hemiplegia affecting right dominant side presenting yesterday with substernal chest pressure, L facial/tongue numbness awaking her from sleep. Reports being in her usual state of health to me prior to bed yesterday (though H&P notes one week of R leg weakness). Awoke from sleep with 7/10 substernal chest pressure, nonexertional, radiating to her back. Nitro x1 given in the ER with improvement. Associated with L facial numbness/tongue numbness and heaviness. Similar to prior chest pressure but previously without numbness. No episodes since she was last admitted for observation in March with atypical chest pressure, minimally elevated troponin 0.04. Plan was DSE and event monitor as an outpatient as previously also had an episode of palpitations. Previously with LHC 12/2017 with luminal irregularities throughout, nonobstructive disease. Last TTE 3/2019 EF 65%, normal diastology.     She denies exertional chest discomfort or dyspnea, PND, orthopnea, PAULINA, palpitations, presyncope/syncope. Denies a hx of MARTIN. Reports she does snore and has woken up feeling short of breath in the past where she felt like she stopped breathing.    In ER, initial SBP 170s, otherwise in 130s. Stroke code called and CTS unremarkable. Neurology assessed, suspect some component of conversion d/o as recently worked up for TIA. EKG NSR without ischemic changes. CXR unremarkable.  Trop .103-->.111.  CTA negative for aortic dissection, negative for PE (though timing noted to not be optimal for PE evaluation).

## 2019-10-29 NOTE — PLAN OF CARE
10/29/19 1430   Discharge Assessment   Assessment Type Discharge Planning Assessment   Confirmed/corrected address and phone number on facesheet? Yes   Assessment information obtained from? Patient   Expected Length of Stay (days) 1   Communicated expected length of stay with patient/caregiver yes   Prior to hospitilization cognitive status: Alert/Oriented   Prior to hospitalization functional status: Assistive Equipment   Current cognitive status: Alert/Oriented   Current Functional Status: Assistive Equipment   Lives With child(leanne), adult;other relative(s)  (adult daughter, Bernarda Nicolas ([phone]), & son-in-law)   Able to Return to Prior Arrangements yes   Is patient able to care for self after discharge? Yes   Patient's perception of discharge disposition home or selfcare   Readmission Within the Last 30 Days no previous admission in last 30 days   Patient currently being followed by outpatient case management? No   Patient currently receives any other outside agency services? No   Equipment Currently Used at Home cane, straight   Do you have any problems affording any of your prescribed medications? No   Is the patient taking medications as prescribed? yes   Does the patient have transportation home? Yes   Transportation Anticipated family or friend will provide  (daughter)   Does the patient receive services at the Coumadin Clinic? No   Discharge Plan A Home with family   Discharge Plan B Home Health   DME Needed Upon Discharge  none   Patient/Family in Agreement with Plan yes     Dx: chest pain  Consult: eduar  Pharm: [facility] f/u appt: Dr. Launne Connell (PCP) on 11/12/19 at 0940

## 2019-10-29 NOTE — HOSPITAL COURSE
74 y.o. who was admitted to hospital medicine for chest pain. Discussed inpatient stress modalities with cardiology as patient with elevated troponin on admission. TTE resulted with normal EF, no WMA, normal RV function, normal estimated CVP, no significant valvular pathology therefore, cardiology did not recommend further evaluation in-house or ACS protocol. They did recommend initiating CCB and/or long acting nitrate for treatment of suspected vasospasm --- will trial low dose amlodipine as blood pressure allows. Upon continued monitoring, the patient's troponin continued to rise therefore ACS protocol was initiated for NSTEMI. Nuclear stress testing ordered, scheduled for 10/31 that did not reveal evidence from myocardial ischemia or injury. High suspicion of coronary vasospasms causing NSTEMI. Pt received 48 hours of heparin therapy and CCB was started. After initiation of these therapies, troponins flattened and the patient was free of repeat episodes.       Ambulatory referral placed and CM to schedule follow-up. No further episodes of chest pain at this time. Patient would benefit greatly from outpatient therapy/psychiatry follow-up.

## 2019-10-29 NOTE — ED NOTES
At 0230- The patient is resting quietly, eyes closed, arouses easily to stimuli. Airway is open and patent, respirations are spontaneous, normal respiratory effort and rate noted, skin warm and dry, appearance: in no acute distress and resting comfortably.

## 2019-10-29 NOTE — ASSESSMENT & PLAN NOTE
Vascular neurology consulted and recs given. CTH unrevealing.   - likely neurogenic in nature  - needs psych f/u  - c/w GDMT for secondary CVA prevention  - will start ezetimibe for elevated LDL  - will need to ensure patient checking BP at home

## 2019-10-29 NOTE — ED NOTES
At 0530- Pt resting comfortably and independently repositioned in stretcher with bed locked in lowest position for safety. NAD noted at this time. Respirations even and unlabored and visible chest rise noted.

## 2019-10-29 NOTE — PROGRESS NOTES
"[facility] Hospital Medicine  Progress Note    Patient Name: Sofia Augustine  MRN: [id number]  Patient Class: OP- Observation   Admission Date: 10/28/2019  Length of Stay: 0 days  Attending Physician: Reinaldo Leon MD  Primary Care Provider: Luanne Connell MD    [facility] Medicine Team: [facility] E Gertrude Bello PA-C    Subjective:     Principal Problem:Elevated troponin        HPI:  Sofia Augustine is a 74 y.o. F with HTN, HLD, depression, seizure disorder, cervical radiculopathy, hemiplegia affecting right dominant side  who presents with complaints of L facial numbness, CP, SOB.  Patient reports R leg weakness x1 week with any paresthesias.  She denies any trips, leg trauma, falls, gait instability.  She reports she developed L facial numbness (like they are "asleep") with associated bilateral upper arm numbness, substernal chest pain this morning when she woke up.  She reports sensation of tongue enlargement with difficulty speaking which has since resolved. She denies hx of CVA but reports sxs similar to last admission here which she states was for "TIA". She denies any dizziness, SOB, vision changes.      Her chest pain was severe (7/10), substernal, non-exertional, radiating to her back, characterized as "pressure" "soreness".  She took no meds at home PTA.  Nitro improver her pain in the ED.  Patient denies any etoh, smoking, drugs.  She denies hx of MI, CAD.  Chest pain occurred during last admission here and she does not know what improved her chest pain or relieved it then.      Of note, she reports increased stressors recently as she found out her ex- was somehow involved in the death of her son in 2001. She states that she has had (above) symptoms like this many times in the past, but not usually this severe. She reports that she feels depressed and would "like to go to sleep and not wake up".  She reports insomnia recently.  She denies thoughts of " "hopelessness, HI, or plan to kill herself.  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 She has plans to improve the quality of her house and is trying to get free legal help to resolve some structural issues at her house.     Of note, admitted 3/23-3/24/19 for chest pain and syncope.  TTE showed EF 65%, normal diastolic fxn.  holter monitor and stress echo ordered as outpatient (never completed). Troponin was .3-->.4 D dimer was elevated (0.56) but within normal range when adjusted for age. US carotids shows mild stenosis (1-39% bilaterally). Discharged and followed up with PCP.  Has yet to establish with cardiologist or complete holter monitor/stress.    ED: Temp max 99, SBP 170s, HDS. Intake labs unremarkable. Stroke code called and CTH unremarkable. Per Neurology note, "On exam in CT scanner, patient had slow but appropriate speech concerning for psychogenic speech pattern.  No aphasia.  Whole face numbness reported which resolved soon after scan.  On exam, patient actively pushes RLE down when asking her to hold it up.  Shows good strength in distal RLE.  Suspect some component of conversion disorder and patient has had fairly recent work up for previous possible TIA.  Would not change meds currently based on current picture".   EKG non-ischemic.  CXR unremarkable.  Trop .103-->.111.  CTA C/A with no evidence of PE (official read pending; issues with CT order initially and given 1L IVF). Given 1 nitro and chest pain resolved.     Overview/Hospital Course:  74 y.o. who was admitted to hospital medicine for chest pain. Discussed inpatient stress modalities with cardiology as patient with elevated troponin on admission. TTE resulted with normal EF, no WMA, normal RV function, normal estimated CVP, no significant valvular pathology therefore, cardiology did not recommend further evaluation in-house or ACS protocol. They did recommend initiating CCB and/or long acting " nitrate for treatment of suspected vasospasm. Imdur was chosen for its anti-anginal properties and her history of non-obstructive CAD (demonstrated on cath in 2017). Ambulatory referral placed and CM to schedule follow-up. No further episodes of chest pain at this time. Patient would benefit greatly from outpatient therapy/psychiatry follow-up.        Interval History: Discussed case with cards. Will follow troponin and monitor for chest pain. Patient expressed extreme stress and insomnia secondary to news regarding her ex-husbands involvement in her son's death. Stated that she developed arm pain during our interview.     Review of Systems   Constitutional: Negative for chills, fever and unexpected weight change.        +trouble sleeping   HENT: Negative for congestion and rhinorrhea.    Eyes: Negative for photophobia and visual disturbance.   Respiratory: Positive for shortness of breath. Negative for cough and chest tightness.    Cardiovascular: Positive for chest pain. Negative for palpitations.   Gastrointestinal: Negative for abdominal pain, diarrhea, nausea and vomiting.   Genitourinary: Negative for difficulty urinating and dysuria.   Musculoskeletal: Negative for back pain and gait problem.   Skin: Negative for rash and wound.   Neurological: Positive for speech difficulty and numbness. Negative for dizziness and headaches.        + facial numbness   Psychiatric/Behavioral: Positive for dysphoric mood, sleep disturbance and suicidal ideas. Negative for agitation, confusion and self-injury.     Objective:     Vital Signs (Most Recent):  Temp: 98.1 °F (36.7 °C) (10/29/19 1618)  Pulse: 91 (10/29/19 1618)  Resp: 18 (10/29/19 1618)  BP: 131/60 (10/29/19 1618)  SpO2: (!) 90 % (10/29/19 1618) Vital Signs (24h Range):  Temp:  [97.4 °F (36.3 °C)-98.9 °F (37.2 °C)] 98.1 °F (36.7 °C)  Pulse:  [62-91] 91  Resp:  [16-18] 18  SpO2:  [89 %-99 %] 90 %  BP: (121-157)/(60-84) 131/60     Weight: 84.9 kg (187 lb 2.7 oz)  Body  "mass index is 32.13 kg/m².    Intake/Output Summary (Last 24 hours) at 10/29/2019 1734  Last data filed at 10/28/2019 1850  Gross per 24 hour   Intake --   Output 600 ml   Net -600 ml      Physical Exam   Constitutional: She is oriented to person, place, and time. She appears well-developed and well-nourished.   Calm appearing female in NAD, lying flat in bed   HENT:   Head: Normocephalic and atraumatic.   Eyes: Pupils are equal, round, and reactive to light. EOM are normal.   Neck: Normal range of motion. Neck supple.   Cardiovascular: Normal rate, regular rhythm, normal heart sounds and intact distal pulses.   No murmur heard.  Pulmonary/Chest: Effort normal and breath sounds normal. No respiratory distress.   Abdominal: Soft. Bowel sounds are normal. She exhibits no distension. There is no tenderness.   Musculoskeletal: Normal range of motion.   Neurological: She is alert and oriented to person, place, and time. No sensory deficit.   Skin: Skin is warm and dry.   Psychiatric: Her speech is normal. Judgment normal. Her affect is blunt. She is slowed. She exhibits a depressed mood. She expresses suicidal ideation. She expresses no homicidal ideation. She expresses no suicidal plans and no homicidal plans.   Mostly blunted affect and depressed mood with tearing of her eyes when discussing son  Smiling, Mood improved after discussing friends and getting back home   Nursing note and vitals reviewed.      Significant Labs: All pertinent labs within the past 24 hours have been reviewed.    Significant Imaging: I have reviewed all pertinent imaging results/findings within the past 24 hours.      Assessment/Plan:      * Chest pain  Elevated troponin  Severe (7/10), substernal, non-exertional, chest pain radiating to her back, characterized as "pressure"/"soreness".   Resolved with SL nitro. Previous TTE, US carotids unrevealing.   No repeated episodes, appreciate cards assistance  - Suspect stress/depressed induced.  " Elevated troponin likely demand ischemia from hypertension.  - EKG non-ischemic, CXR unremarkable  - trop 0.1-->0.1; will trend  - CTA chest without PE.  - [facility] 2017 with non-obstructive CAD  - c/w ASA, statin, BP control  - would benefit from pharm stress as outpatient    Passive suicidal ideations  Reports passive SI with no plan.  Denies HI, self injury/harm.  Wants pains/numbness to resolved.   - will try hydroxyzine TID PRN anxiety  - will need close psych f/u; low threshold for contacting psych in-house, but does not meet criteria for PEC at this time.     HLD (hyperlipidemia)  - c/w high dose statin  - add ezetimibe 10 mg PO daily    Essential hypertension  BP elevated on arrival, improved without intervention  - suspect anxiety related  - c/w candesartan 8 mg PO once daily.    Facial numbness  Vascular neurology consulted and recs given. CTH unrevealing.   - likely neurogenic in nature  - needs psych f/u  - c/w GDMT for secondary CVA prevention  - will start ezetimibe for elevated LDL  - will need to ensure patient checking BP at home    VTE Risk Mitigation (From admission, onward)         Ordered     IP VTE HIGH RISK PATIENT  Once      10/28/19 2145     Place PAUL hose  Until discontinued      10/28/19 2145     Place sequential compression device  Until discontinued      10/28/19 2145                      Gertrude Bello PA-C  Department of Hospital Medicine   [facility]

## 2019-10-29 NOTE — ASSESSMENT & PLAN NOTE
BP elevated on arrival, improved without intervention  - suspect anxiety related  - c/w candesartan 8 mg PO once daily.

## 2019-10-29 NOTE — H&P
"[facility] Hospital Medicine  History & Physical    Patient Name: Sofia Augustine  MRN: [id number]  Admission Date: 10/28/2019  Attending Physician: Reinaldo eLon MD   Primary Care Provider: Luanne Connell MD    [facility] Medicine Team: [facility] Pardeep Duarte PA-C     Patient information was obtained from patient, past medical records and ER records.     Subjective:     Principal Problem:Chest pain    Chief Complaint:   Chief Complaint   Patient presents with    Aphasia     hx tia        HPI: Sofia Augustine is a 74 y.o. F with HTN, HLD, depression, seizure disorder, cervical radiculopathy, hemiplegia affecting right dominant side  who presents with complaints of L facial numbness, CP, SOB.  Patient reports R leg weakness x1 week with any paresthesias.  She denies any trips, leg trauma, falls, gait instability.  She reports she developed L facial numbness (like they are "asleep") with associated bilateral upper arm numbness, substernal chest pain this morning when she woke up.  She reports sensation of tongue enlargement with difficulty speaking which has since resolved. She denies hx of CVA but reports sxs similar to last admission here which she states was for "TIA". She denies any dizziness, SOB, vision changes.      Her chest pain was severe (7/10), substernal, non-exertional, radiating to her back, characterized as "pressure" "soreness".  She took no meds at home PTA.  Nitro improver her pain in the ED.  Patient denies any etoh, smoking, drugs.  She denies hx of MI, CAD.  Chest pain occurred during last admission here and she does not know what improved her chest pain or relieved it then.      Of note, she reports increased stressors recently as she found out her ex- was somehow involved in the death of her son in 2001. She states that she has had (above) symptoms like this many times in the past, but not usually this severe. She reports that she feels depressed " "and would "like to go to sleep and not wake up".  She reports insomnia recently.  She denies thoughts of hopelessness, HI, or plan to kill herself.  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 She has plans to improve the quality of her house and is trying to get free legal help to resolve some structural issues at her house.     Of note, admitted 3/23-3/24/19 for chest pain and syncope.  TTE showed EF 65%, normal diastolic fxn.  holter monitor and stress echo ordered as outpatient (never completed). Troponin was .3-->.4 D dimer was elevated (0.56) but within normal range when adjusted for age. US carotids shows mild stenosis (1-39% bilaterally). Discharged and followed up with PCP.  Has yet to establish with cardiologist or complete holter monitor/stress.    ED: Temp max 99, SBP 170s, HDS. Intake labs unremarkable. Stroke code called and CTH unremarkable. Per Neurology note, "On exam in CT scanner, patient had slow but appropriate speech concerning for psychogenic speech pattern.  No aphasia.  Whole face numbness reported which resolved soon after scan.  On exam, patient actively pushes RLE down when asking her to hold it up.  Shows good strength in distal RLE.  Suspect some component of conversion disorder and patient has had fairly recent work up for previous possible TIA.  Would not change meds currently based on current picture".   EKG non-ischemic.  CXR unremarkable.  Trop .103-->.111.  CTA C/A with no evidence of PE (official read pending; issues with CT order initially and given 1L IVF). Given 1 nitro and chest pain resolved.     Past Medical History:   Diagnosis Date    Abnormal mammogram of both breasts     Arthritis     Hypertension     Major depressive disorder 5/14/2017    Memory disorder     Seizures     Stroke        Past Surgical History:   Procedure Laterality Date    CATARACT EXTRACTION      cysts breast      TONSILLECTOMY         Review of " patient's allergies indicates:  No Known Allergies    No current facility-administered medications on file prior to encounter.      Current Outpatient Medications on File Prior to Encounter   Medication Sig    aspirin 81 MG Chew Take 81 mg by mouth once daily.    atorvastatin (LIPITOR) 80 MG tablet Take 1 tablet (80 mg total) by mouth once daily.    candesartan (ATACAND) 8 MG tablet Take 1 tablet (8 mg total) by mouth once daily.    cephALEXin (KEFLEX) 500 MG capsule Take 1 capsule (500 mg total) by mouth every 12 (twelve) hours.    HYDROcodone-acetaminophen (NORCO) 5-325 mg per tablet Take 1 tablet by mouth every 8 (eight) hours as needed for Pain.    ondansetron (ZOFRAN-ODT) 4 MG TbDL DISSOLVE 1 TABLET(4 MG) ON THE TONGUE EVERY 6 HOURS AS NEEDED     Family History     None        Tobacco Use    Smoking status: Never Smoker    Smokeless tobacco: Never Used   Substance and Sexual Activity    Alcohol use: No    Drug use: No    Sexual activity: Never     Review of Systems   Constitutional: Negative for chills, fever and unexpected weight change.        +trouble sleeping   HENT: Negative for congestion and rhinorrhea.    Eyes: Negative for photophobia and visual disturbance.   Respiratory: Positive for shortness of breath. Negative for cough and chest tightness.    Cardiovascular: Positive for chest pain. Negative for palpitations.   Gastrointestinal: Negative for abdominal pain, diarrhea, nausea and vomiting.   Genitourinary: Negative for difficulty urinating and dysuria.   Musculoskeletal: Negative for back pain and gait problem.   Skin: Negative for rash and wound.   Neurological: Positive for speech difficulty and numbness. Negative for dizziness and headaches.        + facial numbness   Psychiatric/Behavioral: Positive for dysphoric mood, sleep disturbance and suicidal ideas. Negative for agitation, confusion and self-injury.     Objective:     Vital Signs (Most Recent):  Temp: 98.9 °F (37.2 °C)  (10/28/19 2005)  Pulse: 70 (10/28/19 2006)  Resp: 18 (10/28/19 1815)  BP: 135/78 (10/28/19 2007)  SpO2: 98 % (10/28/19 2007) Vital Signs (24h Range):  Temp:  [98.6 °F (37 °C)-99 °F (37.2 °C)] 98.9 °F (37.2 °C)  Pulse:  [66-81] 70  Resp:  [15-18] 18  SpO2:  [96 %-99 %] 98 %  BP: (135-176)/(70-96) 135/78     Weight: 81.6 kg (180 lb)  Body mass index is 30.9 kg/m².    Physical Exam   Constitutional: She is oriented to person, place, and time. She appears well-developed and well-nourished.   Calm appearing female in NAD, lying flat in bed   HENT:   Head: Normocephalic and atraumatic.   Eyes: Pupils are equal, round, and reactive to light. EOM are normal.   Neck: Normal range of motion. Neck supple.   Cardiovascular: Normal rate, regular rhythm, normal heart sounds and intact distal pulses.   No murmur heard.  Pulmonary/Chest: Effort normal and breath sounds normal. No respiratory distress.   Abdominal: Soft. Bowel sounds are normal. She exhibits no distension. There is no tenderness.   Musculoskeletal: Normal range of motion.   STR 4/5 BUE  RLE: STR 5/5 at ankle; unable to lift R leg, other than immediately off bed   Neurological: She is alert and oriented to person, place, and time. No sensory deficit.   Sensory grossly intact  Tongue protrudes midline  Speech congruent  No pronator drift  No facial weakness   Skin: Skin is warm and dry.   Psychiatric: Her speech is normal. Judgment normal. Her affect is blunt. She is slowed. She exhibits a depressed mood. She expresses suicidal ideation. She expresses no homicidal ideation. She expresses no suicidal plans and no homicidal plans.   Mostly blunted affect and depressed mood with tearing of her eyes when discussing son  Smiling, Mood improved after discussing friends and getting back home   Nursing note and vitals reviewed.        CRANIAL NERVES     CN III, IV, VI   Pupils are equal, round, and reactive to light.  Extraocular motions are normal.        Significant Labs:    CBC:   Recent Labs   Lab 10/28/19  1547 10/28/19  1600   WBC  --  4.94   HGB  --  13.4   HCT 42 41.5   PLT  --  209     CMP:   Recent Labs   Lab 10/28/19  1600      K 4.6      CO2 24   GLU 95   BUN 15   CREATININE 0.8   CALCIUM 9.1   PROT 7.1   ALBUMIN 3.5   BILITOT 0.7   ALKPHOS 105   AST 25   ALT 15   ANIONGAP 7*   EGFRNONAA >60.0     Troponin:   Recent Labs   Lab 10/28/19  1600 10/28/19  1905   TROPONINI 0.103* 0.111*       Significant Imaging: I have reviewed all pertinent imaging results/findings within the past 24 hours.     Ct Head Without Contrast    Result Date: 10/28/2019  EXAMINATION: CT HEAD WITHOUT CONTRAST CLINICAL HISTORY: Motor neuron disease; TECHNIQUE: Low dose axial images were obtained through the head.  Coronal and sagittal reformations were also performed. Contrast was not administered. COMPARISON: 02/25/2019 FINDINGS: There is no midline shift, hydrocephalus or mass effect.  There is no evidence of acute intracranial hemorrhage or acute major vascular territory infarct.  No abnormal extra-axial fluid collections.  No evidence of a space-occupying mass.  Calvarium is intact.  Visualized paranasal sinuses and mastoid air cells are clear.     No acute intracranial abnormality.  No significant detrimental change from prior exam.     X-ray Chest Ap Portable    Result Date: 10/28/2019  EXAMINATION: XR CHEST AP PORTABLE CLINICAL HISTORY: Stroke; TECHNIQUE: Single frontal view of the chest was performed. COMPARISON: 03/23/2019.  08/02/2018.  04/26/2018. FINDINGS: Mediastinal structures are midline. Cardiac silhouette and pulmonary vascular distribution are normal.  There is mild tortuosity of the descending thoracic aorta similar to prior studies dating back to 04/26/2018. Lung volumes are normal and symmetric. I detect no pulmonary disease, pleural fluid, lymph node enlargement, cardiac decompensation, pneumothorax, pneumomediastinum, pneumoperitoneum or significant osseous abnormality.  "    No intrathoracic disease identified.    Assessment/Plan:     * Chest pain  Elevated troponin  Severe (7/10), substernal, non-exertional, chest pain radiating to her back, characterized as "pressure"/"soreness".   Resolved with SL nitro. Previous TTE, US carotids unrevealing.   - Suspect stress/depressed induced.  Elevated troponin likely demand ischemia from hypertension.  - EKG non-ischemic, CXR unremarkable  - trop 0.1-->0.1; will trend  - CTA chest without PE.  - [facility] 2017 with non-obstructive CAD  - c/w ASA, statin, BP control  - would benefit from pharm stress as outpatient    Facial numbness  Vascular neurology consulted and recs given. CTH unrevealing.   - likely neurogenic in nature  - needs psych f/u  - c/w GDMT for secondary CVA prevention  - will start ezetimibe for elevated LDL  - will need to ensure patient checking BP at home    Passive suicidal ideations  Reports passive SI with no plan.  Denies HI, self injury/harm.  Wants pains/numbness to resolved.   - will try hydroxyzine TID PRN anxiety  - will need close psych f/u; low threshold for contacting psych in-house, but does not meet criteria for PEC at this time.     HLD (hyperlipidemia)  - c/w high dose statin  - add ezetimibe 10 mg PO daily    Essential hypertension  BP elevated on arrival, improved without intervention  - suspect anxiety related  - c/w candesartan 8 mg PO once daily.    VTE Risk Mitigation (From admission, onward)         Ordered     IP VTE HIGH RISK PATIENT  Once      10/28/19 2145     Place PAUL hose  Until discontinued      10/28/19 2145     Place sequential compression device  Until discontinued      10/28/19 2145                   Pardeep Duarte PA-C  Department of Hospital Medicine   [facility]  "

## 2019-10-29 NOTE — NURSING
Pt arrived to unit via stretcher from ED. Pt AAOx4. Pt denies pain or SOB upon arrival.  Pt updated on plan of care. Bed in low position with call light within reach.  Pt instructed to call for assistance prior to getting out of bed. Pt placed in non skid slip resistant socks.

## 2019-10-29 NOTE — SUBJECTIVE & OBJECTIVE
Past Medical History:   Diagnosis Date    Abnormal mammogram of both breasts     Arthritis     Hypertension     Major depressive disorder 5/14/2017    Memory disorder     Seizures     Stroke        Past Surgical History:   Procedure Laterality Date    CATARACT EXTRACTION      cysts breast      TONSILLECTOMY         Review of patient's allergies indicates:  No Known Allergies    No current facility-administered medications on file prior to encounter.      Current Outpatient Medications on File Prior to Encounter   Medication Sig    aspirin 81 MG Chew Take 81 mg by mouth once daily.    atorvastatin (LIPITOR) 80 MG tablet Take 1 tablet (80 mg total) by mouth once daily.    candesartan (ATACAND) 8 MG tablet Take 1 tablet (8 mg total) by mouth once daily.    cephALEXin (KEFLEX) 500 MG capsule Take 1 capsule (500 mg total) by mouth every 12 (twelve) hours.    HYDROcodone-acetaminophen (NORCO) 5-325 mg per tablet Take 1 tablet by mouth every 8 (eight) hours as needed for Pain.    ondansetron (ZOFRAN-ODT) 4 MG TbDL DISSOLVE 1 TABLET(4 MG) ON THE TONGUE EVERY 6 HOURS AS NEEDED     Family History     None        Tobacco Use    Smoking status: Never Smoker    Smokeless tobacco: Never Used   Substance and Sexual Activity    Alcohol use: No    Drug use: No    Sexual activity: Never     Review of Systems   Constitutional: Negative for chills, fever and unexpected weight change.        +trouble sleeping   HENT: Negative for congestion and rhinorrhea.    Eyes: Negative for photophobia and visual disturbance.   Respiratory: Positive for shortness of breath. Negative for cough and chest tightness.    Cardiovascular: Positive for chest pain. Negative for palpitations.   Gastrointestinal: Negative for abdominal pain, diarrhea, nausea and vomiting.   Genitourinary: Negative for difficulty urinating and dysuria.   Musculoskeletal: Negative for back pain and gait problem.   Skin: Negative for rash and wound.    Neurological: Positive for speech difficulty and numbness. Negative for dizziness and headaches.        + facial numbness   Psychiatric/Behavioral: Positive for dysphoric mood, sleep disturbance and suicidal ideas. Negative for agitation, confusion and self-injury.     Objective:     Vital Signs (Most Recent):  Temp: 98.9 °F (37.2 °C) (10/28/19 2005)  Pulse: 70 (10/28/19 2006)  Resp: 18 (10/28/19 1815)  BP: 135/78 (10/28/19 2007)  SpO2: 98 % (10/28/19 2007) Vital Signs (24h Range):  Temp:  [98.6 °F (37 °C)-99 °F (37.2 °C)] 98.9 °F (37.2 °C)  Pulse:  [66-81] 70  Resp:  [15-18] 18  SpO2:  [96 %-99 %] 98 %  BP: (135-176)/(70-96) 135/78     Weight: 81.6 kg (180 lb)  Body mass index is 30.9 kg/m².    Physical Exam   Constitutional: She is oriented to person, place, and time. She appears well-developed and well-nourished.   Calm appearing female in NAD, lying flat in bed   HENT:   Head: Normocephalic and atraumatic.   Eyes: Pupils are equal, round, and reactive to light. EOM are normal.   Neck: Normal range of motion. Neck supple.   Cardiovascular: Normal rate, regular rhythm, normal heart sounds and intact distal pulses.   No murmur heard.  Pulmonary/Chest: Effort normal and breath sounds normal. No respiratory distress.   Abdominal: Soft. Bowel sounds are normal. She exhibits no distension. There is no tenderness.   Musculoskeletal: Normal range of motion.   STR 4/5 BUE  RLE: STR 5/5 at ankle; unable to lift R leg, other than immediately off bed   Neurological: She is alert and oriented to person, place, and time. No sensory deficit.   Sensory grossly intact  Tongue protrudes midline  Speech congruent  No pronator drift  No facial weakness   Skin: Skin is warm and dry.   Psychiatric: Her speech is normal. Judgment normal. Her affect is blunt. She is slowed. She exhibits a depressed mood. She expresses suicidal ideation. She expresses no homicidal ideation. She expresses no suicidal plans and no homicidal plans.    Mostly blunted affect and depressed mood with tearing of her eyes when discussing son  Smiling, Mood improved after discussing friends and getting back home   Nursing note and vitals reviewed.        CRANIAL NERVES     CN III, IV, VI   Pupils are equal, round, and reactive to light.  Extraocular motions are normal.        Significant Labs:   CBC:   Recent Labs   Lab 10/28/19  1547 10/28/19  1600   WBC  --  4.94   HGB  --  13.4   HCT 42 41.5   PLT  --  209     CMP:   Recent Labs   Lab 10/28/19  1600      K 4.6      CO2 24   GLU 95   BUN 15   CREATININE 0.8   CALCIUM 9.1   PROT 7.1   ALBUMIN 3.5   BILITOT 0.7   ALKPHOS 105   AST 25   ALT 15   ANIONGAP 7*   EGFRNONAA >60.0     Troponin:   Recent Labs   Lab 10/28/19  1600 10/28/19  1905   TROPONINI 0.103* 0.111*       Significant Imaging: I have reviewed all pertinent imaging results/findings within the past 24 hours.     Ct Head Without Contrast    Result Date: 10/28/2019  EXAMINATION: CT HEAD WITHOUT CONTRAST CLINICAL HISTORY: Motor neuron disease; TECHNIQUE: Low dose axial images were obtained through the head.  Coronal and sagittal reformations were also performed. Contrast was not administered. COMPARISON: 02/25/2019 FINDINGS: There is no midline shift, hydrocephalus or mass effect.  There is no evidence of acute intracranial hemorrhage or acute major vascular territory infarct.  No abnormal extra-axial fluid collections.  No evidence of a space-occupying mass.  Calvarium is intact.  Visualized paranasal sinuses and mastoid air cells are clear.     No acute intracranial abnormality.  No significant detrimental change from prior exam.     X-ray Chest Ap Portable    Result Date: 10/28/2019  EXAMINATION: XR CHEST AP PORTABLE CLINICAL HISTORY: Stroke; TECHNIQUE: Single frontal view of the chest was performed. COMPARISON: 03/23/2019.  08/02/2018.  04/26/2018. FINDINGS: Mediastinal structures are midline. Cardiac silhouette and pulmonary vascular  distribution are normal.  There is mild tortuosity of the descending thoracic aorta similar to prior studies dating back to 04/26/2018. Lung volumes are normal and symmetric. I detect no pulmonary disease, pleural fluid, lymph node enlargement, cardiac decompensation, pneumothorax, pneumomediastinum, pneumoperitoneum or significant osseous abnormality.     No intrathoracic disease identified.

## 2019-10-29 NOTE — SUBJECTIVE & OBJECTIVE
Past Medical History:   Diagnosis Date    Abnormal mammogram of both breasts     Arthritis     Hypertension     Major depressive disorder 5/14/2017    Memory disorder     Seizures     Stroke      Past Surgical History:   Procedure Laterality Date    CATARACT EXTRACTION      cysts breast      TONSILLECTOMY       History reviewed. No pertinent family history.  Social History     Tobacco Use    Smoking status: Never Smoker    Smokeless tobacco: Never Used   Substance Use Topics    Alcohol use: No    Drug use: No     Review of patient's allergies indicates:  No Known Allergies    Medications: I have reviewed the current medication administration record.    No current facility-administered medications for this encounter.     Current Outpatient Medications:     aspirin 81 MG Chew, Take 81 mg by mouth once daily., Disp: , Rfl:     atorvastatin (LIPITOR) 80 MG tablet, Take 1 tablet (80 mg total) by mouth once daily., Disp: 90 tablet, Rfl: 3    candesartan (ATACAND) 8 MG tablet, Take 1 tablet (8 mg total) by mouth once daily., Disp: 90 tablet, Rfl: 0    cephALEXin (KEFLEX) 500 MG capsule, Take 1 capsule (500 mg total) by mouth every 12 (twelve) hours., Disp: 14 capsule, Rfl: 0    HYDROcodone-acetaminophen (NORCO) 5-325 mg per tablet, Take 1 tablet by mouth every 8 (eight) hours as needed for Pain., Disp: 40 tablet, Rfl: 0    ondansetron (ZOFRAN-ODT) 4 MG TbDL, DISSOLVE 1 TABLET(4 MG) ON THE TONGUE EVERY 6 HOURS AS NEEDED, Disp: 20 tablet, Rfl: 0    Review of Systems   Constitutional: Negative for chills and fever.   HENT: Positive for dental problem (Edentulous with only bottom dentures).    Eyes: Negative for photophobia and visual disturbance.   Respiratory: Positive for shortness of breath. Negative for cough.    Cardiovascular: Positive for chest pain. Negative for leg swelling.   Gastrointestinal: Negative for nausea and vomiting.   Musculoskeletal: Positive for back pain. Negative for neck pain.    Skin: Negative for rash and wound.   Neurological: Positive for numbness. Negative for facial asymmetry and weakness.   Hematological: Negative for adenopathy. Does not bruise/bleed easily.   Psychiatric/Behavioral: Negative for agitation and confusion. The patient is nervous/anxious.      Objective:     Vital Signs (Most Recent):  Temp: 98.6 °F (37 °C) (10/28/19 1814)  Pulse: 70 (10/28/19 1815)  Resp: 18 (10/28/19 1815)  BP: 136/70 (10/28/19 1815)  SpO2: 99 % (10/28/19 1815)    Vital Signs Range (Last 24H):  Temp:  [98.6 °F (37 °C)-99 °F (37.2 °C)]   Pulse:  [70-81]   Resp:  [15-18]   BP: (136-176)/(70-96)   SpO2:  [96 %-99 %]     Physical Exam  General:  Well-developed, well-nourished, nad  HEENT:  NCAT, PERRLA, EOMI, oropharyngeal membranes non-erythematous/without exudate, edentulous with no upper dentures  Neck:  Supple, normal ROM without nuchal rigidity  Resp:  Symmetric expansion, no increased wob  CVS:  No LE edema  GI:  Abd soft, non-distended  Neurologic Exam:  Mental Status:  AAOx3.  Speech, thought content appropriate though initially slow.  Cranial Nerves:  VFs intact on counting fingers in all quadrants bilaterally.  PERRLA, EOMI.  Facial movement, sensation intact and symmetric.  Palate raises symmetrically, tongue protrudes midline.  Trapezius 5/5 bilaterally.  Motor:  Normal bulk and tone.  BUE shoulder abduction, biceps/triceps, wrist flexion/extension,  strength 5/5.  BLE hip flexors, knee flexion/extension, plantarflexion/dorsiflexion 5/5.  Sensory:  Intact to light touch at all extremities and face without inattention.  Vibratory sensation intact at BUE/BLE digits.  Coordination:  FNF, AIDA, HTS intact with no dysmetria/ataxia/dysdiadochokinesia.  No resting tremor.  Gait:  Deferred     Neurological Exam:   LOC: alert  Attention Span: Good   Language: No aphasia  Articulation: Speech change 2/2 no dentures in top  Orientation: Person, Place, Time   Visual Fields: Full  EOM (CN III, IV,  VI): Full/intact  Pupils (CN II, III): PERRL  Facial Movement (CN VII): Symmetric facial expression    Motor: Arm left  Normal 5/5  Leg left  Normal 5/5  Arm right  Normal 5/5  Leg right Normal 5/5  Cebellar: No evidence of appendicular or axial ataxia  Sensation: Intact to light touch, temperature and vibration  Subjective facial numbness  Tone: Normal tone throughout    Laboratory:  CMP:   Recent Labs   Lab 10/28/19  1600   CALCIUM 9.1   ALBUMIN 3.5   PROT 7.1      K 4.6   CO2 24      BUN 15   CREATININE 0.8   ALKPHOS 105   ALT 15   AST 25   BILITOT 0.7     CBC:   Recent Labs   Lab 10/28/19  1600   WBC 4.94   RBC 4.37   HGB 13.4   HCT 41.5      MCV 95   MCH 30.7   MCHC 32.3     Lipid Panel:   Recent Labs   Lab 10/28/19  1600   CHOL 222*   LDLCALC 135.6   HDL 50   TRIG 182*     Coagulation:   Recent Labs   Lab 10/28/19  1600   INR 1.0     Hgb A1C: No results for input(s): HGBA1C in the last 168 hours.  TSH:   Recent Labs   Lab 10/28/19  1600   TSH 1.260     Diagnostic Results:    Brain imaging:  10/28/19 CT head w/o contrast:  No acute intracranial abnormality.  No significant detrimental change from prior exam.    Vessel Imaging:  None on current admission    Cardiac Evaluation:   None on current admission

## 2019-10-29 NOTE — SUBJECTIVE & OBJECTIVE
Interval History: Discussed case with cards. Will follow troponin and monitor for chest pain. Patient expressed extreme stress and insomnia secondary to news regarding her ex-husbands involvement in her son's death. Stated that she developed arm pain during our interview.     Review of Systems   Constitutional: Negative for chills, fever and unexpected weight change.        +trouble sleeping   HENT: Negative for congestion and rhinorrhea.    Eyes: Negative for photophobia and visual disturbance.   Respiratory: Positive for shortness of breath. Negative for cough and chest tightness.    Cardiovascular: Positive for chest pain. Negative for palpitations.   Gastrointestinal: Negative for abdominal pain, diarrhea, nausea and vomiting.   Genitourinary: Negative for difficulty urinating and dysuria.   Musculoskeletal: Negative for back pain and gait problem.   Skin: Negative for rash and wound.   Neurological: Positive for speech difficulty and numbness. Negative for dizziness and headaches.        + facial numbness   Psychiatric/Behavioral: Positive for dysphoric mood, sleep disturbance and suicidal ideas. Negative for agitation, confusion and self-injury.     Objective:     Vital Signs (Most Recent):  Temp: 98.1 °F (36.7 °C) (10/29/19 1618)  Pulse: 91 (10/29/19 1618)  Resp: 18 (10/29/19 1618)  BP: 131/60 (10/29/19 1618)  SpO2: (!) 90 % (10/29/19 1618) Vital Signs (24h Range):  Temp:  [97.4 °F (36.3 °C)-98.9 °F (37.2 °C)] 98.1 °F (36.7 °C)  Pulse:  [62-91] 91  Resp:  [16-18] 18  SpO2:  [89 %-99 %] 90 %  BP: (121-157)/(60-84) 131/60     Weight: 84.9 kg (187 lb 2.7 oz)  Body mass index is 32.13 kg/m².    Intake/Output Summary (Last 24 hours) at 10/29/2019 3632  Last data filed at 10/28/2019 1850  Gross per 24 hour   Intake --   Output 600 ml   Net -600 ml      Physical Exam   Constitutional: She is oriented to person, place, and time. She appears well-developed and well-nourished.   Calm appearing female in NAD, lying flat  in bed   HENT:   Head: Normocephalic and atraumatic.   Eyes: Pupils are equal, round, and reactive to light. EOM are normal.   Neck: Normal range of motion. Neck supple.   Cardiovascular: Normal rate, regular rhythm, normal heart sounds and intact distal pulses.   No murmur heard.  Pulmonary/Chest: Effort normal and breath sounds normal. No respiratory distress.   Abdominal: Soft. Bowel sounds are normal. She exhibits no distension. There is no tenderness.   Musculoskeletal: Normal range of motion.   Neurological: She is alert and oriented to person, place, and time. No sensory deficit.   Skin: Skin is warm and dry.   Psychiatric: Her speech is normal. Judgment normal. Her affect is blunt. She is slowed. She exhibits a depressed mood. She expresses suicidal ideation. She expresses no homicidal ideation. She expresses no suicidal plans and no homicidal plans.   Mostly blunted affect and depressed mood with tearing of her eyes when discussing son  Smiling, Mood improved after discussing friends and getting back home   Nursing note and vitals reviewed.      Significant Labs: All pertinent labs within the past 24 hours have been reviewed.    Significant Imaging: I have reviewed all pertinent imaging results/findings within the past 24 hours.

## 2019-10-29 NOTE — CONSULTS
[facility]  Cardiology  Consult Note    Patient Name: Sofia Augustine  MRN: [id number]  Admission Date: 10/28/2019  Hospital Length of Stay: 0 days  Code Status: Full Code   Attending Provider: Reinaldo Leon MD   Consulting Provider: Marco Ovalle MD  Primary Care Physician: Luanne Connell MD  Principal Problem:Elevated troponin    Patient information was obtained from patient, past medical records and ER records.     Inpatient consult to Cardiology  Consult performed by: Marco Ovalle MD  Consult ordered by: Gertrude Bello PA-C        Subjective:     Chief Complaint:  Chest pain     HPI:   Sofia Augustine is a 74 y.o. F with HTN, HLD, depression, seizure disorder, cervical radiculopathy, hemiplegia affecting right dominant side presenting yesterday with substernal chest pressure, L facial/tongue numbness awaking her from sleep. Reports being in her usual state of health to me prior to bed yesterday (though H&P notes one week of R leg weakness). Awoke from sleep with 7/10 substernal chest pressure, nonexertional, radiating to her back. Nitro x1 given in the ER with improvement. Associated with L facial numbness/tongue numbness and heaviness. Similar to prior chest pressure but previously without numbness. No episodes since she was last admitted for observation in March with atypical chest pressure, minimally elevated troponin 0.04. Plan was DSE and event monitor as an outpatient as previously also had an episode of palpitations. Previously with [facility] 12/2017 with luminal irregularities throughout, nonobstructive disease. Last TTE 3/2019 EF 65%, normal diastology.     She denies exertional chest discomfort or dyspnea, PND, orthopnea, PAULINA, palpitations, presyncope/syncope. Denies a hx of MARTIN. Reports she does snore and has woken up feeling short of breath in the past where she felt like she stopped breathing.    In ER, initial SBP 170s, otherwise in 130s. Stroke code called and  CTS unremarkable. Neurology assessed, suspect some component of conversion d/o as recently worked up for TIA. EKG NSR without ischemic changes. CXR unremarkable.  Trop .103-->.111.  CTA  negative for aortic dissection, negative for PE (though timing noted to not be optimal for PE evaluation).     Past Medical History:   Diagnosis Date    Abnormal mammogram of both breasts     Arthritis     Hypertension     Major depressive disorder 5/14/2017    Memory disorder     Seizures     Stroke        Past Surgical History:   Procedure Laterality Date    CATARACT EXTRACTION      cysts breast      TONSILLECTOMY         Review of patient's allergies indicates:  No Known Allergies    No current facility-administered medications on file prior to encounter.      Current Outpatient Medications on File Prior to Encounter   Medication Sig    aspirin 81 MG Chew Take 81 mg by mouth once daily.    atorvastatin (LIPITOR) 80 MG tablet Take 1 tablet (80 mg total) by mouth once daily.    candesartan (ATACAND) 8 MG tablet Take 1 tablet (8 mg total) by mouth once daily.    cephALEXin (KEFLEX) 500 MG capsule Take 1 capsule (500 mg total) by mouth every 12 (twelve) hours.    HYDROcodone-acetaminophen (NORCO) 5-325 mg per tablet Take 1 tablet by mouth every 8 (eight) hours as needed for Pain.    ondansetron (ZOFRAN-ODT) 4 MG TbDL DISSOLVE 1 TABLET(4 MG) ON THE TONGUE EVERY 6 HOURS AS NEEDED     Family History     None        Tobacco Use    Smoking status: Never Smoker    Smokeless tobacco: Never Used   Substance and Sexual Activity    Alcohol use: No    Drug use: No    Sexual activity: Never     Review of Systems   All other systems reviewed and are negative.    Objective:     Vital Signs (Most Recent):  Temp: 97.4 °F (36.3 °C) (10/29/19 1101)  Pulse: 63 (10/29/19 1101)  Resp: 16 (10/29/19 1101)  BP: (!) 156/75 (10/29/19 1101)  SpO2: (!) 94 % (10/29/19 1101) Vital Signs (24h Range):  Temp:  [97.4 °F (36.3 °C)-99 °F (37.2 °C)]  97.4 °F (36.3 °C)  Pulse:  [62-81] 63  Resp:  [15-18] 16  SpO2:  [89 %-99 %] 94 %  BP: (121-176)/(63-96) 156/75     Weight: 84.9 kg (187 lb 2.7 oz)  Body mass index is 32.13 kg/m².    SpO2: (!) 94 %  O2 Device (Oxygen Therapy): room air      Intake/Output Summary (Last 24 hours) at 10/29/2019 1147  Last data filed at 10/28/2019 1850  Gross per 24 hour   Intake --   Output 600 ml   Net -600 ml       Lines/Drains/Airways     Peripheral Intravenous Line                 Peripheral IV - Single Lumen 10/28/19 2014 22 G Anterior;Proximal;Right Antecubital less than 1 day                Physical Exam   Constitutional: She is oriented to person, place, and time. She appears well-nourished. No distress.   HENT:   Head: Atraumatic.   Eyes: EOM are normal. No scleral icterus.   Neck: Neck supple. No JVD present.   Cardiovascular: Normal rate, regular rhythm and normal heart sounds. Exam reveals no gallop and no friction rub.   No murmur heard.  Pulmonary/Chest: Effort normal and breath sounds normal. No respiratory distress. She has no wheezes. She has no rales.   Abdominal: Soft. Bowel sounds are normal. She exhibits no distension. There is no tenderness.   Musculoskeletal: She exhibits no edema.   Neurological: She is alert and oriented to person, place, and time. No cranial nerve deficit.   Skin: Skin is warm and dry. No erythema.   Psychiatric: She has a normal mood and affect.       Significant Labs:   BMP:   Recent Labs   Lab 10/28/19  1600 10/29/19  0428   GLU 95 90    139   K 4.6 4.2    108   CO2 24 23   BUN 15 10   CREATININE 0.8 0.7   CALCIUM 9.1 8.9   MG  --  1.8   , CMP   Recent Labs   Lab 10/28/19  1600 10/29/19  0428    139   K 4.6 4.2    108   CO2 24 23   GLU 95 90   BUN 15 10   CREATININE 0.8 0.7   CALCIUM 9.1 8.9   PROT 7.1  --    ALBUMIN 3.5  --    BILITOT 0.7  --    ALKPHOS 105  --    AST 25  --    ALT 15  --    ANIONGAP 7* 8   ESTGFRAFRICA >60.0 >60.0   EGFRNONAA >60.0 >60.0   , CBC    Recent Labs   Lab 10/28/19  1600 10/29/19  0428   WBC 4.94 4.20   HGB 13.4 12.7   HCT 41.5 40.1    170   , INR   Recent Labs   Lab 10/28/19  1600   INR 1.0   , Lipid Panel   Recent Labs   Lab 10/28/19  1600   CHOL 222*   HDL 50   LDLCALC 135.6   TRIG 182*   CHOLHDL 22.5    and Troponin   Recent Labs   Lab 10/28/19  1600 10/28/19  1905 10/29/19  0428   TROPONINI 0.103* 0.111* 0.124*       Significant Imaging: Echocardiogram:   2D echo with color flow doppler:   Results for orders placed or performed during the hospital encounter of 05/09/16   Echo doppler color flow   Result Value Ref Range    QEF 63 55 - 65    Mitral Valve Regurgitation TRIVIAL     Diastolic Dysfunction No     Est. PA Systolic Pressure 28     Tricuspid Valve Regurgitation TRIVIAL TO MILD     Narrative    TEST DESCRIPTION   Technical Quality: This is a technically challenging study.     Aorta: The aortic root is normal in size, measuring 2.5 cm at sinotubular junction and 3.1 cm at Sinuses of Valsalva. The proximal ascending aorta is normal in size, measuring 2.9 cm across.     Left Atrium: The left atrial volume index is normal, measuring 31.15 cc/m2.     Left Ventricle: The left ventricle is normal in size, with an end-diastolic diameter of 3.5 cm, and an end-systolic diameter of 2.4 cm. LV wall thickness is normal, with the septum measuring 0.9 cm and the posterior wall measuring 0.7 cm across. Relative   wall thickness was normal at 0.40, and the LV mass index was 42.2 g/m2 consistent with normal left ventricular mass. Global left ventricular systolic function appears normal. Visually estimated ejection fraction is 60-65%. The LV Doppler derived stroke   volume equals 70.0 ccs.   There is normal systolic/diastolic flow in the pulmonary vein indicating normal left atrial pressures. The E/e'(lat) is 10, consistent with normal diastolic function.     Right Atrium: The right atrium is normal in size, measuring 4.4 cm in length and 4.0 cm in  width in the apical view.     Right Ventricle: The right ventricle is normal in size measuring 3.0 cm at the base in the apical right ventricle-focused view. Global right ventricular systolic function appears normal. Tricuspid annular plane systolic excursion (TAPSE) is 2.1 cm. The   estimated PA systolic pressure is 28 mmHg.     Aortic Valve:  The aortic valve is mildly sclerotic with normal leaflet mobility.     Mitral Valve:  There is trivial mitral regurgitation. Mitral valve is normal in structure with normal leaflet mobility.     Tricuspid Valve:  There is trivial to mild tricuspid regurgitation. Tricuspid valve is normal in structure with normal leaflet mobility.     Pulmonary Valve:  Pulmonary valve is normal in structure with normal leaflet mobility.     IVC: IVC is normal in size and collapses > 50% with a sniff, suggesting normal right atrial pressure of 3 mmHg.     Intracavitary: There is no evidence of pericardial effusion, intracavity mass, thrombi, or vegetation.         CONCLUSIONS     1 - Normal left ventricular systolic function (EF 60-65%).     2 - Normal left ventricular diastolic function.     3 - Normal right ventricular systolic function .     4 - Trivial mitral regurgitation.     5 - Trivial to mild tricuspid regurgitation.     6 - The estimated PA systolic pressure is 28 mmHg.             This document has been electronically    SIGNED BY: Lucas Webber MD On: 05/10/2016 14:37    and Transthoracic echo (TTE) complete (Cupid Only):   Results for orders placed or performed during the hospital encounter of 10/28/19   Echo Color Flow Doppler? Yes   Result Value Ref Range    BSA 1.93 m2    TDI SEPTAL 0.06 m/s    LV LATERAL E/E' RATIO 7.88 m/s    LV SEPTAL E/E' RATIO 10.50 m/s    LA WIDTH 2.82 cm    TDI LATERAL 0.08 m/s    LVIDD 4.19 3.5 - 6.0 cm    IVS 0.80 0.6 - 1.1 cm    PW 0.91 0.6 - 1.1 cm    LVIDS 2.74 2.1 - 4.0 cm    FS 35 28 - 44 %    LA volume 44.22 cm3    Sinus 3.23 cm    STJ 3.29 cm     Ascending aorta 3.77 cm    LV mass 110.27 g    LA size 3.63 cm    RVDD 2.84 cm    TAPSE 2.51 cm    Left Ventricle Relative Wall Thickness 0.43 cm    AV mean gradient 4 mmHg    AV valve area 2.32 cm2    AV Velocity Ratio 0.73     AV index (prosthetic) 0.74     E/A ratio 0.69     Mean e' 0.07 m/s    E wave decelartion time 277.13 msec    LVOT diameter 2.00 cm    LVOT area 3.1 cm2    LVOT peak iván 0.95 m/s    LVOT peak VTI 19.28 cm    Ao peak iván 1.31 m/s    Ao VTI 26.05 cm    LVOT stroke volume 60.54 cm3    AV peak gradient 7 mmHg    E/E' ratio 9.00 m/s    MV Peak E Iván 0.63 m/s    MV Peak A Iván 0.91 m/s    LV Systolic Volume 27.97 mL    LV Systolic Volume Index 14.8 mL/m2    LV Diastolic Volume 78.17 mL    LV Diastolic Volume Index 41.33 mL/m2    LA Volume Index 23.4 mL/m2    LV Mass Index 58 g/m2    RA Major Axis 4.48 cm    Left Atrium Minor Axis 4.56 cm    Left Atrium Major Axis 5.74 cm    RA Width 2.89 cm    RV S' 10 cm/s    Narrative    · Concentric left ventricular remodeling.  · Normal left ventricular systolic function. The estimated ejection   fraction is 60%  · Normal right ventricular systolic function.  · Grade I (mild) left ventricular diastolic dysfunction consistent with   impaired relaxation.  · Low central venous pressure.       and EKG: NSR, no ischemic changes    Assessment and Plan:     * Elevated troponin  74F with hx of HTN, HLD here with chest pain and facial numbness awaking her from sleep. Tn elevated but flat at 0.1, unclear etiology. [facility] 12/2017 with luminal irregularities. Last Echo unremarkable 3/2019. CTA negative for dissection and PE (suboptimal timing for PE, thus small subsegmental cannot be ruled out). Hypertensive on admit but not severely so. EKG without ischemic changes. No other acute stressors/illness to cause type II; however with cath within last 2 years without any significant disease. Consider vasospasm as a potential etiology. Myopericarditis or stress cardiomyopathy  (Takotsubo) a consideration by history. Microvascular dysfunction another potential ddx but would expect more exertional symptoms than acute onset at rest. Transient hypoxia due to MARTIN another consideration, consider sleep study as OP. Occult tachyarrhythmia would be another consideration, but NSR here and without palpitations (though previously reported at prior admissions).     -Recommend TTE for further assessment of above. Given above, would not recommend repeat ischemia evaluation for obstructive coronary disease unless substantial WMA or newly depressed EF.   -Would not treat as ACS  -Will consider adding empiric treatment for vasospasm (CCB and/or long acting nitrate).     ============================  Addendum:  TTE resulted with normal EF, no WMA, normal RV function, normal estimated CVP, no significant valvular pathology.   Given TTE results and flat troponin, would not recommend further cardiac testing.  Would recommend starting amlodipine 5 mg as discussed above.  Cardiology will sign off.   Please place ambulatory referral for Cardiology follow up on discharge.      Thank you for your consult. I will follow-up with patient. Please contact us if you have any additional questions.    Marco Ovalle MD  Cardiology   [facility]

## 2019-10-30 PROBLEM — I21.4 NSTEMI (NON-ST ELEVATED MYOCARDIAL INFARCTION): Status: ACTIVE | Noted: 2019-10-30

## 2019-10-30 PROBLEM — I21.4 NSTEMI (NON-ST ELEVATED MYOCARDIAL INFARCTION): Status: ACTIVE | Noted: 2017-05-13

## 2019-10-30 LAB
ANION GAP SERPL CALC-SCNC: 9 MMOL/L (ref 8–16)
APTT BLDCRRT: 25.1 SEC (ref 21–32)
APTT BLDCRRT: 42.8 SEC (ref 21–32)
BASOPHILS # BLD AUTO: 0.03 K/UL (ref 0–0.2)
BASOPHILS # BLD AUTO: 0.03 K/UL (ref 0–0.2)
BASOPHILS NFR BLD: 0.5 % (ref 0–1.9)
BASOPHILS NFR BLD: 0.5 % (ref 0–1.9)
BUN SERPL-MCNC: 21 MG/DL (ref 8–23)
CALCIUM SERPL-MCNC: 8.7 MG/DL (ref 8.7–10.5)
CHLORIDE SERPL-SCNC: 110 MMOL/L (ref 95–110)
CO2 SERPL-SCNC: 22 MMOL/L (ref 23–29)
CREAT SERPL-MCNC: 1 MG/DL (ref 0.5–1.4)
DIFFERENTIAL METHOD: NORMAL
DIFFERENTIAL METHOD: NORMAL
EOSINOPHIL # BLD AUTO: 0.1 K/UL (ref 0–0.5)
EOSINOPHIL # BLD AUTO: 0.1 K/UL (ref 0–0.5)
EOSINOPHIL NFR BLD: 1 % (ref 0–8)
EOSINOPHIL NFR BLD: 1.8 % (ref 0–8)
ERYTHROCYTE [DISTWIDTH] IN BLOOD BY AUTOMATED COUNT: 12.8 % (ref 11.5–14.5)
ERYTHROCYTE [DISTWIDTH] IN BLOOD BY AUTOMATED COUNT: 12.9 % (ref 11.5–14.5)
EST. GFR  (AFRICAN AMERICAN): >60 ML/MIN/1.73 M^2
EST. GFR  (NON AFRICAN AMERICAN): 55.2 ML/MIN/1.73 M^2
GLUCOSE SERPL-MCNC: 101 MG/DL (ref 70–110)
HCT VFR BLD AUTO: 38.3 % (ref 37–48.5)
HCT VFR BLD AUTO: 39.7 % (ref 37–48.5)
HGB BLD-MCNC: 12.4 G/DL (ref 12–16)
HGB BLD-MCNC: 13 G/DL (ref 12–16)
IMM GRANULOCYTES # BLD AUTO: 0.01 K/UL (ref 0–0.04)
IMM GRANULOCYTES # BLD AUTO: 0.02 K/UL (ref 0–0.04)
IMM GRANULOCYTES NFR BLD AUTO: 0.2 % (ref 0–0.5)
IMM GRANULOCYTES NFR BLD AUTO: 0.3 % (ref 0–0.5)
INR PPP: 1 (ref 0.8–1.2)
LYMPHOCYTES # BLD AUTO: 1.8 K/UL (ref 1–4.8)
LYMPHOCYTES # BLD AUTO: 1.9 K/UL (ref 1–4.8)
LYMPHOCYTES NFR BLD: 30.4 % (ref 18–48)
LYMPHOCYTES NFR BLD: 31.1 % (ref 18–48)
MCH RBC QN AUTO: 30.2 PG (ref 27–31)
MCH RBC QN AUTO: 30.8 PG (ref 27–31)
MCHC RBC AUTO-ENTMCNC: 32.4 G/DL (ref 32–36)
MCHC RBC AUTO-ENTMCNC: 32.7 G/DL (ref 32–36)
MCV RBC AUTO: 92 FL (ref 82–98)
MCV RBC AUTO: 95 FL (ref 82–98)
MONOCYTES # BLD AUTO: 0.7 K/UL (ref 0.3–1)
MONOCYTES # BLD AUTO: 0.7 K/UL (ref 0.3–1)
MONOCYTES NFR BLD: 11.3 % (ref 4–15)
MONOCYTES NFR BLD: 12.2 % (ref 4–15)
NEUTROPHILS # BLD AUTO: 3.2 K/UL (ref 1.8–7.7)
NEUTROPHILS # BLD AUTO: 3.4 K/UL (ref 1.8–7.7)
NEUTROPHILS NFR BLD: 55 % (ref 38–73)
NEUTROPHILS NFR BLD: 55.7 % (ref 38–73)
NRBC BLD-RTO: 0 /100 WBC
NRBC BLD-RTO: 0 /100 WBC
PLATELET # BLD AUTO: 180 K/UL (ref 150–350)
PLATELET # BLD AUTO: 186 K/UL (ref 150–350)
PMV BLD AUTO: 10.5 FL (ref 9.2–12.9)
PMV BLD AUTO: 10.7 FL (ref 9.2–12.9)
POTASSIUM SERPL-SCNC: 3.6 MMOL/L (ref 3.5–5.1)
PROTHROMBIN TIME: 10.8 SEC (ref 9–12.5)
RBC # BLD AUTO: 4.03 M/UL (ref 4–5.4)
RBC # BLD AUTO: 4.31 M/UL (ref 4–5.4)
SODIUM SERPL-SCNC: 141 MMOL/L (ref 136–145)
TROPONIN I SERPL DL<=0.01 NG/ML-MCNC: 0.21 NG/ML (ref 0–0.03)
WBC # BLD AUTO: 5.76 K/UL (ref 3.9–12.7)
WBC # BLD AUTO: 6.11 K/UL (ref 3.9–12.7)

## 2019-10-30 PROCEDURE — 80048 BASIC METABOLIC PNL TOTAL CA: CPT

## 2019-10-30 PROCEDURE — 84484 ASSAY OF TROPONIN QUANT: CPT | Mod: 91

## 2019-10-30 PROCEDURE — 84484 ASSAY OF TROPONIN QUANT: CPT

## 2019-10-30 PROCEDURE — 85025 COMPLETE CBC W/AUTO DIFF WBC: CPT

## 2019-10-30 PROCEDURE — 25000003 PHARM REV CODE 250: Performed by: PHYSICIAN ASSISTANT

## 2019-10-30 PROCEDURE — 85610 PROTHROMBIN TIME: CPT

## 2019-10-30 PROCEDURE — 99233 PR SUBSEQUENT HOSPITAL CARE,LEVL III: ICD-10-PCS | Mod: ,,, | Performed by: PHYSICIAN ASSISTANT

## 2019-10-30 PROCEDURE — 99233 SBSQ HOSP IP/OBS HIGH 50: CPT | Mod: ,,, | Performed by: PHYSICIAN ASSISTANT

## 2019-10-30 PROCEDURE — 85730 THROMBOPLASTIN TIME PARTIAL: CPT

## 2019-10-30 PROCEDURE — 85730 THROMBOPLASTIN TIME PARTIAL: CPT | Mod: 91

## 2019-10-30 PROCEDURE — 11000001 HC ACUTE MED/SURG PRIVATE ROOM

## 2019-10-30 PROCEDURE — 99233 SBSQ HOSP IP/OBS HIGH 50: CPT | Mod: ,,, | Performed by: INTERNAL MEDICINE

## 2019-10-30 PROCEDURE — 99233 PR SUBSEQUENT HOSPITAL CARE,LEVL III: ICD-10-PCS | Mod: ,,, | Performed by: INTERNAL MEDICINE

## 2019-10-30 PROCEDURE — 63600175 PHARM REV CODE 636 W HCPCS: Performed by: PHYSICIAN ASSISTANT

## 2019-10-30 PROCEDURE — 36415 COLL VENOUS BLD VENIPUNCTURE: CPT

## 2019-10-30 RX ORDER — HEPARIN SODIUM,PORCINE/D5W 25000/250
12 INTRAVENOUS SOLUTION INTRAVENOUS CONTINUOUS
Status: DISCONTINUED | OUTPATIENT
Start: 2019-10-30 | End: 2019-11-01

## 2019-10-30 RX ADMIN — HEPARIN SODIUM AND DEXTROSE 12 UNITS/KG/HR: 10000; 5 INJECTION INTRAVENOUS at 10:10

## 2019-10-30 RX ADMIN — ACETAMINOPHEN 650 MG: 325 TABLET ORAL at 03:10

## 2019-10-30 RX ADMIN — METOPROLOL SUCCINATE 12.5 MG: 25 TABLET, EXTENDED RELEASE ORAL at 03:10

## 2019-10-30 RX ADMIN — ATORVASTATIN CALCIUM 80 MG: 20 TABLET, FILM COATED ORAL at 09:10

## 2019-10-30 RX ADMIN — EZETIMIBE 10 MG: 10 TABLET ORAL at 09:10

## 2019-10-30 RX ADMIN — ASPIRIN 81 MG CHEWABLE TABLET 81 MG: 81 TABLET CHEWABLE at 09:10

## 2019-10-30 NOTE — ASSESSMENT & PLAN NOTE
"Elevated troponin  Severe (7/10), substernal, non-exertional, chest pain radiating to her back, characterized as "pressure"/"soreness".   Resolved with SL nitro. Previous TTE, US carotids unrevealing.     Brief episode of chest pressure with associated shortness of breath overnight in the setting of rising troponins. EKG stable overnight.   - Nuclear stress in the AM  - ACS protocol initiated: heparin drip ordered, both anti-platelet and anticoagulant affects    - metoprolol succinate (TOPROL-XL) 24 hr split tablet 12.5 mg started 10/30  - troponin trend remarkable, repeat until trending down  - will initiate amlodipine 2.5mg if tolerable for continued concern for vasospasm, will reassess after the stress  - EKG stable with previous, CXR unremarkable  - CTA chest without PE.  - [facility] 2017 with non-obstructive CAD  - c/w ASA, statin, BP control  "

## 2019-10-30 NOTE — PROGRESS NOTES
"[facility] Hospital Medicine  Progress Note    Patient Name: Sofia Augustine  MRN: [id number]  Patient Class: IP- Inpatient   Admission Date: 10/28/2019  Length of Stay: 0 days  Attending Physician: Reinaldo Leon MD  Primary Care Provider: Luanne Connell MD    [facility] Medicine Team: [facility] Gertrude Bello PA-C    Subjective:     Principal Problem:NSTEMI (non-ST elevated myocardial infarction)        HPI:  Sofia Augustine is a 74 y.o. F with HTN, HLD, depression, seizure disorder, cervical radiculopathy, hemiplegia affecting right dominant side  who presents with complaints of L facial numbness, CP, SOB.  Patient reports R leg weakness x1 week with any paresthesias.  She denies any trips, leg trauma, falls, gait instability.  She reports she developed L facial numbness (like they are "asleep") with associated bilateral upper arm numbness, substernal chest pain this morning when she woke up.  She reports sensation of tongue enlargement with difficulty speaking which has since resolved. She denies hx of CVA but reports sxs similar to last admission here which she states was for "TIA". She denies any dizziness, SOB, vision changes.      Her chest pain was severe (7/10), substernal, non-exertional, radiating to her back, characterized as "pressure" "soreness".  She took no meds at home PTA.  Nitro improver her pain in the ED.  Patient denies any etoh, smoking, drugs.  She denies hx of MI, CAD.  Chest pain occurred during last admission here and she does not know what improved her chest pain or relieved it then.      Of note, she reports increased stressors recently as she found out her ex- was somehow involved in the death of her son in 2001. She states that she has had (above) symptoms like this many times in the past, but not usually this severe. She reports that she feels depressed and would "like to go to sleep and not wake up".  She reports insomnia recently.  She " "denies thoughts of hopelessness, HI, or plan to kill herself.  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 She has plans to improve the quality of her house and is trying to get free legal help to resolve some structural issues at her house.     Of note, admitted 3/23-3/24/19 for chest pain and syncope.  TTE showed EF 65%, normal diastolic fxn.  holter monitor and stress echo ordered as outpatient (never completed). Troponin was .3-->.4 D dimer was elevated (0.56) but within normal range when adjusted for age. US carotids shows mild stenosis (1-39% bilaterally). Discharged and followed up with PCP.  Has yet to establish with cardiologist or complete holter monitor/stress.    ED: Temp max 99, SBP 170s, HDS. Intake labs unremarkable. Stroke code called and CTH unremarkable. Per Neurology note, "On exam in CT scanner, patient had slow but appropriate speech concerning for psychogenic speech pattern.  No aphasia.  Whole face numbness reported which resolved soon after scan.  On exam, patient actively pushes RLE down when asking her to hold it up.  Shows good strength in distal RLE.  Suspect some component of conversion disorder and patient has had fairly recent work up for previous possible TIA.  Would not change meds currently based on current picture".   EKG non-ischemic.  CXR unremarkable.  Trop .103-->.111.  CTA C/A with no evidence of PE (official read pending; issues with CT order initially and given 1L IVF). Given 1 nitro and chest pain resolved.     Overview/Hospital Course:  74 y.o. who was admitted to hospital medicine for chest pain. Discussed inpatient stress modalities with cardiology as patient with elevated troponin on admission. TTE resulted with normal EF, no WMA, normal RV function, normal estimated CVP, no significant valvular pathology therefore, cardiology did not recommend further evaluation in-house or ACS protocol. They did recommend initiating CCB " and/or long acting nitrate for treatment of suspected vasospasm --- will trial low dose amlodipine as blood pressure allows. Upon continued monitoring, the patient's troponin continued to rise therefore ACS protocol was initiated for NSTEMI. Nuclear stress testing ordered, scheduled for 10/31.        Ambulatory referral placed and CM to schedule follow-up. No further episodes of chest pain at this time. Patient would benefit greatly from outpatient therapy/psychiatry follow-up.        Interval History: Patient reports a brief episode of chest pressure overnight with associated dyspnea. ACS protocol initiated in the setting of elevation troponin. EKG stable. Heparin drip started. Nuclear stress test in the AM, NPO at midnight.     Review of Systems   Constitutional: Negative for chills, fever and unexpected weight change.        +trouble sleeping   HENT: Negative for congestion and rhinorrhea.    Eyes: Negative for photophobia and visual disturbance.   Respiratory: Positive for shortness of breath. Negative for cough and chest tightness.    Cardiovascular: Positive for chest pain. Negative for palpitations.   Gastrointestinal: Negative for abdominal pain, diarrhea, nausea and vomiting.   Genitourinary: Negative for difficulty urinating and dysuria.   Musculoskeletal: Negative for back pain and gait problem.   Skin: Negative for rash and wound.   Neurological: Positive for numbness. Negative for dizziness, speech difficulty and headaches.        + facial numbness   Psychiatric/Behavioral: Positive for dysphoric mood, sleep disturbance and suicidal ideas. Negative for agitation, confusion and self-injury.     Objective:     Vital Signs (Most Recent):  Temp: 98.3 °F (36.8 °C) (10/30/19 1202)  Pulse: 81 (10/30/19 1516)  Resp: 18 (10/30/19 1202)  BP: (!) 114/58 (10/30/19 1202)  SpO2: 96 % (10/30/19 1202) Vital Signs (24h Range):  Temp:  [97.9 °F (36.6 °C)-98.6 °F (37 °C)] 98.3 °F (36.8 °C)  Pulse:  [] 81  Resp:   "[16-18] 18  SpO2:  [90 %-96 %] 96 %  BP: (105-131)/(57-65) 114/58     Weight: 84.9 kg (187 lb 2.7 oz)  Body mass index is 32.13 kg/m².  No intake or output data in the 24 hours ending 10/30/19 1611   Physical Exam   Constitutional: She is oriented to person, place, and time. She appears well-developed and well-nourished.   Calm appearing female in NAD, lying flat in bed   HENT:   Head: Normocephalic and atraumatic.   Eyes: Pupils are equal, round, and reactive to light. EOM are normal.   Neck: Normal range of motion. Neck supple.   Cardiovascular: Normal rate, regular rhythm, normal heart sounds and intact distal pulses.   No murmur heard.  Pulmonary/Chest: Effort normal and breath sounds normal. No respiratory distress.   Abdominal: Soft. Bowel sounds are normal. She exhibits no distension. There is no tenderness.   Musculoskeletal: Normal range of motion.   Neurological: She is alert and oriented to person, place, and time. No sensory deficit.   Skin: Skin is warm and dry.   Psychiatric: Her speech is normal. Judgment normal. Her affect is blunt. She is slowed. She exhibits a depressed mood. She expresses suicidal ideation. She expresses no homicidal ideation. She expresses no suicidal plans and no homicidal plans.   Mostly blunted affect    Nursing note and vitals reviewed.      Significant Labs: All pertinent labs within the past 24 hours have been reviewed.    Significant Imaging: I have reviewed all pertinent imaging results/findings within the past 24 hours.      Assessment/Plan:      * NSTEMI (non-ST elevated myocardial infarction)  Elevated troponin  Severe (7/10), substernal, non-exertional, chest pain radiating to her back, characterized as "pressure"/"soreness".   Resolved with SL nitro. Previous TTE, US carotids unrevealing.     Brief episode of chest pressure with associated shortness of breath overnight in the setting of rising troponins. EKG stable overnight.   - Nuclear stress in the AM  - ACS " protocol initiated: heparin drip ordered, both anti-platelet and anticoagulant affects    - metoprolol succinate (TOPROL-XL) 24 hr split tablet 12.5 mg started 10/30  - troponin trend remarkable, repeat until trending down  - will initiate amlodipine 2.5mg if tolerable for continued concern for vasospasm, will reassess after the stress  - EKG stable with previous, CXR unremarkable  - CTA chest without PE.  - [facility] 2017 with non-obstructive CAD  - c/w ASA, statin, BP control    Passive suicidal ideations  Reports passive SI with no plan.  Denies HI, self injury/harm.  Wants pains/numbness to resolved.   - will try hydroxyzine TID PRN anxiety  - will need close psych f/u; low threshold for contacting psych in-house, but does not meet criteria for PEC at this time.     HLD (hyperlipidemia)  - c/w high dose statin  - add ezetimibe 10 mg PO daily    Essential hypertension  BP elevated on arrival, improved without intervention  - suspect anxiety related  - c/w candesartan 8 mg PO once daily.    Facial numbness  Vascular neurology consulted and recs given. CTH unrevealing.   - likely neurogenic in nature  - needs psych f/u  - c/w GDMT for secondary CVA prevention  - will start ezetimibe for elevated LDL  - will need to ensure patient checking BP at home    VTE Risk Mitigation (From admission, onward)         Ordered     heparin 25,000 units in dextrose 5% 250 mL (100 units/mL) infusion LOW INTENSITY nomogram - [facility]  Continuous      10/30/19 0926     heparin 25,000 units in dextrose 5% (100 units/ml) IV bolus from bag - ADDITIONAL PRN BOLUS - 60 units/kg (max bolus 4000 units)  As needed (PRN)      10/30/19 0926     heparin 25,000 units in dextrose 5% (100 units/ml) IV bolus from bag - ADDITIONAL PRN BOLUS - 30 units/kg (max bolus 4000 units)  As needed (PRN)      10/30/19 0926     IP VTE HIGH RISK PATIENT  Once      10/28/19 2145     Place PAUL hose  Until discontinued      10/28/19 2145     Place sequential compression device   Until discontinued      10/28/19 2313                      Gertrude Bello PA-C  Department of Hospital Medicine   [facility]

## 2019-10-30 NOTE — PROGRESS NOTES
Notified MICHAEL Bland of pt's troponin result of 0.188.  Orders given for an EKG.  Orders will be carried out.

## 2019-10-30 NOTE — SUBJECTIVE & OBJECTIVE
Interval History: Patient reports a brief episode of chest pressure overnight with associated dyspnea. ACS protocol initiated in the setting of elevation troponin. EKG stable. Heparin drip started. Nuclear stress test in the AM, NPO at midnight.     Review of Systems   Constitutional: Negative for chills, fever and unexpected weight change.        +trouble sleeping   HENT: Negative for congestion and rhinorrhea.    Eyes: Negative for photophobia and visual disturbance.   Respiratory: Positive for shortness of breath. Negative for cough and chest tightness.    Cardiovascular: Positive for chest pain. Negative for palpitations.   Gastrointestinal: Negative for abdominal pain, diarrhea, nausea and vomiting.   Genitourinary: Negative for difficulty urinating and dysuria.   Musculoskeletal: Negative for back pain and gait problem.   Skin: Negative for rash and wound.   Neurological: Positive for numbness. Negative for dizziness, speech difficulty and headaches.        + facial numbness   Psychiatric/Behavioral: Positive for dysphoric mood, sleep disturbance and suicidal ideas. Negative for agitation, confusion and self-injury.     Objective:     Vital Signs (Most Recent):  Temp: 98.3 °F (36.8 °C) (10/30/19 1202)  Pulse: 81 (10/30/19 1516)  Resp: 18 (10/30/19 1202)  BP: (!) 114/58 (10/30/19 1202)  SpO2: 96 % (10/30/19 1202) Vital Signs (24h Range):  Temp:  [97.9 °F (36.6 °C)-98.6 °F (37 °C)] 98.3 °F (36.8 °C)  Pulse:  [] 81  Resp:  [16-18] 18  SpO2:  [90 %-96 %] 96 %  BP: (105-131)/(57-65) 114/58     Weight: 84.9 kg (187 lb 2.7 oz)  Body mass index is 32.13 kg/m².  No intake or output data in the 24 hours ending 10/30/19 1611   Physical Exam   Constitutional: She is oriented to person, place, and time. She appears well-developed and well-nourished.   Calm appearing female in NAD, lying flat in bed   HENT:   Head: Normocephalic and atraumatic.   Eyes: Pupils are equal, round, and reactive to light. EOM are normal.    Neck: Normal range of motion. Neck supple.   Cardiovascular: Normal rate, regular rhythm, normal heart sounds and intact distal pulses.   No murmur heard.  Pulmonary/Chest: Effort normal and breath sounds normal. No respiratory distress.   Abdominal: Soft. Bowel sounds are normal. She exhibits no distension. There is no tenderness.   Musculoskeletal: Normal range of motion.   Neurological: She is alert and oriented to person, place, and time. No sensory deficit.   Skin: Skin is warm and dry.   Psychiatric: Her speech is normal. Judgment normal. Her affect is blunt. She is slowed. She exhibits a depressed mood. She expresses suicidal ideation. She expresses no homicidal ideation. She expresses no suicidal plans and no homicidal plans.   Mostly blunted affect    Nursing note and vitals reviewed.      Significant Labs: All pertinent labs within the past 24 hours have been reviewed.    Significant Imaging: I have reviewed all pertinent imaging results/findings within the past 24 hours.

## 2019-10-30 NOTE — UM SECONDARY REVIEW
Physician Advisor External    Observation > 48 hours    Treatment/Care recommendations given         CM referred case to [employer] EHR for secondary review as patient likely to extend 48 hour OBS stay.  Troponin remains elevated, nuclear stress test pending.  Awaiting recommendation.    Case reviewed by Dr. Ribeiro with recommendations for 10/28-10/29 OBS appropriate and 10/30/19 IP appropriate.    Cintia Castro RN, BSN, [facility]  Utilization Review Case Management  [facility]  [phone]  Fax [phone]

## 2019-10-30 NOTE — PLAN OF CARE
Hospital follow up appointment scheduled for the patient with Dr. Behzad Connolly ([facility] cards) on 11/13/19 at 1000. Will continue to follow.

## 2019-10-31 ENCOUNTER — CLINICAL SUPPORT (OUTPATIENT)
Dept: CARDIOLOGY | Facility: CLINIC | Age: 74
DRG: 282 | End: 2019-10-31
Attending: EMERGENCY MEDICINE
Payer: MEDICARE

## 2019-10-31 DIAGNOSIS — R79.89 ELEVATED TROPONIN: ICD-10-CM

## 2019-10-31 DIAGNOSIS — I21.4 NSTEMI (NON-ST ELEVATED MYOCARDIAL INFARCTION): Primary | ICD-10-CM

## 2019-10-31 DIAGNOSIS — E78.5 HYPERLIPIDEMIA, UNSPECIFIED HYPERLIPIDEMIA TYPE: ICD-10-CM

## 2019-10-31 PROBLEM — I20.1 CORONARY VASOSPASM: Status: ACTIVE | Noted: 2019-10-31

## 2019-10-31 LAB
ANION GAP SERPL CALC-SCNC: 7 MMOL/L (ref 8–16)
APTT BLDCRRT: 37.5 SEC (ref 21–32)
APTT BLDCRRT: 47.9 SEC (ref 21–32)
APTT BLDCRRT: 54.3 SEC (ref 21–32)
APTT BLDCRRT: 56.6 SEC (ref 21–32)
BASOPHILS # BLD AUTO: 0.02 K/UL (ref 0–0.2)
BASOPHILS # BLD AUTO: 0.02 K/UL (ref 0–0.2)
BASOPHILS NFR BLD: 0.4 % (ref 0–1.9)
BASOPHILS NFR BLD: 0.4 % (ref 0–1.9)
BUN SERPL-MCNC: 19 MG/DL (ref 8–23)
CALCIUM SERPL-MCNC: 9.5 MG/DL (ref 8.7–10.5)
CHLORIDE SERPL-SCNC: 108 MMOL/L (ref 95–110)
CO2 SERPL-SCNC: 27 MMOL/L (ref 23–29)
CREAT SERPL-MCNC: 0.8 MG/DL (ref 0.5–1.4)
CV PHARM DOSE: 0.4 MG
CV STRESS BASE HR: 72 BPM
DIASTOLIC BLOOD PRESSURE: 79 MMHG
DIFFERENTIAL METHOD: NORMAL
DIFFERENTIAL METHOD: NORMAL
END DIASTOLIC INDEX-HIGH: 170 ML/M2
END SYSTOLIC INDEX-HIGH: 70 ML/M2
EOSINOPHIL # BLD AUTO: 0.1 K/UL (ref 0–0.5)
EOSINOPHIL # BLD AUTO: 0.1 K/UL (ref 0–0.5)
EOSINOPHIL NFR BLD: 1.9 % (ref 0–8)
EOSINOPHIL NFR BLD: 1.9 % (ref 0–8)
ERYTHROCYTE [DISTWIDTH] IN BLOOD BY AUTOMATED COUNT: 12.9 % (ref 11.5–14.5)
ERYTHROCYTE [DISTWIDTH] IN BLOOD BY AUTOMATED COUNT: 12.9 % (ref 11.5–14.5)
EST. GFR  (AFRICAN AMERICAN): >60 ML/MIN/1.73 M^2
EST. GFR  (NON AFRICAN AMERICAN): >60 ML/MIN/1.73 M^2
GLUCOSE SERPL-MCNC: 101 MG/DL (ref 70–110)
HCT VFR BLD AUTO: 42.6 % (ref 37–48.5)
HCT VFR BLD AUTO: 42.6 % (ref 37–48.5)
HGB BLD-MCNC: 13.7 G/DL (ref 12–16)
HGB BLD-MCNC: 13.7 G/DL (ref 12–16)
IMM GRANULOCYTES # BLD AUTO: 0.01 K/UL (ref 0–0.04)
IMM GRANULOCYTES # BLD AUTO: 0.01 K/UL (ref 0–0.04)
IMM GRANULOCYTES NFR BLD AUTO: 0.2 % (ref 0–0.5)
IMM GRANULOCYTES NFR BLD AUTO: 0.2 % (ref 0–0.5)
LYMPHOCYTES # BLD AUTO: 1.7 K/UL (ref 1–4.8)
LYMPHOCYTES # BLD AUTO: 1.7 K/UL (ref 1–4.8)
LYMPHOCYTES NFR BLD: 36.3 % (ref 18–48)
LYMPHOCYTES NFR BLD: 36.3 % (ref 18–48)
MCH RBC QN AUTO: 30.6 PG (ref 27–31)
MCH RBC QN AUTO: 30.6 PG (ref 27–31)
MCHC RBC AUTO-ENTMCNC: 32.2 G/DL (ref 32–36)
MCHC RBC AUTO-ENTMCNC: 32.2 G/DL (ref 32–36)
MCV RBC AUTO: 95 FL (ref 82–98)
MCV RBC AUTO: 95 FL (ref 82–98)
MONOCYTES # BLD AUTO: 0.5 K/UL (ref 0.3–1)
MONOCYTES # BLD AUTO: 0.5 K/UL (ref 0.3–1)
MONOCYTES NFR BLD: 10.1 % (ref 4–15)
MONOCYTES NFR BLD: 10.1 % (ref 4–15)
NEUTROPHILS # BLD AUTO: 2.4 K/UL (ref 1.8–7.7)
NEUTROPHILS # BLD AUTO: 2.4 K/UL (ref 1.8–7.7)
NEUTROPHILS NFR BLD: 51.1 % (ref 38–73)
NEUTROPHILS NFR BLD: 51.1 % (ref 38–73)
NRBC BLD-RTO: 0 /100 WBC
NRBC BLD-RTO: 0 /100 WBC
OHS CV CPX 85 PERCENT MAX PREDICTED HEART RATE MALE: 119
OHS CV CPX MAX PREDICTED HEART RATE: 140
OHS CV CPX PATIENT IS FEMALE: 1
OHS CV CPX PATIENT IS MALE: 0
OHS CV CPX PEAK DIASTOLIC BLOOD PRESSURE: 83 MMHG
OHS CV CPX PEAK HEAR RATE: 84 BPM
OHS CV CPX PEAK RATE PRESSURE PRODUCT: NORMAL
OHS CV CPX PEAK SYSTOLIC BLOOD PRESSURE: 136 MMHG
OHS CV CPX PERCENT MAX PREDICTED HEART RATE ACHIEVED: 60
OHS CV CPX RATE PRESSURE PRODUCT PRESENTING: 9576
PLATELET # BLD AUTO: 179 K/UL (ref 150–350)
PLATELET # BLD AUTO: 179 K/UL (ref 150–350)
PMV BLD AUTO: 10.6 FL (ref 9.2–12.9)
PMV BLD AUTO: 10.6 FL (ref 9.2–12.9)
POTASSIUM SERPL-SCNC: 3.9 MMOL/L (ref 3.5–5.1)
RBC # BLD AUTO: 4.47 M/UL (ref 4–5.4)
RBC # BLD AUTO: 4.47 M/UL (ref 4–5.4)
RETIRED EF AND QEF - SEE NOTES: 51 %
SODIUM SERPL-SCNC: 142 MMOL/L (ref 136–145)
STRESS ECHO TARGET HR: 123 BPM
SYSTOLIC BLOOD PRESSURE: 133 MMHG
TROPONIN I SERPL DL<=0.01 NG/ML-MCNC: 0.22 NG/ML (ref 0–0.03)
WBC # BLD AUTO: 4.74 K/UL (ref 3.9–12.7)
WBC # BLD AUTO: 4.74 K/UL (ref 3.9–12.7)

## 2019-10-31 PROCEDURE — 93016 STRESS TEST WITH MYOCARDIAL PERFUSION (CUPID ONLY): ICD-10-PCS | Mod: ,,, | Performed by: INTERNAL MEDICINE

## 2019-10-31 PROCEDURE — 25000003 PHARM REV CODE 250: Performed by: PHYSICIAN ASSISTANT

## 2019-10-31 PROCEDURE — 93018 CV STRESS TEST I&R ONLY: CPT | Mod: ,,, | Performed by: INTERNAL MEDICINE

## 2019-10-31 PROCEDURE — 93018 STRESS TEST WITH MYOCARDIAL PERFUSION (CUPID ONLY): ICD-10-PCS | Mod: ,,, | Performed by: INTERNAL MEDICINE

## 2019-10-31 PROCEDURE — 93010 EKG 12-LEAD: ICD-10-PCS | Mod: ,,, | Performed by: INTERNAL MEDICINE

## 2019-10-31 PROCEDURE — 78452 HT MUSCLE IMAGE SPECT MULT: CPT

## 2019-10-31 PROCEDURE — 63600175 PHARM REV CODE 636 W HCPCS: Performed by: PHYSICIAN ASSISTANT

## 2019-10-31 PROCEDURE — A9502 TC99M TETROFOSMIN: HCPCS | Mod: ,,, | Performed by: INTERNAL MEDICINE

## 2019-10-31 PROCEDURE — 93005 ELECTROCARDIOGRAM TRACING: CPT

## 2019-10-31 PROCEDURE — 93010 ELECTROCARDIOGRAM REPORT: CPT | Mod: ,,, | Performed by: INTERNAL MEDICINE

## 2019-10-31 PROCEDURE — 93016 CV STRESS TEST SUPVJ ONLY: CPT | Mod: ,,, | Performed by: INTERNAL MEDICINE

## 2019-10-31 PROCEDURE — 11000001 HC ACUTE MED/SURG PRIVATE ROOM

## 2019-10-31 PROCEDURE — 85730 THROMBOPLASTIN TIME PARTIAL: CPT | Mod: 91

## 2019-10-31 PROCEDURE — 85025 COMPLETE CBC W/AUTO DIFF WBC: CPT

## 2019-10-31 PROCEDURE — 80048 BASIC METABOLIC PNL TOTAL CA: CPT

## 2019-10-31 PROCEDURE — 78452 HT MUSCLE IMAGE SPECT MULT: CPT | Mod: 26,,, | Performed by: INTERNAL MEDICINE

## 2019-10-31 PROCEDURE — 36415 COLL VENOUS BLD VENIPUNCTURE: CPT

## 2019-10-31 PROCEDURE — 78452 STRESS TEST WITH MYOCARDIAL PERFUSION (CUPID ONLY): ICD-10-PCS | Mod: 26,,, | Performed by: INTERNAL MEDICINE

## 2019-10-31 PROCEDURE — A9502 STRESS TEST WITH MYOCARDIAL PERFUSION (CUPID ONLY): ICD-10-PCS | Mod: ,,, | Performed by: INTERNAL MEDICINE

## 2019-10-31 PROCEDURE — 84484 ASSAY OF TROPONIN QUANT: CPT

## 2019-10-31 PROCEDURE — 99233 SBSQ HOSP IP/OBS HIGH 50: CPT | Mod: ,,, | Performed by: PHYSICIAN ASSISTANT

## 2019-10-31 PROCEDURE — 99233 PR SUBSEQUENT HOSPITAL CARE,LEVL III: ICD-10-PCS | Mod: ,,, | Performed by: PHYSICIAN ASSISTANT

## 2019-10-31 PROCEDURE — 85730 THROMBOPLASTIN TIME PARTIAL: CPT

## 2019-10-31 RX ORDER — AMLODIPINE BESYLATE 5 MG/1
5 TABLET ORAL DAILY
Status: DISCONTINUED | OUTPATIENT
Start: 2019-10-31 | End: 2019-11-01 | Stop reason: HOSPADM

## 2019-10-31 RX ORDER — OXYMETAZOLINE HCL 0.05 %
2 SPRAY, NON-AEROSOL (ML) NASAL 2 TIMES DAILY
Status: DISCONTINUED | OUTPATIENT
Start: 2019-10-31 | End: 2019-11-01 | Stop reason: HOSPADM

## 2019-10-31 RX ORDER — REGADENOSON 0.08 MG/ML
0.4 INJECTION, SOLUTION INTRAVENOUS
Status: DISCONTINUED | OUTPATIENT
Start: 2019-10-31 | End: 2019-10-31 | Stop reason: HOSPADM

## 2019-10-31 RX ADMIN — Medication 2 SPRAY: at 01:10

## 2019-10-31 RX ADMIN — HEPARIN SODIUM AND DEXTROSE 14.07 UNITS/KG/HR: 10000; 5 INJECTION INTRAVENOUS at 07:10

## 2019-10-31 RX ADMIN — REGADENOSON 0.4 MG: 0.08 INJECTION, SOLUTION INTRAVENOUS at 09:10

## 2019-10-31 RX ADMIN — HEPARIN SODIUM AND DEXTROSE 14 UNITS/KG/HR: 10000; 5 INJECTION INTRAVENOUS at 07:10

## 2019-10-31 RX ADMIN — ASPIRIN 81 MG CHEWABLE TABLET 81 MG: 81 TABLET CHEWABLE at 10:10

## 2019-10-31 RX ADMIN — AMLODIPINE BESYLATE 5 MG: 5 TABLET ORAL at 01:10

## 2019-10-31 RX ADMIN — EZETIMIBE 10 MG: 10 TABLET ORAL at 10:10

## 2019-10-31 RX ADMIN — CANDESARTAN CILEXETIL 8 MG: 8 TABLET ORAL at 10:10

## 2019-10-31 RX ADMIN — ATORVASTATIN CALCIUM 80 MG: 20 TABLET, FILM COATED ORAL at 10:10

## 2019-10-31 NOTE — PLAN OF CARE
No complaints this shift.  Tolerating IV heparin this shift.  Cardiac monitoring in progress.  Will continue to monitor.

## 2019-10-31 NOTE — NURSING
"Pt reported an episode of chest pain with a deep breath about 10 min ago, it "is dying off now", MICHAEL Bangura is notified.  "

## 2019-10-31 NOTE — NURSING
Ptt resulted at this time for 2330 lab draw, gave bolus and increase rate per heparin protocol.  Entered new PTT order for 0900.

## 2019-10-31 NOTE — SUBJECTIVE & OBJECTIVE
Interval History: Nurse reported patient with episode of chest pain this afternoon, troponin and EKG ordered. Episode brief and spontaneously resolved. Nuclear stress clear, suspect coronary vasospasms, will start amlodipine. Nose bleed this morning as well, suspect irritated nares due to un-humidified oxygen in the setting of active anticoagulation. CBC stable and bleeding resolved.     Review of Systems   Constitutional: Negative for chills, fever and unexpected weight change.        +trouble sleeping   HENT: Positive for nosebleeds. Negative for congestion and rhinorrhea.    Eyes: Negative for photophobia and visual disturbance.   Respiratory: Positive for shortness of breath. Negative for cough and chest tightness.    Cardiovascular: Positive for chest pain. Negative for palpitations.   Gastrointestinal: Negative for abdominal pain, diarrhea, nausea and vomiting.   Genitourinary: Negative for difficulty urinating and dysuria.   Musculoskeletal: Negative for back pain and gait problem.   Skin: Negative for rash and wound.   Neurological: Negative for dizziness, speech difficulty, numbness and headaches.        + facial numbness   Psychiatric/Behavioral: Positive for dysphoric mood, sleep disturbance and suicidal ideas. Negative for agitation, confusion and self-injury.     Objective:     Vital Signs (Most Recent):  Temp: 98.3 °F (36.8 °C) (10/31/19 1340)  Pulse: 80 (10/31/19 1340)  Resp: 18 (10/31/19 1340)  BP: 121/71 (10/31/19 1340)  SpO2: 96 % (10/31/19 1340) Vital Signs (24h Range):  Temp:  [98.3 °F (36.8 °C)-98.7 °F (37.1 °C)] 98.3 °F (36.8 °C)  Pulse:  [70-96] 80  Resp:  [16-18] 18  SpO2:  [95 %-98 %] 96 %  BP: (121-145)/(67-94) 121/71     Weight: 84.9 kg (187 lb 2.7 oz)  Body mass index is 32.13 kg/m².  No intake or output data in the 24 hours ending 10/31/19 1356   Physical Exam   Constitutional: She is oriented to person, place, and time. She appears well-developed and well-nourished.   Calm appearing  female in NAD, lying flat in bed   HENT:   Head: Normocephalic and atraumatic.   Eyes: Pupils are equal, round, and reactive to light. EOM are normal.   Neck: Normal range of motion. Neck supple.   Cardiovascular: Normal rate, regular rhythm, normal heart sounds and intact distal pulses.   No murmur heard.  Pulmonary/Chest: Effort normal and breath sounds normal. No respiratory distress.   Abdominal: Soft. Bowel sounds are normal. She exhibits no distension. There is no tenderness.   Musculoskeletal: Normal range of motion.   Neurological: She is alert and oriented to person, place, and time. No sensory deficit.   Skin: Skin is warm and dry.   Psychiatric: Her speech is normal. Judgment normal. Her affect is blunt. She is slowed. She exhibits a depressed mood. She expresses suicidal ideation. She expresses no homicidal ideation. She expresses no suicidal plans and no homicidal plans.   Mostly blunted affect    Nursing note and vitals reviewed.      Significant Labs: All pertinent labs within the past 24 hours have been reviewed.    Significant Imaging: I have reviewed all pertinent imaging results/findings within the past 24 hours.

## 2019-10-31 NOTE — PLAN OF CARE
Patient off the floor having a stress test done in nuclear medicine when CM rounded. Will continue to follow.

## 2019-10-31 NOTE — ASSESSMENT & PLAN NOTE
"Coronary vasospasm  Elevated troponin  Severe (7/10), substernal, non-exertional, chest pain radiating to her back, characterized as "pressure"/"soreness".   Resolved with SL nitro. Previous TTE, US carotids unrevealing.     Brief episode of chest pressure with associated shortness of breath that spontaneously resolves without intervention.   - Nuclear stress negative for signs of ischemia, suspect presentation most consistent with coronary vasospasm (likely stress induced from recent news of her son's death) -- during conversation pt reported her body "tightening" with pain in her neck and bilateral upper extremities   - will start amlodipine 5 mg 10/31 as blood pressure allows   - initially gave Imdur 30 mg (concern for non-obstructive CAD) however pt became hypotensive, therefore discontinued --- can reinitiate in setting of severe episode or arrhythmia   - no provocative testing required at this time per cardiology    - ACS protocol initiated: heparin drip ordered, both anti-platelet and anticoagulant affects --- continue for 48 hours   - metoprolol succinate (TOPROL-XL) 24 hr split tablet 12.5 mg started 10/30  - troponin trend remarkable, repeat until trending down 0.193>0.211>0.211>0.212>0.212  - EKG stable with previous, CXR unremarkable  - CTA chest without PE.  - [facility] 2017 with non-obstructive CAD  - c/w ASA, statin, BP control  "

## 2019-10-31 NOTE — NURSING
Called lab at this time to figure out why PTT hasn't resulted at this time.  They said they would call back in 15 minutes.

## 2019-10-31 NOTE — PROGRESS NOTES
"[facility] Hospital Medicine  Progress Note    Patient Name: Sofia Augustine  MRN: [id number]  Patient Class: IP- Inpatient   Admission Date: 10/28/2019  Length of Stay: 1 days  Attending Physician: Reinaldo Leon MD  Primary Care Provider: Luanne Connell MD    [facility] Medicine Team: [facility] HOSP MED E Gertrude Bello PA-C    Subjective:     Principal Problem:NSTEMI (non-ST elevated myocardial infarction)        HPI:  Sofia Augustine is a 74 y.o. F with HTN, HLD, depression, seizure disorder, cervical radiculopathy, hemiplegia affecting right dominant side  who presents with complaints of L facial numbness, CP, SOB.  Patient reports R leg weakness x1 week with any paresthesias.  She denies any trips, leg trauma, falls, gait instability.  She reports she developed L facial numbness (like they are "asleep") with associated bilateral upper arm numbness, substernal chest pain this morning when she woke up.  She reports sensation of tongue enlargement with difficulty speaking which has since resolved. She denies hx of CVA but reports sxs similar to last admission here which she states was for "TIA". She denies any dizziness, SOB, vision changes.      Her chest pain was severe (7/10), substernal, non-exertional, radiating to her back, characterized as "pressure" "soreness".  She took no meds at home PTA.  Nitro improver her pain in the ED.  Patient denies any etoh, smoking, drugs.  She denies hx of MI, CAD.  Chest pain occurred during last admission here and she does not know what improved her chest pain or relieved it then.      Of note, she reports increased stressors recently as she found out her ex- was somehow involved in the death of her son in 2001. She states that she has had (above) symptoms like this many times in the past, but not usually this severe. She reports that she feels depressed and would "like to go to sleep and not wake up".  She reports insomnia recently.  She " "denies thoughts of hopelessness, HI, or plan to kill herself.  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 She has plans to improve the quality of her house and is trying to get free legal help to resolve some structural issues at her house.     Of note, admitted 3/23-3/24/19 for chest pain and syncope.  TTE showed EF 65%, normal diastolic fxn.  holter monitor and stress echo ordered as outpatient (never completed). Troponin was .3-->.4 D dimer was elevated (0.56) but within normal range when adjusted for age. US carotids shows mild stenosis (1-39% bilaterally). Discharged and followed up with PCP.  Has yet to establish with cardiologist or complete holter monitor/stress.    ED: Temp max 99, SBP 170s, HDS. Intake labs unremarkable. Stroke code called and CTH unremarkable. Per Neurology note, "On exam in CT scanner, patient had slow but appropriate speech concerning for psychogenic speech pattern.  No aphasia.  Whole face numbness reported which resolved soon after scan.  On exam, patient actively pushes RLE down when asking her to hold it up.  Shows good strength in distal RLE.  Suspect some component of conversion disorder and patient has had fairly recent work up for previous possible TIA.  Would not change meds currently based on current picture".   EKG non-ischemic.  CXR unremarkable.  Trop .103-->.111.  CTA C/A with no evidence of PE (official read pending; issues with CT order initially and given 1L IVF). Given 1 nitro and chest pain resolved.     Overview/Hospital Course:  74 y.o. who was admitted to hospital medicine for chest pain. Discussed inpatient stress modalities with cardiology as patient with elevated troponin on admission. TTE resulted with normal EF, no WMA, normal RV function, normal estimated CVP, no significant valvular pathology therefore, cardiology did not recommend further evaluation in-house or ACS protocol. They did recommend initiating CCB " and/or long acting nitrate for treatment of suspected vasospasm --- will trial low dose amlodipine as blood pressure allows. Upon continued monitoring, the patient's troponin continued to rise therefore ACS protocol was initiated for NSTEMI. Nuclear stress testing ordered, scheduled for 10/31.        Ambulatory referral placed and CM to schedule follow-up. No further episodes of chest pain at this time. Patient would benefit greatly from outpatient therapy/psychiatry follow-up.        Interval History: Nurse reported patient with episode of chest pain this afternoon, troponin and EKG ordered. Episode brief and spontaneously resolved. Nuclear stress clear, suspect coronary vasospasms, will start amlodipine. Nose bleed this morning as well, suspect irritated nares due to un-humidified oxygen in the setting of active anticoagulation. CBC stable and bleeding resolved.     Review of Systems   Constitutional: Negative for chills, fever and unexpected weight change.        +trouble sleeping   HENT: Positive for nosebleeds. Negative for congestion and rhinorrhea.    Eyes: Negative for photophobia and visual disturbance.   Respiratory: Positive for shortness of breath. Negative for cough and chest tightness.    Cardiovascular: Positive for chest pain. Negative for palpitations.   Gastrointestinal: Negative for abdominal pain, diarrhea, nausea and vomiting.   Genitourinary: Negative for difficulty urinating and dysuria.   Musculoskeletal: Negative for back pain and gait problem.   Skin: Negative for rash and wound.   Neurological: Negative for dizziness, speech difficulty, numbness and headaches.        + facial numbness   Psychiatric/Behavioral: Positive for dysphoric mood, sleep disturbance and suicidal ideas. Negative for agitation, confusion and self-injury.     Objective:     Vital Signs (Most Recent):  Temp: 98.3 °F (36.8 °C) (10/31/19 1340)  Pulse: 80 (10/31/19 1340)  Resp: 18 (10/31/19 1340)  BP: 121/71 (10/31/19  "1340)  SpO2: 96 % (10/31/19 1340) Vital Signs (24h Range):  Temp:  [98.3 °F (36.8 °C)-98.7 °F (37.1 °C)] 98.3 °F (36.8 °C)  Pulse:  [70-96] 80  Resp:  [16-18] 18  SpO2:  [95 %-98 %] 96 %  BP: (121-145)/(67-94) 121/71     Weight: 84.9 kg (187 lb 2.7 oz)  Body mass index is 32.13 kg/m².  No intake or output data in the 24 hours ending 10/31/19 1356   Physical Exam   Constitutional: She is oriented to person, place, and time. She appears well-developed and well-nourished.   Calm appearing female in NAD, lying flat in bed   HENT:   Head: Normocephalic and atraumatic.   Eyes: Pupils are equal, round, and reactive to light. EOM are normal.   Neck: Normal range of motion. Neck supple.   Cardiovascular: Normal rate, regular rhythm, normal heart sounds and intact distal pulses.   No murmur heard.  Pulmonary/Chest: Effort normal and breath sounds normal. No respiratory distress.   Abdominal: Soft. Bowel sounds are normal. She exhibits no distension. There is no tenderness.   Musculoskeletal: Normal range of motion.   Neurological: She is alert and oriented to person, place, and time. No sensory deficit.   Skin: Skin is warm and dry.   Psychiatric: Her speech is normal. Judgment normal. Her affect is blunt. She is slowed. She exhibits a depressed mood. She expresses suicidal ideation. She expresses no homicidal ideation. She expresses no suicidal plans and no homicidal plans.   Mostly blunted affect    Nursing note and vitals reviewed.      Significant Labs: All pertinent labs within the past 24 hours have been reviewed.    Significant Imaging: I have reviewed all pertinent imaging results/findings within the past 24 hours.      Assessment/Plan:      * NSTEMI (non-ST elevated myocardial infarction)  Coronary vasospasm  Elevated troponin  Severe (7/10), substernal, non-exertional, chest pain radiating to her back, characterized as "pressure"/"soreness".   Resolved with SL nitro. Previous TTE, US carotids unrevealing.     Brief " "episode of chest pressure with associated shortness of breath that spontaneously resolves without intervention.   - Nuclear stress negative for signs of ischemia, suspect presentation most consistent with coronary vasospasm (likely stress induced from recent news of her son's death) -- during conversation pt reported her body "tightening" with pain in her neck and bilateral upper extremities   - will start amlodipine 5 mg 10/31 as blood pressure allows   - initially gave Imdur 30 mg (concern for non-obstructive CAD) however pt became hypotensive, therefore discontinued --- can reinitiate in setting of severe episode or arrhythmia   - no provocative testing required at this time per cardiology    - ACS protocol initiated: heparin drip ordered, both anti-platelet and anticoagulant affects --- continue for 48 hours   - metoprolol succinate (TOPROL-XL) 24 hr split tablet 12.5 mg started 10/30  - troponin trend remarkable, repeat until trending down 0.193>0.211>0.211>0.212>0.212  - EKG stable with previous, CXR unremarkable  - CTA chest without PE.  - [facility] 2017 with non-obstructive CAD  - c/w ASA, statin, BP control    Passive suicidal ideations  Reports passive SI with no plan.  Denies HI, self injury/harm.  Wants pains/numbness to resolved.   - will try hydroxyzine TID PRN anxiety  - will need close psych f/u; low threshold for contacting psych in-house, but does not meet criteria for PEC at this time.   - no previous therapy following traumatic events, reports waking from sleep with thoughts of son's death  - ambulatory psych referral     HLD (hyperlipidemia)  - c/w high dose statin  - add ezetimibe 10 mg PO daily    Essential hypertension  BP elevated on arrival, improved without intervention  - suspect anxiety related  - c/w candesartan 8 mg PO once daily.    Facial numbness  Vascular neurology consulted and recs given. CTH unrevealing.   - likely neurogenic in nature  - needs psych f/u  - c/w GDMT for secondary CVA " prevention  - will start ezetimibe for elevated LDL  - will need to ensure patient checking BP at home      VTE Risk Mitigation (From admission, onward)         Ordered     heparin 25,000 units in dextrose 5% 250 mL (100 units/mL) infusion LOW INTENSITY nomogram - [facility]  Continuous      10/30/19 0926     heparin 25,000 units in dextrose 5% (100 units/ml) IV bolus from bag - ADDITIONAL PRN BOLUS - 60 units/kg (max bolus 4000 units)  As needed (PRN)      10/30/19 0926     heparin 25,000 units in dextrose 5% (100 units/ml) IV bolus from bag - ADDITIONAL PRN BOLUS - 30 units/kg (max bolus 4000 units)  As needed (PRN)      10/30/19 0926     IP VTE HIGH RISK PATIENT  Once      10/28/19 2145     Place PAUL hose  Until discontinued      10/28/19 2145     Place sequential compression device  Until discontinued      10/28/19 2145                      Gertrude Bello PA-C  Department of Hospital Medicine   [facility]

## 2019-10-31 NOTE — NURSING
Asked lab to not stick pt at this time for morning labs.  Informed them that she had a PTT due 0900 and asked them to stick her at that time.

## 2019-10-31 NOTE — ASSESSMENT & PLAN NOTE
Reports passive SI with no plan.  Denies HI, self injury/harm.  Wants pains/numbness to resolved.   - will try hydroxyzine TID PRN anxiety  - will need close psych f/u; low threshold for contacting psych in-house, but does not meet criteria for PEC at this time.   - no previous therapy following traumatic events, reports waking from sleep with thoughts of son's death  - ambulatory psych referral

## 2019-11-01 ENCOUNTER — TELEPHONE (OUTPATIENT)
Dept: INTERNAL MEDICINE | Facility: CLINIC | Age: 74
End: 2019-11-01

## 2019-11-01 VITALS
HEART RATE: 74 BPM | HEIGHT: 64 IN | DIASTOLIC BLOOD PRESSURE: 71 MMHG | WEIGHT: 187.19 LBS | TEMPERATURE: 98 F | OXYGEN SATURATION: 95 % | RESPIRATION RATE: 19 BRPM | BODY MASS INDEX: 31.96 KG/M2 | SYSTOLIC BLOOD PRESSURE: 132 MMHG

## 2019-11-01 DIAGNOSIS — M47.22 OSTEOARTHRITIS OF SPINE WITH RADICULOPATHY, CERVICAL REGION: ICD-10-CM

## 2019-11-01 LAB
ANION GAP SERPL CALC-SCNC: 9 MMOL/L (ref 8–16)
APTT BLDCRRT: 47.6 SEC (ref 21–32)
BASOPHILS # BLD AUTO: 0.02 K/UL (ref 0–0.2)
BASOPHILS NFR BLD: 0.4 % (ref 0–1.9)
BUN SERPL-MCNC: 17 MG/DL (ref 8–23)
CALCIUM SERPL-MCNC: 8.7 MG/DL (ref 8.7–10.5)
CHLORIDE SERPL-SCNC: 109 MMOL/L (ref 95–110)
CO2 SERPL-SCNC: 23 MMOL/L (ref 23–29)
CREAT SERPL-MCNC: 0.8 MG/DL (ref 0.5–1.4)
DIFFERENTIAL METHOD: ABNORMAL
EOSINOPHIL # BLD AUTO: 0.2 K/UL (ref 0–0.5)
EOSINOPHIL NFR BLD: 3.5 % (ref 0–8)
ERYTHROCYTE [DISTWIDTH] IN BLOOD BY AUTOMATED COUNT: 12.8 % (ref 11.5–14.5)
EST. GFR  (AFRICAN AMERICAN): >60 ML/MIN/1.73 M^2
EST. GFR  (NON AFRICAN AMERICAN): >60 ML/MIN/1.73 M^2
GLUCOSE SERPL-MCNC: 95 MG/DL (ref 70–110)
HCT VFR BLD AUTO: 38.4 % (ref 37–48.5)
HGB BLD-MCNC: 12.1 G/DL (ref 12–16)
IMM GRANULOCYTES # BLD AUTO: 0.01 K/UL (ref 0–0.04)
IMM GRANULOCYTES NFR BLD AUTO: 0.2 % (ref 0–0.5)
LYMPHOCYTES # BLD AUTO: 1.8 K/UL (ref 1–4.8)
LYMPHOCYTES NFR BLD: 39.6 % (ref 18–48)
MCH RBC QN AUTO: 30.3 PG (ref 27–31)
MCHC RBC AUTO-ENTMCNC: 31.5 G/DL (ref 32–36)
MCV RBC AUTO: 96 FL (ref 82–98)
MONOCYTES # BLD AUTO: 0.6 K/UL (ref 0.3–1)
MONOCYTES NFR BLD: 12.7 % (ref 4–15)
NEUTROPHILS # BLD AUTO: 2 K/UL (ref 1.8–7.7)
NEUTROPHILS NFR BLD: 43.6 % (ref 38–73)
NRBC BLD-RTO: 0 /100 WBC
PLATELET # BLD AUTO: 169 K/UL (ref 150–350)
PMV BLD AUTO: 10.7 FL (ref 9.2–12.9)
POTASSIUM SERPL-SCNC: 3.9 MMOL/L (ref 3.5–5.1)
RBC # BLD AUTO: 3.99 M/UL (ref 4–5.4)
SODIUM SERPL-SCNC: 141 MMOL/L (ref 136–145)
WBC # BLD AUTO: 4.55 K/UL (ref 3.9–12.7)

## 2019-11-01 PROCEDURE — 36415 COLL VENOUS BLD VENIPUNCTURE: CPT

## 2019-11-01 PROCEDURE — 85025 COMPLETE CBC W/AUTO DIFF WBC: CPT

## 2019-11-01 PROCEDURE — 99239 HOSP IP/OBS DSCHRG MGMT >30: CPT | Mod: ,,, | Performed by: PHYSICIAN ASSISTANT

## 2019-11-01 PROCEDURE — 25000003 PHARM REV CODE 250: Performed by: PHYSICIAN ASSISTANT

## 2019-11-01 PROCEDURE — 85730 THROMBOPLASTIN TIME PARTIAL: CPT

## 2019-11-01 PROCEDURE — 63600175 PHARM REV CODE 636 W HCPCS: Performed by: PHYSICIAN ASSISTANT

## 2019-11-01 PROCEDURE — 99239 PR HOSPITAL DISCHARGE DAY,>30 MIN: ICD-10-PCS | Mod: ,,, | Performed by: PHYSICIAN ASSISTANT

## 2019-11-01 PROCEDURE — 80048 BASIC METABOLIC PNL TOTAL CA: CPT

## 2019-11-01 RX ORDER — AMLODIPINE BESYLATE 5 MG/1
5 TABLET ORAL DAILY
Qty: 30 TABLET | Refills: 11 | Status: SHIPPED | OUTPATIENT
Start: 2019-11-02 | End: 2020-09-01

## 2019-11-01 RX ORDER — EZETIMIBE 10 MG/1
10 TABLET ORAL DAILY
Qty: 90 TABLET | Refills: 3 | Status: SHIPPED | OUTPATIENT
Start: 2019-11-02 | End: 2020-09-01

## 2019-11-01 RX ORDER — METOPROLOL SUCCINATE 25 MG/1
12.5 TABLET, EXTENDED RELEASE ORAL DAILY
Qty: 15 TABLET | Refills: 11 | Status: SHIPPED | OUTPATIENT
Start: 2019-11-02 | End: 2020-09-01

## 2019-11-01 RX ORDER — HYDROCODONE BITARTRATE AND ACETAMINOPHEN 5; 325 MG/1; MG/1
1 TABLET ORAL EVERY 8 HOURS PRN
Qty: 40 TABLET | Refills: 0 | Status: SHIPPED | OUTPATIENT
Start: 2019-11-01 | End: 2019-12-10 | Stop reason: SDUPTHER

## 2019-11-01 RX ADMIN — EZETIMIBE 10 MG: 10 TABLET ORAL at 09:11

## 2019-11-01 RX ADMIN — ASPIRIN 81 MG CHEWABLE TABLET 81 MG: 81 TABLET CHEWABLE at 09:11

## 2019-11-01 RX ADMIN — Medication 2 SPRAY: at 09:11

## 2019-11-01 RX ADMIN — AMLODIPINE BESYLATE 5 MG: 5 TABLET ORAL at 09:11

## 2019-11-01 RX ADMIN — METOPROLOL SUCCINATE 12.5 MG: 25 TABLET, EXTENDED RELEASE ORAL at 09:11

## 2019-11-01 RX ADMIN — CANDESARTAN CILEXETIL 8 MG: 8 TABLET ORAL at 09:11

## 2019-11-01 RX ADMIN — HEPARIN SODIUM AND DEXTROSE 14 UNITS/KG/HR: 10000; 5 INJECTION INTRAVENOUS at 09:11

## 2019-11-01 RX ADMIN — ATORVASTATIN CALCIUM 80 MG: 20 TABLET, FILM COATED ORAL at 09:11

## 2019-11-01 NOTE — PLAN OF CARE
Safety measures maintained. VSS on 2LC. Pt has had no complaints of pain. Bed in lowest position. Call light within reach. Side rails up x2. Pt has no complaints at this time. Will continue to monitor.

## 2019-11-01 NOTE — PLAN OF CARE
Patient resting quietly in bed when CM rounded. No family present. Patient in agreement with plan to discharge home with no needs today. CM informed patient that she will have to schedule her own appointment at the [facility] out pt psych clinic but that contact information is printed on her discharge paperwork. Patient verbalized understanding. Patient denied the need for assistance with transportation at time of discharge. Will continue to follow.

## 2019-11-01 NOTE — TELEPHONE ENCOUNTER
----- Message from Jerica Somers sent at 11/1/2019  2:12 PM CDT -----  Contact: Self Call   [phone]  Patient is calling for an RX refill or new RX.  Is this a refill or new RX:  Refill  RX name and strength: HYDROcodone-acetaminophen (NORCO) 5-325 mg per tablet  Directions (copy/paste from chart):  Take 1 tablet by mouth every 8 (eight) hours as needed for Pain  Is this a 30 day or 90 day RX:  30  Local pharmacy or mail order pharmacy:  Local   Pharmacy name and phone # (copy/paste from chart): [facility]  [phone] (Phone)  [phone] (Fax)    Comments:

## 2019-11-01 NOTE — PLAN OF CARE
Pt up ad zac. Denies pain or discomfort.  Telemetry monitor in place. Safety precautions maintained. Pt free of falls.

## 2019-11-01 NOTE — PROGRESS NOTES
Pt Dc per MD orders. Dc and prescription instructions given. Pt verbalizes understanding. PIV removed, catheter tip intact. VSS. Pt transported by escort via .

## 2019-11-01 NOTE — PLAN OF CARE
11/01/19 1423   Final Note   Assessment Type Final Discharge Note     Patient discharged home with no needs on 11/1/19.

## 2019-11-02 ENCOUNTER — NURSE TRIAGE (OUTPATIENT)
Dept: ADMINISTRATIVE | Facility: CLINIC | Age: 74
End: 2019-11-02

## 2019-11-02 RX ORDER — ONDANSETRON 4 MG/1
TABLET, ORALLY DISINTEGRATING ORAL
Qty: 20 TABLET | Refills: 0 | Status: SHIPPED | OUTPATIENT
Start: 2019-11-02 | End: 2019-11-05 | Stop reason: SDUPTHER

## 2019-11-02 NOTE — TELEPHONE ENCOUNTER
"  Reason for Disposition   Caller has urgent medication question about med that PCP prescribed and triager unable to answer question    Additional Information   Negative: Drug overdose and nurse unable to answer question   Negative: Caller requesting information not related to medicine   Negative: Caller requesting a prescription for Strep throat and has a positive culture result   Negative: Rash while taking a medication or within 3 days of stopping it   Negative: Immunization reaction suspected   Negative: [1] Asthma AND [2] having symptoms of asthma (cough, wheezing, etc)   Negative: MORE THAN A DOUBLE DOSE of a prescription or over-the-counter (OTC) drug   Negative: [1] DOUBLE DOSE (an extra dose or lesser amount) of over-the-counter (OTC) drug AND [2] any symptoms (e.g., dizziness, nausea, pain, sleepiness)   Negative: [1] DOUBLE DOSE (an extra dose or lesser amount) of prescription drug AND [2] any symptoms (e.g., dizziness, nausea, pain, sleepiness)   Negative: Took another person's prescription drug   Negative: Pharmacy calling with prescription questions and triager unable to answer question   Negative: [1] Prescription not at pharmacy AND [2] was prescribed today by PCP   Negative: [1] Request for URGENT new prescription or refill of "essential" medication (i.e., likelihood of harm to patient if not taken) AND [2] triager unable to fill per unit policy   Negative: Diabetes drug error or overdose (e.g., insulin or extra dose)   Negative: [1] DOUBLE DOSE (an extra dose or lesser amount) of prescription drug AND [2] NO symptoms (Exception: a double dose of antibiotics)    Protocols used: MEDICATION QUESTION CALL-A-DERIC  discharged from the hospital yesterday from the observation. she was discharged with some medications with instructions to continue taking but she is out of Zofran 4 milligrams.  LM at 1247pm at [phone]  Pt denies nausea. last pm had nausea took pepto. No , no SOB, no " heavy persipiatrion no nausea. Spoke with dr mayo of for zofran to be called in as written. LM on pharm VM at 1259pm. Pt notified via VM at 101pm. Call back with questions

## 2019-11-05 ENCOUNTER — PATIENT OUTREACH (OUTPATIENT)
Dept: ADMINISTRATIVE | Facility: CLINIC | Age: 74
End: 2019-11-05

## 2019-11-05 RX ORDER — ONDANSETRON 4 MG/1
TABLET, ORALLY DISINTEGRATING ORAL
Qty: 20 TABLET | Refills: 0 | Status: SHIPPED | OUTPATIENT
Start: 2019-11-05 | End: 2019-12-17 | Stop reason: SDUPTHER

## 2019-11-05 NOTE — DISCHARGE SUMMARY
"[facility] Hospital Medicine  Discharge Summary      Patient Name: Sofia Augustine  MRN: [id number]  Admission Date: 10/28/2019  Hospital Length of Stay: 4 days  Discharge Date and Time: 11/1/2019 12:53 PM  Attending Physician: KAMAR ABDUL   Discharging Provider: Gertrude Bello PA-C  Primary Care Provider: Luanne Connell MD  [facility] Medicine Team: [facility] HOSP MED E Gertrude Bello PA-C    HPI:   Sofia Augustine is a 74 y.o. F with HTN, HLD, depression, seizure disorder, cervical radiculopathy, hemiplegia affecting right dominant side  who presents with complaints of L facial numbness, CP, SOB.  Patient reports R leg weakness x1 week with any paresthesias.  She denies any trips, leg trauma, falls, gait instability.  She reports she developed L facial numbness (like they are "asleep") with associated bilateral upper arm numbness, substernal chest pain this morning when she woke up.  She reports sensation of tongue enlargement with difficulty speaking which has since resolved. She denies hx of CVA but reports sxs similar to last admission here which she states was for "TIA". She denies any dizziness, SOB, vision changes.      Her chest pain was severe (7/10), substernal, non-exertional, radiating to her back, characterized as "pressure" "soreness".  She took no meds at home PTA.  Nitro improver her pain in the ED.  Patient denies any etoh, smoking, drugs.  She denies hx of MI, CAD.  Chest pain occurred during last admission here and she does not know what improved her chest pain or relieved it then.      Of note, she reports increased stressors recently as she found out her ex- was somehow involved in the death of her son in 2001. She states that she has had (above) symptoms like this many times in the past, but not usually this severe. She reports that she feels depressed and would "like to go to sleep and not wake up".  She reports insomnia recently.  She denies thoughts of " "hopelessness, HI, or plan to kill herself.  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 She has plans to improve the quality of her house and is trying to get free legal help to resolve some structural issues at her house.     Of note, admitted 3/23-3/24/19 for chest pain and syncope.  TTE showed EF 65%, normal diastolic fxn.  holter monitor and stress echo ordered as outpatient (never completed). Troponin was .3-->.4 D dimer was elevated (0.56) but within normal range when adjusted for age. US carotids shows mild stenosis (1-39% bilaterally). Discharged and followed up with PCP.  Has yet to establish with cardiologist or complete holter monitor/stress.    ED: Temp max 99, SBP 170s, HDS. Intake labs unremarkable. Stroke code called and CTH unremarkable. Per Neurology note, "On exam in CT scanner, patient had slow but appropriate speech concerning for psychogenic speech pattern.  No aphasia.  Whole face numbness reported which resolved soon after scan.  On exam, patient actively pushes RLE down when asking her to hold it up.  Shows good strength in distal RLE.  Suspect some component of conversion disorder and patient has had fairly recent work up for previous possible TIA.  Would not change meds currently based on current picture".   EKG non-ischemic.  CXR unremarkable.  Trop .103-->.111.  CTA C/A with no evidence of PE (official read pending; issues with CT order initially and given 1L IVF). Given 1 nitro and chest pain resolved.     * No surgery found *      Hospital Course:   74 y.o. who was admitted to hospital medicine for chest pain. Discussed inpatient stress modalities with cardiology as patient with elevated troponin on admission. TTE resulted with normal EF, no WMA, normal RV function, normal estimated CVP, no significant valvular pathology therefore, cardiology did not recommend further evaluation in-house or ACS protocol. They did recommend initiating CCB " "and/or long acting nitrate for treatment of suspected vasospasm --- will trial low dose amlodipine as blood pressure allows. Upon continued monitoring, the patient's troponin continued to rise therefore ACS protocol was initiated for NSTEMI. Nuclear stress testing ordered, scheduled for 10/31 that did not reveal evidence from myocardial ischemia or injury. High suspicion of coronary vasospasms causing NSTEMI. Pt received 48 hours of heparin therapy and CCB was started. After initiation of these therapies, troponins flattened and the patient was free of repeat episodes.       Ambulatory referral placed and CM to schedule follow-up. No further episodes of chest pain at this time. Patient would benefit greatly from outpatient therapy/psychiatry follow-up.      Consults:   Consults (From admission, onward)        Status Ordering Provider     Inpatient consult to Cardiology  Once     Provider:  (Not yet assigned)    Completed FLIP RIZO     Inpatient consult to Vascular (Stroke) Neurology  Once     Provider:  (Not yet assigned)    Completed SJ CAMPUZANO          * NSTEMI (non-ST elevated myocardial infarction)  Coronary vasospasm  Elevated troponin  Severe (7/10), substernal, non-exertional, chest pain radiating to her back, characterized as "pressure"/"soreness".   Resolved with SL nitro. Previous TTE, US carotids unrevealing.     - CCB therapy initiated, pt free from chest pain   - no DAPT at this time per cardiology  - Brief episode of chest pressure with associated shortness of breath that spontaneously resolves without intervention.   - Nuclear stress negative for signs of ischemia, suspect presentation most consistent with coronary vasospasm (likely stress induced from recent news of her son's death) -- during conversation pt reported her body "tightening" with pain in her neck and bilateral upper extremities   - will start amlodipine 5 mg 10/31 as blood pressure allows   - initially gave Imdur 30 mg " (concern for non-obstructive CAD) however pt became hypotensive, therefore discontinued --- can reinitiate in setting of severe episode or arrhythmia   - no provocative testing required at this time per cardiology    - ACS protocol initiated: heparin drip ordered, both anti-platelet and anticoagulant affects --- continue for 48 hours   - metoprolol succinate (TOPROL-XL) 24 hr split tablet 12.5 mg started 10/30  - troponin trend remarkable, repeat until trending down 0.193>0.211>0.211>0.212>0.212  - EKG stable with previous, CXR unremarkable  - CTA chest without PE.  - [facility] 2017 with non-obstructive CAD  - c/w ASA, statin, BP control    Coronary vasospasm        Passive suicidal ideations  Reports passive SI with no plan.  Denies HI, self injury/harm.  Wants pains/numbness to resolved.   - will try hydroxyzine TID PRN anxiety  - will need close psych f/u; low threshold for contacting psych in-house, but does not meet criteria for PEC at this time.   - no previous therapy following traumatic events, reports waking from sleep with thoughts of son's death  - ambulatory psych referral     HLD (hyperlipidemia)  - c/w high dose statin  - add ezetimibe 10 mg PO daily      Final Active Diagnoses:    Diagnosis Date Noted POA    PRINCIPAL PROBLEM:  NSTEMI (non-ST elevated myocardial infarction) [I21.4] 05/13/2017 Yes    Coronary vasospasm [I20.1] 10/31/2019 Yes    Passive suicidal ideations [R45.851] 10/29/2019 Not Applicable    HLD (hyperlipidemia) [E78.5] 12/28/2017 Yes    Elevated troponin [R79.89] 06/22/2017 Yes    Essential hypertension [I10] 04/03/2016 Yes     Chronic    Facial numbness [R20.0]  Yes      Problems Resolved During this Admission:       Discharged Condition: good    Disposition: Home or Self Care    Follow Up:  Follow-up Information     Luanne Connell MD On 11/12/2019.    Specialty:  Internal Medicine  Why:  at 9:40 AM; previously scheduled PCP appointment  Contact information:  [address]  [facility] [address]  [phone]             Behzad Connolly MD On 11/13/2019.    Specialties:  Cardiovascular Disease, Cardiology  Why:  at 10:00 AM; Cardiology hospital follow up appointment at the [facility]  Contact information:  [address]  [facility] [address]  [phone]             Jim Paiz - Psychiatry On 11/4/2019.    Specialty:  Psychiatry  Why:  Call to schedule an appointment as soon as possible.   Contact information:  [address]  [facility] [address] [phone]  Additional information:  [facility] - 4th Floor               Patient Instructions:      Ambulatory Referral to Psychiatry   Referral Priority: Routine Referral Type: Psychiatric   Referral Reason: Specialty Services Required   Requested Specialty: Psychiatry   Number of Visits Requested: 1       Significant Diagnostic Studies: Labs:   Troponin   Recent Labs   Lab 10/31/19  1420   TROPONINI 0.218*       Pending Diagnostic Studies:     None         Medications:  Reconciled Home Medications:      Medication List      START taking these medications    amLODIPine 5 MG tablet  Commonly known as:  NORVASC  Take 1 tablet (5 mg total) by mouth once daily.     ezetimibe 10 mg tablet  Commonly known as:  ZETIA  Take 1 tablet (10 mg total) by mouth once daily.     metoprolol succinate 25 MG 24 hr tablet  Commonly known as:  TOPROL-XL  Take 0.5 tablets (12.5 mg total) by mouth once daily.        CONTINUE taking these medications    aspirin 81 MG Chew  Take 81 mg by mouth once daily.     atorvastatin 80 MG tablet  Commonly known as:  LIPITOR  Take 1 tablet (80 mg total) by mouth once daily.     candesartan 8 MG tablet  Commonly known as:  ATACAND  Take 1 tablet (8 mg total) by mouth once daily.        STOP taking these medications    cephALEXin 500 MG capsule  Commonly known as:  KEFLEX     HYDROcodone-acetaminophen 5-325 mg per tablet  Commonly known as:  NORCO     ondansetron 4 MG Tbdl  Commonly known  as:  ZOFRAN-ODT            Indwelling Lines/Drains at time of discharge:   Lines/Drains/Airways     None                 Time spent on the discharge of patient: >30 minutes  Patient was seen and examined on the date of discharge and determined to be suitable for discharge.         Gertrude Bello PA-C  Department of Hospital Medicine  [facility]

## 2019-11-05 NOTE — PROGRESS NOTES
C3 nurse attempted to contact patient. No answer. The following message was left for the patient to return the call:  Good afternoon, my name is Taylor and I am a registered nurse calling on behalf of [facility] from the [facility].  This is a Transitional Care call for Sofia Augustine.  When you have a moment please contact us at [phone] between 8 and 4, Monday through Friday. If you have questions or issues, a nurse is available 24 hours every day at our ON CALL # thats [phone]. On behalf of [facility], thank you, and have a nice day.    The patient has a scheduled HOS appointment with Luanne Connell MD on 11/12/2019 @ 0940 hrs.

## 2019-11-05 NOTE — ASSESSMENT & PLAN NOTE
"Coronary vasospasm  Elevated troponin  Severe (7/10), substernal, non-exertional, chest pain radiating to her back, characterized as "pressure"/"soreness".   Resolved with SL nitro. Previous TTE, US carotids unrevealing.     - CCB therapy initiated, pt free from chest pain   - no DAPT at this time per cardiology  - Brief episode of chest pressure with associated shortness of breath that spontaneously resolves without intervention.   - Nuclear stress negative for signs of ischemia, suspect presentation most consistent with coronary vasospasm (likely stress induced from recent news of her son's death) -- during conversation pt reported her body "tightening" with pain in her neck and bilateral upper extremities   - will start amlodipine 5 mg 10/31 as blood pressure allows   - initially gave Imdur 30 mg (concern for non-obstructive CAD) however pt became hypotensive, therefore discontinued --- can reinitiate in setting of severe episode or arrhythmia   - no provocative testing required at this time per cardiology    - ACS protocol initiated: heparin drip ordered, both anti-platelet and anticoagulant affects --- continue for 48 hours   - metoprolol succinate (TOPROL-XL) 24 hr split tablet 12.5 mg started 10/30  - troponin trend remarkable, repeat until trending down 0.193>0.211>0.211>0.212>0.212  - EKG stable with previous, CXR unremarkable  - CTA chest without PE.  - [facility] 2017 with non-obstructive CAD  - c/w ASA, statin, BP control  "

## 2019-11-05 NOTE — PROGRESS NOTES
C3 nurse attempted to contact patient. The following occurred:   C3 nurse attempted to contact Sofia Augustine for a TCC post hospital discharge follow up call. The patient is unable to conduct the call @ this time. The patient requested a callback.    The patient has a scheduled [facility] appointment with Luanne Connell MD on 11/12/2019 @ 0940 hrs. Message sent to Physician staff.

## 2019-11-05 NOTE — TELEPHONE ENCOUNTER
----- Message from Taylor Mock RN sent at 11/5/2019  4:10 PM CST -----  Regarding: Prescription  During TCC Post discharge call patient was very interested to see if Dr. Connell sent a prescription for Zofran 4 mg.  Patient is requesting this refill.  Please contact patient as soon as possible.  Respectfully,  Taylor Mock RN  [facility] C[facility]    [email or web address]       Please do not reply to this message, as this inbox is not routinely monitored.

## 2019-11-05 NOTE — PATIENT INSTRUCTIONS
Discharge Instructions for Heart Attack  You have had a heart attack. Also known as acute myocardial infarction, or AMI, a heart attack occurs when a vessel supplying the heart with blood suddenly becomes blocked. Follow these guidelines for home care and lifestyle changes.  Home Care  Take your medications exactly as directed. Dont skip doses.  Remember that recovery after a heart attack takes time. Plan to rest for at least 4-8 weeks while you recover. Then return to normal activity when your doctor says its okay.  Ask your doctor about joining a heart rehabilitation program.  Tell your doctor if you are feeling depressed. Feelings of sadness are common after a heart attack, but it is important that you speak to someone if you are feeling overwhelmed by these feelings.  If you are having chest pain, call 911 for an ambulance. Do NOT drive yourself to the hospital.  Ask your family members to learn CPR.  Learn to take your own blood pressure and pulse. Keep a record of your results. Ask your doctor when you should seek emergency medical attention. He or she will tell you which blood pressure reading is dangerous.  Lifestyle Changes  Maintain a healthy weight. Get help to lose any extra pounds.  Cut back on salt.  Limit canned, dried, packaged, and fast foods.  Dont add salt to your food.  Season foods with herbs instead of salt when you cook.  Break the smoking habit. Enroll in a stop-smoking program to improve your chances of success.  Limit fatty foods.  Check your lipid levels regularly. (Your doctor can show you how to do this.)  Build up your activity according to your doctors recommendation.  Ask your doctor when its okay to resume sexual activity.  Tell your doctor about any erectile dysfunction (ED) medication you are taking. Some ED medications are not safe if you take certain heart medications.  Try to manage stress.  Follow-Up  Make a follow-up appointment as directed by our staff.    When to Seek  Medical Attention  Call 911 right away if you have:  Chest pain that is not relieved by medication.  Shortness of breath.  Otherwise, call your doctor immediately if you have:  Lightheadedness, dizziness, or fainting.  Feeling of irregular heartbeat or fast pulse.   © 3833-6846 [employer]ast[employer], [address] [place] [address], [place] [address]. All rights reserved. This information is not intended as a substitute for professional medical care. Always follow your healthcare professional's instructions.

## 2019-11-06 ENCOUNTER — TELEPHONE (OUTPATIENT)
Dept: INTERNAL MEDICINE | Facility: CLINIC | Age: 74
End: 2019-11-06

## 2019-11-06 DIAGNOSIS — Z12.11 COLON CANCER SCREENING: Primary | ICD-10-CM

## 2019-11-10 ENCOUNTER — PATIENT OUTREACH (OUTPATIENT)
Dept: ADMINISTRATIVE | Facility: OTHER | Age: 74
End: 2019-11-10

## 2019-11-10 DIAGNOSIS — Z12.39 BREAST CANCER SCREENING: Primary | ICD-10-CM

## 2019-11-10 DIAGNOSIS — Z12.31 ENCOUNTER FOR SCREENING MAMMOGRAM FOR MALIGNANT NEOPLASM OF BREAST: ICD-10-CM

## 2019-11-20 RX ORDER — METHYLPREDNISOLONE 4 MG/1
TABLET ORAL
Qty: 21 TABLET | Refills: 0 | Status: SHIPPED | OUTPATIENT
Start: 2019-11-20 | End: 2019-12-10 | Stop reason: SDUPTHER

## 2019-12-04 ENCOUNTER — TELEPHONE (OUTPATIENT)
Dept: INTERNAL MEDICINE | Facility: CLINIC | Age: 74
End: 2019-12-04

## 2019-12-04 NOTE — TELEPHONE ENCOUNTER
----- Message from Corinne Hong sent at 12/4/2019 12:39 PM CST -----  Contact: patient [phone]  Patient is calling for an RX refill or new RX.  Is this a refill or new RX:  Refill  ne  RX name and strength: HYDROcodone-acetaminophen (NORCO) 5-325 mg per tablet  Directions (copy/paste from chart):   Take 1 tablet by mouth every 8 (eight) hours as needed for Pain.ne-acetaminophen (NORCO) 5-325 mg per tablet     Is this a 30 day or 90 day RX:  30  Local pharmacy or mail order pharmacy:  local  Pharmacy name and phone # :      [id number] - [place] - [address]  AT [facility] [address] [facility] [address] [phone]      Comments:

## 2019-12-10 ENCOUNTER — OFFICE VISIT (OUTPATIENT)
Dept: INTERNAL MEDICINE | Facility: CLINIC | Age: 74
End: 2019-12-10
Payer: MEDICARE

## 2019-12-10 VITALS
HEIGHT: 64 IN | OXYGEN SATURATION: 97 % | BODY MASS INDEX: 31.39 KG/M2 | TEMPERATURE: 98 F | RESPIRATION RATE: 18 BRPM | DIASTOLIC BLOOD PRESSURE: 82 MMHG | HEART RATE: 86 BPM | SYSTOLIC BLOOD PRESSURE: 130 MMHG | WEIGHT: 183.88 LBS

## 2019-12-10 DIAGNOSIS — M47.22 OSTEOARTHRITIS OF SPINE WITH RADICULOPATHY, CERVICAL REGION: ICD-10-CM

## 2019-12-10 DIAGNOSIS — I10 ESSENTIAL HYPERTENSION: Primary | ICD-10-CM

## 2019-12-10 DIAGNOSIS — G47.00 INSOMNIA, UNSPECIFIED TYPE: ICD-10-CM

## 2019-12-10 PROCEDURE — 99499 RISK ADDL DX/OHS AUDIT: ICD-10-PCS | Mod: S$GLB,,, | Performed by: INTERNAL MEDICINE

## 2019-12-10 PROCEDURE — 3079F PR MOST RECENT DIASTOLIC BLOOD PRESSURE 80-89 MM HG: ICD-10-PCS | Mod: CPTII,S$GLB,, | Performed by: INTERNAL MEDICINE

## 2019-12-10 PROCEDURE — 1101F PR PT FALLS ASSESS DOC 0-1 FALLS W/OUT INJ PAST YR: ICD-10-PCS | Mod: CPTII,S$GLB,, | Performed by: INTERNAL MEDICINE

## 2019-12-10 PROCEDURE — 3075F PR MOST RECENT SYSTOLIC BLOOD PRESS GE 130-139MM HG: ICD-10-PCS | Mod: CPTII,S$GLB,, | Performed by: INTERNAL MEDICINE

## 2019-12-10 PROCEDURE — 99999 PR PBB SHADOW E&M-EST. PATIENT-LVL III: ICD-10-PCS | Mod: PBBFAC,,, | Performed by: INTERNAL MEDICINE

## 2019-12-10 PROCEDURE — 99499 UNLISTED E&M SERVICE: CPT | Mod: S$GLB,,, | Performed by: INTERNAL MEDICINE

## 2019-12-10 PROCEDURE — 99213 PR OFFICE/OUTPT VISIT, EST, LEVL III, 20-29 MIN: ICD-10-PCS | Mod: S$GLB,,, | Performed by: INTERNAL MEDICINE

## 2019-12-10 PROCEDURE — 1125F PR PAIN SEVERITY QUANTIFIED, PAIN PRESENT: ICD-10-PCS | Mod: S$GLB,,, | Performed by: INTERNAL MEDICINE

## 2019-12-10 PROCEDURE — 99213 OFFICE O/P EST LOW 20 MIN: CPT | Mod: S$GLB,,, | Performed by: INTERNAL MEDICINE

## 2019-12-10 PROCEDURE — 3079F DIAST BP 80-89 MM HG: CPT | Mod: CPTII,S$GLB,, | Performed by: INTERNAL MEDICINE

## 2019-12-10 PROCEDURE — 1125F AMNT PAIN NOTED PAIN PRSNT: CPT | Mod: S$GLB,,, | Performed by: INTERNAL MEDICINE

## 2019-12-10 PROCEDURE — 1159F PR MEDICATION LIST DOCUMENTED IN MEDICAL RECORD: ICD-10-PCS | Mod: S$GLB,,, | Performed by: INTERNAL MEDICINE

## 2019-12-10 PROCEDURE — 3075F SYST BP GE 130 - 139MM HG: CPT | Mod: CPTII,S$GLB,, | Performed by: INTERNAL MEDICINE

## 2019-12-10 PROCEDURE — 1101F PT FALLS ASSESS-DOCD LE1/YR: CPT | Mod: CPTII,S$GLB,, | Performed by: INTERNAL MEDICINE

## 2019-12-10 PROCEDURE — 1159F MED LIST DOCD IN RCRD: CPT | Mod: S$GLB,,, | Performed by: INTERNAL MEDICINE

## 2019-12-10 PROCEDURE — 99999 PR PBB SHADOW E&M-EST. PATIENT-LVL III: CPT | Mod: PBBFAC,,, | Performed by: INTERNAL MEDICINE

## 2019-12-10 RX ORDER — HYDROCODONE BITARTRATE AND ACETAMINOPHEN 5; 325 MG/1; MG/1
1 TABLET ORAL EVERY 8 HOURS PRN
Qty: 40 TABLET | Refills: 0 | Status: SHIPPED | OUTPATIENT
Start: 2019-12-10 | End: 2020-01-13 | Stop reason: SDUPTHER

## 2019-12-10 RX ORDER — TRAZODONE HYDROCHLORIDE 50 MG/1
50 TABLET ORAL NIGHTLY
Qty: 30 TABLET | Refills: 11 | Status: SHIPPED | OUTPATIENT
Start: 2019-12-10 | End: 2020-09-01

## 2019-12-10 RX ORDER — DICLOFENAC SODIUM 50 MG/1
TABLET, DELAYED RELEASE ORAL
Refills: 3 | COMMUNITY
Start: 2019-10-22 | End: 2020-01-24 | Stop reason: SDUPTHER

## 2019-12-10 NOTE — PROGRESS NOTES
CC: followup of hypertension  HPI:  The patient is a 74 y.o. year old female who presents to the office for followup of hypertension.  The patient denies any shortness of breath, headache, blurred vision, nausea or vomiting, but reports fatigue and mild chest pain, which was diagnosed as musculoskeletal pain.  Her pain has improved.  She has been experiencing a lot of depression symptoms revolving around the death of her son.  She complains of difficulty sleeping at night and reports continued back pain.    PAST MEDICAL HISTORY:  Past Medical History:   Diagnosis Date    Abnormal mammogram of both breasts     Arthritis     Coronary vasospasm 10/31/2019    Hypertension     Major depressive disorder 5/14/2017    Memory disorder     Seizures     Stroke        SURGICAL HISTORY:  Past Surgical History:   Procedure Laterality Date    CATARACT EXTRACTION      cysts breast      TONSILLECTOMY         MEDS:  Medcard reviewed and updated    ALLERGIES: Allergy Card reviewed and updated    SOCIAL HISTORY:   The patient is a nonsmoker.    PE:   APPEARANCE: Well nourished, well developed, in no acute distress.    CHEST: Lungs clear to auscultation with unlabored respirations.  CARDIOVASCULAR: Normal S1, S2. No murmurs. No carotid bruits. No pedal edema.  ABDOMEN: Bowel sounds normal. Not distended. Soft. No tenderness or masses.   MUSCULOSKELETAL:  Abnormal gait, ambulates with a cane.  PSYCHIATRIC: The patient is oriented to person, place, and time and has a pleasant affect.        ASSESSMENT/PLAN:  Sofia was seen today for follow-up.    Diagnoses and all orders for this visit:    Essential hypertension  -     blood pressure is controlled    Osteoarthritis of spine with radiculopathy, cervical region  -     HYDROcodone-acetaminophen (NORCO) 5-325 mg per tablet; Take 1 tablet by mouth every 8 (eight) hours as needed for Pain.    Insomnia, unspecified type  -     start trazodone    Other orders  -     traZODone (DESYREL)  50 MG tablet; Take 1 tablet (50 mg total) by mouth every evening.

## 2019-12-11 RX ORDER — METHYLPREDNISOLONE 4 MG/1
TABLET ORAL
Qty: 21 TABLET | Refills: 0 | Status: SHIPPED | OUTPATIENT
Start: 2019-12-11 | End: 2020-01-23

## 2019-12-17 ENCOUNTER — TELEPHONE (OUTPATIENT)
Dept: INTERNAL MEDICINE | Facility: CLINIC | Age: 74
End: 2019-12-17

## 2019-12-17 RX ORDER — ONDANSETRON 4 MG/1
TABLET, ORALLY DISINTEGRATING ORAL
Qty: 20 TABLET | Refills: 0 | Status: SHIPPED | OUTPATIENT
Start: 2019-12-17 | End: 2020-01-10 | Stop reason: SDUPTHER

## 2019-12-17 NOTE — TELEPHONE ENCOUNTER
Please inform patient that prescription for Zofran has been sent to the pharmacy electronically.  I do not recommend a medication for diarrhea.  I do recommend patient eat a bland diet.

## 2019-12-17 NOTE — TELEPHONE ENCOUNTER
Called and spoke with the pt and she states she has been nauseated since Sunday and she was vomiting she

## 2020-01-09 ENCOUNTER — NURSE TRIAGE (OUTPATIENT)
Dept: ADMINISTRATIVE | Facility: CLINIC | Age: 75
End: 2020-01-09

## 2020-01-09 NOTE — TELEPHONE ENCOUNTER
Pt c/o intermittent lightheadedness since yesterday. Pt states she feels like she is going to pass out when she stands. Pt advised per protocol to ED now and pt verbalizes understanding. Pt advised if she is unable to get someone to drive her to ED or she is unable to phasically get to ED, pt should call 911. Pt verbalizes understanding.     Reason for Disposition   SEVERE dizziness (e.g., unable to stand, requires support to walk, feels like passing out now)    Additional Information   Negative: Shock suspected (e.g., cold/pale/clammy skin, too weak to stand, low BP, rapid pulse)   Negative: Difficult to awaken or acting confused (e.g., disoriented, slurred speech)   Negative: Fainted, and still feels dizzy afterwards   Negative: Severe difficulty breathing (e.g., struggling for each breath, speaks in single words)   Negative: Overdose (accidental or intentional) of medications   Negative: New neurologic deficit that is present now: * Weakness of the face, arm, or leg on one side of the body * Numbness of the face, arm, or leg on one side of the body * Loss of speech or garbled speech   Negative: Heart beating < 50 beats per minute OR > 140 beats per minute   Negative: Sounds like a life-threatening emergency to the triager    Protocols used: DIZZINESS-A-OH

## 2020-01-10 ENCOUNTER — HOSPITAL ENCOUNTER (EMERGENCY)
Facility: HOSPITAL | Age: 75
Discharge: HOME | End: 2020-01-11
Attending: EMERGENCY MEDICINE
Payer: MEDICARE

## 2020-01-10 VITALS
SYSTOLIC BLOOD PRESSURE: 144 MMHG | WEIGHT: 175 LBS | TEMPERATURE: 98 F | HEART RATE: 69 BPM | DIASTOLIC BLOOD PRESSURE: 74 MMHG | OXYGEN SATURATION: 98 % | RESPIRATION RATE: 16 BRPM | HEIGHT: 64 IN | BODY MASS INDEX: 29.88 KG/M2

## 2020-01-10 DIAGNOSIS — R55 NEAR SYNCOPE: ICD-10-CM

## 2020-01-10 DIAGNOSIS — R42 VERTIGO: Primary | ICD-10-CM

## 2020-01-10 DIAGNOSIS — R07.9 CHEST PAIN: ICD-10-CM

## 2020-01-10 DIAGNOSIS — R07.89 ATYPICAL CHEST PAIN: ICD-10-CM

## 2020-01-10 LAB
ALBUMIN SERPL BCP-MCNC: 3.6 G/DL (ref 3.5–5.2)
ALP SERPL-CCNC: 89 U/L (ref 55–135)
ALT SERPL W/O P-5'-P-CCNC: 13 U/L (ref 10–44)
ANION GAP SERPL CALC-SCNC: 7 MMOL/L (ref 8–16)
APTT BLDCRRT: 24.8 SEC (ref 21–32)
AST SERPL-CCNC: 15 U/L (ref 10–40)
BASOPHILS # BLD AUTO: 0.02 K/UL (ref 0–0.2)
BASOPHILS NFR BLD: 0.5 % (ref 0–1.9)
BILIRUB SERPL-MCNC: 0.9 MG/DL (ref 0.1–1)
BUN SERPL-MCNC: 15 MG/DL (ref 8–23)
CALCIUM SERPL-MCNC: 9.5 MG/DL (ref 8.7–10.5)
CHLORIDE SERPL-SCNC: 110 MMOL/L (ref 95–110)
CO2 SERPL-SCNC: 25 MMOL/L (ref 23–29)
CREAT SERPL-MCNC: 0.7 MG/DL (ref 0.5–1.4)
DIFFERENTIAL METHOD: ABNORMAL
EOSINOPHIL # BLD AUTO: 0.1 K/UL (ref 0–0.5)
EOSINOPHIL NFR BLD: 3 % (ref 0–8)
ERYTHROCYTE [DISTWIDTH] IN BLOOD BY AUTOMATED COUNT: 12.9 % (ref 11.5–14.5)
EST. GFR  (AFRICAN AMERICAN): >60 ML/MIN/1.73 M^2
EST. GFR  (NON AFRICAN AMERICAN): >60 ML/MIN/1.73 M^2
GLUCOSE SERPL-MCNC: 89 MG/DL (ref 70–110)
HCT VFR BLD AUTO: 41.9 % (ref 37–48.5)
HGB BLD-MCNC: 13 G/DL (ref 12–16)
IMM GRANULOCYTES # BLD AUTO: 0.01 K/UL (ref 0–0.04)
IMM GRANULOCYTES NFR BLD AUTO: 0.2 % (ref 0–0.5)
INR PPP: 1.1 (ref 0.8–1.2)
LYMPHOCYTES # BLD AUTO: 1.5 K/UL (ref 1–4.8)
LYMPHOCYTES NFR BLD: 36.8 % (ref 18–48)
MCH RBC QN AUTO: 30 PG (ref 27–31)
MCHC RBC AUTO-ENTMCNC: 31 G/DL (ref 32–36)
MCV RBC AUTO: 97 FL (ref 82–98)
MONOCYTES # BLD AUTO: 0.5 K/UL (ref 0.3–1)
MONOCYTES NFR BLD: 12.2 % (ref 4–15)
NEUTROPHILS # BLD AUTO: 1.9 K/UL (ref 1.8–7.7)
NEUTROPHILS NFR BLD: 47.3 % (ref 38–73)
NRBC BLD-RTO: 0 /100 WBC
PLATELET # BLD AUTO: 196 K/UL (ref 150–350)
PMV BLD AUTO: 10.9 FL (ref 9.2–12.9)
POCT GLUCOSE: 82 MG/DL (ref 70–110)
POTASSIUM SERPL-SCNC: 3.9 MMOL/L (ref 3.5–5.1)
PROT SERPL-MCNC: 7.1 G/DL (ref 6–8.4)
PROTHROMBIN TIME: 10.9 SEC (ref 9–12.5)
RBC # BLD AUTO: 4.33 M/UL (ref 4–5.4)
SODIUM SERPL-SCNC: 142 MMOL/L (ref 136–145)
TROPONIN I SERPL DL<=0.01 NG/ML-MCNC: 0.03 NG/ML (ref 0–0.03)
WBC # BLD AUTO: 4.02 K/UL (ref 3.9–12.7)

## 2020-01-10 PROCEDURE — 99284 EMERGENCY DEPT VISIT MOD MDM: CPT | Mod: ,,, | Performed by: EMERGENCY MEDICINE

## 2020-01-10 PROCEDURE — 25000003 PHARM REV CODE 250: Performed by: EMERGENCY MEDICINE

## 2020-01-10 PROCEDURE — 63600175 PHARM REV CODE 636 W HCPCS: Performed by: EMERGENCY MEDICINE

## 2020-01-10 PROCEDURE — 96374 THER/PROPH/DIAG INJ IV PUSH: CPT

## 2020-01-10 PROCEDURE — 99285 EMERGENCY DEPT VISIT HI MDM: CPT | Mod: 25

## 2020-01-10 PROCEDURE — 85730 THROMBOPLASTIN TIME PARTIAL: CPT

## 2020-01-10 PROCEDURE — 93005 ELECTROCARDIOGRAM TRACING: CPT

## 2020-01-10 PROCEDURE — 96361 HYDRATE IV INFUSION ADD-ON: CPT

## 2020-01-10 PROCEDURE — 99284 PR EMERGENCY DEPT VISIT,LEVEL IV: ICD-10-PCS | Mod: ,,, | Performed by: EMERGENCY MEDICINE

## 2020-01-10 PROCEDURE — 82962 GLUCOSE BLOOD TEST: CPT

## 2020-01-10 PROCEDURE — 85610 PROTHROMBIN TIME: CPT

## 2020-01-10 PROCEDURE — 85025 COMPLETE CBC W/AUTO DIFF WBC: CPT

## 2020-01-10 PROCEDURE — 93010 EKG 12-LEAD: ICD-10-PCS | Mod: ,,, | Performed by: INTERNAL MEDICINE

## 2020-01-10 PROCEDURE — 80053 COMPREHEN METABOLIC PANEL: CPT

## 2020-01-10 PROCEDURE — 93010 ELECTROCARDIOGRAM REPORT: CPT | Mod: ,,, | Performed by: INTERNAL MEDICINE

## 2020-01-10 PROCEDURE — 84484 ASSAY OF TROPONIN QUANT: CPT

## 2020-01-10 RX ORDER — ACETAMINOPHEN 500 MG
1000 TABLET ORAL
Status: COMPLETED | OUTPATIENT
Start: 2020-01-10 | End: 2020-01-10

## 2020-01-10 RX ORDER — ONDANSETRON 4 MG/1
4 TABLET, ORALLY DISINTEGRATING ORAL EVERY 6 HOURS PRN
Qty: 12 TABLET | Refills: 0 | Status: SHIPPED | OUTPATIENT
Start: 2020-01-10 | End: 2020-09-01

## 2020-01-10 RX ORDER — MECLIZINE HYDROCHLORIDE 25 MG/1
25 TABLET ORAL 3 TIMES DAILY PRN
Qty: 20 TABLET | Refills: 0 | Status: SHIPPED | OUTPATIENT
Start: 2020-01-10 | End: 2020-09-01

## 2020-01-10 RX ORDER — MECLIZINE HCL 12.5 MG 12.5 MG/1
25 TABLET ORAL
Status: COMPLETED | OUTPATIENT
Start: 2020-01-10 | End: 2020-01-10

## 2020-01-10 RX ORDER — ONDANSETRON 2 MG/ML
4 INJECTION INTRAMUSCULAR; INTRAVENOUS ONCE
Status: COMPLETED | OUTPATIENT
Start: 2020-01-10 | End: 2020-01-10

## 2020-01-10 RX ADMIN — SODIUM CHLORIDE 500 ML: 0.9 INJECTION, SOLUTION INTRAVENOUS at 01:01

## 2020-01-10 RX ADMIN — ONDANSETRON 4 MG: 2 INJECTION INTRAMUSCULAR; INTRAVENOUS at 01:01

## 2020-01-10 RX ADMIN — ACETAMINOPHEN 1000 MG: 500 TABLET ORAL at 01:01

## 2020-01-10 RX ADMIN — MECLIZINE 25 MG: 12.5 TABLET ORAL at 01:01

## 2020-01-10 NOTE — ED NOTES
Pt identifiers Sofia Augustine were checked and are  correct  LOC: The patient is awake, alert, aware of environment with an appropriate affect. Oriented x4, speaking appropriately  APPEARANCE: Pt resting comfortably, in no acute distress, pt is clean and well groomed, clothing properly fastened  SKIN: Skin warm, dry and intact, normal skin turgor, moist mucus membranes  RESPIRATORY: Airway is open and patent, respirations are spontaneous, even and unlabored, normal effort and rate Breath sounds clear iker to all lung fields on auscultation  CARDIAC: Normal rate and rhythm, no peripheral edema noted, capillary refill < 3 seconds, bilateral radial pulses 2+  ABDOMEN: Soft, nontender, nondistended. Bowel sounds present to all four quad of abd on auscultation  NEUROLOGIC: PERRL, facial expression is symmetrical, patient moving all extremities spontaneously, normal sensation in all extremities when touched with a finger.  Follows all commands appropriately  MUSCULOSKELETAL: No obvious deformities.

## 2020-01-10 NOTE — ED TRIAGE NOTES
Pt complains of chest pain this morning and dizziness yesterday   Any smell of food made her sick as stated by pt

## 2020-01-10 NOTE — PROVIDER PROGRESS NOTES - EMERGENCY DEPT.
Encounter Date: 1/10/2020    ED Physician Progress Notes         EKG - STEMI Decision  Initial Reading: No STEMI present.    ____________________  Vu Renteria MD, [facility]  Emergency Medicine Staff  11:43 AM 1/10/2020

## 2020-01-10 NOTE — ED PROVIDER NOTES
Encounter Date: 1/10/2020       History     Chief Complaint   Patient presents with    Chest Pain     started yesterday    Dizziness     The history is provided by the patient.   Neurologic Problem   The primary symptoms include headaches, dizziness and nausea. Primary symptoms do not include syncope, loss of consciousness, altered mental status, seizures, visual change, paresthesias, focal weakness, loss of sensation, fever or vomiting. Primary symptoms comment: felt like she might pass out. The symptoms began yesterday. The episode lasted 2 days. The symptoms are waxing and waning. The neurological symptoms are diffuse. Context: woke up with it yesterday.   The headache began yesterday. The headache developed gradually. Headache is a new problem. The headache is present intermittently. The pain from the headache is at a severity of 1/10. Location/region(s) of the headache: bilateral and occipital. The headache is associated with activity. The headache is associated with loss of balance. The headache is not associated with aura, photophobia, visual change, neck stiffness, paresthesias or weakness.     She describes the dizziness as faintness. The dizziness began today. The dizziness has been resolved since its onset. Dizziness also occurs with tinnitus and nausea. Dizziness does not occur with blurred vision, hearing loss, vomiting, weakness or diaphoresis.   Nausea began yesterday. The nausea is associated with eating.   Additional symptoms include loss of balance, tinnitus and vertigo. Additional symptoms do not include neck stiffness, weakness, lower back pain, leg pain, photophobia, aura or anxiety. Medical issues also include hypertension. Medical issues do not include seizures, cerebral vascular accident, cancer, diabetes or recent surgery.       Called PCP and told to come to ED.  The patient came to the emergency department for her dizziness however on arrival here she did complain about couple minutes  "worth of chest tightness.  She has no complaint about that now however    Review of patient's allergies indicates:  No Known Allergies  Past Medical History:   Diagnosis Date    Abnormal mammogram of both breasts     Arthritis     Coronary vasospasm 10/31/2019    Hypertension     Major depressive disorder 5/14/2017    Memory disorder     Seizures     Stroke      Past Surgical History:   Procedure Laterality Date    CATARACT EXTRACTION      cysts breast      TONSILLECTOMY       History reviewed. No pertinent family history.  Social History     Tobacco Use    Smoking status: Never Smoker    Smokeless tobacco: Never Used   Substance Use Topics    Alcohol use: No    Drug use: No     Review of Systems   Constitutional: Negative for diaphoresis and fever.   HENT: Positive for tinnitus.    Eyes: Negative for blurred vision and photophobia.   Cardiovascular: Negative for syncope.   Gastrointestinal: Positive for nausea. Negative for vomiting.   Musculoskeletal: Negative for neck stiffness.   Neurological: Positive for dizziness, vertigo, headaches and loss of balance. Negative for focal weakness, seizures, loss of consciousness, weakness and paresthesias.       Physical Exam     Initial Vitals [01/10/20 1132]   BP Pulse Resp Temp SpO2   (!) 184/86 80 16 98 °F (36.7 °C) 98 %      MAP       --         Vitals:    01/10/20 1132 01/10/20 1458   BP: (!) 184/86 (!) 144/74   BP Location: Right arm Left arm   Patient Position: Sitting Lying   Pulse: 80 69   Resp: 16 16   Temp: 98 °F (36.7 °C) 97.7 °F (36.5 °C)   TempSrc: Oral Oral   SpO2: 98% 98%   Weight: 79.4 kg (175 lb)    Height: 5' 4" (1.626 m)      Physical Exam    Nursing note and vitals reviewed.  Constitutional: She appears well-developed and well-nourished. No distress.   HENT:   Head: Normocephalic and atraumatic.   Right Ear: Hearing, tympanic membrane and ear canal normal.   Left Ear: Tympanic membrane and ear canal normal. No tenderness. Decreased " hearing is noted.   Eyes: Conjunctivae and EOM are normal. Pupils are equal, round, and reactive to light. Right eye exhibits no nystagmus. Left eye exhibits no nystagmus.   Neck: Normal range of motion. Neck supple.   Cardiovascular: Normal rate, regular rhythm, normal heart sounds and intact distal pulses.   Pulmonary/Chest: Breath sounds normal. No stridor. She has no wheezes. She has no rhonchi.   Abdominal: Soft. Bowel sounds are normal. She exhibits no mass. There is no rebound and no guarding.   Musculoskeletal: Normal range of motion. She exhibits no edema.   Neurological: She is alert and oriented to person, place, and time. She has normal strength. No sensory deficit.   Skin: Skin is warm and dry. Capillary refill takes less than 2 seconds. No rash noted.   Psychiatric: She has a normal mood and affect. Her behavior is normal. Judgment and thought content normal.         ED Course   Procedures  Labs Reviewed   CBC W/ AUTO DIFFERENTIAL - Abnormal; Notable for the following components:       Result Value    Mean Corpuscular Hemoglobin Conc 31.0 (*)     All other components within normal limits   COMPREHENSIVE METABOLIC PANEL - Abnormal; Notable for the following components:    Anion Gap 7 (*)     All other components within normal limits   PROTIME-INR   APTT   TROPONIN I   TROPONIN I    Narrative:     ADD ON TROP ORDER #[id number] PER YUE M.CARMEN  01/10/2020  15:39    POCT GLUCOSE     Results for orders placed or performed during the hospital encounter of 01/10/20   CBC auto differential   Result Value Ref Range    WBC 4.02 3.90 - 12.70 K/uL    RBC 4.33 4.00 - 5.40 M/uL    Hemoglobin 13.0 12.0 - 16.0 g/dL    Hematocrit 41.9 37.0 - 48.5 %    Mean Corpuscular Volume 97 82 - 98 fL    Mean Corpuscular Hemoglobin 30.0 27.0 - 31.0 pg    Mean Corpuscular Hemoglobin Conc 31.0 (L) 32.0 - 36.0 g/dL    RDW 12.9 11.5 - 14.5 %    Platelets 196 150 - 350 K/uL    MPV 10.9 9.2 - 12.9 fL    Immature Granulocytes 0.2 0.0 - 0.5 %     Gran # (ANC) 1.9 1.8 - 7.7 K/uL    Immature Grans (Abs) 0.01 0.00 - 0.04 K/uL    Lymph # 1.5 1.0 - 4.8 K/uL    Mono # 0.5 0.3 - 1.0 K/uL    Eos # 0.1 0.0 - 0.5 K/uL    Baso # 0.02 0.00 - 0.20 K/uL    nRBC 0 0 /100 WBC    Gran% 47.3 38.0 - 73.0 %    Lymph% 36.8 18.0 - 48.0 %    Mono% 12.2 4.0 - 15.0 %    Eosinophil% 3.0 0.0 - 8.0 %    Basophil% 0.5 0.0 - 1.9 %    Differential Method Automated    Comprehensive metabolic panel   Result Value Ref Range    Sodium 142 136 - 145 mmol/L    Potassium 3.9 3.5 - 5.1 mmol/L    Chloride 110 95 - 110 mmol/L    CO2 25 23 - 29 mmol/L    Glucose 89 70 - 110 mg/dL    BUN, Bld 15 8 - 23 mg/dL    Creatinine 0.7 0.5 - 1.4 mg/dL    Calcium 9.5 8.7 - 10.5 mg/dL    Total Protein 7.1 6.0 - 8.4 g/dL    Albumin 3.6 3.5 - 5.2 g/dL    Total Bilirubin 0.9 0.1 - 1.0 mg/dL    Alkaline Phosphatase 89 55 - 135 U/L    AST 15 10 - 40 U/L    ALT 13 10 - 44 U/L    Anion Gap 7 (L) 8 - 16 mmol/L    eGFR if African American >60.0 >60 mL/min/1.73 m^2    eGFR if non African American >60.0 >60 mL/min/1.73 m^2   Protime-INR   Result Value Ref Range    Prothrombin Time 10.9 9.0 - 12.5 sec    INR 1.1 0.8 - 1.2   APTT   Result Value Ref Range    aPTT 24.8 21.0 - 32.0 sec   Troponin I   Result Value Ref Range    Troponin I 0.025 0.000 - 0.026 ng/mL   POCT glucose   Result Value Ref Range    POCT Glucose 82 70 - 110 mg/dL      EKG Readings: (Independently Interpreted)   Initial Reading: No STEMI. Rhythm: Normal Sinus Rhythm. Ectopy: No Ectopy. Conduction: Normal. ST Segments: Normal ST Segments. T Waves: Normal. Clinical Impression: Normal Sinus Rhythm Other Impression: Normal sinus at 79-no STEMI     ECG Results          EKG 12-lead (Final result)  Result time 01/10/20 13:51:01    Final result by Interface, Lab In [facility] (01/10/20 13:51:01)                 Narrative:    Test Reason : R07.9,    Vent. Rate : 079 BPM     Atrial Rate : 079 BPM     P-R Int : 164 ms          QRS Dur : 064 ms      QT Int : 348 ms        P-R-T Axes : 043 037 046 degrees     QTc Int : 399 ms    Normal sinus rhythm  Normal ECG  When compared with ECG of 31-OCT-2019 13:56,  No significant change was found  Confirmed by LARISA LIU MD ([id number]) on 1/10/2020 1:50:54 PM    Referred By: GUSTAVO   SELF           Confirmed By:LARISA LIU MD                            Imaging Results          MRI Brain Without Contrast (Final result)  Result time 01/10/20 14:20:09    Final result by John Landers DO (01/10/20 14:20:09)                 Impression:      Mild cerebral volume loss with few scattered punctate foci of T2 FLAIR signal abnormality in the supratentorial white matter which is nonspecific and may be sequela of mild chronic ischemic change.    Otherwise unremarkable noncontrast MRI brain as detailed above specifically without evidence for acute infarction.      Electronically signed by: John Landers DO  Date:    01/10/2020  Time:    14:20             Narrative:    EXAMINATION:  MRI BRAIN WITHOUT CONTRAST    CLINICAL HISTORY:  Focal neuro deficit, new, fixed or worsening, >6 hours;    TECHNIQUE:  Sagittal and axial T1, axial T2, axial FLAIR, axial gradient and axial diffusion imaging of the whole brain without contrast.    COMPARISON:  CT 10/28/2019    FINDINGS:  Scattered punctate foci of T2 FLAIR signal hyperintensity supratentorial white matter which are nonspecific and may represent mild degree of chronic ischemic change.  There is no restricted diffusion to suggest acute infarction.  The ventricles are normal in size without hydrocephalus.  There is no midline shift or mass effect.  There is no abnormal parenchymal susceptibility to suggest parenchymal hemorrhage.  Major intracranial T2 flow voids are present.  Degenerative change in the visualized cervical spine.  There is slight impression the left aspect of the medulla related to tortuosity of the left distal vertebral artery without underlying signal abnormality to suggest edema.  Mild  age-appropriate cerebral volume loss.                               X-Ray Chest AP Portable (Final result)  Result time 01/10/20 12:48:49    Final result by Riccardo Torre MD (01/10/20 12:48:49)                 Impression:      No significant intrathoracic abnormality referable to the current history of chest pain.  No significant detrimental interval change in the appearance of the chest since 10/28/2019 is appreciated.      Electronically signed by: Riccardo Torre MD  Date:    01/10/2020  Time:    12:48             Narrative:    EXAMINATION:  XR CHEST AP PORTABLE    CLINICAL HISTORY:  Chest Pain;    COMPARISON:  Comparison is made to 10/28/2019.    FINDINGS:  Heart size is normal, as is the appearance of the pulmonary vascularity.  Lung zones are clear, and are free of significant airspace consolidation or volume loss.  No pleural fluid.  No abnormal mediastinal widening.  No pneumothorax.                                               patient feels improved after treatment here in the emergency department she still does have mild dizziness intermittently but is very comfortable discharge home with symptomatic treatment at this time.  We discussed the difference between central and peripheral vertigo.  With her history of some tinnitus particular in the left ear and her MRI results I believe this is peripheral vertigo.  She follow-up with a primary care physician understands that air nose and throat follow-up might be indicated.  She will follow up with primary care doctor and return emergency department for any worsening signs or symptoms.    I have discussed with patient and/or family/caretaker chest pain precautions, specifically to return for worsening chest pain, shortness of breath, fever, or any concern.  I have low suspicion for cardiopulmonary, vascular, infectious, respiratory, or other emergent medical condition based on my evaluation in the ED.        Patient presents with a  near-syncopal episode. I have no  suspicion that the event is secondary to an arrhythmia such as WPW, prolonged QT syndrome, or ventricular tachycardia. I have no suspicion for a seizure episode, intracranial hemorrhage, pulmonary embolus, myocardial infarction, sepsis, ectopic pregnancy, hemorrhagic shock, or hypoglycemia. Based on my evaluation, there is no emergent medical condition. Syncope precautions were discussed with the patient and/or caretaker. Patient is safe for discharge.           Clinical Impression:       ICD-10-CM ICD-9-CM   1. Vertigo R42 780.4   2. Chest pain R07.9 786.50   3. Near syncope R55 780.2   4. Atypical chest pain R07.89 786.59         Disposition:   Disposition: Discharged  Condition: Stable                     Davian Dc MD  01/11/20 2307

## 2020-01-10 NOTE — DISCHARGE INSTRUCTIONS
Follow-up with your primary care doctor the next 2-3 days for recheck and possible referral to Ear Nose and Throat doctor.  Return to the emergency department for any worsening signs or symptoms.

## 2020-01-13 DIAGNOSIS — M47.22 OSTEOARTHRITIS OF SPINE WITH RADICULOPATHY, CERVICAL REGION: ICD-10-CM

## 2020-01-13 RX ORDER — HYDROCODONE BITARTRATE AND ACETAMINOPHEN 5; 325 MG/1; MG/1
1 TABLET ORAL EVERY 8 HOURS PRN
Qty: 40 TABLET | Refills: 0 | Status: SHIPPED | OUTPATIENT
Start: 2020-01-13 | End: 2020-02-17 | Stop reason: SDUPTHER

## 2020-01-13 NOTE — TELEPHONE ENCOUNTER
----- Message from Patty Zafar sent at 1/13/2020  3:22 PM CST -----  Type:  RX Refill Request    Who Called: SARTHAK     Refill or New Rx:REFILL    RX Name and Strength:HYDROcodone-acetaminophen (NORCO) 5-325 mg per tablet    Preferred Pharmacy with phone number:[id number] - [place], [place] - [address]  AT [facility] [address] [phone] (Phone)  [phone] (Fax)    Would the patient rather a call back or a response via [facility]? CALL BACK     Best Call Back Number:[phone]    Additional Information: PT WOULD LIKE TO KNOW IF SHE CAN TAKE THIS MEDICATION WITH [facility].

## 2020-01-16 ENCOUNTER — TELEPHONE (OUTPATIENT)
Dept: INTERNAL MEDICINE | Facility: CLINIC | Age: 75
End: 2020-01-16

## 2020-01-16 ENCOUNTER — PATIENT OUTREACH (OUTPATIENT)
Dept: ADMINISTRATIVE | Facility: OTHER | Age: 75
End: 2020-01-16

## 2020-01-16 DIAGNOSIS — M25.569 KNEE PAIN, UNSPECIFIED CHRONICITY, UNSPECIFIED LATERALITY: Primary | ICD-10-CM

## 2020-01-16 NOTE — TELEPHONE ENCOUNTER
Mr. Иван Delgado is a 60 YOM, admitted for dx pancreatic mass, hypolakemia, hyperglycemia due to TII DM, weight loss abnormal. Patient lives in a single-story home, [address], reported continued independence with ADLs/IADLs, DME includes cpap, home O2, rollator walker. Patient drives, will provide own d/c transportation. Patient provided additional emergency contact information, daughter Maylin Flynn [phone], son Tabatha Villagomez [phone]. Patient shared that he has been living at the [facility] [place] off[place]and[place]on since January, due to ongoing home rennovations, reported mortgage and insurance companies are involved. Patient inquired about a \"home attendant,\" stated in the past he had UAI completed with [address] [place] [address] and [facility] but was previously denied services as he did not qualify due to functional status/independence with ADLs/IADLs. Based on assessment, Patient reported continued independence with tasks of daily living. CM advised would review MD/PT recommendations, but was not made aware of any recs for [facility] or in-home services. CM advised Patient could request additional assessment with PACE program if needed. Patient stated he was trying to plan ahead for when he returns home, but stated he does not need additional community resources/services while he is at the [facility]. D/c plan remains to return to [facility], provide own transportation via personal vehicle. Patient requested that CM assist with [employer] medical form re: home O2. CM agreed, advised would provide Patient with blank copy of form for completion/return to company. CM went to print form for Patient, and when returned to room Patient had already left/been discharged. No additional CM needs reported/identified at this time.     Reason for Admission:   dx pancreatic mass, hypolakemia, hyperglycemia due to TII DM, weight loss abnormal                     RRAT Score: 11                 Do you (patient/family) have any concerns Orthopedics referral has been ordered.   for transition/discharge? None reported/identified              Plan for utilizing home health:     N/A    Likelihood of readmission? Low            Transition of Care Plan:      Discharge home, transport self via personal vehicle    Care Management Interventions  PCP Verified by CM:  Yes  Mode of Transport at Discharge: Self  Transition of Care Consult (CM Consult): Discharge Planning  Discharge Durable Medical Equipment: No  Physical Therapy Consult: Yes  Occupational Therapy Consult: No  Speech Therapy Consult: No  Current Support Network: Own Home (Patient owns his home at [address], reported he has been staying [facility] the [facility] [place] [facility] [place] since January due to ongoing repairs at home)  Confirm Follow Up Transport: [facility] discussed with Pt/Family/Caregiver: Yes  Discharge Location  Discharge Placement: Other: (Patient owns his home at [address], reported he has been staying at the [facility] [place] since January due to ongoing repairs at home, Patient will return to [facility] following d/c)    Leeroy Fernández, [address] [place] [address]. [facility]0

## 2020-01-16 NOTE — TELEPHONE ENCOUNTER
----- Message from Farhana Lara sent at 1/16/2020 12:17 PM CST -----  Contact: self [phone]  Patient is calling to ask if her doctor can help her get in to see someone in Ortho for her pain on her knees, she states is so bad that not even the hydrocodone is helping. Please call her back and advise  t  Thank you

## 2020-01-21 ENCOUNTER — TELEPHONE (OUTPATIENT)
Dept: ORTHOPEDICS | Facility: CLINIC | Age: 75
End: 2020-01-21

## 2020-01-21 DIAGNOSIS — M25.562 PAIN IN BOTH KNEES, UNSPECIFIED CHRONICITY: Primary | ICD-10-CM

## 2020-01-21 DIAGNOSIS — M25.561 PAIN IN BOTH KNEES, UNSPECIFIED CHRONICITY: Primary | ICD-10-CM

## 2020-01-22 ENCOUNTER — PATIENT OUTREACH (OUTPATIENT)
Dept: ADMINISTRATIVE | Facility: OTHER | Age: 75
End: 2020-01-22

## 2020-01-22 ENCOUNTER — TELEPHONE (OUTPATIENT)
Dept: INTERNAL MEDICINE | Facility: CLINIC | Age: 75
End: 2020-01-22

## 2020-01-22 NOTE — TELEPHONE ENCOUNTER
----- Message from Elo Renteria sent at 1/22/2020  9:32 AM CST -----  Contact: Patient [phone]  Patient is requesting a Rx for hives please sent to [id number] - [place] - [address] AT [facility] [address] [phone] (Phone)  [phone] (Fax    Please call and advise.    Thank You

## 2020-01-22 NOTE — TELEPHONE ENCOUNTER
Called and spoke with the patient she states she has had them since 9am yesterday she states she will try Benadryl and elisha back. Termed the call

## 2020-01-23 RX ORDER — METHYLPREDNISOLONE 4 MG/1
TABLET ORAL
Qty: 21 TABLET | Refills: 0 | Status: SHIPPED | OUTPATIENT
Start: 2020-01-23 | End: 2020-01-24

## 2020-01-24 ENCOUNTER — OFFICE VISIT (OUTPATIENT)
Dept: ORTHOPEDICS | Facility: CLINIC | Age: 75
End: 2020-01-24
Payer: MEDICARE

## 2020-01-24 ENCOUNTER — HOSPITAL ENCOUNTER (OUTPATIENT)
Dept: RADIOLOGY | Facility: HOSPITAL | Age: 75
Discharge: HOME | End: 2020-01-24
Attending: PHYSICIAN ASSISTANT
Payer: MEDICARE

## 2020-01-24 VITALS — WEIGHT: 175 LBS | BODY MASS INDEX: 29.88 KG/M2 | HEIGHT: 64 IN

## 2020-01-24 DIAGNOSIS — M25.562 PAIN IN BOTH KNEES, UNSPECIFIED CHRONICITY: ICD-10-CM

## 2020-01-24 DIAGNOSIS — M25.569 KNEE PAIN, UNSPECIFIED CHRONICITY, UNSPECIFIED LATERALITY: ICD-10-CM

## 2020-01-24 DIAGNOSIS — M17.0 PRIMARY OSTEOARTHRITIS OF BOTH KNEES: Primary | ICD-10-CM

## 2020-01-24 DIAGNOSIS — M25.561 PAIN IN BOTH KNEES, UNSPECIFIED CHRONICITY: ICD-10-CM

## 2020-01-24 PROCEDURE — 20610 PR DRAIN/INJECT LARGE JOINT/BURSA: ICD-10-PCS | Mod: 50,S$GLB,, | Performed by: ORTHOPAEDIC SURGERY

## 2020-01-24 PROCEDURE — 73564 XR KNEE COMP 4 OR MORE VIEWS BILAT: ICD-10-PCS | Mod: 26,50,, | Performed by: RADIOLOGY

## 2020-01-24 PROCEDURE — 1125F AMNT PAIN NOTED PAIN PRSNT: CPT | Mod: S$GLB,,, | Performed by: ORTHOPAEDIC SURGERY

## 2020-01-24 PROCEDURE — 73564 X-RAY EXAM KNEE 4 OR MORE: CPT | Mod: TC,50,PN

## 2020-01-24 PROCEDURE — 1159F MED LIST DOCD IN RCRD: CPT | Mod: S$GLB,,, | Performed by: ORTHOPAEDIC SURGERY

## 2020-01-24 PROCEDURE — 99999 PR PBB SHADOW E&M-EST. PATIENT-LVL III: CPT | Mod: PBBFAC,,, | Performed by: ORTHOPAEDIC SURGERY

## 2020-01-24 PROCEDURE — 1101F PR PT FALLS ASSESS DOC 0-1 FALLS W/OUT INJ PAST YR: ICD-10-PCS | Mod: CPTII,S$GLB,, | Performed by: ORTHOPAEDIC SURGERY

## 2020-01-24 PROCEDURE — 99999 PR PBB SHADOW E&M-EST. PATIENT-LVL III: ICD-10-PCS | Mod: PBBFAC,,, | Performed by: ORTHOPAEDIC SURGERY

## 2020-01-24 PROCEDURE — 1125F PR PAIN SEVERITY QUANTIFIED, PAIN PRESENT: ICD-10-PCS | Mod: S$GLB,,, | Performed by: ORTHOPAEDIC SURGERY

## 2020-01-24 PROCEDURE — 1101F PT FALLS ASSESS-DOCD LE1/YR: CPT | Mod: CPTII,S$GLB,, | Performed by: ORTHOPAEDIC SURGERY

## 2020-01-24 PROCEDURE — 1159F PR MEDICATION LIST DOCUMENTED IN MEDICAL RECORD: ICD-10-PCS | Mod: S$GLB,,, | Performed by: ORTHOPAEDIC SURGERY

## 2020-01-24 PROCEDURE — 73564 X-RAY EXAM KNEE 4 OR MORE: CPT | Mod: 26,50,, | Performed by: RADIOLOGY

## 2020-01-24 PROCEDURE — 99214 PR OFFICE/OUTPT VISIT, EST, LEVL IV, 30-39 MIN: ICD-10-PCS | Mod: 25,S$GLB,, | Performed by: ORTHOPAEDIC SURGERY

## 2020-01-24 PROCEDURE — 20610 DRAIN/INJ JOINT/BURSA W/O US: CPT | Mod: 50,S$GLB,, | Performed by: ORTHOPAEDIC SURGERY

## 2020-01-24 PROCEDURE — 99214 OFFICE O/P EST MOD 30 MIN: CPT | Mod: 25,S$GLB,, | Performed by: ORTHOPAEDIC SURGERY

## 2020-01-24 RX ORDER — TRIAMCINOLONE ACETONIDE 40 MG/ML
40 INJECTION, SUSPENSION INTRA-ARTICULAR; INTRAMUSCULAR
Status: COMPLETED | OUTPATIENT
Start: 2020-01-24 | End: 2020-01-24

## 2020-01-24 RX ORDER — DICLOFENAC SODIUM 50 MG/1
50 TABLET, DELAYED RELEASE ORAL 2 TIMES DAILY
Qty: 60 TABLET | Refills: 3 | Status: SHIPPED | OUTPATIENT
Start: 2020-01-24 | End: 2020-06-04 | Stop reason: SDUPTHER

## 2020-01-24 RX ADMIN — TRIAMCINOLONE ACETONIDE 40 MG: 40 INJECTION, SUSPENSION INTRA-ARTICULAR; INTRAMUSCULAR at 10:01

## 2020-01-24 NOTE — PROGRESS NOTES
Subjective:      Patient ID: Sofia Augustine is a 74 y.o. female.    Chief Complaint: Pain of the Left Knee and Pain of the Right Knee    HPI     They have experienced problems with their bilateral knee over the past 5 years. The patient denies relevant history of injury/aggravation. Pain is located medially Associated symptoms include swelling, pseudolocking and gelling.  Symptoms are aggravated by prolonged walking. They have been treated with cane/walker.   Symptoms have recently worsened. Ambulation reportedly has been impaired. Self care ADLs are not painful.     Review of Systems   Constitution: Negative for fever and weight loss.   HENT: Negative for congestion.    Eyes: Negative for visual disturbance.   Cardiovascular: Negative for chest pain.   Respiratory: Negative for shortness of breath.    Hematologic/Lymphatic: Negative for bleeding problem. Does not bruise/bleed easily.   Skin: Negative for poor wound healing.   Musculoskeletal: Positive for joint pain and joint swelling.   Gastrointestinal: Negative for abdominal pain.   Genitourinary: Negative for dysuria.   Neurological: Negative for seizures.   Psychiatric/Behavioral: Negative for altered mental status.   Allergic/Immunologic: Negative for persistent infections.         Objective:      Ortho/SPM Exam      Right knee    [unfilled]    The patient is not in acute distress.   Sclerae normal  Body habitus is normal.  Respiratory distress:  none   The patient walks with a limp.  Hip irritability  negative.   The skin over the knee is intact.  Knee effusion 1+  Tendernes is located medially  Range of motion- Flexion 120 deg, Extension 5 deg,   Ligament laxity exam:   MCL 1+   Lachman 0   Post sag  0    LCL 0  Patellar apprehension negative.  Popliteal cyst positive  Patellar crepitation present.  Flexion/pinch negative  Pulses DP present, PT present.  Motor normal 5/5 strength in all tested muscle groups.   Sensory normal.    Left knee is  identical    I reviewed the relevant radiographic images for the patient's condition:  Films of both knees show complete loss of medial joint space with substantial osteophyte formation    Assessment:       Encounter Diagnosis   Name Primary?    Knee pain, unspecified chronicity, unspecified laterality           The condition is structurally advanced with significant pain and functional impairment  Plan:       Sofia was seen today for pain and pain.    Diagnoses and all orders for this visit:    Knee pain, unspecified chronicity, unspecified laterality  -     Ambulatory referral/consult to Orthopedics      I explained my diagnostic impression and the reasoning behind it in detail, using layman's terms.  Models and/or pictures were used to help in the explanation.    I explained the role of arthroplasty along with usual clinical course and typical complications encountered. The patient wishes to consider this for now.    The patient has Voltaren on her medication list, I recommend that we refill this and that she use it.    I explained to the patient that I am providing a prescription for anti-inflammatory medication.  I warned the patient that it should not be taken with other anti-inflammatory medications including over-the-counter products containing ibuprofen and naproxen.  I advised them to consult their primary physician or pharmacist if there is any question about this in the future.  I discussed the possible side effects including cardiovascular issues, renal issues and gastrointestinal problems.  I advised the patient to discontinue the medication should they develop persistent symptoms of dyspepsia.    I also explained that should they elect to continue this medication long-term, that is beyond the prescription that I provide at this time, they should contact their primary physician for refills and appropriate monitoring.    Injections recommended and consent given    Continue cane    Appropriate icing  explained    I explained the potential role of surgery in the treatment of this condition to the patient.  They understand that if nonsurgical measures do not adequately control symptoms, surgery will be considered in the future.

## 2020-01-24 NOTE — LETTER
January 24, 2020      Luanne Connell MD  [address] [facility]  [place] [address]           [facility]  [address]  [facility] [address]  Phone: [phone]          Patient: Sofia Augustine   MR Number: [id number]   YOB: 1945   Date of Visit: 1/24/2020       Dear Dr. Luanne Connell:    Thank you for referring Sofia Augustine to me for evaluation. Attached you will find relevant portions of my assessment and plan of care.    If you have questions, please do not hesitate to call me. I look forward to following Sofia Augustine along with you.    Sincerely,    Raymond Dunlap MD    Enclosure  CC:  No Recipients    If you would like to receive this communication electronically, please contact [email or web address] or [phone] to request more information on [employer] access.    For providers and/or their staff who would like to refer a patient to [facility], please contact us through our one-stop-shop provider referral line, [facility], at [phone].    If you feel you have received this communication in error or would no longer like to receive these types of communications, please e-mail [email or web address]

## 2020-01-24 NOTE — PROCEDURES
Procedures.    After obtaining verbal informed consent the patient's right knee was prepped aseptically and injected through an inferior lateral approach using 40 mg of triamcinolone and 1 cc of 1% plain Xylocaine.  The patient was warned about postinjection flare and how to manage it with ice, rest and over-the-counter analgesics.  They're advised to contact me for any severe, uncontrolled pain.    After obtaining verbal informed consent the patient's left knee was prepped aseptically and injected through an inferior lateral approach using 40 mg of triamcinolone and 1 cc of 1% plain Xylocaine.  The patient was warned about postinjection flare and how to manage it with ice, rest and over-the-counter analgesics.  They're advised to contact me for any severe, uncontrolled pain.

## 2020-02-17 DIAGNOSIS — M47.22 OSTEOARTHRITIS OF SPINE WITH RADICULOPATHY, CERVICAL REGION: ICD-10-CM

## 2020-02-17 NOTE — TELEPHONE ENCOUNTER
----- Message from Faith Lara sent at 2/17/2020  4:08 PM CST -----  Contact: Self   Patient is calling for an RX refill or new RX.  Is this a refill or new RX:    RX name and strength: HYDROcodone-aTake 1 tablet by mouth every 8 (eight) hours as needed for Pain. - Oralcetaminophen (NORCO) 5-325 mg per tablet  Directions (copy/paste from chart):    Is this a 30 day or 90 day RX:    Local pharmacy or mail order pharmacy:  local  Pharmacy name and phone # (copy/paste from chart):   [facility][id number] - [place], [place] - [address]  AT [facility] OF [address] [phone] (Phone)  [phone] (Fax)  Comments:

## 2020-02-18 RX ORDER — HYDROCODONE BITARTRATE AND ACETAMINOPHEN 5; 325 MG/1; MG/1
1 TABLET ORAL EVERY 8 HOURS PRN
Qty: 40 TABLET | Refills: 0 | Status: SHIPPED | OUTPATIENT
Start: 2020-02-18 | End: 2020-03-20 | Stop reason: SDUPTHER

## 2020-02-24 ENCOUNTER — PATIENT OUTREACH (OUTPATIENT)
Dept: ADMINISTRATIVE | Facility: OTHER | Age: 75
End: 2020-02-24

## 2020-02-24 ENCOUNTER — PATIENT OUTREACH (OUTPATIENT)
Dept: ADMINISTRATIVE | Facility: HOSPITAL | Age: 75
End: 2020-02-24

## 2020-03-20 DIAGNOSIS — M47.22 OSTEOARTHRITIS OF SPINE WITH RADICULOPATHY, CERVICAL REGION: ICD-10-CM

## 2020-03-20 NOTE — TELEPHONE ENCOUNTER
----- Message from Giorgio Blair sent at 3/20/2020 11:57 AM CDT -----  Contact: Patient [phone]  RX request - refill or new RX.  Is this a refill or new RX:  Refill  RX name and strength: HYDROcodone-acetaminophen (NORCO) 5-325 mg per tablet  Directions:   Is this a 30 day or 90 day RX:    Pharmacy name and phone # [facility][id number]  [place], [place] - [address] AT [facility] OF [address] [phone] (Phone)  [phone] (Fax)    Please call an advise  Thank you

## 2020-03-21 RX ORDER — HYDROCODONE BITARTRATE AND ACETAMINOPHEN 5; 325 MG/1; MG/1
1 TABLET ORAL EVERY 8 HOURS PRN
Qty: 40 TABLET | Refills: 0 | Status: SHIPPED | OUTPATIENT
Start: 2020-03-21 | End: 2020-04-29 | Stop reason: SDUPTHER

## 2020-04-07 ENCOUNTER — NURSE TRIAGE (OUTPATIENT)
Dept: ADMINISTRATIVE | Facility: CLINIC | Age: 75
End: 2020-04-07

## 2020-04-07 ENCOUNTER — TELEPHONE (OUTPATIENT)
Dept: INTERNAL MEDICINE | Facility: CLINIC | Age: 75
End: 2020-04-07

## 2020-04-07 NOTE — TELEPHONE ENCOUNTER
----- Message from Lucy Escobar sent at 4/7/2020  2:23 PM CDT -----  Contact: Pt [phone]  Would like to get medical advice.  Symptoms (please be specific):  /49 and feels very tired even her face feels tired and all she does is want to sleep  How long has patient had these symptoms:  2 days  Pharmacy name and phone #:  [id number] - [place], [place] - [address]  AT [facility] OF [address] [phone] (Phone) [phone] (Fax)    I am trying to get the nurse on call on the line also.

## 2020-04-07 NOTE — TELEPHONE ENCOUNTER
Pt calling, states new onset weakness and bilateral facial numbness. Calling to ask what she can take for energy. Advised patient per protocol. Pt does not have smart phone, not set up for virtual visit. Pt states she does not want to go to a healthcare setting due to covid-19 concerns. Informed her I would send message to PCP office, she v/u.    Reason for Disposition   MODERATE weakness (i.e., interferes with work, school, normal activities) and cause unknown    Additional Information   Negative: Severe difficulty breathing (e.g., struggling for each breath, speaks in single words)   Negative: Shock suspected (e.g., cold/pale/clammy skin, too weak to stand, low BP, rapid pulse)   Negative: Difficult to awaken or acting confused (e.g., disoriented, slurred speech)   Negative: Fainted > 15 minutes ago and still feels too weak or dizzy to stand   Negative: SEVERE weakness (i.e., unable to walk or barely able to walk, requires support) and new onset or worsening   Negative: Sounds like a life-threatening emergency to the triager   Negative: Difficulty breathing   Negative: Heart beating < 50 beats per minute OR > 140 beats per minute   Negative: Extra heartbeats OR irregular heart beating (i.e., 'palpitations')   Negative: Follows bleeding (e.g., from vomiting, rectum, vagina)   Negative: Bloody, black, or tarry bowel movements   Negative: MODERATE weakness from poor fluid intake with no improvement after 2 hours of rest and fluids   Negative: Drinking very little and dehydration suspected (e.g., no urine > 12 hours, very dry mouth, very lightheaded)   Negative: Patient sounds very sick or weak to the triager    Protocols used: WEAKNESS (GENERALIZED) AND FATIGUE-A-OH

## 2020-04-29 DIAGNOSIS — M47.22 OSTEOARTHRITIS OF SPINE WITH RADICULOPATHY, CERVICAL REGION: ICD-10-CM

## 2020-04-29 RX ORDER — HYDROCODONE BITARTRATE AND ACETAMINOPHEN 5; 325 MG/1; MG/1
1 TABLET ORAL EVERY 8 HOURS PRN
Qty: 40 TABLET | Refills: 0 | Status: SHIPPED | OUTPATIENT
Start: 2020-04-29 | End: 2020-06-04 | Stop reason: SDUPTHER

## 2020-04-29 NOTE — TELEPHONE ENCOUNTER
----- Message from Gloria Tobias sent at 4/29/2020  2:14 PM CDT -----  Contact: self/[phone]  Requesting an RX refill or new RX.  Is this a refill or new RX:  Refill 1  RX name and strength: HYDROcodone-acetaminophen (NORCO) 5-325 mg per tablet  Directions (copy/paste from chart):    Is this a 30 day or 90 day RX:    Local pharmacy or mail order pharmacy:  Local pharmacy  Pharmacy name and phone # (copy/paste from chart):  [id number] - [place] [address]  AT [facility] OF [address] [phone] (Phone) [phone] (Fax)   Comments:

## 2020-06-04 ENCOUNTER — TELEPHONE (OUTPATIENT)
Dept: ORTHOPEDICS | Facility: CLINIC | Age: 75
End: 2020-06-04

## 2020-06-04 DIAGNOSIS — M17.0 PRIMARY OSTEOARTHRITIS OF BOTH KNEES: ICD-10-CM

## 2020-06-04 DIAGNOSIS — M47.22 OSTEOARTHRITIS OF SPINE WITH RADICULOPATHY, CERVICAL REGION: ICD-10-CM

## 2020-06-04 RX ORDER — DICLOFENAC SODIUM 50 MG/1
50 TABLET, DELAYED RELEASE ORAL 2 TIMES DAILY
Qty: 60 TABLET | Refills: 3 | Status: SHIPPED | OUTPATIENT
Start: 2020-06-04 | End: 2020-11-27 | Stop reason: SDUPTHER

## 2020-06-04 NOTE — TELEPHONE ENCOUNTER
----- Message from Chasidy Beltran sent at 6/4/2020 10:56 AM CDT -----  Contact: [phone] liliya hernandez   Type:RX Refill Request  Who Called:   Best Call Back Number:    Preferred Pharmacy:[facility] #[id number] - [place], [place] - [address]  AT [facility] OF [facility] & [address]  Ordering Provider:  RX Name and Strength:diclofenac (VOLTAREN) 50 MG EC tablet      Additional Information:

## 2020-06-04 NOTE — TELEPHONE ENCOUNTER
----- Message from Chasidy Beltran sent at 6/4/2020 10:53 AM CDT -----  Contact: [phone] pt   Type:RX Refill Request  Who Called:   Best Call Back Number:    Preferred Pharmacy:[facility] #[id number] - [place] - [address]  AT [facility] OF [facility] & [address]  Ordering Provider:  RX Name and Strength:HYDROcodone-acetaminophen (NORCO) 5-325 mg per tablet, diclofenac (VOLTAREN) 50 MG EC tablet      Additional Information:

## 2020-06-04 NOTE — TELEPHONE ENCOUNTER
----- Message from Chata Armijo sent at 6/4/2020  1:14 PM CDT -----  Contact: [phone]/ self   Patient called in returning your call. Please advise.

## 2020-06-04 NOTE — TELEPHONE ENCOUNTER
I will do this for now.  Please please let the patient know that she should get these from her PCP if she wishes to stay on them long-term

## 2020-06-05 RX ORDER — HYDROCODONE BITARTRATE AND ACETAMINOPHEN 5; 325 MG/1; MG/1
1 TABLET ORAL EVERY 8 HOURS PRN
Qty: 40 TABLET | Refills: 0 | Status: SHIPPED | OUTPATIENT
Start: 2020-06-05 | End: 2020-06-12 | Stop reason: SDUPTHER

## 2020-06-12 DIAGNOSIS — M47.22 OSTEOARTHRITIS OF SPINE WITH RADICULOPATHY, CERVICAL REGION: ICD-10-CM

## 2020-06-12 NOTE — TELEPHONE ENCOUNTER
----- Message from Giorgio Blair sent at 6/12/2020  1:54 PM CDT -----  Contact: Patient [phone]  Patient would like to get medical advice.    Pharmacy name and phone #:  [facility] #[id number] - [place], [place] - [address]  AT [facility] OF [address] [phone] (Phone)  [phone] (Fax)    Comments: Patient calling is needing PA for the Rx HYDROcodone-acetaminophen (NORCO) 5-325 mg per tablet, still not received at Pharmacy above call to update patient, out of Rx call ASAP.    Please call an advise  Thank you

## 2020-06-13 RX ORDER — HYDROCODONE BITARTRATE AND ACETAMINOPHEN 5; 325 MG/1; MG/1
1 TABLET ORAL EVERY 8 HOURS PRN
Qty: 40 TABLET | Refills: 0 | Status: SHIPPED | OUTPATIENT
Start: 2020-06-13 | End: 2020-07-31 | Stop reason: SDUPTHER

## 2020-06-29 ENCOUNTER — TELEPHONE (OUTPATIENT)
Dept: INTERNAL MEDICINE | Facility: CLINIC | Age: 75
End: 2020-06-29

## 2020-06-29 RX ORDER — VALACYCLOVIR HYDROCHLORIDE 1 G/1
1000 TABLET, FILM COATED ORAL 3 TIMES DAILY
Qty: 21 TABLET | Refills: 0 | Status: SHIPPED | OUTPATIENT
Start: 2020-06-29 | End: 2020-08-04

## 2020-06-29 NOTE — TELEPHONE ENCOUNTER
Was able to schedule pt on 7/2/20 with next available MD/DO.    Please advise treatment until that time.    Thanks.

## 2020-06-29 NOTE — TELEPHONE ENCOUNTER
----- Message from Jennifer Bolden sent at 6/29/2020  8:30 AM CDT -----  Contact: [phone]  Would like to get medical advice.  Symptoms (please be specific):  rash on right side of her body (patient thinks she has shingles)  How long has patient had these symptoms:  few days   Pharmacy name and phone #:  [facility] #[id number] - [place] - [address]  AT [facility] OF [facility] & [address] [phone] (Phone)  [phone] (Fax)  Any drug allergies (copy from chart):      Would the patient rather a call back or a response via [facility]?:  Call back  Comments:

## 2020-06-29 NOTE — TELEPHONE ENCOUNTER
Spoke with pt to advise that Dr. Connell will treat her empirically with Valtrex but wants her to keep the scheduled appt on 7/2/20 with Debi at 11 am.    Verbalized understanding.

## 2020-06-30 ENCOUNTER — NURSE TRIAGE (OUTPATIENT)
Dept: ADMINISTRATIVE | Facility: CLINIC | Age: 75
End: 2020-06-30

## 2020-06-30 ENCOUNTER — OFFICE VISIT (OUTPATIENT)
Dept: URGENT CARE | Facility: CLINIC | Age: 75
End: 2020-06-30
Payer: MEDICARE

## 2020-06-30 ENCOUNTER — TELEPHONE (OUTPATIENT)
Dept: INTERNAL MEDICINE | Facility: CLINIC | Age: 75
End: 2020-06-30

## 2020-06-30 VITALS
OXYGEN SATURATION: 98 % | BODY MASS INDEX: 29.88 KG/M2 | WEIGHT: 175 LBS | TEMPERATURE: 97 F | DIASTOLIC BLOOD PRESSURE: 77 MMHG | RESPIRATION RATE: 17 BRPM | SYSTOLIC BLOOD PRESSURE: 123 MMHG | HEIGHT: 64 IN | HEART RATE: 97 BPM

## 2020-06-30 DIAGNOSIS — B02.8 HERPES ZOSTER WITH OTHER COMPLICATION: Primary | ICD-10-CM

## 2020-06-30 PROCEDURE — 99214 PR OFFICE/OUTPT VISIT, EST, LEVL IV, 30-39 MIN: ICD-10-PCS | Mod: S$GLB,,, | Performed by: STUDENT IN AN ORGANIZED HEALTH CARE EDUCATION/TRAINING PROGRAM

## 2020-06-30 PROCEDURE — 99214 OFFICE O/P EST MOD 30 MIN: CPT | Mod: S$GLB,,, | Performed by: STUDENT IN AN ORGANIZED HEALTH CARE EDUCATION/TRAINING PROGRAM

## 2020-06-30 RX ORDER — GABAPENTIN 100 MG/1
100 CAPSULE ORAL EVERY 8 HOURS PRN
Qty: 21 CAPSULE | Refills: 0 | Status: SHIPPED | OUTPATIENT
Start: 2020-06-30 | End: 2020-08-05 | Stop reason: SDUPTHER

## 2020-06-30 RX ORDER — NAPROXEN 375 MG/1
375 TABLET ORAL 2 TIMES DAILY WITH MEALS
Qty: 14 TABLET | Refills: 0 | Status: SHIPPED | OUTPATIENT
Start: 2020-06-30 | End: 2020-07-07

## 2020-06-30 NOTE — TELEPHONE ENCOUNTER
----- Message from Christina Encinas MA sent at 6/29/2020  5:06 PM CDT -----    ----- Message -----  From: Patty Zafar  Sent: 6/29/2020   4:38 PM CDT  To: [facility] ANNE Staff    Type:  Needs Medical Advice    Who Called: SARTHAK     Would the patient rather a call back or a response via [facility]? CALL BACK     Best Call Back Number: [phone]     Additional Information: SARTHAK WOULD LIKE TO SPEAK WITH THE NURSE ABOUT HER PAINFUL RASH. PLEASE CALL BACK TODAY

## 2020-06-30 NOTE — TELEPHONE ENCOUNTER
Called and spoke with the pt and she states she went to [facility] and they advised she has Shingles she has also been given Rx by PCP she will start and call back. Termed the call

## 2020-06-30 NOTE — TELEPHONE ENCOUNTER
Called to speak with the pt she advised its on her right side and under her arm and right breast. Advised pt she can go to an urgent care facility since she was in so much pain unless she preferred to wait on scheduled appt she advised she will go to  and she will call back. Termed the call

## 2020-06-30 NOTE — PROGRESS NOTES
"Subjective:       Patient ID: Sofia Augustine is a 74 y.o. female.    Vitals:  height is 5' 4" (1.626 m) and weight is 79.4 kg (175 lb). Her tympanic temperature is 97.2 °F (36.2 °C). Her blood pressure is 123/77 and her pulse is 97. Her respiration is 17 and oxygen saturation is 98%.     Chief Complaint: Rash    Patient presents for shingles. Has had in past and states this feels very similar. Noticed rash ~3d ago on R chest wall, has since spread to back. Does not cross to left side. Still having new lesions appear and reports significant pain with burning and itching feeling. Started empirically on valcyclovir and hydrocodone by PCP over phone yesterday but still reporting significant pain. Denied f/c, ocular symptoms, focal deficits, GI sx's.    Rash  This is a new problem. The current episode started in the past 7 days. The problem is unchanged (x3 days). The affected locations include the torso. The rash is characterized by pain, redness, itchiness and burning. She was exposed to nothing. Pertinent negatives include no congestion, cough, diarrhea, eye pain, facial edema, fatigue, fever, joint pain, shortness of breath, sore throat or vomiting. Treatments tried: Hydrocodone, valcyclovir. The treatment provided no relief. Her past medical history is significant for varicella.       Constitution: Negative for chills, fatigue and fever.   HENT: Negative for facial swelling, congestion and sore throat.    Neck: Negative for neck pain and painful lymph nodes.   Cardiovascular: Positive for chest pain (related to rash). Negative for palpitations and sob on exertion.   Eyes: Negative for eye itching, eye pain and eyelid swelling.   Respiratory: Negative for chest tightness, cough and shortness of breath.    Gastrointestinal: Negative for nausea, vomiting and diarrhea.   Musculoskeletal: Positive for pain. Negative for joint pain and joint swelling.   Skin: Positive for rash. Negative for color change, pale, wound, " "abrasion, laceration, lesion, erythema, bruising, abscess, avulsion and hives.   Allergic/Immunologic: Negative for environmental allergies, immunocompromised state and hives.   Neurological: Negative for dizziness, light-headedness and headaches.   Hematologic/Lymphatic: Negative for swollen lymph nodes.   Psychiatric/Behavioral: Positive for sleep disturbance (2/2 pain). Negative for confusion and agitation.       Objective:       Vitals:    06/30/20 0941   BP: 123/77   Pulse: 97   Resp: 17   Temp: 97.2 °F (36.2 °C)   TempSrc: Tympanic   SpO2: 98%   Weight: 79.4 kg (175 lb)   Height: 5' 4" (1.626 m)     Physical Exam   Constitutional: She is oriented to person, place, and time. She appears well-developed. She does not appear ill. No distress.   Patient with intermittent wincing from pain otherwise NAD   HENT:   Head: Normocephalic and atraumatic.   Right Ear: External ear normal.   Left Ear: External ear normal.   Eyes: Conjunctivae and EOM are normal. Right eye exhibits no discharge. Left eye exhibits no discharge. No scleral icterus.   Neck: Normal range of motion. Neck supple.   Cardiovascular: Normal rate, regular rhythm and normal heart sounds. Exam reveals no friction rub.   No murmur heard.  Pulmonary/Chest: Effort normal and breath sounds normal. No respiratory distress. She has no wheezes. She has no rales.   Musculoskeletal: Normal range of motion.   Neurological: She is alert and oriented to person, place, and time. No cranial nerve deficit (CN II-XII grossly intact).   Skin: Skin is warm, dry and rash.   Vesiculopapular rash on R mid chest and back in dermatomal distribution that does not cross midline Lesions:  lesionerythema   Comments: Vesiculopapular rash on R mid chest and back in dermatomal distribution that does not cross midline     Psychiatric: Her behavior is normal. Judgment and thought content normal.   Nursing note and vitals reviewed.        Assessment:       1. Herpes zoster with other " complication        Plan:         Herpes zoster with other complication  -     gabapentin (NEURONTIN) 100 MG capsule; Take 1 capsule (100 mg total) by mouth every 8 (eight) hours as needed (pain).  Dispense: 21 capsule; Refill: 0  -     naproxen (EC-NAPROSYN) 375 MG TbEC EC tablet; Take 1 tablet (375 mg total) by mouth 2 (two) times daily with meals. for 7 days  Dispense: 14 tablet; Refill: 0      - discussed NSAIDs and counseled pt not to take prescribed diclofenac or OTC NSAIDS while taking prescribed naproxen  - continue valcyclovir and hydrocodone as prescribed by PCP  - offered steroids to pt but after reviewing risks pt preferred not to take    Medications and diagnosis reviewed with patient, questions answered, and return precautions given.    Follow up in 2 days (on 7/2/2020) for re-evaluation with PCP as scheduled.    Wild Carmen MD/MPH  [facility]  [facility]

## 2020-06-30 NOTE — TELEPHONE ENCOUNTER
----- Message from Corinne Schulte sent at 6/30/2020 11:00 AM CDT -----  Contact: self/[phone]  Pt called in regards to a missed call from the MA about PAINFUL RASH. She would like a call back.       Please advise

## 2020-06-30 NOTE — PATIENT INSTRUCTIONS

## 2020-07-02 ENCOUNTER — TELEPHONE (OUTPATIENT)
Dept: INTERNAL MEDICINE | Facility: CLINIC | Age: 75
End: 2020-07-02

## 2020-07-03 ENCOUNTER — NURSE TRIAGE (OUTPATIENT)
Dept: ADMINISTRATIVE | Facility: CLINIC | Age: 75
End: 2020-07-03

## 2020-07-04 NOTE — TELEPHONE ENCOUNTER
Reason for Disposition   [1] Shingles rash already diagnosed and [2] taking antiviral medication    Additional Information   Negative: Difficult to awaken or acting confused (e.g., disoriented, slurred speech)   Negative: Sounds like a life-threatening emergency to the triager   Negative: [1] Localized rash AND [2] doesn't match the SYMPTOMS of shingles   Negative: [1] Back pain AND [2] doesn't match the SYMPTOMS of shingles   Negative: Shingles Vaccine (Recombinant Zoster Vaccine; RZV; Shingrix), questions about   Negative: Patient sounds very sick or weak to the triager   Negative: [1] Shingles rash (matches SYMPTOMS) AND [2] weak immune system (e.g., HIV positive,  cancer chemotherapy, chronic steroid treatment, splenectomy) AND [3] NOT taking antiviral medication   Negative: Shingles rash on the eyelid or tip of the nose   Negative: [1] Shingles rash of face AND [2] eye pain or blurred vision   Negative: [1] Shingles rash of face AND [2] facial weakness   Negative: [1] Shingles rash of face or ear AND [2] earache or ringing in the ear   Negative: [1] Shingles rash AND [2] spots start appearing other places on body   Negative: Fever > 100.4 F (38.0 C)   Negative: SEVERE pain (e.g., excruciating)   Negative: [1] Shingles rash (matches SYMPTOMS) AND [2] onset within past 72 hours   Negative: [1] Shingles rash AND [2] onset > 72 hours ago   Negative: [1] Looks infected (spreading redness, pus) AND [2] no fever   Negative: [1] Shingle rash already diagnosed AND [2] weak immune system (e.g., HIV positive,  cancer chemotherapy, chronic steroid treatment, splenectomy) AND [3]  taking antiviral medication   Negative: Pain persisting > 1 month after rash disappears    Protocols used: SHINGLES-A-AH  Pt called re rash still hurting. Seen in  6/30 dxd with shingles. pt on treatment and no help with rash or pain. rash on right side under arm going toward back and on neck and face to jaw line , eyes  itchy. Denies rash on face near eyes or nose. On hydrocodone and gabapentin and still hurting. Blisters on back are a little bigger. Cant sit on drivers seat has to lean forward. Anything touches it hurts. Rates pain 7. Last pain meds about 3 hours ago. taking q 8 hours. Offered protocol advice. Call back with questions

## 2020-07-31 DIAGNOSIS — M47.22 OSTEOARTHRITIS OF SPINE WITH RADICULOPATHY, CERVICAL REGION: ICD-10-CM

## 2020-07-31 RX ORDER — HYDROCODONE BITARTRATE AND ACETAMINOPHEN 5; 325 MG/1; MG/1
1 TABLET ORAL EVERY 8 HOURS PRN
Qty: 40 TABLET | Refills: 0 | Status: SHIPPED | OUTPATIENT
Start: 2020-07-31 | End: 2020-09-11

## 2020-07-31 NOTE — TELEPHONE ENCOUNTER
----- Message from Jennifer Bolden sent at 7/31/2020  9:38 AM CDT -----  Contact: [phone]  Requesting an RX refill or new RX.  Is this a refill or new RX:  refill  RX name and strength: HYDROcodone-acetaminophen (NORCO) 5-325 mg per tablet  Directions (copy/paste from chart):    Is this a 30 day or 90 day RX:  30  Local pharmacy or mail order pharmacy:  local  Pharmacy name and phone # (copy/paste from chart):   [id number] - [facility] [address] AT [facility] OF [address] [phone] (Phone)  [phone] (Fax)  Comments:

## 2020-08-04 ENCOUNTER — NURSE TRIAGE (OUTPATIENT)
Dept: ADMINISTRATIVE | Facility: CLINIC | Age: 75
End: 2020-08-04

## 2020-08-04 DIAGNOSIS — B02.8 HERPES ZOSTER WITH OTHER COMPLICATION: ICD-10-CM

## 2020-08-04 RX ORDER — VALACYCLOVIR HYDROCHLORIDE 1 G/1
1000 TABLET, FILM COATED ORAL 3 TIMES DAILY
Qty: 21 TABLET | Refills: 0 | OUTPATIENT
Start: 2020-08-04 | End: 2020-08-11

## 2020-08-04 NOTE — TELEPHONE ENCOUNTER
Spoke with patient she states that she has shingles and she has ran out of medication.  States that she was taking gabapentin and valtrex.  She is not sure of the name for the 3 rd medication she was taking.  Patient states that the shingles are on her left shoulder, neck, and back.  She denies having any clusters but states they are painful.  Current pain 7/10 patient unable to wait until tomorrow.  Dr. Connell sent in valtrex to pharmacy however medication will not be available until tomorrow morning around 8 am.  Advised patient to be seen within the next 3-4 hours.  She states that she will get a ride to go to Urgent Care.  [facility] information given as well.  Advised patient to call back if symptoms worsen.  Patient verbalized understanding.    Reason for Disposition   [1] Shingles rash AND [2] spots start appearing other places on body    Additional Information   Negative: Difficult to awaken or acting confused (e.g., disoriented, slurred speech)   Negative: Sounds like a life-threatening emergency to the triager   Negative: Patient sounds very sick or weak to the triager   Negative: [1] Shingles rash (matches SYMPTOMS) AND [2] weak immune system (e.g., HIV positive,  cancer chemotherapy, chronic steroid treatment, splenectomy) AND [3] NOT taking antiviral medication   Negative: Shingles rash on the eyelid or tip of the nose   Negative: [1] Shingles rash of face AND [2] eye pain or blurred vision   Negative: [1] Shingles rash of face AND [2] facial weakness   Negative: [1] Shingles rash of face or ear AND [2] earache or ringing in the ear    Protocols used: SHINGLES-A-AH

## 2020-08-04 NOTE — TELEPHONE ENCOUNTER
----- Message from Teto Henry sent at 8/4/2020 11:21 AM CDT -----  Regarding: Ms. BlackWujmy-[phone]  Type:  RX Refill Request    Who Called: Ms. Black     RX Name and Strength: valACYclovir (VALTREX) 1000 MG tablet      Is this a 30 day or 90 day RX: 21 tablet    Preferred Pharmacy with phone number: [facility] #[id number] - [place], [place] - [address]  AT [facility] OF [facility] [address] [phone] (Phone)  [phone] (Fax)      Would the patient rather a call back or a response via [facility]? Callback     Best Call Back Number: Ms. BlackIstyz-[phone]    Additional Information: Ms. Black is requesting a callback; she states that she had two other medications but she doesn't remember what they were.  She states that she had finished all of the medication that she needed for the shingles.  She states that now the pain is in her right side and she can't lift her right arm.  She also has blisters on her scalp, under chin and neck.

## 2020-08-05 RX ORDER — GABAPENTIN 100 MG/1
100 CAPSULE ORAL EVERY 8 HOURS PRN
Qty: 90 CAPSULE | Refills: 0 | Status: SHIPPED | OUTPATIENT
Start: 2020-08-05 | End: 2020-11-27

## 2020-08-05 NOTE — TELEPHONE ENCOUNTER
Spoke with patient she states that the urgent care told her there is nothing they can do to help with her shingles pain she is experiencing.  Patient states that she was told by  to follow up with her PCP. Offered Ready Responder patient declined.  During call after offering RR  patient reports having a new symptoms of numbness in her face on the left side.  Patient states that numbness suddenly started.  States that she took Ibuprofen (4) 200 mg tablets.  Also states that pain in her neck and back have increased.  Per protocol advised patient to call 911.  She does not have transportation at this time.  Patient states that she does not want to call 911.  States that sh only ants to follow up with PCP in the Am.  Further advised patient to call 911 for new facial numbness that she is experiencing.  Patient verbalized understadning.     Reason for Disposition   [1] Numbness (i.e., loss of sensation) of the face, arm / hand, or leg / foot on one side of the body AND [2] sudden onset AND [3] present now    Additional Information   Negative: [1] SEVERE weakness (i.e., unable to walk or barely able to walk, requires support) AND [2] new onset or worsening   Negative: [1] Weakness (i.e., paralysis, loss of muscle strength) of the face, arm / hand, or leg / foot on one side of the body AND [2] sudden onset AND [3] present now    Protocols used: NEUROLOGIC DEFICIT-A-AH

## 2020-08-05 NOTE — TELEPHONE ENCOUNTER
"I spoke to pt, she refused to call 911 last night and again this am.   Pt reported back pain going around to right side if chest area is due to shingles, "it's not heart related'.Pt states she have blisters on her neck and back. She was treated for shingles about 1 month ago but it never went away.  She's taking hydrocodone and ibuprofen with no relief.  She have not picked up new Rx for Valtrex.    She states gabapentin works and she is requesting a refill.  Verified pharmacy used.     Please advise  "

## 2020-08-05 NOTE — TELEPHONE ENCOUNTER
Reason for Disposition   [1] Caller requesting a NON-URGENT new prescription or refill AND [2] triager unable to refill per unit policy    Protocols used: MEDICATION QUESTION CALL-A-DERIC    Sofia thought her pcp was going to order gabapentin for her shingles but nothing was sent over to her pharmacy. Advised triage nurse could send message to provider about requesting an order for gabapentin.

## 2020-08-27 ENCOUNTER — TELEPHONE (OUTPATIENT)
Dept: INTERNAL MEDICINE | Facility: CLINIC | Age: 75
End: 2020-08-27

## 2020-08-27 NOTE — TELEPHONE ENCOUNTER
----- Message from Elo Renteria sent at 8/27/2020  9:48 AM CDT -----  Contact: Pt [phone]  Patient is requesting a call about her shingles.    Please call and advise.    Thank You

## 2020-09-01 RX ORDER — VALACYCLOVIR HYDROCHLORIDE 1 G/1
TABLET, FILM COATED ORAL
Qty: 21 TABLET | Refills: 0 | Status: SHIPPED | OUTPATIENT
Start: 2020-09-01 | End: 2020-09-11

## 2020-09-02 ENCOUNTER — TELEPHONE (OUTPATIENT)
Dept: INTERNAL MEDICINE | Facility: CLINIC | Age: 75
End: 2020-09-02

## 2020-09-02 RX ORDER — SERTRALINE HYDROCHLORIDE 25 MG/1
25 TABLET, FILM COATED ORAL DAILY
Qty: 30 TABLET | Refills: 3 | Status: SHIPPED | OUTPATIENT
Start: 2020-09-02 | End: 2020-09-11

## 2020-09-02 NOTE — TELEPHONE ENCOUNTER
Returned patient's call.  Patient states she has been crying because of new developments in the case involving the death of her son.  I informed her that upon review of her chart, the only antidepressant that have prescribed was trazodone for sleep.  Advised her that this dosage was not adequate for treating depression.  I recommend sertraline.  Patient is in agreement.  Prescription has been sent to the pharmacy electronically.  Patient will follow-up next Friday with me.

## 2020-09-02 NOTE — TELEPHONE ENCOUNTER
Pt called she is depressed and wants a Rx sent she is dealing with the murder of her son and the case is not closed

## 2020-09-02 NOTE — TELEPHONE ENCOUNTER
----- Message from Ernestine Herron sent at 9/2/2020 11:11 AM CDT -----  Contact: Self [phone]  Would like to receive medical advice.    Pharmacy name/number (copy/paste from chart):  [facility]    Would they like a call back or a response via [facility]:  call back    Additional information:  Calling to speak with the nurse regarding if the provider can prescribe the pt a medication for depression. Patient states the provider has written a rx in the past , but is unsure of the name. Patient is requesting a call back regarding message.

## 2020-09-11 ENCOUNTER — OFFICE VISIT (OUTPATIENT)
Dept: INTERNAL MEDICINE | Facility: CLINIC | Age: 75
End: 2020-09-11
Payer: MEDICARE

## 2020-09-11 VITALS
HEIGHT: 64 IN | HEART RATE: 95 BPM | DIASTOLIC BLOOD PRESSURE: 88 MMHG | WEIGHT: 181.19 LBS | OXYGEN SATURATION: 99 % | BODY MASS INDEX: 30.93 KG/M2 | SYSTOLIC BLOOD PRESSURE: 134 MMHG | TEMPERATURE: 98 F

## 2020-09-11 DIAGNOSIS — F33.9 RECURRENT MAJOR DEPRESSIVE DISORDER, REMISSION STATUS UNSPECIFIED: Primary | ICD-10-CM

## 2020-09-11 DIAGNOSIS — I10 ESSENTIAL HYPERTENSION: ICD-10-CM

## 2020-09-11 PROCEDURE — 99499 RISK ADDL DX/OHS AUDIT: ICD-10-PCS | Mod: S$GLB,,, | Performed by: INTERNAL MEDICINE

## 2020-09-11 PROCEDURE — 99999 PR PBB SHADOW E&M-EST. PATIENT-LVL IV: CPT | Mod: PBBFAC,,, | Performed by: INTERNAL MEDICINE

## 2020-09-11 PROCEDURE — 3075F SYST BP GE 130 - 139MM HG: CPT | Mod: CPTII,S$GLB,, | Performed by: INTERNAL MEDICINE

## 2020-09-11 PROCEDURE — 3079F PR MOST RECENT DIASTOLIC BLOOD PRESSURE 80-89 MM HG: ICD-10-PCS | Mod: CPTII,S$GLB,, | Performed by: INTERNAL MEDICINE

## 2020-09-11 PROCEDURE — 99999 PR PBB SHADOW E&M-EST. PATIENT-LVL IV: ICD-10-PCS | Mod: PBBFAC,,, | Performed by: INTERNAL MEDICINE

## 2020-09-11 PROCEDURE — 1101F PR PT FALLS ASSESS DOC 0-1 FALLS W/OUT INJ PAST YR: ICD-10-PCS | Mod: CPTII,S$GLB,, | Performed by: INTERNAL MEDICINE

## 2020-09-11 PROCEDURE — 1159F MED LIST DOCD IN RCRD: CPT | Mod: S$GLB,,, | Performed by: INTERNAL MEDICINE

## 2020-09-11 PROCEDURE — 1126F PR PAIN SEVERITY QUANTIFIED, NO PAIN PRESENT: ICD-10-PCS | Mod: S$GLB,,, | Performed by: INTERNAL MEDICINE

## 2020-09-11 PROCEDURE — 1126F AMNT PAIN NOTED NONE PRSNT: CPT | Mod: S$GLB,,, | Performed by: INTERNAL MEDICINE

## 2020-09-11 PROCEDURE — 3079F DIAST BP 80-89 MM HG: CPT | Mod: CPTII,S$GLB,, | Performed by: INTERNAL MEDICINE

## 2020-09-11 PROCEDURE — 99213 PR OFFICE/OUTPT VISIT, EST, LEVL III, 20-29 MIN: ICD-10-PCS | Mod: S$GLB,,, | Performed by: INTERNAL MEDICINE

## 2020-09-11 PROCEDURE — 99213 OFFICE O/P EST LOW 20 MIN: CPT | Mod: S$GLB,,, | Performed by: INTERNAL MEDICINE

## 2020-09-11 PROCEDURE — 3075F PR MOST RECENT SYSTOLIC BLOOD PRESS GE 130-139MM HG: ICD-10-PCS | Mod: CPTII,S$GLB,, | Performed by: INTERNAL MEDICINE

## 2020-09-11 PROCEDURE — 99499 UNLISTED E&M SERVICE: CPT | Mod: S$GLB,,, | Performed by: INTERNAL MEDICINE

## 2020-09-11 PROCEDURE — 1101F PT FALLS ASSESS-DOCD LE1/YR: CPT | Mod: CPTII,S$GLB,, | Performed by: INTERNAL MEDICINE

## 2020-09-11 PROCEDURE — 1159F PR MEDICATION LIST DOCUMENTED IN MEDICAL RECORD: ICD-10-PCS | Mod: S$GLB,,, | Performed by: INTERNAL MEDICINE

## 2020-09-11 RX ORDER — SERTRALINE HYDROCHLORIDE 50 MG/1
50 TABLET, FILM COATED ORAL DAILY
Qty: 30 TABLET | Refills: 3 | Status: SHIPPED | OUTPATIENT
Start: 2020-09-11 | End: 2020-12-18

## 2020-09-11 RX ORDER — HYDROCODONE BITARTRATE AND ACETAMINOPHEN 5; 325 MG/1; MG/1
TABLET ORAL
COMMUNITY
End: 2020-09-21 | Stop reason: SDUPTHER

## 2020-09-11 RX ORDER — LOSARTAN POTASSIUM 25 MG/1
25 TABLET ORAL DAILY
Qty: 90 TABLET | Refills: 3 | Status: SHIPPED | OUTPATIENT
Start: 2020-09-11 | End: 2021-06-29 | Stop reason: SDUPTHER

## 2020-09-11 RX ORDER — ASPIRIN 81 MG/1
81 TABLET ORAL DAILY
COMMUNITY

## 2020-09-11 NOTE — PROGRESS NOTES
CC:  Depression  HPI:  The patient is a 74 y.o. year old female who presents to the office for depression.  She started zoloft recently, but denies any adverse effects.  She is not sleeping well.  The patient has been experiencing an exacerbation of her symptoms following the reopening of the case of her son's death.  She complains of fatigue.  She denies any homicidal or suicidal ideations.    PAST MEDICAL HISTORY:  Past Medical History:   Diagnosis Date    Abnormal mammogram of both breasts     Arthritis     Coronary vasospasm 10/31/2019    Hypertension     Major depressive disorder 5/14/2017    Memory disorder     Seizures     Stroke        SURGICAL HISTORY:  Past Surgical History:   Procedure Laterality Date    CATARACT EXTRACTION      cysts breast      TONSILLECTOMY         MEDS:  Medcard reviewed and updated    ALLERGIES: Allergy Card reviewed and updated    SOCIAL HISTORY:   The patient is a nonsmoker.    PE:   APPEARANCE: Well nourished, well developed, in no acute distress.    CHEST: Lungs clear to auscultation with unlabored respirations.  CARDIOVASCULAR: Normal S1, S2. No murmurs. No carotid bruits. No pedal edema.  ABDOMEN: Bowel sounds normal. Not distended. Soft. No tenderness or masses.   PSYCHIATRIC: The patient is oriented to person, place, and time and has a pleasant affect.        ASSESSMENT/PLAN:  Sofia was seen today for depression.    Diagnoses and all orders for this visit:    Recurrent major depressive disorder, remission status unspecified  -     continue Zoloft    Essential hypertension  -     start losartan    Other orders  -     sertraline (ZOLOFT) 50 MG tablet; Take 1 tablet (50 mg total) by mouth once daily.  -     losartan (COZAAR) 25 MG tablet; Take 1 tablet (25 mg total) by mouth once daily.

## 2020-09-21 ENCOUNTER — NURSE TRIAGE (OUTPATIENT)
Dept: ADMINISTRATIVE | Facility: CLINIC | Age: 75
End: 2020-09-21

## 2020-09-21 DIAGNOSIS — M47.22 OSTEOARTHRITIS OF SPINE WITH RADICULOPATHY, CERVICAL REGION: Primary | ICD-10-CM

## 2020-09-21 NOTE — TELEPHONE ENCOUNTER
"    Reason for Disposition   Caller requesting a CONTROLLED substance prescription refill (e.g., narcotics, ADHD medicines)    Additional Information   Negative: Drug overdose and triager unable to answer question   Negative: Caller requesting information unrelated to medicine   Negative: Caller requesting a prescription for Strep throat and has a positive culture result   Negative: Rash while taking a medication or within 3 days of stopping it   Negative: Immunization reaction suspected   Negative: [1] Asthma and [2] having symptoms of asthma (cough, wheezing, etc.)   Negative: [1] Influenza symptoms AND [2] anti-viral med prescription request, such as Tamiflu   Negative: [1] Symptom of illness (e.g., headache, abdominal pain, earache, vomiting) AND [2] more than mild   Negative: MORE THAN A DOUBLE DOSE of a prescription or over-the-counter (OTC) drug   Negative: [1] DOUBLE DOSE (an extra dose or lesser amount) of over-the-counter (OTC) drug AND [2] any symptoms (e.g., dizziness, nausea, pain, sleepiness)   Negative: [1] DOUBLE DOSE (an extra dose or lesser amount) of prescription drug AND [2] any symptoms (e.g., dizziness, nausea, pain, sleepiness)   Negative: Took another person's prescription drug   Negative: [1] DOUBLE DOSE (an extra dose or lesser amount) of prescription drug AND [2] NO symptoms (Exception: a double dose of antibiotics)   Negative: Diabetes drug error or overdose (e.g., took wrong type of insulin or took extra dose)   Negative: [1] Request for URGENT new prescription or refill of "essential" medication (i.e., likelihood of harm to patient if not taken) AND [2] triager unable to fill per unit policy   Negative: [1] Prescription not at pharmacy AND [2] was prescribed by PCP recently   Negative: [1] Pharmacy calling with prescription questions AND [2] triager unable to answer question   Negative: [1] Caller has URGENT medication question about med that PCP or specialist " prescribed AND [2] triager unable to answer question   Negative: [1] Caller has NON-URGENT medication question about med that PCP prescribed AND [2] triager unable to answer question   Negative: [1] Caller requesting a NON-URGENT new prescription or refill AND [2] triager unable to refill per unit policy   Negative: [1] Caller has medication question about med not prescribed by PCP AND [2] triager unable to answer question (e.g., compatibility with other med, storage)    Protocols used: MEDICATION QUESTION CALL-A-    Pt stated she has been calling Dr. Connell's office all day for her Knee pain medication. Advised Provider on call cannot refill pain medication. Advised a message will be sent to the PCP's office to follow up on tomorrow. Pt verbalized understanding.

## 2020-09-21 NOTE — TELEPHONE ENCOUNTER
----- Message from Donell Dunn sent at 9/21/2020  9:32 AM CDT -----  Regarding: refill  Contact: Mom  Type:  Needs Medical Advice    Who Called:Self     Would the patient rather a call back or a response via [facility]? Call back     Best Call Back Number:[phone]    Additional Information: Self [phone]-------calling to get a refill on the pt HYDROcodone-acetaminophen (NORCO) 5-325 mg per tablet. The pt is requesting a call back

## 2020-09-22 RX ORDER — HYDROCODONE BITARTRATE AND ACETAMINOPHEN 5; 325 MG/1; MG/1
1 TABLET ORAL EVERY 8 HOURS PRN
Qty: 40 TABLET | Refills: 0 | Status: SHIPPED | OUTPATIENT
Start: 2020-09-22 | End: 2020-10-20 | Stop reason: SDUPTHER

## 2020-10-01 ENCOUNTER — TELEPHONE (OUTPATIENT)
Dept: INTERNAL MEDICINE | Facility: CLINIC | Age: 75
End: 2020-10-01

## 2020-10-01 RX ORDER — PROMETHAZINE HYDROCHLORIDE 12.5 MG/1
12.5 TABLET ORAL EVERY 6 HOURS PRN
Qty: 20 TABLET | Refills: 0 | Status: SHIPPED | OUTPATIENT
Start: 2020-10-01 | End: 2020-11-27

## 2020-10-01 NOTE — TELEPHONE ENCOUNTER
----- Message from Lanette Francis sent at 10/1/2020 12:47 PM CDT -----  Contact: [phone]  Pt would like Rx for diarrhea and nausea   Pharmacy [phone] (Phone)  [phone] (Fax)

## 2020-10-01 NOTE — TELEPHONE ENCOUNTER
Please inform patient that I do not recommend treating both diarrhea and nausea at the same time.  I have sent a prescription for Phenergan to her pharmacy electronically, but caution patient that medication is very sedating and she should not operate heavy machinery while taking this medication.  In addition, I recommend a bland diet to help counteract the diarrhea.

## 2020-10-15 ENCOUNTER — PES CALL (OUTPATIENT)
Dept: ADMINISTRATIVE | Facility: CLINIC | Age: 75
End: 2020-10-15

## 2020-10-19 ENCOUNTER — PES CALL (OUTPATIENT)
Dept: ADMINISTRATIVE | Facility: CLINIC | Age: 75
End: 2020-10-19

## 2020-10-20 DIAGNOSIS — M47.22 OSTEOARTHRITIS OF SPINE WITH RADICULOPATHY, CERVICAL REGION: ICD-10-CM

## 2020-10-20 NOTE — TELEPHONE ENCOUNTER
----- Message from Lanette Francis sent at 10/20/2020  2:52 PM CDT -----  Contact: [phone]  Requesting an RX refill or new RX.  Is this a refill or new RX: refill  RX name and strength:HYDROcodone-acetaminophen (NORCO) 5-325 mg per tablet  Is this a 30 day or 90 day RX: 30  Pharmacy name and phone # (copy/paste from chart):  [id number] - [place] - [address]  AT [facility] [address] [phone] (Phone)  [phone] (Fax)      Comments:

## 2020-10-21 RX ORDER — HYDROCODONE BITARTRATE AND ACETAMINOPHEN 5; 325 MG/1; MG/1
1 TABLET ORAL EVERY 8 HOURS PRN
Qty: 40 TABLET | Refills: 0 | Status: SHIPPED | OUTPATIENT
Start: 2020-10-21 | End: 2020-11-24 | Stop reason: SDUPTHER

## 2020-11-24 DIAGNOSIS — M47.22 OSTEOARTHRITIS OF SPINE WITH RADICULOPATHY, CERVICAL REGION: ICD-10-CM

## 2020-11-24 RX ORDER — HYDROCODONE BITARTRATE AND ACETAMINOPHEN 5; 325 MG/1; MG/1
1 TABLET ORAL EVERY 8 HOURS PRN
Qty: 40 TABLET | Refills: 0 | Status: SHIPPED | OUTPATIENT
Start: 2020-11-24 | End: 2020-12-22 | Stop reason: SDUPTHER

## 2020-11-24 NOTE — TELEPHONE ENCOUNTER
----- Message from Amanda Dial sent at 11/24/2020  9:25 AM CST -----  Contact: Self   Requesting an RX refill or new RX.  Is this a refill or new RX:   RX name and strength:HYDROcodone-acetaminophen (NORCO) 5-325 mg per tablet  Route: Take 1 tablet by mouth every 8 (eight) hours as needed for Pain. - Oral   Is this a 30 day or 90 day RX:   Pharmacy name and phone # (copy/paste from chart):  [facility] #[id number] - [place], [place] - [address] AT [facility] OF [address] [phone] (Phone)  [phone] (Fax)      Comments:

## 2020-11-27 ENCOUNTER — HOSPITAL ENCOUNTER (OUTPATIENT)
Dept: RADIOLOGY | Facility: HOSPITAL | Age: 75
Discharge: HOME | End: 2020-11-27
Attending: NURSE PRACTITIONER
Payer: MEDICARE

## 2020-11-27 ENCOUNTER — TELEPHONE (OUTPATIENT)
Dept: INTERNAL MEDICINE | Facility: CLINIC | Age: 75
End: 2020-11-27

## 2020-11-27 ENCOUNTER — OFFICE VISIT (OUTPATIENT)
Dept: INTERNAL MEDICINE | Facility: CLINIC | Age: 75
End: 2020-11-27
Payer: MEDICARE

## 2020-11-27 VITALS
WEIGHT: 171.31 LBS | SYSTOLIC BLOOD PRESSURE: 142 MMHG | OXYGEN SATURATION: 97 % | RESPIRATION RATE: 16 BRPM | HEIGHT: 64 IN | BODY MASS INDEX: 29.24 KG/M2 | TEMPERATURE: 97 F | HEART RATE: 78 BPM | DIASTOLIC BLOOD PRESSURE: 98 MMHG

## 2020-11-27 DIAGNOSIS — M25.511 ACUTE PAIN OF RIGHT SHOULDER: ICD-10-CM

## 2020-11-27 DIAGNOSIS — M17.0 PRIMARY OSTEOARTHRITIS OF BOTH KNEES: ICD-10-CM

## 2020-11-27 DIAGNOSIS — M25.511 ACUTE PAIN OF RIGHT SHOULDER: Primary | ICD-10-CM

## 2020-11-27 PROCEDURE — 1101F PR PT FALLS ASSESS DOC 0-1 FALLS W/OUT INJ PAST YR: ICD-10-PCS | Mod: CPTII,S$GLB,, | Performed by: NURSE PRACTITIONER

## 2020-11-27 PROCEDURE — 3288F PR FALLS RISK ASSESSMENT DOCUMENTED: ICD-10-PCS | Mod: CPTII,S$GLB,, | Performed by: NURSE PRACTITIONER

## 2020-11-27 PROCEDURE — 3288F FALL RISK ASSESSMENT DOCD: CPT | Mod: CPTII,S$GLB,, | Performed by: NURSE PRACTITIONER

## 2020-11-27 PROCEDURE — 1125F PR PAIN SEVERITY QUANTIFIED, PAIN PRESENT: ICD-10-PCS | Mod: S$GLB,,, | Performed by: NURSE PRACTITIONER

## 2020-11-27 PROCEDURE — 99214 OFFICE O/P EST MOD 30 MIN: CPT | Mod: S$GLB,,, | Performed by: NURSE PRACTITIONER

## 2020-11-27 PROCEDURE — 73030 XR SHOULDER COMPLETE 2 OR MORE VIEWS RIGHT: ICD-10-PCS | Mod: 26,RT,, | Performed by: RADIOLOGY

## 2020-11-27 PROCEDURE — 99214 PR OFFICE/OUTPT VISIT, EST, LEVL IV, 30-39 MIN: ICD-10-PCS | Mod: S$GLB,,, | Performed by: NURSE PRACTITIONER

## 2020-11-27 PROCEDURE — 99999 PR PBB SHADOW E&M-EST. PATIENT-LVL III: ICD-10-PCS | Mod: PBBFAC,,, | Performed by: NURSE PRACTITIONER

## 2020-11-27 PROCEDURE — 99999 PR PBB SHADOW E&M-EST. PATIENT-LVL III: CPT | Mod: PBBFAC,,, | Performed by: NURSE PRACTITIONER

## 2020-11-27 PROCEDURE — 1125F AMNT PAIN NOTED PAIN PRSNT: CPT | Mod: S$GLB,,, | Performed by: NURSE PRACTITIONER

## 2020-11-27 PROCEDURE — 73030 X-RAY EXAM OF SHOULDER: CPT | Mod: TC,PO,RT

## 2020-11-27 PROCEDURE — 1101F PT FALLS ASSESS-DOCD LE1/YR: CPT | Mod: CPTII,S$GLB,, | Performed by: NURSE PRACTITIONER

## 2020-11-27 PROCEDURE — 73030 X-RAY EXAM OF SHOULDER: CPT | Mod: 26,RT,, | Performed by: RADIOLOGY

## 2020-11-27 RX ORDER — DICLOFENAC SODIUM 50 MG/1
50 TABLET, DELAYED RELEASE ORAL 2 TIMES DAILY
Qty: 20 TABLET | Refills: 0 | Status: SHIPPED | OUTPATIENT
Start: 2020-11-27 | End: 2020-12-07

## 2020-11-27 NOTE — TELEPHONE ENCOUNTER
Please let pt know that her xray was fine. Will refill antiinflammatory- diclofenac tab.   Stay hydrated, light movements, alternate ice and warm compresses. Can apply an OTC topical such as Blue emu or salonpas

## 2020-11-27 NOTE — PROGRESS NOTES
[facility]    Chief Complaint      Chief Complaint   Patient presents with    Chest Pain     RUQ; pt state a dumpster hit her chest     Shoulder Pain     Right shoulder     Back Pain     Behind Right shoulder blade      History of Present Illness      Sofia Augustine is a 75 y.o. female patient of Dr. Marcos who is new to me and presents today for c/o pain to chest and shoulder.  Patient reports 2 days ago was digging out of a dumpster at [facility], a car backing to the dumpster which hit her in the chest and right shoulder.  Is having intermittent throbbing to shoulder, maybe muscle spasms, feels like hot bee sting, some radiation into right arm and neck.  Denies shortness of breath cardiac chest pain, no swelling or redness or bruising.  Tender to touch    Problem List Items Addressed This Visit     None      Visit Diagnoses     Acute pain of right shoulder    -  Primary    Relevant Orders    X-ray Shoulder 2 or More Views Right (Completed)          Health Maintenance   Topic Date Due    Hepatitis C Screening  1945    TETANUS VACCINE  09/27/1962    DEXA SCAN  09/27/1984    Pneumococcal Vaccine (65+ Low/Medium Risk) (2 of 2 - PPSV23) 03/24/2016    Mammogram  05/13/2017    Aspirin/Antiplatelet Therapy  11/27/2021    Lipid Panel  10/28/2024       Past Medical History:   Diagnosis Date    Abnormal mammogram of both breasts     Arthritis     Coronary vasospasm 10/31/2019    Hypertension     Major depressive disorder 5/14/2017    Memory disorder     Seizures     Stroke        Past Surgical History:   Procedure Laterality Date    CATARACT EXTRACTION      cysts breast      TONSILLECTOMY         family history is not on file.    Social History     Tobacco Use    Smoking status: Never Smoker    Smokeless tobacco: Never Used   Substance Use Topics    Alcohol use: No    Drug use: No       Review of Systems   Constitutional: Negative for chills and fever.  "  Respiratory: Negative for cough, shortness of breath and wheezing.    Cardiovascular: Positive for chest pain. Negative for palpitations.   Gastrointestinal: Negative for nausea and vomiting.   Musculoskeletal: Positive for joint pain and neck pain. Negative for myalgias.   Skin: Negative for rash.   Neurological: Negative for dizziness and headaches.        Outpatient Encounter Medications as of 11/27/2020   Medication Sig Dispense Refill    aspirin (ECOTRIN) 81 MG EC tablet Take by mouth.      diclofenac (VOLTAREN) 50 MG EC tablet Take 1 tablet (50 mg total) by mouth 2 (two) times daily. 60 tablet 3    gabapentin (NEURONTIN) 100 MG capsule Take 1 capsule (100 mg total) by mouth every 8 (eight) hours as needed (pain). 90 capsule 0    HYDROcodone-acetaminophen (NORCO) 5-325 mg per tablet Take 1 tablet by mouth every 8 (eight) hours as needed for Pain. 40 tablet 0    losartan (COZAAR) 25 MG tablet Take 1 tablet (25 mg total) by mouth once daily. 90 tablet 3    promethazine (PHENERGAN) 12.5 MG Tab Take 1 tablet (12.5 mg total) by mouth every 6 (six) hours as needed. 20 tablet 0    sertraline (ZOLOFT) 50 MG tablet Take 1 tablet (50 mg total) by mouth once daily. 30 tablet 3     No facility-administered encounter medications on file as of 11/27/2020.         Review of patient's allergies indicates:  No Known Allergies    Physical Exam      Vital Signs  Temp: 97.3 °F (36.3 °C)  Temp src: Temporal  Pulse: 78  Resp: 16  SpO2: 97 %  BP: (!) 142/98  BP Location: Right arm  Patient Position: Sitting  Pain Score:   5  Pain Loc: Shoulder(Right shoulder/ right upper back)  Height and Weight  Height: 5' 4" (162.6 cm)  Weight: 77.7 kg (171 lb 4.8 oz)  BSA (Calculated - sq m): 1.87 sq meters  BMI (Calculated): 29.4  Weight in (lb) to have BMI = 25: 145.3]    Physical Exam  Vitals signs and nursing note reviewed.   Constitutional:       Appearance: Normal appearance. She is well-developed.   HENT:      Head: Normocephalic " and atraumatic.      Right Ear: External ear normal.      Left Ear: External ear normal.   Eyes:      Conjunctiva/sclera: Conjunctivae normal.   Neck:      Musculoskeletal: Normal range of motion and neck supple.      Thyroid: No thyromegaly.      Vascular: No JVD.      Trachea: No tracheal deviation.   Cardiovascular:      Rate and Rhythm: Normal rate and regular rhythm.      Heart sounds: Normal heart sounds.   Pulmonary:      Effort: Pulmonary effort is normal.      Breath sounds: Normal breath sounds.   Abdominal:      Palpations: Abdomen is soft.   Musculoskeletal: Normal range of motion.         General: Tenderness present. No swelling.      Comments: No redness swelling bruising noted to right shoulder, tender to touch.   Skin:     General: Skin is warm and dry.   Neurological:      Mental Status: She is alert and oriented to person, place, and time.   Psychiatric:         Behavior: Behavior normal.         Thought Content: Thought content normal.         Judgment: Judgment normal.          Laboratory:  CBC:  No results for input(s): WBC, RBC, HGB, HCT, PLT, MCV, MCH, MCHC in the last 2160 hours.  CMP:  No results for input(s): GLU, CALCIUM, ALBUMIN, PROT, NA, K, CO2, CL, BUN, ALKPHOS, ALT, AST, BILITOT in the last 2160 hours.    Invalid input(s): CREATININ  URINALYSIS:  No results for input(s): COLORU, CLARITYU, SPECGRAV, PHUR, PROTEINUA, GLUCOSEU, BILIRUBINCON, BLOODU, WBCU, RBCU, BACTERIA, MUCUS, NITRITE, LEUKOCYTESUR, UROBILINOGEN, HYALINECASTS in the last 2160 hours.   LIPIDS:  No results for input(s): TSH, HDL, CHOL, TRIG, LDLCALC, CHOLHDL, NONHDLCHOL, TOTALCHOLEST in the last 2160 hours.  TSH:  No results for input(s): TSH in the last 2160 hours.  A1C:  No results for input(s): HGBA1C in the last 2160 hours.      Assessment/Plan     Sofia Augustine is a 75 y.o.female with:    Encounter Diagnosis   Name Primary?    Acute pain of right shoulder Yes      Can alternate ice and warm compress, light  movements to joints, stay hydrated      -Continue current medications and maintain follow up with specialists.  Return to clinic as needed for any concerns       Erin Jiminez, NP-C [facility] [place]

## 2020-12-04 ENCOUNTER — NURSE TRIAGE (OUTPATIENT)
Dept: ADMINISTRATIVE | Facility: CLINIC | Age: 75
End: 2020-12-04

## 2020-12-04 RX ORDER — METHYLPREDNISOLONE 4 MG/1
TABLET ORAL
Qty: 1 PACKAGE | Refills: 0 | Status: SHIPPED | OUTPATIENT
Start: 2020-12-04 | End: 2021-04-28

## 2020-12-04 NOTE — TELEPHONE ENCOUNTER
Pt is calling for a refill of Methylprednisone 4 mg. Pt states she was prescribed this last year for hives. Pt developed generalized hives yesterday morning after trying a new type of Cran-Apple juice. Pt reports itching, as well. She denies facial swelling or trouble breathing. Pt refuses Benadryl as she cannot be drowsy due to obligations.  Dr. Connell notified via [facility]. She states she will refill. Pt advised to call back with any further needs.  Reason for Disposition   [1] MODERATE-SEVERE hives persist (i.e., hives interfere with normal activities or work) AND [2] taking antihistamine (e.g., Benadryl, Claritin) > 24 hours    Additional Information   Negative: [1] Life-threatening reaction (anaphylaxis) in the past to similar substance (e.g., food, insect bite/sting, chemical, etc.) AND [2] < 2 hours since exposure   Negative: Difficulty breathing or wheezing now   Negative: [1] Swollen tongue AND [2] rapid onset   Negative: [1] Hoarseness or cough now AND [2] rapid onset   Negative: Shock suspected (e.g., cold/pale/clammy skin, too weak to stand, low BP, rapid pulse)   Negative: Difficult to awaken or acting confused (e.g., disoriented, slurred speech)   Negative: Sounds like a life-threatening emergency to the triager   Negative: Swollen tongue   Negative: [1] Widespread hives, itching or facial swelling AND [2] onset < 2 hours of exposure to high-risk allergen (e.g., sting, nuts, 1st dose of antibiotic)   Negative: Patient sounds very sick or weak to the triager    Protocols used: HIVES-A-

## 2020-12-11 ENCOUNTER — LAB VISIT (OUTPATIENT)
Dept: LAB | Facility: HOSPITAL | Age: 75
End: 2020-12-11
Attending: INTERNAL MEDICINE
Payer: MEDICARE

## 2020-12-11 ENCOUNTER — OFFICE VISIT (OUTPATIENT)
Dept: INTERNAL MEDICINE | Facility: CLINIC | Age: 75
End: 2020-12-11
Payer: MEDICARE

## 2020-12-11 VITALS
HEART RATE: 88 BPM | HEIGHT: 64 IN | SYSTOLIC BLOOD PRESSURE: 120 MMHG | BODY MASS INDEX: 29.74 KG/M2 | DIASTOLIC BLOOD PRESSURE: 82 MMHG | TEMPERATURE: 98 F | WEIGHT: 174.19 LBS

## 2020-12-11 DIAGNOSIS — R10.9 ABDOMINAL PAIN, UNSPECIFIED ABDOMINAL LOCATION: Primary | ICD-10-CM

## 2020-12-11 DIAGNOSIS — R10.9 ABDOMINAL PAIN, UNSPECIFIED ABDOMINAL LOCATION: ICD-10-CM

## 2020-12-11 DIAGNOSIS — I10 ESSENTIAL HYPERTENSION: ICD-10-CM

## 2020-12-11 LAB
BASOPHILS # BLD AUTO: 0.03 K/UL (ref 0–0.2)
BASOPHILS NFR BLD: 0.6 % (ref 0–1.9)
DIFFERENTIAL METHOD: ABNORMAL
EOSINOPHIL # BLD AUTO: 0.1 K/UL (ref 0–0.5)
EOSINOPHIL NFR BLD: 1.6 % (ref 0–8)
ERYTHROCYTE [DISTWIDTH] IN BLOOD BY AUTOMATED COUNT: 12.8 % (ref 11.5–14.5)
HCT VFR BLD AUTO: 43.3 % (ref 37–48.5)
HGB BLD-MCNC: 13.5 G/DL (ref 12–16)
IMM GRANULOCYTES # BLD AUTO: 0.01 K/UL (ref 0–0.04)
IMM GRANULOCYTES NFR BLD AUTO: 0.2 % (ref 0–0.5)
LYMPHOCYTES # BLD AUTO: 1.8 K/UL (ref 1–4.8)
LYMPHOCYTES NFR BLD: 34.7 % (ref 18–48)
MCH RBC QN AUTO: 30.3 PG (ref 27–31)
MCHC RBC AUTO-ENTMCNC: 31.2 G/DL (ref 32–36)
MCV RBC AUTO: 97 FL (ref 82–98)
MONOCYTES # BLD AUTO: 0.6 K/UL (ref 0.3–1)
MONOCYTES NFR BLD: 11.4 % (ref 4–15)
NEUTROPHILS # BLD AUTO: 2.6 K/UL (ref 1.8–7.7)
NEUTROPHILS NFR BLD: 51.5 % (ref 38–73)
NRBC BLD-RTO: 0 /100 WBC
PLATELET # BLD AUTO: 218 K/UL (ref 150–350)
PMV BLD AUTO: 11.2 FL (ref 9.2–12.9)
RBC # BLD AUTO: 4.46 M/UL (ref 4–5.4)
WBC # BLD AUTO: 5.07 K/UL (ref 3.9–12.7)

## 2020-12-11 PROCEDURE — 36415 COLL VENOUS BLD VENIPUNCTURE: CPT | Mod: PO

## 2020-12-11 PROCEDURE — 3079F DIAST BP 80-89 MM HG: CPT | Mod: CPTII,S$GLB,, | Performed by: INTERNAL MEDICINE

## 2020-12-11 PROCEDURE — 1101F PT FALLS ASSESS-DOCD LE1/YR: CPT | Mod: CPTII,S$GLB,, | Performed by: INTERNAL MEDICINE

## 2020-12-11 PROCEDURE — 3288F FALL RISK ASSESSMENT DOCD: CPT | Mod: CPTII,S$GLB,, | Performed by: INTERNAL MEDICINE

## 2020-12-11 PROCEDURE — 99999 PR PBB SHADOW E&M-EST. PATIENT-LVL IV: ICD-10-PCS | Mod: PBBFAC,,, | Performed by: INTERNAL MEDICINE

## 2020-12-11 PROCEDURE — 82150 ASSAY OF AMYLASE: CPT

## 2020-12-11 PROCEDURE — 3074F PR MOST RECENT SYSTOLIC BLOOD PRESSURE < 130 MM HG: ICD-10-PCS | Mod: CPTII,S$GLB,, | Performed by: INTERNAL MEDICINE

## 2020-12-11 PROCEDURE — 1101F PR PT FALLS ASSESS DOC 0-1 FALLS W/OUT INJ PAST YR: ICD-10-PCS | Mod: CPTII,S$GLB,, | Performed by: INTERNAL MEDICINE

## 2020-12-11 PROCEDURE — 3079F PR MOST RECENT DIASTOLIC BLOOD PRESSURE 80-89 MM HG: ICD-10-PCS | Mod: CPTII,S$GLB,, | Performed by: INTERNAL MEDICINE

## 2020-12-11 PROCEDURE — 3074F SYST BP LT 130 MM HG: CPT | Mod: CPTII,S$GLB,, | Performed by: INTERNAL MEDICINE

## 2020-12-11 PROCEDURE — 1159F PR MEDICATION LIST DOCUMENTED IN MEDICAL RECORD: ICD-10-PCS | Mod: S$GLB,,, | Performed by: INTERNAL MEDICINE

## 2020-12-11 PROCEDURE — 1159F MED LIST DOCD IN RCRD: CPT | Mod: S$GLB,,, | Performed by: INTERNAL MEDICINE

## 2020-12-11 PROCEDURE — 1126F PR PAIN SEVERITY QUANTIFIED, NO PAIN PRESENT: ICD-10-PCS | Mod: S$GLB,,, | Performed by: INTERNAL MEDICINE

## 2020-12-11 PROCEDURE — 99213 OFFICE O/P EST LOW 20 MIN: CPT | Mod: S$GLB,,, | Performed by: INTERNAL MEDICINE

## 2020-12-11 PROCEDURE — 80053 COMPREHEN METABOLIC PANEL: CPT

## 2020-12-11 PROCEDURE — 99213 PR OFFICE/OUTPT VISIT, EST, LEVL III, 20-29 MIN: ICD-10-PCS | Mod: S$GLB,,, | Performed by: INTERNAL MEDICINE

## 2020-12-11 PROCEDURE — 1126F AMNT PAIN NOTED NONE PRSNT: CPT | Mod: S$GLB,,, | Performed by: INTERNAL MEDICINE

## 2020-12-11 PROCEDURE — 3288F PR FALLS RISK ASSESSMENT DOCUMENTED: ICD-10-PCS | Mod: CPTII,S$GLB,, | Performed by: INTERNAL MEDICINE

## 2020-12-11 PROCEDURE — 99999 PR PBB SHADOW E&M-EST. PATIENT-LVL IV: CPT | Mod: PBBFAC,,, | Performed by: INTERNAL MEDICINE

## 2020-12-11 PROCEDURE — 83690 ASSAY OF LIPASE: CPT

## 2020-12-11 PROCEDURE — 85025 COMPLETE CBC W/AUTO DIFF WBC: CPT

## 2020-12-11 NOTE — PROGRESS NOTES
CC: followup of hypertension  HPI:  The patient is a 75 y.o. year old female who presents to the office for followup of hypertension.  The patient denies any new chest pain, shortness of breath, headache, nausea or vomiting, but complains of fatigue.  She reports one week history of diarrhea after eating breakfast every morning.  She reports any additional stool is normal.  She also reports abdominal cramping after eating breakfast.  She denies any nausea or vomiting.      PAST MEDICAL HISTORY:  Past Medical History:   Diagnosis Date    Abnormal mammogram of both breasts     Arthritis     Coronary vasospasm 10/31/2019    Hypertension     Major depressive disorder 5/14/2017    Memory disorder     Seizures     Stroke        SURGICAL HISTORY:  Past Surgical History:   Procedure Laterality Date    CATARACT EXTRACTION      cysts breast      TONSILLECTOMY         MEDS:  Medcard reviewed and updated    ALLERGIES: Allergy Card reviewed and updated    SOCIAL HISTORY:   The patient is a nonsmoker.    PE:   APPEARANCE: Well nourished, well developed, in no acute distress.    CHEST: Lungs clear to auscultation with unlabored respirations.  CARDIOVASCULAR: Normal S1, S2. No murmurs. No carotid bruits. No pedal edema.  ABDOMEN: Bowel sounds normal. Not distended. Soft. No tenderness or masses.   PSYCHIATRIC: The patient is oriented to person, place, and time and has a pleasant affect.        ASSESSMENT/PLAN:  Sofia was seen today for follow-up.    Diagnoses and all orders for this visit:    Abdominal pain, unspecified abdominal location  -     CBC Auto Differential; Future  -     Comprehensive Metabolic Panel; Future  -     Amylase; Future  -     Lipase; Future    Essential hypertension  -     Blood pressure is controlled

## 2020-12-12 LAB
ALBUMIN SERPL BCP-MCNC: 3.8 G/DL (ref 3.5–5.2)
ALP SERPL-CCNC: 87 U/L (ref 55–135)
ALT SERPL W/O P-5'-P-CCNC: 10 U/L (ref 10–44)
AMYLASE SERPL-CCNC: 64 U/L (ref 20–110)
ANION GAP SERPL CALC-SCNC: 11 MMOL/L (ref 8–16)
AST SERPL-CCNC: 18 U/L (ref 10–40)
BILIRUB SERPL-MCNC: 0.9 MG/DL (ref 0.1–1)
BUN SERPL-MCNC: 16 MG/DL (ref 8–23)
CALCIUM SERPL-MCNC: 9 MG/DL (ref 8.7–10.5)
CHLORIDE SERPL-SCNC: 105 MMOL/L (ref 95–110)
CO2 SERPL-SCNC: 25 MMOL/L (ref 23–29)
CREAT SERPL-MCNC: 0.9 MG/DL (ref 0.5–1.4)
EST. GFR  (AFRICAN AMERICAN): >60 ML/MIN/1.73 M^2
EST. GFR  (NON AFRICAN AMERICAN): >60 ML/MIN/1.73 M^2
GLUCOSE SERPL-MCNC: 84 MG/DL (ref 70–110)
LIPASE SERPL-CCNC: 29 U/L (ref 4–60)
POTASSIUM SERPL-SCNC: 3.9 MMOL/L (ref 3.5–5.1)
PROT SERPL-MCNC: 7.3 G/DL (ref 6–8.4)
SODIUM SERPL-SCNC: 141 MMOL/L (ref 136–145)

## 2020-12-14 NOTE — TELEPHONE ENCOUNTER
Attempted to contact the pt but her voicemail box is not set up yet.    Unable to leave a message.

## 2020-12-16 ENCOUNTER — TELEPHONE (OUTPATIENT)
Dept: INTERNAL MEDICINE | Facility: CLINIC | Age: 75
End: 2020-12-16

## 2020-12-16 NOTE — TELEPHONE ENCOUNTER
----- Message from Teto Henry sent at 12/16/2020 10:34 AM CST -----  Regarding: [phone]  Patient is returning a phone call.    Who left a message for the patient: The doctor's office    Does patient know what this is regarding:  Returning a phone call    Comments: Sofia is requesting a callback; she would like to be advised on what the call was about.

## 2020-12-16 NOTE — TELEPHONE ENCOUNTER
Spoke with pt advise pt of lab results.     Pt also stated she pulled her back yesterday and is taking meds and using a heating pad.

## 2020-12-18 RX ORDER — SERTRALINE HYDROCHLORIDE 25 MG/1
TABLET, FILM COATED ORAL
Qty: 30 TABLET | Refills: 3 | Status: SHIPPED | OUTPATIENT
Start: 2020-12-18 | End: 2021-06-16

## 2020-12-18 RX ORDER — SERTRALINE HYDROCHLORIDE 50 MG/1
50 TABLET, FILM COATED ORAL DAILY
Qty: 90 TABLET | Refills: 3 | Status: SHIPPED | OUTPATIENT
Start: 2020-12-18 | End: 2021-11-09

## 2020-12-22 DIAGNOSIS — M47.22 OSTEOARTHRITIS OF SPINE WITH RADICULOPATHY, CERVICAL REGION: ICD-10-CM

## 2020-12-22 RX ORDER — HYDROCODONE BITARTRATE AND ACETAMINOPHEN 5; 325 MG/1; MG/1
1 TABLET ORAL EVERY 8 HOURS PRN
Qty: 40 TABLET | Refills: 0 | Status: SHIPPED | OUTPATIENT
Start: 2020-12-22 | End: 2021-02-01 | Stop reason: SDUPTHER

## 2020-12-22 NOTE — TELEPHONE ENCOUNTER
----- Message from Patricia Taveras sent at 12/22/2020  9:56 AM CST -----  Contact: self [phone]  Requesting an RX refill or new RX.  Is this a refill or new RX: New  RX name and strength: HYDROcodone-acetaminophen (NORCO) 5-325 mg per tablet  Is this a 30 day or 90 day RX: 30  Pharmacy name and phone # (copy/paste from chart):    [id number] - [place]  [address]  [address] [facility] [address]  [address] [place] [address]  Phone: [phone] Fax: [phone]   Comments:

## 2020-12-23 ENCOUNTER — TELEPHONE (OUTPATIENT)
Dept: INTERNAL MEDICINE | Facility: CLINIC | Age: 75
End: 2020-12-23

## 2021-02-01 DIAGNOSIS — M47.22 OSTEOARTHRITIS OF SPINE WITH RADICULOPATHY, CERVICAL REGION: ICD-10-CM

## 2021-02-02 ENCOUNTER — NURSE TRIAGE (OUTPATIENT)
Dept: ADMINISTRATIVE | Facility: CLINIC | Age: 76
End: 2021-02-02

## 2021-02-02 RX ORDER — HYDROCODONE BITARTRATE AND ACETAMINOPHEN 5; 325 MG/1; MG/1
1 TABLET ORAL EVERY 8 HOURS PRN
Qty: 40 TABLET | Refills: 0 | Status: SHIPPED | OUTPATIENT
Start: 2021-02-02 | End: 2021-03-04 | Stop reason: SDUPTHER

## 2021-03-04 DIAGNOSIS — M47.22 OSTEOARTHRITIS OF SPINE WITH RADICULOPATHY, CERVICAL REGION: ICD-10-CM

## 2021-03-05 RX ORDER — HYDROCODONE BITARTRATE AND ACETAMINOPHEN 5; 325 MG/1; MG/1
1 TABLET ORAL EVERY 8 HOURS PRN
Qty: 40 TABLET | Refills: 0 | Status: SHIPPED | OUTPATIENT
Start: 2021-03-05 | End: 2021-04-22 | Stop reason: SDUPTHER

## 2021-03-30 ENCOUNTER — HOSPITAL ENCOUNTER (EMERGENCY)
Facility: HOSPITAL | Age: 76
Discharge: HOME | End: 2021-03-30
Attending: EMERGENCY MEDICINE
Payer: MEDICARE

## 2021-03-30 VITALS
HEART RATE: 68 BPM | OXYGEN SATURATION: 94 % | DIASTOLIC BLOOD PRESSURE: 86 MMHG | TEMPERATURE: 98 F | SYSTOLIC BLOOD PRESSURE: 165 MMHG | RESPIRATION RATE: 18 BRPM

## 2021-03-30 DIAGNOSIS — M54.50 ACUTE LEFT-SIDED LOW BACK PAIN, UNSPECIFIED WHETHER SCIATICA PRESENT: Primary | ICD-10-CM

## 2021-03-30 PROBLEM — R29.818 TRANSIENT NEUROLOGICAL SYMPTOMS: Status: ACTIVE | Noted: 2021-03-30

## 2021-03-30 LAB
ALBUMIN SERPL BCP-MCNC: 3.6 G/DL (ref 3.5–5.2)
ALP SERPL-CCNC: 92 U/L (ref 55–135)
ALT SERPL W/O P-5'-P-CCNC: 8 U/L (ref 10–44)
ANION GAP SERPL CALC-SCNC: 10 MMOL/L (ref 8–16)
AST SERPL-CCNC: 16 U/L (ref 10–40)
BACTERIA #/AREA URNS AUTO: ABNORMAL /HPF
BASOPHILS # BLD AUTO: 0.02 K/UL (ref 0–0.2)
BASOPHILS NFR BLD: 0.4 % (ref 0–1.9)
BILIRUB SERPL-MCNC: 1.5 MG/DL (ref 0.1–1)
BILIRUB UR QL STRIP: NEGATIVE
BUN SERPL-MCNC: 14 MG/DL (ref 8–23)
BUN SERPL-MCNC: 15 MG/DL (ref 6–30)
CALCIUM SERPL-MCNC: 9 MG/DL (ref 8.7–10.5)
CHLORIDE SERPL-SCNC: 104 MMOL/L (ref 95–110)
CHLORIDE SERPL-SCNC: 106 MMOL/L (ref 95–110)
CLARITY UR REFRACT.AUTO: ABNORMAL
CO2 SERPL-SCNC: 26 MMOL/L (ref 23–29)
COLOR UR AUTO: YELLOW
CREAT SERPL-MCNC: 0.8 MG/DL (ref 0.5–1.4)
CREAT SERPL-MCNC: 0.8 MG/DL (ref 0.5–1.4)
CTP QC/QA: YES
DIFFERENTIAL METHOD: NORMAL
EOSINOPHIL # BLD AUTO: 0.1 K/UL (ref 0–0.5)
EOSINOPHIL NFR BLD: 2 % (ref 0–8)
ERYTHROCYTE [DISTWIDTH] IN BLOOD BY AUTOMATED COUNT: 12.8 % (ref 11.5–14.5)
EST. GFR  (AFRICAN AMERICAN): >60 ML/MIN/1.73 M^2
EST. GFR  (NON AFRICAN AMERICAN): >60 ML/MIN/1.73 M^2
GLUCOSE SERPL-MCNC: 89 MG/DL (ref 70–110)
GLUCOSE SERPL-MCNC: 91 MG/DL (ref 70–110)
GLUCOSE UR QL STRIP: NEGATIVE
HCT VFR BLD AUTO: 42 % (ref 37–48.5)
HCT VFR BLD CALC: 42 %PCV (ref 36–54)
HCV AB SERPL QL IA: NEGATIVE
HGB BLD-MCNC: 13.9 G/DL (ref 12–16)
HGB UR QL STRIP: NEGATIVE
HIV 1+2 AB+HIV1 P24 AG SERPL QL IA: NEGATIVE
HYALINE CASTS UR QL AUTO: 2 /LPF
IMM GRANULOCYTES # BLD AUTO: 0.01 K/UL (ref 0–0.04)
IMM GRANULOCYTES NFR BLD AUTO: 0.2 % (ref 0–0.5)
KETONES UR QL STRIP: NEGATIVE
LEUKOCYTE ESTERASE UR QL STRIP: ABNORMAL
LYMPHOCYTES # BLD AUTO: 1.6 K/UL (ref 1–4.8)
LYMPHOCYTES NFR BLD: 34.2 % (ref 18–48)
MCH RBC QN AUTO: 30.3 PG (ref 27–31)
MCHC RBC AUTO-ENTMCNC: 33.1 G/DL (ref 32–36)
MCV RBC AUTO: 92 FL (ref 82–98)
MICROSCOPIC COMMENT: ABNORMAL
MONOCYTES # BLD AUTO: 0.5 K/UL (ref 0.3–1)
MONOCYTES NFR BLD: 10.1 % (ref 4–15)
NEUTROPHILS # BLD AUTO: 2.4 K/UL (ref 1.8–7.7)
NEUTROPHILS NFR BLD: 53.1 % (ref 38–73)
NITRITE UR QL STRIP: NEGATIVE
NON-SQ EPI CELLS #/AREA URNS AUTO: 1 /HPF
NRBC BLD-RTO: 0 /100 WBC
PH UR STRIP: 5 [PH] (ref 5–8)
PLATELET # BLD AUTO: 195 K/UL (ref 150–450)
PMV BLD AUTO: 10.6 FL (ref 9.2–12.9)
POC IONIZED CALCIUM: 1.17 MMOL/L (ref 1.06–1.42)
POC TCO2 (MEASURED): 27 MMOL/L (ref 23–29)
POTASSIUM BLD-SCNC: 3.6 MMOL/L (ref 3.5–5.1)
POTASSIUM SERPL-SCNC: 3.7 MMOL/L (ref 3.5–5.1)
PROT SERPL-MCNC: 6.9 G/DL (ref 6–8.4)
PROT UR QL STRIP: NEGATIVE
RBC # BLD AUTO: 4.59 M/UL (ref 4–5.4)
RBC #/AREA URNS AUTO: 2 /HPF (ref 0–4)
SAMPLE: NORMAL
SARS-COV-2 RDRP RESP QL NAA+PROBE: NEGATIVE
SODIUM BLD-SCNC: 141 MMOL/L (ref 136–145)
SODIUM SERPL-SCNC: 142 MMOL/L (ref 136–145)
SP GR UR STRIP: 1.02 (ref 1–1.03)
SQUAMOUS #/AREA URNS AUTO: 10 /HPF
URN SPEC COLLECT METH UR: ABNORMAL
WBC # BLD AUTO: 4.56 K/UL (ref 3.9–12.7)
WBC #/AREA URNS AUTO: 11 /HPF (ref 0–5)

## 2021-03-30 PROCEDURE — 96375 TX/PRO/DX INJ NEW DRUG ADDON: CPT

## 2021-03-30 PROCEDURE — 99284 EMERGENCY DEPT VISIT MOD MDM: CPT | Mod: GC,,, | Performed by: PSYCHIATRY & NEUROLOGY

## 2021-03-30 PROCEDURE — 80047 BASIC METABLC PNL IONIZED CA: CPT

## 2021-03-30 PROCEDURE — 96374 THER/PROPH/DIAG INJ IV PUSH: CPT

## 2021-03-30 PROCEDURE — 63600175 PHARM REV CODE 636 W HCPCS: Performed by: EMERGENCY MEDICINE

## 2021-03-30 PROCEDURE — 87086 URINE CULTURE/COLONY COUNT: CPT | Performed by: EMERGENCY MEDICINE

## 2021-03-30 PROCEDURE — U0002 COVID-19 LAB TEST NON-CDC: HCPCS | Performed by: EMERGENCY MEDICINE

## 2021-03-30 PROCEDURE — 86803 HEPATITIS C AB TEST: CPT | Performed by: EMERGENCY MEDICINE

## 2021-03-30 PROCEDURE — 99284 PR EMERGENCY DEPT VISIT,LEVEL IV: ICD-10-PCS | Mod: GC,,, | Performed by: PSYCHIATRY & NEUROLOGY

## 2021-03-30 PROCEDURE — 82330 ASSAY OF CALCIUM: CPT

## 2021-03-30 PROCEDURE — 99285 EMERGENCY DEPT VISIT HI MDM: CPT | Mod: CS,,, | Performed by: EMERGENCY MEDICINE

## 2021-03-30 PROCEDURE — 80053 COMPREHEN METABOLIC PANEL: CPT | Performed by: EMERGENCY MEDICINE

## 2021-03-30 PROCEDURE — 99284 EMERGENCY DEPT VISIT MOD MDM: CPT | Mod: 25

## 2021-03-30 PROCEDURE — 86703 HIV-1/HIV-2 1 RESULT ANTBDY: CPT | Performed by: EMERGENCY MEDICINE

## 2021-03-30 PROCEDURE — 85025 COMPLETE CBC W/AUTO DIFF WBC: CPT | Performed by: EMERGENCY MEDICINE

## 2021-03-30 PROCEDURE — 81001 URINALYSIS AUTO W/SCOPE: CPT | Performed by: EMERGENCY MEDICINE

## 2021-03-30 PROCEDURE — 25000003 PHARM REV CODE 250: Performed by: EMERGENCY MEDICINE

## 2021-03-30 PROCEDURE — 99285 PR EMERGENCY DEPT VISIT,LEVEL V: ICD-10-PCS | Mod: CS,,, | Performed by: EMERGENCY MEDICINE

## 2021-03-30 RX ORDER — NAPROXEN 500 MG/1
500 TABLET ORAL
Status: COMPLETED | OUTPATIENT
Start: 2021-03-30 | End: 2021-03-30

## 2021-03-30 RX ORDER — KETOROLAC TROMETHAMINE 30 MG/ML
10 INJECTION, SOLUTION INTRAMUSCULAR; INTRAVENOUS
Status: COMPLETED | OUTPATIENT
Start: 2021-03-30 | End: 2021-03-30

## 2021-03-30 RX ORDER — NAPROXEN 375 MG/1
375 TABLET ORAL 2 TIMES DAILY PRN
Qty: 20 TABLET | Refills: 0 | Status: SHIPPED | OUTPATIENT
Start: 2021-03-30 | End: 2021-11-09

## 2021-03-30 RX ORDER — SULFAMETHOXAZOLE AND TRIMETHOPRIM 800; 160 MG/1; MG/1
1 TABLET ORAL 2 TIMES DAILY
Qty: 14 TABLET | Refills: 0 | Status: SHIPPED | OUTPATIENT
Start: 2021-03-30 | End: 2022-12-02 | Stop reason: SDUPTHER

## 2021-03-30 RX ORDER — MORPHINE SULFATE 4 MG/ML
4 INJECTION, SOLUTION INTRAMUSCULAR; INTRAVENOUS
Status: COMPLETED | OUTPATIENT
Start: 2021-03-30 | End: 2021-03-30

## 2021-03-30 RX ORDER — CEFTRIAXONE 1 G/1
1 INJECTION, POWDER, FOR SOLUTION INTRAMUSCULAR; INTRAVENOUS
Status: COMPLETED | OUTPATIENT
Start: 2021-03-30 | End: 2021-03-30

## 2021-03-30 RX ADMIN — KETOROLAC TROMETHAMINE 10 MG: 30 INJECTION, SOLUTION INTRAMUSCULAR at 01:03

## 2021-03-30 RX ADMIN — NAPROXEN 500 MG: 500 TABLET ORAL at 05:03

## 2021-03-30 RX ADMIN — CEFTRIAXONE 1 G: 1 INJECTION, POWDER, FOR SOLUTION INTRAMUSCULAR; INTRAVENOUS at 04:03

## 2021-03-30 RX ADMIN — MORPHINE SULFATE 4 MG: 4 INJECTION INTRAVENOUS at 01:03

## 2021-03-31 ENCOUNTER — TELEPHONE (OUTPATIENT)
Dept: EMERGENCY MEDICINE | Facility: HOSPITAL | Age: 76
End: 2021-03-31

## 2021-03-31 LAB
BACTERIA UR CULT: NORMAL
BACTERIA UR CULT: NORMAL

## 2021-04-22 DIAGNOSIS — M47.22 OSTEOARTHRITIS OF SPINE WITH RADICULOPATHY, CERVICAL REGION: ICD-10-CM

## 2021-04-22 RX ORDER — HYDROCODONE BITARTRATE AND ACETAMINOPHEN 5; 325 MG/1; MG/1
1 TABLET ORAL EVERY 8 HOURS PRN
Qty: 40 TABLET | Refills: 0 | Status: SHIPPED | OUTPATIENT
Start: 2021-04-22 | End: 2021-06-02 | Stop reason: SDUPTHER

## 2021-04-28 RX ORDER — METHYLPREDNISOLONE 4 MG/1
TABLET ORAL
Qty: 1 PACKAGE | Refills: 0 | Status: SHIPPED | OUTPATIENT
Start: 2021-04-28 | End: 2021-06-16 | Stop reason: ALTCHOICE

## 2021-06-02 DIAGNOSIS — M47.22 OSTEOARTHRITIS OF SPINE WITH RADICULOPATHY, CERVICAL REGION: ICD-10-CM

## 2021-06-02 RX ORDER — HYDROCODONE BITARTRATE AND ACETAMINOPHEN 5; 325 MG/1; MG/1
1 TABLET ORAL EVERY 8 HOURS PRN
Qty: 40 TABLET | Refills: 0 | Status: SHIPPED | OUTPATIENT
Start: 2021-06-02 | End: 2021-07-22 | Stop reason: SDUPTHER

## 2021-06-15 ENCOUNTER — OFFICE VISIT (OUTPATIENT)
Dept: INTERNAL MEDICINE | Facility: CLINIC | Age: 76
End: 2021-06-15
Payer: MEDICARE

## 2021-06-15 VITALS
TEMPERATURE: 98 F | WEIGHT: 178.38 LBS | DIASTOLIC BLOOD PRESSURE: 88 MMHG | RESPIRATION RATE: 20 BRPM | BODY MASS INDEX: 30.45 KG/M2 | SYSTOLIC BLOOD PRESSURE: 126 MMHG | HEIGHT: 64 IN | HEART RATE: 80 BPM

## 2021-06-15 DIAGNOSIS — I10 ESSENTIAL HYPERTENSION: Primary | ICD-10-CM

## 2021-06-15 DIAGNOSIS — M17.0 PRIMARY OSTEOARTHRITIS OF BOTH KNEES: ICD-10-CM

## 2021-06-15 PROCEDURE — 1125F PR PAIN SEVERITY QUANTIFIED, PAIN PRESENT: ICD-10-PCS | Mod: S$GLB,,, | Performed by: INTERNAL MEDICINE

## 2021-06-15 PROCEDURE — 99499 RISK ADDL DX/OHS AUDIT: ICD-10-PCS | Mod: S$GLB,,, | Performed by: INTERNAL MEDICINE

## 2021-06-15 PROCEDURE — 1125F AMNT PAIN NOTED PAIN PRSNT: CPT | Mod: S$GLB,,, | Performed by: INTERNAL MEDICINE

## 2021-06-15 PROCEDURE — 1101F PR PT FALLS ASSESS DOC 0-1 FALLS W/OUT INJ PAST YR: ICD-10-PCS | Mod: CPTII,S$GLB,, | Performed by: INTERNAL MEDICINE

## 2021-06-15 PROCEDURE — 1101F PT FALLS ASSESS-DOCD LE1/YR: CPT | Mod: CPTII,S$GLB,, | Performed by: INTERNAL MEDICINE

## 2021-06-15 PROCEDURE — 3288F PR FALLS RISK ASSESSMENT DOCUMENTED: ICD-10-PCS | Mod: CPTII,S$GLB,, | Performed by: INTERNAL MEDICINE

## 2021-06-15 PROCEDURE — 1159F PR MEDICATION LIST DOCUMENTED IN MEDICAL RECORD: ICD-10-PCS | Mod: S$GLB,,, | Performed by: INTERNAL MEDICINE

## 2021-06-15 PROCEDURE — 99999 PR PBB SHADOW E&M-EST. PATIENT-LVL IV: ICD-10-PCS | Mod: PBBFAC,,, | Performed by: INTERNAL MEDICINE

## 2021-06-15 PROCEDURE — 3074F SYST BP LT 130 MM HG: CPT | Mod: CPTII,S$GLB,, | Performed by: INTERNAL MEDICINE

## 2021-06-15 PROCEDURE — 99213 PR OFFICE/OUTPT VISIT, EST, LEVL III, 20-29 MIN: ICD-10-PCS | Mod: S$GLB,,, | Performed by: INTERNAL MEDICINE

## 2021-06-15 PROCEDURE — 3074F PR MOST RECENT SYSTOLIC BLOOD PRESSURE < 130 MM HG: ICD-10-PCS | Mod: CPTII,S$GLB,, | Performed by: INTERNAL MEDICINE

## 2021-06-15 PROCEDURE — 3079F DIAST BP 80-89 MM HG: CPT | Mod: CPTII,S$GLB,, | Performed by: INTERNAL MEDICINE

## 2021-06-15 PROCEDURE — 99999 PR PBB SHADOW E&M-EST. PATIENT-LVL IV: CPT | Mod: PBBFAC,,, | Performed by: INTERNAL MEDICINE

## 2021-06-15 PROCEDURE — 3079F PR MOST RECENT DIASTOLIC BLOOD PRESSURE 80-89 MM HG: ICD-10-PCS | Mod: CPTII,S$GLB,, | Performed by: INTERNAL MEDICINE

## 2021-06-15 PROCEDURE — 99499 UNLISTED E&M SERVICE: CPT | Mod: S$GLB,,, | Performed by: INTERNAL MEDICINE

## 2021-06-15 PROCEDURE — 3288F FALL RISK ASSESSMENT DOCD: CPT | Mod: CPTII,S$GLB,, | Performed by: INTERNAL MEDICINE

## 2021-06-15 PROCEDURE — 99213 OFFICE O/P EST LOW 20 MIN: CPT | Mod: S$GLB,,, | Performed by: INTERNAL MEDICINE

## 2021-06-15 PROCEDURE — 1159F MED LIST DOCD IN RCRD: CPT | Mod: S$GLB,,, | Performed by: INTERNAL MEDICINE

## 2021-06-29 ENCOUNTER — LAB VISIT (OUTPATIENT)
Dept: LAB | Facility: HOSPITAL | Age: 76
End: 2021-06-29
Attending: INTERNAL MEDICINE
Payer: MEDICARE

## 2021-06-29 DIAGNOSIS — I10 ESSENTIAL HYPERTENSION: ICD-10-CM

## 2021-06-29 LAB
ALBUMIN SERPL BCP-MCNC: 3.6 G/DL (ref 3.5–5.2)
ALP SERPL-CCNC: 82 U/L (ref 55–135)
ALT SERPL W/O P-5'-P-CCNC: 9 U/L (ref 10–44)
ANION GAP SERPL CALC-SCNC: 6 MMOL/L (ref 8–16)
AST SERPL-CCNC: 17 U/L (ref 10–40)
BILIRUB SERPL-MCNC: 1.2 MG/DL (ref 0.1–1)
BUN SERPL-MCNC: 13 MG/DL (ref 8–23)
CALCIUM SERPL-MCNC: 9.4 MG/DL (ref 8.7–10.5)
CHLORIDE SERPL-SCNC: 105 MMOL/L (ref 95–110)
CHOLEST SERPL-MCNC: 179 MG/DL (ref 120–199)
CHOLEST/HDLC SERPL: 3.7 {RATIO} (ref 2–5)
CO2 SERPL-SCNC: 28 MMOL/L (ref 23–29)
CREAT SERPL-MCNC: 0.8 MG/DL (ref 0.5–1.4)
EST. GFR  (AFRICAN AMERICAN): >60 ML/MIN/1.73 M^2
EST. GFR  (NON AFRICAN AMERICAN): >60 ML/MIN/1.73 M^2
GLUCOSE SERPL-MCNC: 95 MG/DL (ref 70–110)
HDLC SERPL-MCNC: 49 MG/DL (ref 40–75)
HDLC SERPL: 27.4 % (ref 20–50)
LDLC SERPL CALC-MCNC: 111.2 MG/DL (ref 63–159)
NONHDLC SERPL-MCNC: 130 MG/DL
POTASSIUM SERPL-SCNC: 4.1 MMOL/L (ref 3.5–5.1)
PROT SERPL-MCNC: 6.9 G/DL (ref 6–8.4)
SODIUM SERPL-SCNC: 139 MMOL/L (ref 136–145)
TRIGL SERPL-MCNC: 94 MG/DL (ref 30–150)
TSH SERPL DL<=0.005 MIU/L-ACNC: 0.79 UIU/ML (ref 0.4–4)

## 2021-06-29 PROCEDURE — 84443 ASSAY THYROID STIM HORMONE: CPT | Performed by: INTERNAL MEDICINE

## 2021-06-29 PROCEDURE — 80061 LIPID PANEL: CPT | Performed by: INTERNAL MEDICINE

## 2021-06-29 PROCEDURE — 80053 COMPREHEN METABOLIC PANEL: CPT | Performed by: INTERNAL MEDICINE

## 2021-06-29 PROCEDURE — 36415 COLL VENOUS BLD VENIPUNCTURE: CPT | Mod: PO | Performed by: INTERNAL MEDICINE

## 2021-06-29 RX ORDER — LOSARTAN POTASSIUM 25 MG/1
25 TABLET ORAL DAILY
Qty: 90 TABLET | Refills: 3 | Status: SHIPPED | OUTPATIENT
Start: 2021-06-29 | End: 2021-10-07

## 2021-07-15 ENCOUNTER — TELEPHONE (OUTPATIENT)
Dept: INTERNAL MEDICINE | Facility: CLINIC | Age: 76
End: 2021-07-15

## 2021-07-22 DIAGNOSIS — M47.22 OSTEOARTHRITIS OF SPINE WITH RADICULOPATHY, CERVICAL REGION: ICD-10-CM

## 2021-07-23 RX ORDER — HYDROCODONE BITARTRATE AND ACETAMINOPHEN 5; 325 MG/1; MG/1
1 TABLET ORAL EVERY 8 HOURS PRN
Qty: 40 TABLET | Refills: 0 | Status: SHIPPED | OUTPATIENT
Start: 2021-07-23 | End: 2021-08-26 | Stop reason: SDUPTHER

## 2021-08-10 ENCOUNTER — TELEPHONE (OUTPATIENT)
Dept: INTERNAL MEDICINE | Facility: CLINIC | Age: 76
End: 2021-08-10

## 2021-08-11 ENCOUNTER — TELEPHONE (OUTPATIENT)
Dept: INTERNAL MEDICINE | Facility: CLINIC | Age: 76
End: 2021-08-11

## 2021-08-11 RX ORDER — LEVOCETIRIZINE DIHYDROCHLORIDE 5 MG/1
5 TABLET, FILM COATED ORAL NIGHTLY
Qty: 30 TABLET | Refills: 3 | Status: SHIPPED | OUTPATIENT
Start: 2021-08-11 | End: 2021-11-09

## 2021-08-20 ENCOUNTER — NURSE TRIAGE (OUTPATIENT)
Dept: ADMINISTRATIVE | Facility: CLINIC | Age: 76
End: 2021-08-20

## 2021-08-21 ENCOUNTER — LAB VISIT (OUTPATIENT)
Dept: PRIMARY CARE CLINIC | Facility: OTHER | Age: 76
End: 2021-08-21
Payer: MEDICARE

## 2021-08-21 DIAGNOSIS — Z20.822 ENCOUNTER FOR LABORATORY TESTING FOR COVID-19 VIRUS: ICD-10-CM

## 2021-08-21 PROCEDURE — U0003 INFECTIOUS AGENT DETECTION BY NUCLEIC ACID (DNA OR RNA); SEVERE ACUTE RESPIRATORY SYNDROME CORONAVIRUS 2 (SARS-COV-2) (CORONAVIRUS DISEASE [COVID-19]), AMPLIFIED PROBE TECHNIQUE, MAKING USE OF HIGH THROUGHPUT TECHNOLOGIES AS DESCRIBED BY CMS-2020-01-R: HCPCS | Performed by: INTERNAL MEDICINE

## 2021-08-24 LAB
SARS-COV-2 RNA RESP QL NAA+PROBE: NOT DETECTED
SARS-COV-2- CYCLE NUMBER: NORMAL

## 2021-08-26 DIAGNOSIS — M25.569 KNEE PAIN, UNSPECIFIED CHRONICITY, UNSPECIFIED LATERALITY: Primary | ICD-10-CM

## 2021-08-26 DIAGNOSIS — M47.22 OSTEOARTHRITIS OF SPINE WITH RADICULOPATHY, CERVICAL REGION: ICD-10-CM

## 2021-08-26 RX ORDER — HYDROCODONE BITARTRATE AND ACETAMINOPHEN 5; 325 MG/1; MG/1
1 TABLET ORAL EVERY 8 HOURS PRN
Qty: 40 TABLET | Refills: 0 | Status: SHIPPED | OUTPATIENT
Start: 2021-08-26 | End: 2021-09-24 | Stop reason: SDUPTHER

## 2021-09-17 ENCOUNTER — OFFICE VISIT (OUTPATIENT)
Dept: URGENT CARE | Facility: CLINIC | Age: 76
End: 2021-09-17
Payer: MEDICARE

## 2021-09-17 ENCOUNTER — NURSE TRIAGE (OUTPATIENT)
Dept: ADMINISTRATIVE | Facility: CLINIC | Age: 76
End: 2021-09-17

## 2021-09-17 VITALS
RESPIRATION RATE: 16 BRPM | OXYGEN SATURATION: 95 % | SYSTOLIC BLOOD PRESSURE: 145 MMHG | DIASTOLIC BLOOD PRESSURE: 85 MMHG | HEIGHT: 64 IN | TEMPERATURE: 98 F | BODY MASS INDEX: 30.39 KG/M2 | WEIGHT: 178 LBS | HEART RATE: 89 BPM

## 2021-09-17 DIAGNOSIS — M19.031 OSTEOARTHRITIS OF RIGHT WRIST, UNSPECIFIED OSTEOARTHRITIS TYPE: Primary | ICD-10-CM

## 2021-09-17 PROCEDURE — 3079F PR MOST RECENT DIASTOLIC BLOOD PRESSURE 80-89 MM HG: ICD-10-PCS | Mod: CPTII,S$GLB,, | Performed by: FAMILY MEDICINE

## 2021-09-17 PROCEDURE — 3079F DIAST BP 80-89 MM HG: CPT | Mod: CPTII,S$GLB,, | Performed by: FAMILY MEDICINE

## 2021-09-17 PROCEDURE — 1125F AMNT PAIN NOTED PAIN PRSNT: CPT | Mod: CPTII,S$GLB,, | Performed by: FAMILY MEDICINE

## 2021-09-17 PROCEDURE — 1159F PR MEDICATION LIST DOCUMENTED IN MEDICAL RECORD: ICD-10-PCS | Mod: CPTII,S$GLB,, | Performed by: FAMILY MEDICINE

## 2021-09-17 PROCEDURE — 99214 OFFICE O/P EST MOD 30 MIN: CPT | Mod: S$GLB,,, | Performed by: FAMILY MEDICINE

## 2021-09-17 PROCEDURE — 3077F PR MOST RECENT SYSTOLIC BLOOD PRESSURE >= 140 MM HG: ICD-10-PCS | Mod: CPTII,S$GLB,, | Performed by: FAMILY MEDICINE

## 2021-09-17 PROCEDURE — 73110 X-RAY EXAM OF WRIST: CPT | Mod: RT,S$GLB,, | Performed by: RADIOLOGY

## 2021-09-17 PROCEDURE — 1160F PR REVIEW ALL MEDS BY PRESCRIBER/CLIN PHARMACIST DOCUMENTED: ICD-10-PCS | Mod: CPTII,S$GLB,, | Performed by: FAMILY MEDICINE

## 2021-09-17 PROCEDURE — 1159F MED LIST DOCD IN RCRD: CPT | Mod: CPTII,S$GLB,, | Performed by: FAMILY MEDICINE

## 2021-09-17 PROCEDURE — 1125F PR PAIN SEVERITY QUANTIFIED, PAIN PRESENT: ICD-10-PCS | Mod: CPTII,S$GLB,, | Performed by: FAMILY MEDICINE

## 2021-09-17 PROCEDURE — 99214 PR OFFICE/OUTPT VISIT, EST, LEVL IV, 30-39 MIN: ICD-10-PCS | Mod: S$GLB,,, | Performed by: FAMILY MEDICINE

## 2021-09-17 PROCEDURE — 73110 XR WRIST COMPLETE 3 VIEWS RIGHT: ICD-10-PCS | Mod: RT,S$GLB,, | Performed by: RADIOLOGY

## 2021-09-17 PROCEDURE — 1160F RVW MEDS BY RX/DR IN RCRD: CPT | Mod: CPTII,S$GLB,, | Performed by: FAMILY MEDICINE

## 2021-09-17 PROCEDURE — 3077F SYST BP >= 140 MM HG: CPT | Mod: CPTII,S$GLB,, | Performed by: FAMILY MEDICINE

## 2021-09-17 RX ORDER — IBUPROFEN 600 MG/1
600 TABLET ORAL EVERY 8 HOURS PRN
Qty: 30 TABLET | Refills: 0 | Status: SHIPPED | OUTPATIENT
Start: 2021-09-17 | End: 2022-10-20

## 2021-09-21 ENCOUNTER — NURSE TRIAGE (OUTPATIENT)
Dept: ADMINISTRATIVE | Facility: CLINIC | Age: 76
End: 2021-09-21

## 2021-09-21 DIAGNOSIS — M25.531 RIGHT WRIST PAIN: Primary | ICD-10-CM

## 2021-09-24 DIAGNOSIS — M47.22 OSTEOARTHRITIS OF SPINE WITH RADICULOPATHY, CERVICAL REGION: ICD-10-CM

## 2021-09-25 RX ORDER — HYDROCODONE BITARTRATE AND ACETAMINOPHEN 5; 325 MG/1; MG/1
1 TABLET ORAL EVERY 8 HOURS PRN
Qty: 40 TABLET | Refills: 0 | Status: SHIPPED | OUTPATIENT
Start: 2021-09-25 | End: 2021-10-25 | Stop reason: SDUPTHER

## 2021-10-21 ENCOUNTER — TELEPHONE (OUTPATIENT)
Dept: ORTHOPEDICS | Facility: CLINIC | Age: 76
End: 2021-10-21

## 2021-10-22 ENCOUNTER — OFFICE VISIT (OUTPATIENT)
Dept: ORTHOPEDICS | Facility: CLINIC | Age: 76
End: 2021-10-22
Payer: MEDICARE

## 2021-10-22 VITALS — SYSTOLIC BLOOD PRESSURE: 128 MMHG | HEART RATE: 82 BPM | DIASTOLIC BLOOD PRESSURE: 81 MMHG

## 2021-10-22 DIAGNOSIS — M17.0 PRIMARY OSTEOARTHRITIS OF BOTH KNEES: Primary | ICD-10-CM

## 2021-10-22 PROCEDURE — 1159F PR MEDICATION LIST DOCUMENTED IN MEDICAL RECORD: ICD-10-PCS | Mod: CPTII,S$GLB,, | Performed by: PHYSICIAN ASSISTANT

## 2021-10-22 PROCEDURE — 99213 OFFICE O/P EST LOW 20 MIN: CPT | Mod: S$GLB,,, | Performed by: PHYSICIAN ASSISTANT

## 2021-10-22 PROCEDURE — 1160F PR REVIEW ALL MEDS BY PRESCRIBER/CLIN PHARMACIST DOCUMENTED: ICD-10-PCS | Mod: CPTII,S$GLB,, | Performed by: PHYSICIAN ASSISTANT

## 2021-10-22 PROCEDURE — 3074F SYST BP LT 130 MM HG: CPT | Mod: CPTII,S$GLB,, | Performed by: PHYSICIAN ASSISTANT

## 2021-10-22 PROCEDURE — 99499 RISK ADDL DX/OHS AUDIT: ICD-10-PCS | Mod: S$GLB,,, | Performed by: PHYSICIAN ASSISTANT

## 2021-10-22 PROCEDURE — 1101F PR PT FALLS ASSESS DOC 0-1 FALLS W/OUT INJ PAST YR: ICD-10-PCS | Mod: CPTII,S$GLB,, | Performed by: PHYSICIAN ASSISTANT

## 2021-10-22 PROCEDURE — 1101F PT FALLS ASSESS-DOCD LE1/YR: CPT | Mod: CPTII,S$GLB,, | Performed by: PHYSICIAN ASSISTANT

## 2021-10-22 PROCEDURE — 99999 PR PBB SHADOW E&M-EST. PATIENT-LVL III: CPT | Mod: PBBFAC,,, | Performed by: PHYSICIAN ASSISTANT

## 2021-10-22 PROCEDURE — 3079F DIAST BP 80-89 MM HG: CPT | Mod: CPTII,S$GLB,, | Performed by: PHYSICIAN ASSISTANT

## 2021-10-22 PROCEDURE — 3288F FALL RISK ASSESSMENT DOCD: CPT | Mod: CPTII,S$GLB,, | Performed by: PHYSICIAN ASSISTANT

## 2021-10-22 PROCEDURE — 99499 UNLISTED E&M SERVICE: CPT | Mod: S$GLB,,, | Performed by: PHYSICIAN ASSISTANT

## 2021-10-22 PROCEDURE — 1125F AMNT PAIN NOTED PAIN PRSNT: CPT | Mod: CPTII,S$GLB,, | Performed by: PHYSICIAN ASSISTANT

## 2021-10-22 PROCEDURE — 3074F PR MOST RECENT SYSTOLIC BLOOD PRESSURE < 130 MM HG: ICD-10-PCS | Mod: CPTII,S$GLB,, | Performed by: PHYSICIAN ASSISTANT

## 2021-10-22 PROCEDURE — 1159F MED LIST DOCD IN RCRD: CPT | Mod: CPTII,S$GLB,, | Performed by: PHYSICIAN ASSISTANT

## 2021-10-22 PROCEDURE — 3288F PR FALLS RISK ASSESSMENT DOCUMENTED: ICD-10-PCS | Mod: CPTII,S$GLB,, | Performed by: PHYSICIAN ASSISTANT

## 2021-10-22 PROCEDURE — 99999 PR PBB SHADOW E&M-EST. PATIENT-LVL III: ICD-10-PCS | Mod: PBBFAC,,, | Performed by: PHYSICIAN ASSISTANT

## 2021-10-22 PROCEDURE — 1125F PR PAIN SEVERITY QUANTIFIED, PAIN PRESENT: ICD-10-PCS | Mod: CPTII,S$GLB,, | Performed by: PHYSICIAN ASSISTANT

## 2021-10-22 PROCEDURE — 1160F RVW MEDS BY RX/DR IN RCRD: CPT | Mod: CPTII,S$GLB,, | Performed by: PHYSICIAN ASSISTANT

## 2021-10-22 PROCEDURE — 99213 PR OFFICE/OUTPT VISIT, EST, LEVL III, 20-29 MIN: ICD-10-PCS | Mod: S$GLB,,, | Performed by: PHYSICIAN ASSISTANT

## 2021-10-22 PROCEDURE — 3079F PR MOST RECENT DIASTOLIC BLOOD PRESSURE 80-89 MM HG: ICD-10-PCS | Mod: CPTII,S$GLB,, | Performed by: PHYSICIAN ASSISTANT

## 2021-10-25 DIAGNOSIS — M47.22 OSTEOARTHRITIS OF SPINE WITH RADICULOPATHY, CERVICAL REGION: ICD-10-CM

## 2021-10-26 ENCOUNTER — TELEPHONE (OUTPATIENT)
Dept: ORTHOPEDICS | Facility: CLINIC | Age: 76
End: 2021-10-26
Payer: MEDICARE

## 2021-10-26 RX ORDER — HYDROCODONE BITARTRATE AND ACETAMINOPHEN 5; 325 MG/1; MG/1
1 TABLET ORAL EVERY 8 HOURS PRN
Qty: 40 TABLET | Refills: 0 | Status: SHIPPED | OUTPATIENT
Start: 2021-10-26 | End: 2021-11-26 | Stop reason: SDUPTHER

## 2021-10-28 ENCOUNTER — OFFICE VISIT (OUTPATIENT)
Dept: ORTHOPEDICS | Facility: CLINIC | Age: 76
End: 2021-10-28
Payer: MEDICARE

## 2021-10-28 ENCOUNTER — HOSPITAL ENCOUNTER (OUTPATIENT)
Dept: RADIOLOGY | Facility: HOSPITAL | Age: 76
Discharge: HOME | End: 2021-10-28
Attending: ORTHOPAEDIC SURGERY
Payer: MEDICARE

## 2021-10-28 ENCOUNTER — TELEPHONE (OUTPATIENT)
Dept: INTERNAL MEDICINE | Facility: CLINIC | Age: 76
End: 2021-10-28

## 2021-10-28 VITALS — WEIGHT: 172.56 LBS | BODY MASS INDEX: 29.46 KG/M2 | HEIGHT: 64 IN

## 2021-10-28 DIAGNOSIS — M25.559 HIP PAIN: ICD-10-CM

## 2021-10-28 DIAGNOSIS — Z01.818 PREOP TESTING: Primary | ICD-10-CM

## 2021-10-28 DIAGNOSIS — M17.0 PRIMARY OSTEOARTHRITIS OF BOTH KNEES: ICD-10-CM

## 2021-10-28 DIAGNOSIS — M17.0 PRIMARY OSTEOARTHRITIS OF BOTH KNEES: Primary | ICD-10-CM

## 2021-10-28 DIAGNOSIS — M17.12 PRIMARY OSTEOARTHRITIS OF LEFT KNEE: ICD-10-CM

## 2021-10-28 DIAGNOSIS — M17.11 PRIMARY OSTEOARTHRITIS OF RIGHT KNEE: ICD-10-CM

## 2021-10-28 PROCEDURE — 99999 PR PBB SHADOW E&M-EST. PATIENT-LVL III: CPT | Mod: PBBFAC,,, | Performed by: ORTHOPAEDIC SURGERY

## 2021-10-28 PROCEDURE — 99213 PR OFFICE/OUTPT VISIT, EST, LEVL III, 20-29 MIN: ICD-10-PCS | Mod: S$GLB,,, | Performed by: ORTHOPAEDIC SURGERY

## 2021-10-28 PROCEDURE — 1125F PR PAIN SEVERITY QUANTIFIED, PAIN PRESENT: ICD-10-PCS | Mod: CPTII,S$GLB,, | Performed by: ORTHOPAEDIC SURGERY

## 2021-10-28 PROCEDURE — 72170 X-RAY EXAM OF PELVIS: CPT | Mod: TC

## 2021-10-28 PROCEDURE — 1159F PR MEDICATION LIST DOCUMENTED IN MEDICAL RECORD: ICD-10-PCS | Mod: CPTII,S$GLB,, | Performed by: ORTHOPAEDIC SURGERY

## 2021-10-28 PROCEDURE — 99999 PR PBB SHADOW E&M-EST. PATIENT-LVL III: ICD-10-PCS | Mod: PBBFAC,,, | Performed by: ORTHOPAEDIC SURGERY

## 2021-10-28 PROCEDURE — 1125F AMNT PAIN NOTED PAIN PRSNT: CPT | Mod: CPTII,S$GLB,, | Performed by: ORTHOPAEDIC SURGERY

## 2021-10-28 PROCEDURE — 72170 XR PELVIS ROUTINE AP: ICD-10-PCS | Mod: 26,,, | Performed by: RADIOLOGY

## 2021-10-28 PROCEDURE — 1159F MED LIST DOCD IN RCRD: CPT | Mod: CPTII,S$GLB,, | Performed by: ORTHOPAEDIC SURGERY

## 2021-10-28 PROCEDURE — 72170 X-RAY EXAM OF PELVIS: CPT | Mod: 26,,, | Performed by: RADIOLOGY

## 2021-10-28 PROCEDURE — 73562 X-RAY EXAM OF KNEE 3: CPT | Mod: TC,50

## 2021-10-28 PROCEDURE — 73562 X-RAY EXAM OF KNEE 3: CPT | Mod: 26,50,, | Performed by: RADIOLOGY

## 2021-10-28 PROCEDURE — 1160F RVW MEDS BY RX/DR IN RCRD: CPT | Mod: CPTII,S$GLB,, | Performed by: ORTHOPAEDIC SURGERY

## 2021-10-28 PROCEDURE — 1160F PR REVIEW ALL MEDS BY PRESCRIBER/CLIN PHARMACIST DOCUMENTED: ICD-10-PCS | Mod: CPTII,S$GLB,, | Performed by: ORTHOPAEDIC SURGERY

## 2021-10-28 PROCEDURE — 73562 XR KNEE ORTHO BILAT: ICD-10-PCS | Mod: 26,50,, | Performed by: RADIOLOGY

## 2021-10-28 PROCEDURE — 99213 OFFICE O/P EST LOW 20 MIN: CPT | Mod: S$GLB,,, | Performed by: ORTHOPAEDIC SURGERY

## 2021-10-28 NOTE — TELEPHONE ENCOUNTER
Spoke to pt and scheduled pre op exam for 11/09/2021 at 1 pm in office with you.     Pt also requesting a stand up walker with a seat, since she is having trouble getting around.

## 2021-10-28 NOTE — TELEPHONE ENCOUNTER
----- Message from Silvestre Muñoz MD sent at 10/28/2021  4:10 PM CDT -----  She would like to have knee replacement, and I would like your input as to optimization.  Thanks!

## 2021-10-29 ENCOUNTER — TELEPHONE (OUTPATIENT)
Dept: ORTHOPEDICS | Facility: CLINIC | Age: 76
End: 2021-10-29
Payer: MEDICARE

## 2021-10-29 DIAGNOSIS — M17.11 PRIMARY OSTEOARTHRITIS OF RIGHT KNEE: Primary | ICD-10-CM

## 2021-10-29 PROBLEM — M17.12 PRIMARY OSTEOARTHRITIS OF LEFT KNEE: Status: ACTIVE | Noted: 2021-10-29

## 2021-11-02 ENCOUNTER — OFFICE VISIT (OUTPATIENT)
Dept: ORTHOPEDICS | Facility: CLINIC | Age: 76
End: 2021-11-02
Payer: MEDICARE

## 2021-11-02 ENCOUNTER — TELEPHONE (OUTPATIENT)
Dept: ORTHOPEDICS | Facility: CLINIC | Age: 76
End: 2021-11-02
Payer: MEDICARE

## 2021-11-02 VITALS — BODY MASS INDEX: 29.37 KG/M2 | WEIGHT: 172 LBS | HEIGHT: 64 IN

## 2021-11-02 DIAGNOSIS — M17.11 PRIMARY OSTEOARTHRITIS OF RIGHT KNEE: Primary | ICD-10-CM

## 2021-11-02 DIAGNOSIS — M17.11 RIGHT KNEE DJD: ICD-10-CM

## 2021-11-02 PROCEDURE — 3288F FALL RISK ASSESSMENT DOCD: CPT | Mod: CPTII,S$GLB,, | Performed by: NURSE PRACTITIONER

## 2021-11-02 PROCEDURE — 99499 UNLISTED E&M SERVICE: CPT | Mod: S$GLB,,, | Performed by: NURSE PRACTITIONER

## 2021-11-02 PROCEDURE — 1101F PR PT FALLS ASSESS DOC 0-1 FALLS W/OUT INJ PAST YR: ICD-10-PCS | Mod: CPTII,S$GLB,, | Performed by: NURSE PRACTITIONER

## 2021-11-02 PROCEDURE — 1160F PR REVIEW ALL MEDS BY PRESCRIBER/CLIN PHARMACIST DOCUMENTED: ICD-10-PCS | Mod: CPTII,S$GLB,, | Performed by: NURSE PRACTITIONER

## 2021-11-02 PROCEDURE — 99999 PR PBB SHADOW E&M-EST. PATIENT-LVL III: ICD-10-PCS | Mod: PBBFAC,,, | Performed by: NURSE PRACTITIONER

## 2021-11-02 PROCEDURE — 99999 PR PBB SHADOW E&M-EST. PATIENT-LVL III: CPT | Mod: PBBFAC,,, | Performed by: NURSE PRACTITIONER

## 2021-11-02 PROCEDURE — 99499 NO LOS: ICD-10-PCS | Mod: S$GLB,,, | Performed by: NURSE PRACTITIONER

## 2021-11-02 PROCEDURE — 1126F AMNT PAIN NOTED NONE PRSNT: CPT | Mod: CPTII,S$GLB,, | Performed by: NURSE PRACTITIONER

## 2021-11-02 PROCEDURE — 1160F RVW MEDS BY RX/DR IN RCRD: CPT | Mod: CPTII,S$GLB,, | Performed by: NURSE PRACTITIONER

## 2021-11-02 PROCEDURE — 1159F MED LIST DOCD IN RCRD: CPT | Mod: CPTII,S$GLB,, | Performed by: NURSE PRACTITIONER

## 2021-11-02 PROCEDURE — 1159F PR MEDICATION LIST DOCUMENTED IN MEDICAL RECORD: ICD-10-PCS | Mod: CPTII,S$GLB,, | Performed by: NURSE PRACTITIONER

## 2021-11-02 PROCEDURE — 3288F PR FALLS RISK ASSESSMENT DOCUMENTED: ICD-10-PCS | Mod: CPTII,S$GLB,, | Performed by: NURSE PRACTITIONER

## 2021-11-02 PROCEDURE — 1126F PR PAIN SEVERITY QUANTIFIED, NO PAIN PRESENT: ICD-10-PCS | Mod: CPTII,S$GLB,, | Performed by: NURSE PRACTITIONER

## 2021-11-02 PROCEDURE — 1101F PT FALLS ASSESS-DOCD LE1/YR: CPT | Mod: CPTII,S$GLB,, | Performed by: NURSE PRACTITIONER

## 2021-11-04 ENCOUNTER — TELEPHONE (OUTPATIENT)
Dept: ORTHOPEDICS | Facility: CLINIC | Age: 76
End: 2021-11-04
Payer: MEDICARE

## 2021-11-05 ENCOUNTER — TELEPHONE (OUTPATIENT)
Dept: ORTHOPEDICS | Facility: CLINIC | Age: 76
End: 2021-11-05
Payer: MEDICARE

## 2021-11-05 RX ORDER — NALOXONE HCL 0.4 MG/ML
0.02 VIAL (ML) INJECTION
Status: CANCELLED | OUTPATIENT
Start: 2021-11-05

## 2021-11-05 RX ORDER — PREGABALIN 75 MG/1
75 CAPSULE ORAL
Status: CANCELLED | OUTPATIENT
Start: 2021-11-05

## 2021-11-05 RX ORDER — OXYCODONE HYDROCHLORIDE 5 MG/1
5 TABLET ORAL
Status: CANCELLED | OUTPATIENT
Start: 2021-11-05

## 2021-11-05 RX ORDER — MIDAZOLAM HYDROCHLORIDE 1 MG/ML
1 INJECTION INTRAMUSCULAR; INTRAVENOUS EVERY 5 MIN PRN
Status: CANCELLED | OUTPATIENT
Start: 2021-11-05

## 2021-11-05 RX ORDER — ACETAMINOPHEN 500 MG
1000 TABLET ORAL EVERY 6 HOURS
Status: CANCELLED | OUTPATIENT
Start: 2021-11-05 | End: 2021-11-07

## 2021-11-05 RX ORDER — ASPIRIN 81 MG/1
81 TABLET ORAL 2 TIMES DAILY
Status: CANCELLED | OUTPATIENT
Start: 2021-11-05

## 2021-11-05 RX ORDER — OXYCODONE HYDROCHLORIDE 5 MG/1
10 TABLET ORAL
Status: CANCELLED | OUTPATIENT
Start: 2021-11-05

## 2021-11-05 RX ORDER — LIDOCAINE HYDROCHLORIDE 10 MG/ML
1 INJECTION, SOLUTION EPIDURAL; INFILTRATION; INTRACAUDAL; PERINEURAL
Status: CANCELLED | OUTPATIENT
Start: 2021-11-05

## 2021-11-05 RX ORDER — BISACODYL 10 MG
10 SUPPOSITORY, RECTAL RECTAL EVERY 12 HOURS PRN
Status: CANCELLED | OUTPATIENT
Start: 2021-11-05

## 2021-11-05 RX ORDER — PROCHLORPERAZINE EDISYLATE 5 MG/ML
5 INJECTION INTRAMUSCULAR; INTRAVENOUS EVERY 6 HOURS PRN
Status: CANCELLED | OUTPATIENT
Start: 2021-11-05

## 2021-11-05 RX ORDER — CELECOXIB 200 MG/1
400 CAPSULE ORAL
Status: CANCELLED | OUTPATIENT
Start: 2021-11-05

## 2021-11-05 RX ORDER — MUPIROCIN 20 MG/G
1 OINTMENT TOPICAL
Status: CANCELLED | OUTPATIENT
Start: 2021-11-05

## 2021-11-05 RX ORDER — FAMOTIDINE 20 MG/1
20 TABLET, FILM COATED ORAL 2 TIMES DAILY
Status: CANCELLED | OUTPATIENT
Start: 2021-11-05

## 2021-11-05 RX ORDER — FENTANYL CITRATE 50 UG/ML
25 INJECTION, SOLUTION INTRAMUSCULAR; INTRAVENOUS EVERY 5 MIN PRN
Status: CANCELLED | OUTPATIENT
Start: 2021-11-05

## 2021-11-05 RX ORDER — SODIUM CHLORIDE 9 MG/ML
INJECTION, SOLUTION INTRAVENOUS CONTINUOUS
Status: CANCELLED | OUTPATIENT
Start: 2021-11-05 | End: 2021-11-06

## 2021-11-05 RX ORDER — PREGABALIN 75 MG/1
75 CAPSULE ORAL NIGHTLY
Status: CANCELLED | OUTPATIENT
Start: 2021-11-05

## 2021-11-05 RX ORDER — CEFAZOLIN SODIUM 2 G/50ML
2 SOLUTION INTRAVENOUS
Status: CANCELLED | OUTPATIENT
Start: 2021-11-05 | End: 2021-11-06

## 2021-11-05 RX ORDER — SODIUM CHLORIDE 9 MG/ML
INJECTION, SOLUTION INTRAVENOUS
Status: CANCELLED | OUTPATIENT
Start: 2021-11-05

## 2021-11-05 RX ORDER — ONDANSETRON 2 MG/ML
4 INJECTION INTRAMUSCULAR; INTRAVENOUS EVERY 8 HOURS PRN
Status: CANCELLED | OUTPATIENT
Start: 2021-11-05

## 2021-11-05 RX ORDER — SODIUM CHLORIDE 0.9 % (FLUSH) 0.9 %
10 SYRINGE (ML) INJECTION
Status: CANCELLED | OUTPATIENT
Start: 2021-11-05

## 2021-11-05 RX ORDER — ACETAMINOPHEN 500 MG
1000 TABLET ORAL
Status: CANCELLED | OUTPATIENT
Start: 2021-11-05

## 2021-11-05 RX ORDER — CEFAZOLIN SODIUM 2 G/50ML
2 SOLUTION INTRAVENOUS
Status: CANCELLED | OUTPATIENT
Start: 2021-11-05

## 2021-11-05 RX ORDER — POLYETHYLENE GLYCOL 3350 17 G/17G
17 POWDER, FOR SOLUTION ORAL DAILY
Status: CANCELLED | OUTPATIENT
Start: 2021-11-05

## 2021-11-05 RX ORDER — AMOXICILLIN 250 MG
1 CAPSULE ORAL 2 TIMES DAILY
Status: CANCELLED | OUTPATIENT
Start: 2021-11-05

## 2021-11-07 DIAGNOSIS — I10 HYPERTENSION, UNSPECIFIED TYPE: ICD-10-CM

## 2021-11-07 DIAGNOSIS — M79.604 PAIN OF RIGHT LOWER EXTREMITY: Primary | ICD-10-CM

## 2021-11-09 ENCOUNTER — OFFICE VISIT (OUTPATIENT)
Dept: INTERNAL MEDICINE | Facility: CLINIC | Age: 76
End: 2021-11-09
Payer: MEDICARE

## 2021-11-09 ENCOUNTER — HOSPITAL ENCOUNTER (OUTPATIENT)
Dept: RADIOLOGY | Facility: HOSPITAL | Age: 76
Discharge: HOME | End: 2021-11-09
Attending: INTERNAL MEDICINE
Payer: MEDICARE

## 2021-11-09 VITALS
RESPIRATION RATE: 17 BRPM | OXYGEN SATURATION: 95 % | HEART RATE: 80 BPM | SYSTOLIC BLOOD PRESSURE: 118 MMHG | BODY MASS INDEX: 29.17 KG/M2 | DIASTOLIC BLOOD PRESSURE: 64 MMHG | WEIGHT: 170.88 LBS | HEIGHT: 64 IN | TEMPERATURE: 98 F

## 2021-11-09 DIAGNOSIS — R07.9 CHEST PAIN, UNSPECIFIED TYPE: ICD-10-CM

## 2021-11-09 DIAGNOSIS — M25.561 RIGHT KNEE PAIN, UNSPECIFIED CHRONICITY: Primary | ICD-10-CM

## 2021-11-09 PROCEDURE — 3074F PR MOST RECENT SYSTOLIC BLOOD PRESSURE < 130 MM HG: ICD-10-PCS | Mod: CPTII,S$GLB,, | Performed by: INTERNAL MEDICINE

## 2021-11-09 PROCEDURE — 3288F PR FALLS RISK ASSESSMENT DOCUMENTED: ICD-10-PCS | Mod: CPTII,S$GLB,, | Performed by: INTERNAL MEDICINE

## 2021-11-09 PROCEDURE — 99999 PR PBB SHADOW E&M-EST. PATIENT-LVL IV: ICD-10-PCS | Mod: PBBFAC,,, | Performed by: INTERNAL MEDICINE

## 2021-11-09 PROCEDURE — 1126F AMNT PAIN NOTED NONE PRSNT: CPT | Mod: CPTII,S$GLB,, | Performed by: INTERNAL MEDICINE

## 2021-11-09 PROCEDURE — 93005 ELECTROCARDIOGRAM TRACING: CPT | Mod: S$GLB,,, | Performed by: INTERNAL MEDICINE

## 2021-11-09 PROCEDURE — 1101F PR PT FALLS ASSESS DOC 0-1 FALLS W/OUT INJ PAST YR: ICD-10-PCS | Mod: CPTII,S$GLB,, | Performed by: INTERNAL MEDICINE

## 2021-11-09 PROCEDURE — 3078F PR MOST RECENT DIASTOLIC BLOOD PRESSURE < 80 MM HG: ICD-10-PCS | Mod: CPTII,S$GLB,, | Performed by: INTERNAL MEDICINE

## 2021-11-09 PROCEDURE — 71046 XR CHEST PA AND LATERAL: ICD-10-PCS | Mod: 26,,, | Performed by: RADIOLOGY

## 2021-11-09 PROCEDURE — 93010 ELECTROCARDIOGRAM REPORT: CPT | Mod: S$GLB,,, | Performed by: INTERNAL MEDICINE

## 2021-11-09 PROCEDURE — 3078F DIAST BP <80 MM HG: CPT | Mod: CPTII,S$GLB,, | Performed by: INTERNAL MEDICINE

## 2021-11-09 PROCEDURE — 3074F SYST BP LT 130 MM HG: CPT | Mod: CPTII,S$GLB,, | Performed by: INTERNAL MEDICINE

## 2021-11-09 PROCEDURE — 1126F PR PAIN SEVERITY QUANTIFIED, NO PAIN PRESENT: ICD-10-PCS | Mod: CPTII,S$GLB,, | Performed by: INTERNAL MEDICINE

## 2021-11-09 PROCEDURE — 71046 X-RAY EXAM CHEST 2 VIEWS: CPT | Mod: TC,PO

## 2021-11-09 PROCEDURE — 3288F FALL RISK ASSESSMENT DOCD: CPT | Mod: CPTII,S$GLB,, | Performed by: INTERNAL MEDICINE

## 2021-11-09 PROCEDURE — 1160F RVW MEDS BY RX/DR IN RCRD: CPT | Mod: CPTII,S$GLB,, | Performed by: INTERNAL MEDICINE

## 2021-11-09 PROCEDURE — 1101F PT FALLS ASSESS-DOCD LE1/YR: CPT | Mod: CPTII,S$GLB,, | Performed by: INTERNAL MEDICINE

## 2021-11-09 PROCEDURE — 71046 X-RAY EXAM CHEST 2 VIEWS: CPT | Mod: 26,,, | Performed by: RADIOLOGY

## 2021-11-09 PROCEDURE — 93005 EKG 12-LEAD: ICD-10-PCS | Mod: S$GLB,,, | Performed by: INTERNAL MEDICINE

## 2021-11-09 PROCEDURE — 1159F PR MEDICATION LIST DOCUMENTED IN MEDICAL RECORD: ICD-10-PCS | Mod: CPTII,S$GLB,, | Performed by: INTERNAL MEDICINE

## 2021-11-09 PROCEDURE — 1160F PR REVIEW ALL MEDS BY PRESCRIBER/CLIN PHARMACIST DOCUMENTED: ICD-10-PCS | Mod: CPTII,S$GLB,, | Performed by: INTERNAL MEDICINE

## 2021-11-09 PROCEDURE — 1159F MED LIST DOCD IN RCRD: CPT | Mod: CPTII,S$GLB,, | Performed by: INTERNAL MEDICINE

## 2021-11-09 PROCEDURE — 99999 PR PBB SHADOW E&M-EST. PATIENT-LVL IV: CPT | Mod: PBBFAC,,, | Performed by: INTERNAL MEDICINE

## 2021-11-09 PROCEDURE — 93010 EKG 12-LEAD: ICD-10-PCS | Mod: S$GLB,,, | Performed by: INTERNAL MEDICINE

## 2021-11-09 PROCEDURE — 99213 PR OFFICE/OUTPT VISIT, EST, LEVL III, 20-29 MIN: ICD-10-PCS | Mod: S$GLB,,, | Performed by: INTERNAL MEDICINE

## 2021-11-09 PROCEDURE — 99213 OFFICE O/P EST LOW 20 MIN: CPT | Mod: S$GLB,,, | Performed by: INTERNAL MEDICINE

## 2021-11-10 ENCOUNTER — TELEPHONE (OUTPATIENT)
Dept: INTERNAL MEDICINE | Facility: CLINIC | Age: 76
End: 2021-11-10

## 2021-11-10 NOTE — TELEPHONE ENCOUNTER
Please inform patient that chest x-ray shows clear lungs.  EKG shows normal sinus rhythm, and labs are essentially normal.

## 2021-11-10 NOTE — TELEPHONE ENCOUNTER
----- Message from Jennifer Bolden sent at 11/10/2021  9:08 AM CST -----  Contact: [phone]  Patient called to follow up on the status of the new prescription request. Please advise

## 2021-11-10 NOTE — TELEPHONE ENCOUNTER
Spoke to pt and informed her of her results. Pt gave a verbal understanding.     Pt also asked about her, saying you discussed with her on yesterdays visit about taking an abx before her surgery. Pt surgery date is 11/17/2021

## 2021-11-13 ENCOUNTER — NURSE TRIAGE (OUTPATIENT)
Dept: ADMINISTRATIVE | Facility: CLINIC | Age: 76
End: 2021-11-13
Payer: MEDICARE

## 2021-11-14 ENCOUNTER — LAB VISIT (OUTPATIENT)
Dept: SPORTS MEDICINE | Facility: CLINIC | Age: 76
End: 2021-11-14
Payer: MEDICARE

## 2021-11-14 DIAGNOSIS — Z01.818 PREOP TESTING: ICD-10-CM

## 2021-11-14 PROCEDURE — U0005 INFEC AGEN DETEC AMPLI PROBE: HCPCS | Performed by: ORTHOPAEDIC SURGERY

## 2021-11-14 PROCEDURE — U0003 INFECTIOUS AGENT DETECTION BY NUCLEIC ACID (DNA OR RNA); SEVERE ACUTE RESPIRATORY SYNDROME CORONAVIRUS 2 (SARS-COV-2) (CORONAVIRUS DISEASE [COVID-19]), AMPLIFIED PROBE TECHNIQUE, MAKING USE OF HIGH THROUGHPUT TECHNOLOGIES AS DESCRIBED BY CMS-2020-01-R: HCPCS | Performed by: ORTHOPAEDIC SURGERY

## 2021-11-15 ENCOUNTER — PATIENT OUTREACH (OUTPATIENT)
Dept: ADMINISTRATIVE | Facility: OTHER | Age: 76
End: 2021-11-15
Payer: MEDICARE

## 2021-11-15 LAB
SARS-COV-2 RNA RESP QL NAA+PROBE: NOT DETECTED
SARS-COV-2- CYCLE NUMBER: NORMAL

## 2021-11-16 ENCOUNTER — TELEPHONE (OUTPATIENT)
Dept: ORTHOPEDICS | Facility: CLINIC | Age: 76
End: 2021-11-16
Payer: MEDICARE

## 2021-11-16 ENCOUNTER — HOSPITAL ENCOUNTER (OUTPATIENT)
Dept: PREADMISSION TESTING | Facility: HOSPITAL | Age: 76
Discharge: HOME | End: 2021-11-16
Attending: ORTHOPAEDIC SURGERY
Payer: MEDICARE

## 2021-11-16 ENCOUNTER — OFFICE VISIT (OUTPATIENT)
Dept: CARDIOLOGY | Facility: CLINIC | Age: 76
End: 2021-11-16
Payer: MEDICARE

## 2021-11-16 VITALS
WEIGHT: 171 LBS | SYSTOLIC BLOOD PRESSURE: 144 MMHG | HEIGHT: 65 IN | HEART RATE: 78 BPM | DIASTOLIC BLOOD PRESSURE: 77 MMHG | OXYGEN SATURATION: 95 % | BODY MASS INDEX: 28.49 KG/M2 | TEMPERATURE: 98 F | RESPIRATION RATE: 18 BRPM

## 2021-11-16 VITALS
WEIGHT: 170.88 LBS | HEART RATE: 79 BPM | HEIGHT: 64 IN | DIASTOLIC BLOOD PRESSURE: 65 MMHG | SYSTOLIC BLOOD PRESSURE: 135 MMHG | BODY MASS INDEX: 29.17 KG/M2

## 2021-11-16 DIAGNOSIS — R07.9 CHEST PAIN, UNSPECIFIED TYPE: Primary | ICD-10-CM

## 2021-11-16 PROCEDURE — 99214 OFFICE O/P EST MOD 30 MIN: CPT | Mod: GC,S$GLB,, | Performed by: INTERNAL MEDICINE

## 2021-11-16 PROCEDURE — 99214 PR OFFICE/OUTPT VISIT, EST, LEVL IV, 30-39 MIN: ICD-10-PCS | Mod: GC,S$GLB,, | Performed by: INTERNAL MEDICINE

## 2021-11-16 PROCEDURE — 99999 PR PBB SHADOW E&M-EST. PATIENT-LVL III: CPT | Mod: PBBFAC,GC,, | Performed by: INTERNAL MEDICINE

## 2021-11-16 PROCEDURE — 99999 PR PBB SHADOW E&M-EST. PATIENT-LVL III: ICD-10-PCS | Mod: PBBFAC,GC,, | Performed by: INTERNAL MEDICINE

## 2021-11-17 ENCOUNTER — TELEPHONE (OUTPATIENT)
Dept: INTERNAL MEDICINE | Facility: CLINIC | Age: 76
End: 2021-11-17
Payer: MEDICARE

## 2021-11-18 ENCOUNTER — HOSPITAL ENCOUNTER (OUTPATIENT)
Dept: CARDIOLOGY | Facility: HOSPITAL | Age: 76
Discharge: HOME | End: 2021-11-18
Attending: INTERNAL MEDICINE
Payer: MEDICARE

## 2021-11-18 DIAGNOSIS — R07.9 CHEST PAIN, UNSPECIFIED TYPE: ICD-10-CM

## 2021-11-19 ENCOUNTER — HOSPITAL ENCOUNTER (OUTPATIENT)
Dept: CARDIOLOGY | Facility: HOSPITAL | Age: 76
Discharge: HOME | End: 2021-11-19
Attending: INTERNAL MEDICINE
Payer: MEDICARE

## 2021-11-19 VITALS
WEIGHT: 171 LBS | DIASTOLIC BLOOD PRESSURE: 64 MMHG | RESPIRATION RATE: 16 BRPM | HEIGHT: 65 IN | SYSTOLIC BLOOD PRESSURE: 124 MMHG | BODY MASS INDEX: 28.49 KG/M2

## 2021-11-19 LAB
ASCENDING AORTA: 2.89 CM
AV INDEX (PROSTH): 0.75
AV MEAN GRADIENT: 4 MMHG
AV PEAK GRADIENT: 7 MMHG
AV VALVE AREA: 3.17 CM2
AV VELOCITY RATIO: 0.71
BSA FOR ECHO PROCEDURE: 1.89 M2
CV ECHO LV RWT: 0.54 CM
CV STRESS BASE HR: 71 BPM
DIASTOLIC BLOOD PRESSURE: 85 MMHG
DOP CALC AO PEAK VEL: 1.35 M/S
DOP CALC AO VTI: 25.54 CM
DOP CALC LVOT AREA: 4.2 CM2
DOP CALC LVOT DIAMETER: 2.32 CM
DOP CALC LVOT PEAK VEL: 0.96 M/S
DOP CALC LVOT STROKE VOLUME: 80.95 CM3
DOP CALCLVOT PEAK VEL VTI: 19.16 CM
E WAVE DECELERATION TIME: 282.89 MSEC
E/A RATIO: 0.63
E/E' RATIO: 9.08 M/S
ECHO LV POSTERIOR WALL: 0.94 CM (ref 0.6–1.1)
EJECTION FRACTION: 60 %
FRACTIONAL SHORTENING: 34 % (ref 28–44)
INTERVENTRICULAR SEPTUM: 1.02 CM (ref 0.6–1.1)
IVRT: 102.76 MSEC
LA MAJOR: 5.88 CM
LA MINOR: 5.85 CM
LA WIDTH: 3.47 CM
LEFT ATRIUM SIZE: 2.71 CM
LEFT ATRIUM VOLUME INDEX MOD: 30 ML/M2
LEFT ATRIUM VOLUME INDEX: 25.3 ML/M2
LEFT ATRIUM VOLUME MOD: 55.49 CM3
LEFT ATRIUM VOLUME: 46.88 CM3
LEFT INTERNAL DIMENSION IN SYSTOLE: 2.29 CM (ref 2.1–4)
LEFT VENTRICLE DIASTOLIC VOLUME INDEX: 27.31 ML/M2
LEFT VENTRICLE DIASTOLIC VOLUME: 50.53 ML
LEFT VENTRICLE MASS INDEX: 54 G/M2
LEFT VENTRICLE SYSTOLIC VOLUME INDEX: 9.7 ML/M2
LEFT VENTRICLE SYSTOLIC VOLUME: 17.97 ML
LEFT VENTRICULAR INTERNAL DIMENSION IN DIASTOLE: 3.49 CM (ref 3.5–6)
LEFT VENTRICULAR MASS: 99.92 G
LV LATERAL E/E' RATIO: 7.38 M/S
LV SEPTAL E/E' RATIO: 11.8 M/S
MV PEAK A VEL: 0.93 M/S
MV PEAK E VEL: 0.59 M/S
MV STENOSIS PRESSURE HALF TIME: 82.04 MS
MV VALVE AREA P 1/2 METHOD: 2.68 CM2
OHS CV CPX 1 MINUTE RECOVERY HEART RATE: 139 BPM
OHS CV CPX 85 PERCENT MAX PREDICTED HEART RATE MALE: 118
OHS CV CPX MAX PREDICTED HEART RATE: 138
OHS CV CPX PATIENT IS FEMALE: 1
OHS CV CPX PATIENT IS MALE: 0
OHS CV CPX PEAK DIASTOLIC BLOOD PRESSURE: 53 MMHG
OHS CV CPX PEAK HEAR RATE: 139 BPM
OHS CV CPX PEAK RATE PRESSURE PRODUCT: ABNORMAL
OHS CV CPX PEAK SYSTOLIC BLOOD PRESSURE: 116 MMHG
OHS CV CPX PERCENT MAX PREDICTED HEART RATE ACHIEVED: 101
OHS CV CPX RATE PRESSURE PRODUCT PRESENTING: ABNORMAL
PISA TR MAX VEL: 2.14 M/S
RA MAJOR: 4.74 CM
RA PRESSURE: 3 MMHG
RA WIDTH: 2.72 CM
RIGHT VENTRICULAR END-DIASTOLIC DIMENSION: 2.65 CM
RV TISSUE DOPPLER FREE WALL SYSTOLIC VELOCITY 1 (APICAL 4 CHAMBER VIEW): 11.35 CM/S
SINUS: 3.12 CM
STJ: 2.33 CM
SYSTOLIC BLOOD PRESSURE: 155 MMHG
TDI LATERAL: 0.08 M/S
TDI SEPTAL: 0.05 M/S
TDI: 0.07 M/S
TR MAX PG: 18 MMHG
TRICUSPID ANNULAR PLANE SYSTOLIC EXCURSION: 1.1 CM
TV REST PULMONARY ARTERY PRESSURE: 21 MMHG

## 2021-11-19 PROCEDURE — 93351 STRESS TTE COMPLETE: CPT

## 2021-11-19 PROCEDURE — 63600175 PHARM REV CODE 636 W HCPCS: Performed by: INTERNAL MEDICINE

## 2021-11-19 PROCEDURE — 93351 STRESS TTE COMPLETE: CPT | Mod: 26,,, | Performed by: INTERNAL MEDICINE

## 2021-11-19 PROCEDURE — 93351 STRESS ECHO (CUPID ONLY): ICD-10-PCS | Mod: 26,,, | Performed by: INTERNAL MEDICINE

## 2021-11-19 RX ORDER — ATROPINE SULFATE 0.1 MG/ML
1 INJECTION INTRAVENOUS
Status: DISCONTINUED | OUTPATIENT
Start: 2021-11-19 | End: 2021-11-19

## 2021-11-19 RX ORDER — DOBUTAMINE HYDROCHLORIDE 400 MG/100ML
10 INJECTION INTRAVENOUS
Status: COMPLETED | OUTPATIENT
Start: 2021-11-19 | End: 2021-11-19

## 2021-11-19 RX ADMIN — DOBUTAMINE HYDROCHLORIDE 10 MCG/KG/MIN: 400 INJECTION INTRAVENOUS at 04:11

## 2021-11-24 ENCOUNTER — NURSE TRIAGE (OUTPATIENT)
Dept: ADMINISTRATIVE | Facility: CLINIC | Age: 76
End: 2021-11-24
Payer: MEDICARE

## 2021-11-26 DIAGNOSIS — M47.22 OSTEOARTHRITIS OF SPINE WITH RADICULOPATHY, CERVICAL REGION: ICD-10-CM

## 2021-11-26 RX ORDER — HYDROCODONE BITARTRATE AND ACETAMINOPHEN 5; 325 MG/1; MG/1
1 TABLET ORAL EVERY 8 HOURS PRN
Qty: 40 TABLET | Refills: 0 | Status: SHIPPED | OUTPATIENT
Start: 2021-11-26 | End: 2021-12-28 | Stop reason: SDUPTHER

## 2021-11-27 ENCOUNTER — NURSE TRIAGE (OUTPATIENT)
Dept: ADMINISTRATIVE | Facility: CLINIC | Age: 76
End: 2021-11-27
Payer: MEDICARE

## 2021-12-07 ENCOUNTER — TELEPHONE (OUTPATIENT)
Dept: INTERNAL MEDICINE | Facility: CLINIC | Age: 76
End: 2021-12-07
Payer: MEDICARE

## 2021-12-07 DIAGNOSIS — H93.19 TINNITUS, UNSPECIFIED LATERALITY: Primary | ICD-10-CM

## 2021-12-07 NOTE — TELEPHONE ENCOUNTER
----- Message from Ernestine Romero sent at 12/7/2021  8:47 AM CST -----  Contact: [phone] Sofia  Patient would like to get medical advice.  Symptoms (please be specific):  Ringing in Left Ear  How long have you had these symptoms: 3 weeks  Would you like a call back, or a response through your [facility] portal?:  call back   Pharmacy name and phone # (copy from chart):    Comments:  Patient would like to know what to do.

## 2021-12-07 NOTE — TELEPHONE ENCOUNTER
ENT referral has been ordered.   Problem: Patient Care Overview (Adult)  Goal: Plan of Care Review  Outcome: Outcome(s) achieved Date Met:  11/17/17 11/17/17 7438   Coping/Psychosocial Response Interventions   Plan Of Care Reviewed With patient   Patient Care Overview   Progress improving   Outcome Evaluation   Outcome Summary/Follow up Plan HTN well controlled on po meds, pain controlled on oral meds. Discharge teaching completed       Goal: Adult Individualization and Mutuality  Outcome: Outcome(s) achieved Date Met:  11/17/17  Goal: Discharge Needs Assessment  Outcome: Outcome(s) achieved Date Met:  11/17/17    Problem: Perioperative Period (Adult)  Goal: Signs and Symptoms of Listed Potential Problems Will be Absent or Manageable (Perioperative Period)  Outcome: Outcome(s) achieved Date Met:  11/17/17    Problem: Self-Care Deficit (Adult,Obstetrics,Pediatric)  Goal: Improved Ability to Perform BADL and IADL  Outcome: Outcome(s) achieved Date Met:  11/17/17    Problem: Fall Risk (Adult)  Goal: Absence of Falls  Outcome: Outcome(s) achieved Date Met:  11/17/17    Problem: Pain, Acute (Adult)  Goal: Acceptable Pain Control/Comfort Level  Outcome: Outcome(s) achieved Date Met:  11/17/17

## 2021-12-13 ENCOUNTER — HOSPITAL ENCOUNTER (EMERGENCY)
Facility: HOSPITAL | Age: 76
Discharge: HOME | End: 2021-12-13
Attending: EMERGENCY MEDICINE
Payer: MEDICARE

## 2021-12-13 VITALS
DIASTOLIC BLOOD PRESSURE: 92 MMHG | WEIGHT: 170 LBS | TEMPERATURE: 98 F | OXYGEN SATURATION: 98 % | BODY MASS INDEX: 29.02 KG/M2 | HEIGHT: 64 IN | SYSTOLIC BLOOD PRESSURE: 162 MMHG | HEART RATE: 72 BPM | RESPIRATION RATE: 16 BRPM

## 2021-12-13 DIAGNOSIS — R07.9 CHEST PAIN: ICD-10-CM

## 2021-12-13 DIAGNOSIS — R42 DIZZINESS: ICD-10-CM

## 2021-12-13 DIAGNOSIS — H93.12 TINNITUS OF LEFT EAR: Primary | ICD-10-CM

## 2021-12-13 LAB
ALBUMIN SERPL BCP-MCNC: 3.6 G/DL (ref 3.5–5.2)
ALP SERPL-CCNC: 78 U/L (ref 55–135)
ALT SERPL W/O P-5'-P-CCNC: 11 U/L (ref 10–44)
ANION GAP SERPL CALC-SCNC: 11 MMOL/L (ref 8–16)
AST SERPL-CCNC: 15 U/L (ref 10–40)
BASOPHILS # BLD AUTO: 0.02 K/UL (ref 0–0.2)
BASOPHILS NFR BLD: 0.4 % (ref 0–1.9)
BILIRUB SERPL-MCNC: 0.9 MG/DL (ref 0.1–1)
BUN SERPL-MCNC: 16 MG/DL (ref 8–23)
CALCIUM SERPL-MCNC: 9.4 MG/DL (ref 8.7–10.5)
CHLORIDE SERPL-SCNC: 107 MMOL/L (ref 95–110)
CO2 SERPL-SCNC: 22 MMOL/L (ref 23–29)
CREAT SERPL-MCNC: 0.8 MG/DL (ref 0.5–1.4)
DIFFERENTIAL METHOD: ABNORMAL
EOSINOPHIL # BLD AUTO: 0.1 K/UL (ref 0–0.5)
EOSINOPHIL NFR BLD: 1.7 % (ref 0–8)
ERYTHROCYTE [DISTWIDTH] IN BLOOD BY AUTOMATED COUNT: 12.6 % (ref 11.5–14.5)
EST. GFR  (AFRICAN AMERICAN): >60 ML/MIN/1.73 M^2
EST. GFR  (NON AFRICAN AMERICAN): >60 ML/MIN/1.73 M^2
GLUCOSE SERPL-MCNC: 92 MG/DL (ref 70–110)
HCT VFR BLD AUTO: 39.9 % (ref 37–48.5)
HGB BLD-MCNC: 12.9 G/DL (ref 12–16)
IMM GRANULOCYTES # BLD AUTO: 0.01 K/UL (ref 0–0.04)
IMM GRANULOCYTES NFR BLD AUTO: 0.2 % (ref 0–0.5)
LYMPHOCYTES # BLD AUTO: 1.7 K/UL (ref 1–4.8)
LYMPHOCYTES NFR BLD: 35.6 % (ref 18–48)
MCH RBC QN AUTO: 31.1 PG (ref 27–31)
MCHC RBC AUTO-ENTMCNC: 32.3 G/DL (ref 32–36)
MCV RBC AUTO: 96 FL (ref 82–98)
MONOCYTES # BLD AUTO: 0.5 K/UL (ref 0.3–1)
MONOCYTES NFR BLD: 10.9 % (ref 4–15)
NEUTROPHILS # BLD AUTO: 2.4 K/UL (ref 1.8–7.7)
NEUTROPHILS NFR BLD: 51.2 % (ref 38–73)
NRBC BLD-RTO: 0 /100 WBC
PLATELET # BLD AUTO: 184 K/UL (ref 150–450)
PMV BLD AUTO: 10.2 FL (ref 9.2–12.9)
POTASSIUM SERPL-SCNC: 3.7 MMOL/L (ref 3.5–5.1)
PROT SERPL-MCNC: 6.9 G/DL (ref 6–8.4)
RBC # BLD AUTO: 4.15 M/UL (ref 4–5.4)
SODIUM SERPL-SCNC: 140 MMOL/L (ref 136–145)
TROPONIN I SERPL DL<=0.01 NG/ML-MCNC: 0.02 NG/ML (ref 0–0.03)
WBC # BLD AUTO: 4.77 K/UL (ref 3.9–12.7)

## 2021-12-13 PROCEDURE — 93010 EKG 12-LEAD: ICD-10-PCS | Mod: ,,, | Performed by: INTERNAL MEDICINE

## 2021-12-13 PROCEDURE — 84484 ASSAY OF TROPONIN QUANT: CPT | Performed by: PHYSICIAN ASSISTANT

## 2021-12-13 PROCEDURE — 93005 ELECTROCARDIOGRAM TRACING: CPT

## 2021-12-13 PROCEDURE — 99284 PR EMERGENCY DEPT VISIT,LEVEL IV: ICD-10-PCS | Mod: ,,, | Performed by: PHYSICIAN ASSISTANT

## 2021-12-13 PROCEDURE — 99284 EMERGENCY DEPT VISIT MOD MDM: CPT | Mod: ,,, | Performed by: PHYSICIAN ASSISTANT

## 2021-12-13 PROCEDURE — 85025 COMPLETE CBC W/AUTO DIFF WBC: CPT | Performed by: PHYSICIAN ASSISTANT

## 2021-12-13 PROCEDURE — 93010 ELECTROCARDIOGRAM REPORT: CPT | Mod: ,,, | Performed by: INTERNAL MEDICINE

## 2021-12-13 PROCEDURE — 99284 EMERGENCY DEPT VISIT MOD MDM: CPT | Mod: 25

## 2021-12-13 PROCEDURE — 80053 COMPREHEN METABOLIC PANEL: CPT | Performed by: PHYSICIAN ASSISTANT

## 2021-12-13 PROCEDURE — 25000003 PHARM REV CODE 250: Performed by: PHYSICIAN ASSISTANT

## 2021-12-13 RX ORDER — MECLIZINE HYDROCHLORIDE 25 MG/1
25 TABLET ORAL 3 TIMES DAILY PRN
Qty: 20 TABLET | Refills: 0 | Status: SHIPPED | OUTPATIENT
Start: 2021-12-13 | End: 2022-10-20

## 2021-12-13 RX ORDER — MECLIZINE HCL 12.5 MG 12.5 MG/1
25 TABLET ORAL
Status: COMPLETED | OUTPATIENT
Start: 2021-12-13 | End: 2021-12-13

## 2021-12-13 RX ADMIN — MECLIZINE 25 MG: 12.5 TABLET ORAL at 05:12

## 2021-12-22 ENCOUNTER — CLINICAL SUPPORT (OUTPATIENT)
Dept: AUDIOLOGY | Facility: CLINIC | Age: 76
End: 2021-12-22
Payer: MEDICARE

## 2021-12-22 ENCOUNTER — OFFICE VISIT (OUTPATIENT)
Dept: OTOLARYNGOLOGY | Facility: CLINIC | Age: 76
End: 2021-12-22
Payer: MEDICARE

## 2021-12-22 VITALS — HEART RATE: 74 BPM | DIASTOLIC BLOOD PRESSURE: 95 MMHG | SYSTOLIC BLOOD PRESSURE: 148 MMHG

## 2021-12-22 DIAGNOSIS — H90.3 SENSORINEURAL HEARING LOSS OF BOTH EARS: Primary | ICD-10-CM

## 2021-12-22 DIAGNOSIS — H93.12 TINNITUS OF LEFT EAR: Primary | ICD-10-CM

## 2021-12-22 DIAGNOSIS — H91.8X3 ASYMMETRICAL HEARING LOSS: ICD-10-CM

## 2021-12-22 DIAGNOSIS — H90.3 SENSORINEURAL HEARING LOSS (SNHL) OF BOTH EARS: ICD-10-CM

## 2021-12-22 DIAGNOSIS — H93.12 TINNITUS OF LEFT EAR: ICD-10-CM

## 2021-12-22 PROCEDURE — 1159F PR MEDICATION LIST DOCUMENTED IN MEDICAL RECORD: ICD-10-PCS | Mod: CPTII,S$GLB,, | Performed by: NURSE PRACTITIONER

## 2021-12-22 PROCEDURE — 1101F PR PT FALLS ASSESS DOC 0-1 FALLS W/OUT INJ PAST YR: ICD-10-PCS | Mod: CPTII,S$GLB,, | Performed by: NURSE PRACTITIONER

## 2021-12-22 PROCEDURE — 92567 TYMPANOMETRY: CPT | Mod: S$GLB,,, | Performed by: AUDIOLOGIST

## 2021-12-22 PROCEDURE — 3077F SYST BP >= 140 MM HG: CPT | Mod: CPTII,S$GLB,, | Performed by: NURSE PRACTITIONER

## 2021-12-22 PROCEDURE — 1159F MED LIST DOCD IN RCRD: CPT | Mod: CPTII,S$GLB,, | Performed by: NURSE PRACTITIONER

## 2021-12-22 PROCEDURE — 3080F DIAST BP >= 90 MM HG: CPT | Mod: CPTII,S$GLB,, | Performed by: NURSE PRACTITIONER

## 2021-12-22 PROCEDURE — 99999 PR PBB SHADOW E&M-EST. PATIENT-LVL I: ICD-10-PCS | Mod: PBBFAC,,, | Performed by: AUDIOLOGIST

## 2021-12-22 PROCEDURE — 1125F PR PAIN SEVERITY QUANTIFIED, PAIN PRESENT: ICD-10-PCS | Mod: CPTII,S$GLB,, | Performed by: NURSE PRACTITIONER

## 2021-12-22 PROCEDURE — 99999 PR PBB SHADOW E&M-EST. PATIENT-LVL IV: ICD-10-PCS | Mod: PBBFAC,,, | Performed by: NURSE PRACTITIONER

## 2021-12-22 PROCEDURE — 1160F RVW MEDS BY RX/DR IN RCRD: CPT | Mod: CPTII,S$GLB,, | Performed by: NURSE PRACTITIONER

## 2021-12-22 PROCEDURE — 1101F PT FALLS ASSESS-DOCD LE1/YR: CPT | Mod: CPTII,S$GLB,, | Performed by: NURSE PRACTITIONER

## 2021-12-22 PROCEDURE — 1125F AMNT PAIN NOTED PAIN PRSNT: CPT | Mod: CPTII,S$GLB,, | Performed by: NURSE PRACTITIONER

## 2021-12-22 PROCEDURE — 99202 OFFICE O/P NEW SF 15 MIN: CPT | Mod: S$GLB,,, | Performed by: NURSE PRACTITIONER

## 2021-12-22 PROCEDURE — 92557 COMPREHENSIVE HEARING TEST: CPT | Mod: S$GLB,,, | Performed by: AUDIOLOGIST

## 2021-12-22 PROCEDURE — 3288F PR FALLS RISK ASSESSMENT DOCUMENTED: ICD-10-PCS | Mod: CPTII,S$GLB,, | Performed by: NURSE PRACTITIONER

## 2021-12-22 PROCEDURE — 1160F PR REVIEW ALL MEDS BY PRESCRIBER/CLIN PHARMACIST DOCUMENTED: ICD-10-PCS | Mod: CPTII,S$GLB,, | Performed by: NURSE PRACTITIONER

## 2021-12-22 PROCEDURE — 92557 PR COMPREHENSIVE HEARING TEST: ICD-10-PCS | Mod: S$GLB,,, | Performed by: AUDIOLOGIST

## 2021-12-22 PROCEDURE — 3080F PR MOST RECENT DIASTOLIC BLOOD PRESSURE >= 90 MM HG: ICD-10-PCS | Mod: CPTII,S$GLB,, | Performed by: NURSE PRACTITIONER

## 2021-12-22 PROCEDURE — 99999 PR PBB SHADOW E&M-EST. PATIENT-LVL IV: CPT | Mod: PBBFAC,,, | Performed by: NURSE PRACTITIONER

## 2021-12-22 PROCEDURE — 99999 PR PBB SHADOW E&M-EST. PATIENT-LVL I: CPT | Mod: PBBFAC,,, | Performed by: AUDIOLOGIST

## 2021-12-22 PROCEDURE — 99202 PR OFFICE/OUTPT VISIT, NEW, LEVL II, 15-29 MIN: ICD-10-PCS | Mod: S$GLB,,, | Performed by: NURSE PRACTITIONER

## 2021-12-22 PROCEDURE — 3288F FALL RISK ASSESSMENT DOCD: CPT | Mod: CPTII,S$GLB,, | Performed by: NURSE PRACTITIONER

## 2021-12-22 PROCEDURE — 3077F PR MOST RECENT SYSTOLIC BLOOD PRESSURE >= 140 MM HG: ICD-10-PCS | Mod: CPTII,S$GLB,, | Performed by: NURSE PRACTITIONER

## 2021-12-22 PROCEDURE — 92567 PR TYMPA2METRY: ICD-10-PCS | Mod: S$GLB,,, | Performed by: AUDIOLOGIST

## 2021-12-23 ENCOUNTER — TELEPHONE (OUTPATIENT)
Dept: OTOLARYNGOLOGY | Facility: CLINIC | Age: 76
End: 2021-12-23

## 2021-12-23 NOTE — TELEPHONE ENCOUNTER
----- Message from Angel Bowman sent at 12/23/2021  1:46 PM CST -----  Pt states she will get her sweater that she left sometime this week.

## 2021-12-28 DIAGNOSIS — M47.22 OSTEOARTHRITIS OF SPINE WITH RADICULOPATHY, CERVICAL REGION: ICD-10-CM

## 2021-12-29 RX ORDER — HYDROCODONE BITARTRATE AND ACETAMINOPHEN 5; 325 MG/1; MG/1
1 TABLET ORAL EVERY 8 HOURS PRN
Qty: 40 TABLET | Refills: 0 | Status: SHIPPED | OUTPATIENT
Start: 2021-12-29 | End: 2022-01-31 | Stop reason: SDUPTHER

## 2022-01-31 DIAGNOSIS — M47.22 OSTEOARTHRITIS OF SPINE WITH RADICULOPATHY, CERVICAL REGION: ICD-10-CM

## 2022-01-31 NOTE — TELEPHONE ENCOUNTER
----- Message from Nancy Gates sent at 1/31/2022  4:11 PM CST -----  Regarding: refill  Contact: patient [phone]  Requesting an RX refill or new RX.  Is this a refill or new RX: refill  RX name and strength (HYDROcodone-acetaminophen (NORCO) 5-325 mg per tablet  Is this a 30 day or 90 day RX: 90  Pharmacy name and phone #   [id number] - [place] - [address]  AT [facility] [address]  [address] [place] [address]  Phone: [phone] Fax: [phone]  The doctors have asked that we provide their patients with the following 2 reminders -- prescription refills can take up to 72 hours, and a friendly reminder that in the future you can use your [facility] account to request refills: yes    Patent is also requesting Indocin an anti-inflammatory that she has taken before.

## 2022-02-01 RX ORDER — HYDROCODONE BITARTRATE AND ACETAMINOPHEN 5; 325 MG/1; MG/1
1 TABLET ORAL EVERY 8 HOURS PRN
Qty: 40 TABLET | Refills: 0 | Status: SHIPPED | OUTPATIENT
Start: 2022-02-01 | End: 2022-03-02 | Stop reason: SDUPTHER

## 2022-02-01 NOTE — TELEPHONE ENCOUNTER
Please inform patient I do not recommend indomethacin.  She has taken both ibuprofen and diclofenac in the recent past.  Do either of these work for her?

## 2022-02-25 ENCOUNTER — PES CALL (OUTPATIENT)
Dept: ADMINISTRATIVE | Facility: CLINIC | Age: 77
End: 2022-02-25
Payer: MEDICARE

## 2022-03-02 DIAGNOSIS — M47.22 OSTEOARTHRITIS OF SPINE WITH RADICULOPATHY, CERVICAL REGION: ICD-10-CM

## 2022-03-02 RX ORDER — HYDROCODONE BITARTRATE AND ACETAMINOPHEN 5; 325 MG/1; MG/1
1 TABLET ORAL EVERY 8 HOURS PRN
Qty: 40 TABLET | Refills: 0 | Status: SHIPPED | OUTPATIENT
Start: 2022-03-02 | End: 2022-04-19 | Stop reason: SDUPTHER

## 2022-03-02 NOTE — TELEPHONE ENCOUNTER
----- Message from Ernestine Herron sent at 3/2/2022 12:58 PM CST -----  Contact: Self [phone]  Requesting an RX refill or new RX.    Is this a refill or new RX: refill    RX name and strength (copy/paste from chart):  HYDROcodone-acetaminophen (NORCO) 5-325 mg per tablet and nitroGLYCERIN SL tablet 0.4 mg   (mediation was not prescribed by provider)    Is this a 30 day or 90 day RX: 30    Pharmacy name and phone # (copy/paste from chart):      [facility] #[id number] - [place] - [address]  AT [facility] [address]  [address] [place] [address]  Phone: [phone] Fax: [phone]

## 2022-03-10 ENCOUNTER — TELEPHONE (OUTPATIENT)
Dept: INTERNAL MEDICINE | Facility: CLINIC | Age: 77
End: 2022-03-10
Payer: MEDICARE

## 2022-03-10 NOTE — TELEPHONE ENCOUNTER
----- Message from Kassandra Nur sent at 3/10/2022  9:05 AM CST -----  Contact: [phone]  Requesting an RX refill or new RX.  Is this a refill or new RX: new   RX name and strength (copy/paste from chart):  Ambien 10 mg   Is this a 30 day or 90 day RX: 30 day  Pharmacy name and phone # (copy/paste from chart):    [facility] #[id number] - [place] [facility]  AT [facility] [address]  [address] [place] [address]  Phone: [phone] Fax: [phone]      The doctors have asked that we provide their patients with the following 2 reminders -- prescription refills can take up to 72 hours, and a friendly reminder that in the future you can use your [facility] account to request refills:aware     Pt says that she has been having trouble sleeping. She says last time prescribed was in 2014

## 2022-03-10 NOTE — TELEPHONE ENCOUNTER
Please inform patient that I recommend she schedule an office visit to discuss treatment options.  Okay to schedule a virtual visit if patient prefers.

## 2022-03-11 NOTE — TELEPHONE ENCOUNTER
----- Message from Alejandra Chen sent at 3/11/2022  9:44 AM CST -----  Contact: self   Patient is returning a phone call.  Who left a message for the patient: Ozzy Farias MA   Does patient know what this is regarding:  missed call 3/10/22  Would you like a call back, or a response through your [facility] portal?:   phone  Comments:

## 2022-03-12 ENCOUNTER — OFFICE VISIT (OUTPATIENT)
Dept: INTERNAL MEDICINE | Facility: CLINIC | Age: 77
End: 2022-03-12
Payer: MEDICARE

## 2022-03-12 VITALS
OXYGEN SATURATION: 99 % | DIASTOLIC BLOOD PRESSURE: 82 MMHG | RESPIRATION RATE: 18 BRPM | HEART RATE: 89 BPM | TEMPERATURE: 98 F | SYSTOLIC BLOOD PRESSURE: 138 MMHG | WEIGHT: 176.56 LBS | BODY MASS INDEX: 30.14 KG/M2 | HEIGHT: 64 IN

## 2022-03-12 DIAGNOSIS — Z13.31 POSITIVE DEPRESSION SCREENING: ICD-10-CM

## 2022-03-12 DIAGNOSIS — I10 ESSENTIAL HYPERTENSION: ICD-10-CM

## 2022-03-12 DIAGNOSIS — F33.9 RECURRENT MAJOR DEPRESSIVE DISORDER, REMISSION STATUS UNSPECIFIED: ICD-10-CM

## 2022-03-12 DIAGNOSIS — G47.00 INSOMNIA, UNSPECIFIED TYPE: Primary | ICD-10-CM

## 2022-03-12 PROCEDURE — 1160F RVW MEDS BY RX/DR IN RCRD: CPT | Mod: CPTII,S$GLB,, | Performed by: INTERNAL MEDICINE

## 2022-03-12 PROCEDURE — 99499 RISK ADDL DX/OHS AUDIT: ICD-10-PCS | Mod: S$GLB,,, | Performed by: INTERNAL MEDICINE

## 2022-03-12 PROCEDURE — 1160F PR REVIEW ALL MEDS BY PRESCRIBER/CLIN PHARMACIST DOCUMENTED: ICD-10-PCS | Mod: CPTII,S$GLB,, | Performed by: INTERNAL MEDICINE

## 2022-03-12 PROCEDURE — 99499 UNLISTED E&M SERVICE: CPT | Mod: S$GLB,,, | Performed by: INTERNAL MEDICINE

## 2022-03-12 PROCEDURE — 99213 OFFICE O/P EST LOW 20 MIN: CPT | Mod: S$GLB,,, | Performed by: INTERNAL MEDICINE

## 2022-03-12 PROCEDURE — 3079F DIAST BP 80-89 MM HG: CPT | Mod: CPTII,S$GLB,, | Performed by: INTERNAL MEDICINE

## 2022-03-12 PROCEDURE — 1159F MED LIST DOCD IN RCRD: CPT | Mod: CPTII,S$GLB,, | Performed by: INTERNAL MEDICINE

## 2022-03-12 PROCEDURE — 3075F PR MOST RECENT SYSTOLIC BLOOD PRESS GE 130-139MM HG: ICD-10-PCS | Mod: CPTII,S$GLB,, | Performed by: INTERNAL MEDICINE

## 2022-03-12 PROCEDURE — 99999 PR PBB SHADOW E&M-EST. PATIENT-LVL IV: CPT | Mod: PBBFAC,,, | Performed by: INTERNAL MEDICINE

## 2022-03-12 PROCEDURE — 1159F PR MEDICATION LIST DOCUMENTED IN MEDICAL RECORD: ICD-10-PCS | Mod: CPTII,S$GLB,, | Performed by: INTERNAL MEDICINE

## 2022-03-12 PROCEDURE — 3075F SYST BP GE 130 - 139MM HG: CPT | Mod: CPTII,S$GLB,, | Performed by: INTERNAL MEDICINE

## 2022-03-12 PROCEDURE — 1125F PR PAIN SEVERITY QUANTIFIED, PAIN PRESENT: ICD-10-PCS | Mod: CPTII,S$GLB,, | Performed by: INTERNAL MEDICINE

## 2022-03-12 PROCEDURE — 1101F PT FALLS ASSESS-DOCD LE1/YR: CPT | Mod: CPTII,S$GLB,, | Performed by: INTERNAL MEDICINE

## 2022-03-12 PROCEDURE — 3288F FALL RISK ASSESSMENT DOCD: CPT | Mod: CPTII,S$GLB,, | Performed by: INTERNAL MEDICINE

## 2022-03-12 PROCEDURE — 99999 PR PBB SHADOW E&M-EST. PATIENT-LVL IV: ICD-10-PCS | Mod: PBBFAC,,, | Performed by: INTERNAL MEDICINE

## 2022-03-12 PROCEDURE — 1101F PR PT FALLS ASSESS DOC 0-1 FALLS W/OUT INJ PAST YR: ICD-10-PCS | Mod: CPTII,S$GLB,, | Performed by: INTERNAL MEDICINE

## 2022-03-12 PROCEDURE — 99213 PR OFFICE/OUTPT VISIT, EST, LEVL III, 20-29 MIN: ICD-10-PCS | Mod: S$GLB,,, | Performed by: INTERNAL MEDICINE

## 2022-03-12 PROCEDURE — 3079F PR MOST RECENT DIASTOLIC BLOOD PRESSURE 80-89 MM HG: ICD-10-PCS | Mod: CPTII,S$GLB,, | Performed by: INTERNAL MEDICINE

## 2022-03-12 PROCEDURE — 1125F AMNT PAIN NOTED PAIN PRSNT: CPT | Mod: CPTII,S$GLB,, | Performed by: INTERNAL MEDICINE

## 2022-03-12 PROCEDURE — 3288F PR FALLS RISK ASSESSMENT DOCUMENTED: ICD-10-PCS | Mod: CPTII,S$GLB,, | Performed by: INTERNAL MEDICINE

## 2022-03-12 RX ORDER — NITROGLYCERIN 0.3 MG/1
0.3 TABLET SUBLINGUAL EVERY 5 MIN PRN
Qty: 100 TABLET | Refills: 0 | Status: SHIPPED | OUTPATIENT
Start: 2022-03-12 | End: 2022-04-08

## 2022-03-12 RX ORDER — ZALEPLON 5 MG/1
5 CAPSULE ORAL NIGHTLY PRN
Qty: 30 CAPSULE | Refills: 3 | Status: SHIPPED | OUTPATIENT
Start: 2022-03-12 | End: 2023-03-27 | Stop reason: SDUPTHER

## 2022-03-12 NOTE — PROGRESS NOTES
CC:  Insomnia  HPI:  The patient is a 76 y.o. year old female who presents to the office for insomnia.  She reports difficulty falling asleep and remaining asleep.  She is waking up hourly.  Her symptoms started a couple of months ago when she started having problems evicting someone from her house.  She denies any spontaneous crying, anhedonia or lack of motivation.  She denies any homicidal or suicidal ideations.     PAST MEDICAL HISTORY:  Past Medical History:   Diagnosis Date    Abnormal mammogram of both breasts     Arthritis     Coronary vasospasm 10/31/2019    Hypertension     Major depressive disorder 5/14/2017    Memory disorder     Nephrolithiasis     Seizures     Stroke        SURGICAL HISTORY:  Past Surgical History:   Procedure Laterality Date    CATARACT EXTRACTION      cysts breast      TONSILLECTOMY         MEDS:  Medcard reviewed and updated    ALLERGIES: Allergy Card reviewed and updated    SOCIAL HISTORY:   The patient is a nonsmoker.    PE:   APPEARANCE: Well nourished, well developed, in no acute distress.    CHEST: Lungs clear to auscultation with unlabored respirations.  CARDIOVASCULAR: Normal S1, S2. No murmurs. No carotid bruits. No pedal edema.  ABDOMEN: Bowel sounds normal. Not distended. Soft. No tenderness or masses.   PSYCHIATRIC: The patient is oriented to person, place, and time and has a pleasant affect.        ASSESSMENT/PLAN:  Sofia was seen today for chest pain and insomnia.    Diagnoses and all orders for this visit:    Insomnia, unspecified type  -     Trial of sonata    Essential hypertension  -     Blood pressure is controlled    Recurrent major depressive disorder, remission status unspecified  -     Ambulatory referral/consult to Psychiatry; Future    Other orders  -     zaleplon (SONATA) 5 MG Cap; Take 1 capsule (5 mg total) by mouth nightly as needed.          I have used clinical judgement based on duration and functional status to consider definite necessity  for treatment.

## 2022-03-23 RX ORDER — ZALEPLON 5 MG/1
5 CAPSULE ORAL NIGHTLY PRN
Qty: 30 CAPSULE | Refills: 3 | Status: CANCELLED | OUTPATIENT
Start: 2022-03-23 | End: 2022-04-22

## 2022-03-23 NOTE — TELEPHONE ENCOUNTER
----- Message from Nereyda Chen sent at 3/23/2022  3:55 PM CDT -----  Contact: [phone]  Requesting an RX refill or new RX.  Is this a refill or new RX: refill  RX name and strength (copy/paste from chart):  zaleplon (SONATA) 5 MG Cap  Is this a 30 day or 90 day RX: 30  Pharmacy name and phone # (copy/paste from chart):   [facility] #[id number] - [place] - [address]  AT [facility] [address]  [address] [place] [address]  Phone: [phone] Fax: [phone]      The doctors have asked that we provide their patients with the following 2 reminders -- prescription refills can take up to 72 hours, and a friendly reminder that in the future you can use your [facility] account to request refills: yes

## 2022-04-14 ENCOUNTER — TELEPHONE (OUTPATIENT)
Dept: INTERNAL MEDICINE | Facility: CLINIC | Age: 77
End: 2022-04-14
Payer: MEDICARE

## 2022-04-14 NOTE — TELEPHONE ENCOUNTER
----- Message from Nancy Gates sent at 4/14/2022 11:09 AM CDT -----  Regarding: Handicapp tag needed  Contact: patient [phone]  Patient is requesting  a form for a handicap tag.

## 2022-04-18 ENCOUNTER — NURSE TRIAGE (OUTPATIENT)
Dept: ADMINISTRATIVE | Facility: CLINIC | Age: 77
End: 2022-04-18
Payer: MEDICARE

## 2022-04-18 NOTE — TELEPHONE ENCOUNTER
Wondering if she can please have a refill on hydrocodone as he knees were hurting her a lot today. She also would like to know if handicap tag can be addressed for her. Advised triage nurse would have to send a message to her doctor. She has no further needs at this time. Please contact caller with any further care advice.     Reason for Disposition   Health Information question, no triage required and triager able to answer question    Protocols used: INFORMATION ONLY CALL-A-AH

## 2022-04-19 DIAGNOSIS — M47.22 OSTEOARTHRITIS OF SPINE WITH RADICULOPATHY, CERVICAL REGION: ICD-10-CM

## 2022-04-19 RX ORDER — HYDROCODONE BITARTRATE AND ACETAMINOPHEN 5; 325 MG/1; MG/1
1 TABLET ORAL EVERY 8 HOURS PRN
Qty: 40 TABLET | Refills: 0 | Status: SHIPPED | OUTPATIENT
Start: 2022-04-19 | End: 2022-05-24 | Stop reason: SDUPTHER

## 2022-04-27 NOTE — TELEPHONE ENCOUNTER
Spoke to pt and she would like the handi-cap tag mailed out today. Tagged mailed out today 04/27/2022

## 2022-04-27 NOTE — TELEPHONE ENCOUNTER
----- Message from Dante Dunn sent at 4/27/2022  1:25 PM CDT -----  Contact: self [phone]  Pt requesting a call in regards to another handicap sticker.    Please call and advise

## 2022-05-24 DIAGNOSIS — M47.22 OSTEOARTHRITIS OF SPINE WITH RADICULOPATHY, CERVICAL REGION: ICD-10-CM

## 2022-05-24 RX ORDER — HYDROCODONE BITARTRATE AND ACETAMINOPHEN 5; 325 MG/1; MG/1
1 TABLET ORAL EVERY 8 HOURS PRN
Qty: 40 TABLET | Refills: 0 | Status: SHIPPED | OUTPATIENT
Start: 2022-05-24 | End: 2022-06-28 | Stop reason: SDUPTHER

## 2022-05-24 NOTE — TELEPHONE ENCOUNTER
----- Message from Randell Boykin sent at 5/24/2022 12:55 PM CDT -----  Type:  RX Refill Request    Who Called: pt   Refill or New Rx:refill  RX Name and Strength:HYDROcodone-acetaminophen (NORCO) 5-325 mg per tablet  How is the patient currently taking it? (ex. 1XDay):1  Is this a 30 day or 90 day RX:40  Preferred Pharmacy with phone number: [id number] [place], [place] - [address]  AT [facility] [address]  Local or Mail Order:local  Ordering Provider:haydee   Would the patient rather a call back or a response via [facility]? Call   Best Call Back Number:[phone]  Additional Information:     Pt would like a call back

## 2022-06-02 ENCOUNTER — TELEPHONE (OUTPATIENT)
Dept: INTERNAL MEDICINE | Facility: CLINIC | Age: 77
End: 2022-06-02
Payer: MEDICARE

## 2022-06-02 RX ORDER — METHYLPREDNISOLONE 4 MG/1
TABLET ORAL
Qty: 21 EACH | Refills: 0 | Status: SHIPPED | OUTPATIENT
Start: 2022-06-02 | End: 2022-07-08

## 2022-06-02 NOTE — TELEPHONE ENCOUNTER
Spoke to pt and she c/o hives and itching on hands and side after pulling grass she states. This started today. Pt requesting Rx for prednisone.

## 2022-06-02 NOTE — TELEPHONE ENCOUNTER
----- Message from Elidia Swartz sent at 6/2/2022 10:24 AM CDT -----  Contact: [phone] Patient  Patient would like to get medical advice.  Symptoms (please be specific):   hives with itching  How long have you had these symptoms: today  Would you like a call back, or a response through your [facility] portal?:   call back  Pharmacy name and phone # (copy from chart):     [id number] - [place] - [address]  AT [facility] [address]  [address] [place] [address]  Phone: [phone] Fax: [phone]     Comments:   Pt is requesting a Rx for prednisone.

## 2022-06-02 NOTE — TELEPHONE ENCOUNTER
Please inform patient that prescription for Medrol Dosepak has been sent to the pharmacy electronically.

## 2022-06-07 ENCOUNTER — NURSE TRIAGE (OUTPATIENT)
Dept: ADMINISTRATIVE | Facility: CLINIC | Age: 77
End: 2022-06-07
Payer: MEDICARE

## 2022-06-08 ENCOUNTER — TELEPHONE (OUTPATIENT)
Dept: INTERNAL MEDICINE | Facility: CLINIC | Age: 77
End: 2022-06-08
Payer: MEDICARE

## 2022-06-08 RX ORDER — ZALEPLON 5 MG/1
5 CAPSULE ORAL NIGHTLY PRN
Qty: 30 CAPSULE | Refills: 3 | Status: CANCELLED | OUTPATIENT
Start: 2022-06-08 | End: 2022-07-08

## 2022-06-08 NOTE — TELEPHONE ENCOUNTER
"Triage nurse note from 9pm last nite   " Spoke with patient states she needs zaleplon 5 mg refilled.  Patient would like prescription sent to [facility] on [address].    Informed patient that message would be sent to office.  Advised her to call back in the morning if no return call from the office.  Patient verbalized understanding. "     We have received any other msg requesting a refill.  Primary number not working number.  2nd number busy.    4 refills were  3/11/22.  She should have 1 refill left for now.  I left msg at [facility] to fill the last refill they have on file.   Call us if a problem doing so.  "

## 2022-06-08 NOTE — TELEPHONE ENCOUNTER
Spoke with patient states she needs zaleplon 5 mg refilled.  Patient would like prescription sent to [facility] on [address].  Informed patient that message would be sent to office.  Advised her to call back in the morning if no return call from the office.  Patient verbalized understanding.     Reason for Disposition   Caller requesting a CONTROLLED substance prescription refill (e.g., narcotics, ADHD medicines)    Protocols used: MEDICATION REFILL AND RENEWAL CALL-A-

## 2022-06-24 ENCOUNTER — NURSE TRIAGE (OUTPATIENT)
Dept: ADMINISTRATIVE | Facility: CLINIC | Age: 77
End: 2022-06-24
Payer: MEDICARE

## 2022-06-24 NOTE — TELEPHONE ENCOUNTER
Patient states she is out of her pain medication, Hydrocodone-Acetaminophen 5-325 mg. Patient states she does not have refills.     Care Advice given to Call PCP when office is open. Patient states understanding of Care Advice and cites no additional concerns at this time.    Reason for Disposition   Caller requesting a CONTROLLED substance prescription refill (e.g., narcotics, ADHD medicines)    Additional Information   Negative: New-onset or worsening symptoms, see that guideline  (e.g., diarrhea, runny nose, sore throat)   Negative: Medicine question not related to refill or renewal   Negative: Caller requesting information unrelated to medicine   Negative: [1] Prescription refill request for ESSENTIAL medicine (i.e., likelihood of harm to patient if not taken) AND [2] triager unable to refill per department policy   Negative: [1] Prescription not at pharmacy AND [2] was prescribed by PCP recently (Exception: triager has access to EMR and prescription is recorded there. Go to Home Care and confirm for pharmacy.)   Negative: [1] Pharmacy calling with prescription questions AND [2] triager unable to answer question   Negative: Prescription request for new medicine (not a refill)    Protocols used: MEDICATION REFILL AND RENEWAL CALL-A-

## 2022-06-28 DIAGNOSIS — M47.22 OSTEOARTHRITIS OF SPINE WITH RADICULOPATHY, CERVICAL REGION: ICD-10-CM

## 2022-06-28 RX ORDER — HYDROCODONE BITARTRATE AND ACETAMINOPHEN 5; 325 MG/1; MG/1
1 TABLET ORAL EVERY 8 HOURS PRN
Qty: 40 TABLET | Refills: 0 | Status: SHIPPED | OUTPATIENT
Start: 2022-06-28 | End: 2022-08-29 | Stop reason: SDUPTHER

## 2022-06-28 NOTE — TELEPHONE ENCOUNTER
----- Message from Micheline Cade sent at 6/28/2022  3:33 PM CDT -----  Contact: Sofia stewart [phone]  Requesting an RX refill or new RX.  Is this a refill or new RX: refill  RX name and strength:  HYDROcodone-acetaminophen (NORCO) 5-325 mg per tablet  Is this a 30 day or 90 day RX:   Pharmacy name and phone # :  [id number] - [address]  AT [facility] [address]   Phone: [phone]  The doctors have asked that we provide their patients with the following 2 reminders -- prescription refills can take up to 72 hours, and a friendly reminder that in the future you can use your [facility] account to request refills:

## 2022-06-30 ENCOUNTER — NURSE TRIAGE (OUTPATIENT)
Dept: ADMINISTRATIVE | Facility: CLINIC | Age: 77
End: 2022-06-30
Payer: MEDICARE

## 2022-07-01 RX ORDER — ZALEPLON 5 MG/1
5 CAPSULE ORAL NIGHTLY
COMMUNITY
Start: 2022-06-08 | End: 2022-07-01 | Stop reason: SDUPTHER

## 2022-07-01 RX ORDER — ZALEPLON 5 MG/1
5 CAPSULE ORAL NIGHTLY
Qty: 30 CAPSULE | Refills: 3 | Status: SHIPPED | OUTPATIENT
Start: 2022-07-01 | End: 2022-10-20

## 2022-07-01 NOTE — TELEPHONE ENCOUNTER
Sofia calling requesting a refill for sleep medication, Zaleplon. States she accidentally dropped the bottle after opening by the basin and pills got wet. Out of med now and rx . Advised to notify pharmacy of reason for early refill request for further instructions and Advised pt per triage protocol to call PCP during office hours for refill. Informed a high priority message will be sent to PCP during office hours. V/u.     Reason for Disposition   Caller requesting a CONTROLLED substance prescription refill (e.g., narcotics, ADHD medicines)    Protocols used: MEDICATION REFILL AND RENEWAL CALL-A-

## 2022-07-01 NOTE — TELEPHONE ENCOUNTER
----- Message from Maggie Santamaria sent at 7/1/2022 11:47 AM CDT -----  Contact: [phone] @ Patient  Good Morning  Patient is calling for a refill on her Sleeping Rx but need it sent please to [facility] #[id number] - [place], [place] - [address]  AT [facility] [address] patient does not know name of Rx    Please call and advise

## 2022-07-06 ENCOUNTER — TELEPHONE (OUTPATIENT)
Dept: INTERNAL MEDICINE | Facility: CLINIC | Age: 77
End: 2022-07-06
Payer: MEDICARE

## 2022-07-06 NOTE — TELEPHONE ENCOUNTER
----- Message from Amberly Chavez sent at 7/6/2022  9:14 AM CDT -----  Contact: [phone]  Pt has questions in regards to medical marijuana. Please f/u

## 2022-07-08 ENCOUNTER — TELEPHONE (OUTPATIENT)
Dept: INTERNAL MEDICINE | Facility: CLINIC | Age: 77
End: 2022-07-08
Payer: MEDICARE

## 2022-07-08 RX ORDER — METHYLPREDNISOLONE 4 MG/1
TABLET ORAL
Qty: 21 EACH | Refills: 0 | Status: SHIPPED | OUTPATIENT
Start: 2022-07-08 | End: 2022-07-29

## 2022-07-08 NOTE — TELEPHONE ENCOUNTER
----- Message from Alejandra Chen sent at 7/8/2022  2:31 PM CDT -----  Contact: [phone]  Requesting an RX refill or new RX.  Is this a refill or new RX: New  RX name and strength: methylPREDNISolone (MEDROL DOSEPACK) 4 mg tablet  Is this a 30 day or 90 day RX: 21 qty  Pharmacy name and phone #   [id number] - [place] - [address]  AT [facility] [address]  [address] [place] [address]  Phone: [phone] Fax: [phone]    The doctors have asked that we provide their patients with the following 2 reminders -- prescription refills can take up to 72 hours, and a friendly reminder that in the future you can use your [facility] account to request refills: yes      Patient states that she has hives again

## 2022-07-20 ENCOUNTER — HOSPITAL ENCOUNTER (EMERGENCY)
Facility: HOSPITAL | Age: 77
Discharge: HOME | End: 2022-07-20
Attending: EMERGENCY MEDICINE
Payer: MEDICARE

## 2022-07-20 VITALS
BODY MASS INDEX: 30.05 KG/M2 | DIASTOLIC BLOOD PRESSURE: 79 MMHG | TEMPERATURE: 98 F | OXYGEN SATURATION: 95 % | HEART RATE: 72 BPM | SYSTOLIC BLOOD PRESSURE: 134 MMHG | WEIGHT: 176 LBS | HEIGHT: 64 IN | RESPIRATION RATE: 16 BRPM

## 2022-07-20 DIAGNOSIS — R20.0 NUMBNESS: ICD-10-CM

## 2022-07-20 DIAGNOSIS — R06.02 SHORTNESS OF BREATH: ICD-10-CM

## 2022-07-20 DIAGNOSIS — I63.9 STROKE: ICD-10-CM

## 2022-07-20 DIAGNOSIS — E83.39 LOW PHOSPHATE LEVELS: ICD-10-CM

## 2022-07-20 DIAGNOSIS — R53.1 WEAKNESS: Primary | ICD-10-CM

## 2022-07-20 DIAGNOSIS — R20.2 PARESTHESIA: ICD-10-CM

## 2022-07-20 LAB
ALBUMIN SERPL BCP-MCNC: 4 G/DL (ref 3.5–5.2)
ALP SERPL-CCNC: 82 U/L (ref 55–135)
ALT SERPL W/O P-5'-P-CCNC: 10 U/L (ref 10–44)
ANION GAP SERPL CALC-SCNC: 12 MMOL/L (ref 8–16)
AST SERPL-CCNC: 17 U/L (ref 10–40)
BASOPHILS # BLD AUTO: 0.02 K/UL (ref 0–0.2)
BASOPHILS NFR BLD: 0.4 % (ref 0–1.9)
BILIRUB SERPL-MCNC: 1.9 MG/DL (ref 0.1–1)
BNP SERPL-MCNC: 35 PG/ML (ref 0–99)
BUN SERPL-MCNC: 18 MG/DL (ref 8–23)
BUN SERPL-MCNC: 20 MG/DL (ref 6–30)
CALCIUM SERPL-MCNC: 10 MG/DL (ref 8.7–10.5)
CHLORIDE SERPL-SCNC: 102 MMOL/L (ref 95–110)
CHLORIDE SERPL-SCNC: 103 MMOL/L (ref 95–110)
CHOLEST SERPL-MCNC: 247 MG/DL (ref 120–199)
CHOLEST/HDLC SERPL: 4.2 {RATIO} (ref 2–5)
CO2 SERPL-SCNC: 23 MMOL/L (ref 23–29)
CREAT SERPL-MCNC: 0.9 MG/DL (ref 0.5–1.4)
CREAT SERPL-MCNC: 1 MG/DL (ref 0.5–1.4)
CREAT SERPL-MCNC: 1 MG/DL (ref 0.5–1.4)
DIFFERENTIAL METHOD: NORMAL
EOSINOPHIL # BLD AUTO: 0 K/UL (ref 0–0.5)
EOSINOPHIL NFR BLD: 0 % (ref 0–8)
ERYTHROCYTE [DISTWIDTH] IN BLOOD BY AUTOMATED COUNT: 12.6 % (ref 11.5–14.5)
EST. GFR  (AFRICAN AMERICAN): >60 ML/MIN/1.73 M^2
EST. GFR  (NON AFRICAN AMERICAN): 54.5 ML/MIN/1.73 M^2
GLUCOSE SERPL-MCNC: 130 MG/DL (ref 70–110)
GLUCOSE SERPL-MCNC: 138 MG/DL (ref 70–110)
HCT VFR BLD AUTO: 41.7 % (ref 37–48.5)
HCT VFR BLD CALC: 43 %PCV (ref 36–54)
HCV AB SERPL QL IA: NEGATIVE
HDLC SERPL-MCNC: 59 MG/DL (ref 40–75)
HDLC SERPL: 23.9 % (ref 20–50)
HGB BLD-MCNC: 14.3 G/DL (ref 12–16)
IMM GRANULOCYTES # BLD AUTO: 0.02 K/UL (ref 0–0.04)
IMM GRANULOCYTES NFR BLD AUTO: 0.4 % (ref 0–0.5)
INR PPP: 1.1 (ref 0.8–1.2)
LDLC SERPL CALC-MCNC: 171.6 MG/DL (ref 63–159)
LYMPHOCYTES # BLD AUTO: 1.6 K/UL (ref 1–4.8)
LYMPHOCYTES NFR BLD: 28.1 % (ref 18–48)
MAGNESIUM SERPL-MCNC: 1.9 MG/DL (ref 1.6–2.6)
MCH RBC QN AUTO: 30.9 PG (ref 27–31)
MCHC RBC AUTO-ENTMCNC: 34.3 G/DL (ref 32–36)
MCV RBC AUTO: 90 FL (ref 82–98)
MONOCYTES # BLD AUTO: 0.6 K/UL (ref 0.3–1)
MONOCYTES NFR BLD: 11.1 % (ref 4–15)
NEUTROPHILS # BLD AUTO: 3.4 K/UL (ref 1.8–7.7)
NEUTROPHILS NFR BLD: 60 % (ref 38–73)
NONHDLC SERPL-MCNC: 188 MG/DL
NRBC BLD-RTO: 0 /100 WBC
PHOSPHATE SERPL-MCNC: 2.4 MG/DL (ref 2.7–4.5)
PLATELET # BLD AUTO: 219 K/UL (ref 150–450)
PMV BLD AUTO: 9.9 FL (ref 9.2–12.9)
POC IONIZED CALCIUM: 1.21 MMOL/L (ref 1.06–1.42)
POC PTINR: 1.1 (ref 0.9–1.2)
POC PTWBT: 13.2 SEC (ref 9.7–14.3)
POC TCO2 (MEASURED): 25 MMOL/L (ref 23–29)
POCT GLUCOSE: 132 MG/DL (ref 70–110)
POCT GLUCOSE: 134 MG/DL (ref 70–110)
POTASSIUM BLD-SCNC: 3.7 MMOL/L (ref 3.5–5.1)
POTASSIUM SERPL-SCNC: 3.7 MMOL/L (ref 3.5–5.1)
PROT SERPL-MCNC: 7.8 G/DL (ref 6–8.4)
PROTHROMBIN TIME: 11.4 SEC (ref 9–12.5)
RBC # BLD AUTO: 4.63 M/UL (ref 4–5.4)
SAMPLE: ABNORMAL
SAMPLE: NORMAL
SAMPLE: NORMAL
SARS-COV-2 RDRP RESP QL NAA+PROBE: NEGATIVE
SODIUM BLD-SCNC: 141 MMOL/L (ref 136–145)
SODIUM SERPL-SCNC: 137 MMOL/L (ref 136–145)
TRIGL SERPL-MCNC: 82 MG/DL (ref 30–150)
TROPONIN I SERPL DL<=0.01 NG/ML-MCNC: 0.03 NG/ML (ref 0–0.03)
TROPONIN I SERPL DL<=0.01 NG/ML-MCNC: 0.04 NG/ML (ref 0–0.03)
TSH SERPL DL<=0.005 MIU/L-ACNC: 0.6 UIU/ML (ref 0.4–4)
WBC # BLD AUTO: 5.66 K/UL (ref 3.9–12.7)

## 2022-07-20 PROCEDURE — 80053 COMPREHEN METABOLIC PANEL: CPT | Performed by: STUDENT IN AN ORGANIZED HEALTH CARE EDUCATION/TRAINING PROGRAM

## 2022-07-20 PROCEDURE — 25500020 PHARM REV CODE 255: Performed by: EMERGENCY MEDICINE

## 2022-07-20 PROCEDURE — 84100 ASSAY OF PHOSPHORUS: CPT | Performed by: STUDENT IN AN ORGANIZED HEALTH CARE EDUCATION/TRAINING PROGRAM

## 2022-07-20 PROCEDURE — 82565 ASSAY OF CREATININE: CPT

## 2022-07-20 PROCEDURE — 99283 PR EMERGENCY DEPT VISIT,LEVEL III: ICD-10-PCS | Mod: ,,, | Performed by: PSYCHIATRY & NEUROLOGY

## 2022-07-20 PROCEDURE — 25000003 PHARM REV CODE 250: Performed by: STUDENT IN AN ORGANIZED HEALTH CARE EDUCATION/TRAINING PROGRAM

## 2022-07-20 PROCEDURE — 99900035 HC TECH TIME PER 15 MIN (STAT)

## 2022-07-20 PROCEDURE — U0002 COVID-19 LAB TEST NON-CDC: HCPCS | Performed by: STUDENT IN AN ORGANIZED HEALTH CARE EDUCATION/TRAINING PROGRAM

## 2022-07-20 PROCEDURE — 93005 ELECTROCARDIOGRAM TRACING: CPT

## 2022-07-20 PROCEDURE — 86803 HEPATITIS C AB TEST: CPT | Performed by: PHYSICIAN ASSISTANT

## 2022-07-20 PROCEDURE — 85610 PROTHROMBIN TIME: CPT | Performed by: STUDENT IN AN ORGANIZED HEALTH CARE EDUCATION/TRAINING PROGRAM

## 2022-07-20 PROCEDURE — 93010 ELECTROCARDIOGRAM REPORT: CPT | Mod: ,,, | Performed by: INTERNAL MEDICINE

## 2022-07-20 PROCEDURE — 83735 ASSAY OF MAGNESIUM: CPT | Performed by: STUDENT IN AN ORGANIZED HEALTH CARE EDUCATION/TRAINING PROGRAM

## 2022-07-20 PROCEDURE — 80061 LIPID PANEL: CPT | Performed by: STUDENT IN AN ORGANIZED HEALTH CARE EDUCATION/TRAINING PROGRAM

## 2022-07-20 PROCEDURE — 99284 PR EMERGENCY DEPT VISIT,LEVEL IV: ICD-10-PCS | Mod: CS,,, | Performed by: EMERGENCY MEDICINE

## 2022-07-20 PROCEDURE — 99284 EMERGENCY DEPT VISIT MOD MDM: CPT | Mod: CS,,, | Performed by: EMERGENCY MEDICINE

## 2022-07-20 PROCEDURE — 99285 EMERGENCY DEPT VISIT HI MDM: CPT | Mod: 25

## 2022-07-20 PROCEDURE — 83880 ASSAY OF NATRIURETIC PEPTIDE: CPT | Performed by: STUDENT IN AN ORGANIZED HEALTH CARE EDUCATION/TRAINING PROGRAM

## 2022-07-20 PROCEDURE — 82962 GLUCOSE BLOOD TEST: CPT | Mod: 91

## 2022-07-20 PROCEDURE — 85025 COMPLETE CBC W/AUTO DIFF WBC: CPT | Performed by: STUDENT IN AN ORGANIZED HEALTH CARE EDUCATION/TRAINING PROGRAM

## 2022-07-20 PROCEDURE — 84443 ASSAY THYROID STIM HORMONE: CPT | Performed by: STUDENT IN AN ORGANIZED HEALTH CARE EDUCATION/TRAINING PROGRAM

## 2022-07-20 PROCEDURE — 84484 ASSAY OF TROPONIN QUANT: CPT | Mod: 91 | Performed by: EMERGENCY MEDICINE

## 2022-07-20 PROCEDURE — 84484 ASSAY OF TROPONIN QUANT: CPT | Performed by: STUDENT IN AN ORGANIZED HEALTH CARE EDUCATION/TRAINING PROGRAM

## 2022-07-20 PROCEDURE — 99283 EMERGENCY DEPT VISIT LOW MDM: CPT | Mod: ,,, | Performed by: PSYCHIATRY & NEUROLOGY

## 2022-07-20 PROCEDURE — 93010 EKG 12-LEAD: ICD-10-PCS | Mod: ,,, | Performed by: INTERNAL MEDICINE

## 2022-07-20 RX ORDER — SODIUM,POTASSIUM PHOSPHATES 280-250MG
1 POWDER IN PACKET (EA) ORAL ONCE
Status: DISCONTINUED | OUTPATIENT
Start: 2022-07-20 | End: 2022-07-20

## 2022-07-20 RX ORDER — SODIUM,POTASSIUM PHOSPHATES 280-250MG
2 POWDER IN PACKET (EA) ORAL ONCE
Status: COMPLETED | OUTPATIENT
Start: 2022-07-20 | End: 2022-07-20

## 2022-07-20 RX ADMIN — POTASSIUM & SODIUM PHOSPHATES POWDER PACK 280-160-250 MG 2 PACKET: 280-160-250 PACK at 01:07

## 2022-07-20 RX ADMIN — IOHEXOL 75 ML: 350 INJECTION, SOLUTION INTRAVENOUS at 10:07

## 2022-07-20 NOTE — ASSESSMENT & PLAN NOTE
-Stroke risk factor  -Low suspicion for cerebrovascular cause for symptoms  -If MRI negative for infarct can resume home regiment

## 2022-07-20 NOTE — HPI
"77 y/o female with HTN, HLD, NSTEMI, prior seizure event, prior stroke who presented with tongue "thickness/numbness" and altered cognition since this morning.  Patient states she went to bed last night feeling normal.  She did not sleep well through the night.  When she woke up, she felt a little off balance and had a little nausea.  She left her house around 7:45 to go to [employer].  Shortly after leaving around 8:00am she noticed her tongue felt very thick and numb causing her speech to be slurred.  She also became somewhat confused and could not remember where she was going and ended up going to the wrong place.  She also was confused about where to turn to get back to her son's house where she has lived since [place].  She has never had these symptoms before.  She denies speech difficulty outside of slurred speech.  She denies vision changes, unilateral weakness/numbness, gait abnormalities, or difficulty swallowing.  She does have a history of remote stroke with no residual symptoms.  She also has a history of seizure but states she had a single event 12 years ago with no recurrence. States unaware of cause.    "

## 2022-07-20 NOTE — ED NOTES
Pt passed gant screening, pt reports that it does feel uncomfortable to swallow. Compared the feeling to when its difficult to get a pill down.

## 2022-07-20 NOTE — ASSESSMENT & PLAN NOTE
77 y/o female with HTN, HLD, NSTEMI, prior seizure event, prior stroke who presented with full tongue numbness and altered cognition with no focal symptoms.  Initial CT head with no early infarct signs or hemorrhage.  CTA with no high-grade stenosis or occlusion.  Patient's speech and behavior mildly slowed but appropriate with no focal exam findings.  Cerebrovascular cause less likely.  Patient does have prior stroke and stroke risk factors.  Would not be candidate for thrombolysis due to mild non-disabling symptoms.  LVO not suspected.      Recommendations:  -MRI brain - If MRI positive, please notify Stroke team and we will admit to Stroke Service  -Investigate for other causes for symptoms  -Vascular Neurology to sign off at this time, please call if MRI positive for infarct

## 2022-07-20 NOTE — ASSESSMENT & PLAN NOTE
-Stroke risk factor  -.6  -Patient does not appear to be on home statin  -Given prior history of stroke recommend Atorvastatin 40mg daily for goal LDL < 70

## 2022-07-20 NOTE — FIRST PROVIDER EVALUATION
"Medical screening exam completed.  I have conducted a focused provider triage encounter, findings are as follows:    Brief history of present illness:  76-year-old presenting with new onset numbness and tingling associated with change in speech.  Patient mentions close to 8:00 a.m. this morning noted new onset dysarthria associated with confusion numbness and tingling of face.  Patient denies any further focal numbness tingling or weakness.  Patient mentions she wears dentures however currently her speech is different from her norm.  Vitals:    07/20/22 1001   BP: (!) 142/90   Pulse: 84   Resp: 18   Temp: 99.7 °F (37.6 °C)   TempSrc: Oral   SpO2: 98%   Weight: 79.8 kg (176 lb)   Height: 5' 4" (1.626 m)       Pertinent physical exam:    Patient is dysarthric, no noted facial droop.    Strength 5/5 throughout all extremities  Sensation grossly intact throughout extremities.    Brief workup plan:    Stroke code    Preliminary workup initiated; this workup will be continued and followed by the physician or advanced practice provider that is assigned to the patient when roomed.  "

## 2022-07-20 NOTE — PROVIDER PROGRESS NOTES - EMERGENCY DEPT.
Encounter Date: 7/20/2022    ED Physician Progress Notes        Physician Note:   S/o  Sessions:  76-year-old female with brief episode of numbness to face and tongue and weakness to RUE, along with chest tightness, initially evaluated by vascular Neurology with negative MRI, no suspicion for stroke.  Symptoms resolved upon re-evaluation by resident, pt stable for d/c.

## 2022-07-20 NOTE — ED PROVIDER NOTES
Encounter Date: 7/20/2022       History     Chief Complaint   Patient presents with    Numbness     Onset of tongue and facial numbness at 0800. Pt also reports brief episode of feeling disoriented/confused while driving w/ mild HA. Denies visual changes, numbness/tingling to extremities.     Ms. Augustine is a 76 y.o. F with HTN, HLD, depression, seizure disorder, cervical radiculopathy, hemiplegia affecting right dominant side who presents to the emergency department due to neurologic deficits. Patient states that her last known well was yesterday at 10:00 p.m. when she had gone to bed but she states that she woke up around 8 a.m. this morning and noticed that she had numbness on her tongue. Additionally her speech appeared slurred and she felt like her right upper extremity was weak. She was concerned that she was having a stroke and so she wanted to come to the emergency department for evaluation.  She denies fever, chills, nausea, vomiting or abdominal pain.         Review of patient's allergies indicates:  No Known Allergies  Past Medical History:   Diagnosis Date    Abnormal mammogram of both breasts     Arthritis     Coronary vasospasm 10/31/2019    Hypertension     Major depressive disorder 5/14/2017    Memory disorder     Nephrolithiasis     Seizures     Stroke      Past Surgical History:   Procedure Laterality Date    CATARACT EXTRACTION      cysts breast      TONSILLECTOMY       No family history on file.  Social History     Tobacco Use    Smoking status: Never Smoker    Smokeless tobacco: Never Used   Substance Use Topics    Alcohol use: No    Drug use: No     Review of Systems   Constitutional: Negative for activity change, appetite change, chills, fatigue and fever.   HENT: Negative for congestion, ear discharge, ear pain, mouth sores, postnasal drip, rhinorrhea, sinus pain, sneezing, tinnitus and voice change.    Eyes: Negative for photophobia, pain and visual disturbance.    Respiratory: Negative for cough, chest tightness, shortness of breath and wheezing.    Cardiovascular: Negative for chest pain, palpitations and leg swelling.   Gastrointestinal: Negative for abdominal pain, constipation, diarrhea, nausea and vomiting.   Endocrine: Negative for polydipsia and polyuria.   Genitourinary: Negative for difficulty urinating, dysuria, flank pain, hematuria, pelvic pain and urgency.   Musculoskeletal: Negative for arthralgias, back pain, joint swelling and myalgias.   Skin: Negative for color change and wound.   Neurological: Positive for speech difficulty, weakness, light-headedness and numbness. Negative for dizziness, seizures, facial asymmetry and headaches.   Psychiatric/Behavioral: Negative for agitation and confusion. The patient is not nervous/anxious.        Physical Exam     Initial Vitals [07/20/22 1001]   BP Pulse Resp Temp SpO2   (!) 142/90 84 18 99.7 °F (37.6 °C) 98 %      MAP       --         Physical Exam    Nursing note and vitals reviewed.  Constitutional: She appears well-developed and well-nourished. She is not diaphoretic. No distress.   HENT:   Head: Normocephalic and atraumatic.   Mouth/Throat: Oropharynx is clear and moist. No oropharyngeal exudate.   Eyes: Conjunctivae and EOM are normal. Pupils are equal, round, and reactive to light. Right eye exhibits no discharge. Left eye exhibits no discharge. No scleral icterus.   Neck: No tracheal deviation present. No JVD present.   Normal range of motion.  Cardiovascular: Normal rate, normal heart sounds and intact distal pulses. Exam reveals no gallop.    No murmur heard.  Pulmonary/Chest: Breath sounds normal. No respiratory distress. She has no wheezes. She has no rales. She exhibits no tenderness.   Abdominal: Abdomen is soft. Bowel sounds are normal. She exhibits no distension and no mass. There is no abdominal tenderness. There is no guarding.   Musculoskeletal:         General: No tenderness or edema. Normal  range of motion.      Cervical back: Normal range of motion.     Neurological: She is alert and oriented to person, place, and time.   Moves all extremities and carries on conversation.  Slight dysarthria noted   CN- II: PERRL  III/IV/VI: EOMI w/out evidence of nystagmus  V: no deficits appreciated to light touch bilateral face  VII: no facial weakness, no facial asymmetry. Eyebrow raise symmetric. Smile symmetric  IX/X: palate midline, and raises symmetrically  XI: shoulder shrug 5/5 bilaterally  XII: tongue is midline w/out asymmetry.   Strength 5/5 to left upper and lower extremities, sensation intact to light touch  Strength decreased to right upper extremity  Normal H2S, normal F2N.        Skin: Skin is warm. Capillary refill takes less than 2 seconds. No erythema.   Psychiatric: She has a normal mood and affect.         ED Course   Procedures  Labs Reviewed   COMPREHENSIVE METABOLIC PANEL - Abnormal; Notable for the following components:       Result Value    Glucose 130 (*)     Total Bilirubin 1.9 (*)     eGFR if non  54.5 (*)     All other components within normal limits    Narrative:     Release to patient->Immediate  Add on Trop per Dr. Munoz @ 10:43 am to order # [id number]  ADD-ON BNP #[id number] PER OSWALD MUNOZ MD 11:02 07/20/2022    LIPID PANEL - Abnormal; Notable for the following components:    Cholesterol 247 (*)     LDL Cholesterol 171.6 (*)     All other components within normal limits    Narrative:     Release to patient->Immediate  Add on Trop per Dr. Munoz @ 10:43 am to order # [id number]  ADD-ON BNP #[id number] PER OSWALD MUNOZ MD 11:02 07/20/2022    TROPONIN I - Abnormal; Notable for the following components:    Troponin I 0.033 (*)     All other components within normal limits    Narrative:     Release to patient->Immediate  Add on Trop per Dr. Munoz @ 10:43 am to order # [id number]  ADD-ON BNP #[id number] PER OSWALD MUNOZ MD 11:02 07/20/2022     PHOSPHORUS - Abnormal; Notable for the following components:    Phosphorus 2.4 (*)     All other components within normal limits    Narrative:     Release to patient->Immediate  Add on Trop per Dr. Munoz @ 10:43 am to order # [id number]  ADD-ON BNP #[id number] PER OSWALD MUNOZ MD 11:02 07/20/2022    POCT GLUCOSE - Abnormal; Notable for the following components:    POCT Glucose 134 (*)     All other components within normal limits   POCT GLUCOSE - Abnormal; Notable for the following components:    POCT Glucose 132 (*)     All other components within normal limits   ISTAT PROCEDURE - Abnormal; Notable for the following components:    POC Glucose 138 (*)     All other components within normal limits   HEPATITIS C ANTIBODY    Narrative:     Release to patient->Immediate  Add on Trop per Dr. Munoz @ 10:43 am to order # [id number]  ADD-ON BNP #[id number] PER OSWALD MUNOZ MD 11:02 07/20/2022    CBC W/ AUTO DIFFERENTIAL    Narrative:     Release to patient->Immediate  Add on Trop per Dr. Munoz @ 10:43 am to order # [id number]   PROTIME-INR    Narrative:     Release to patient->Immediate  Add on Trop per Dr. Munoz @ 10:43 am to order # [id number]  ADD-ON BNP #[id number] PER OSWALD MUNOZ MD 11:02 07/20/2022    TSH    Narrative:     Release to patient->Immediate  Add on Trop per Dr. Munoz @ 10:43 am to order # [id number]  ADD-ON BNP #[id number] PER OSWALD MUNOZ MD 11:02  07/20/2022    SARS-COV-2 RNA AMPLIFICATION, QUAL   B-TYPE NATRIURETIC PEPTIDE   TROPONIN I   TROPONIN I   B-TYPE NATRIURETIC PEPTIDE   B-TYPE NATRIURETIC PEPTIDE    Narrative:     Release to patient->Immediate  Add on Trop per Dr. Munoz @ 10:43 am to order # [id number]  ADD-ON BNP #[id number] PER OSWALD MUNOZ MD 11:02  07/20/2022    MAGNESIUM    Narrative:     Release to patient->Immediate  Add on Trop per Dr. Munoz @ 10:43 am to order # [id number]  ADD-ON BNP #[id number] PER OSWALD MUNOZ MD 11:02   07/20/2022    URINALYSIS, REFLEX TO URINE CULTURE   TROPONIN I   POCT GLUCOSE, HAND-HELD DEVICE   ISTAT PROCEDURE   ISTAT CREATININE        ECG Results          ECG 12 lead (Final result)  Result time 07/20/22 12:54:02    Final result by Interface, [facility] (07/20/22 12:54:02)                 Narrative:    Test Reason :     Vent. Rate : 085 BPM     Atrial Rate : 085 BPM     P-R Int : 166 ms          QRS Dur : 074 ms      QT Int : 348 ms       P-R-T Axes : 061 035 058 degrees     QTc Int : 414 ms    Normal sinus rhythm  Normal ECG  When compared with ECG of 13-DEC-2021 17:45,  Premature ventricular complexes are no longer Present    Confirmed by Behzad Connolly MD ([id number]) on 7/20/2022 12:53:54 PM    Referred By: System System           Confirmed By:Behzad Connolly MD                            Imaging Results          MRI Brain Without Contrast (Final result)  Result time 07/20/22 13:15:54    Final result by Saroj Snow MD (07/20/22 13:15:54)                 Impression:      No acute infarct or intracranial hemorrhage.    Chronic senescent and microvascular ischemic change.      Electronically signed by: Saroj Snow MD  Date:    07/20/2022  Time:    13:15             Narrative:    EXAMINATION:  MRI BRAIN WITHOUT CONTRAST    CLINICAL HISTORY:  Headache, chronic, no new features;.    TECHNIQUE:  Multiplanar multisequence MR imaging of the brain was performed without contrast.    COMPARISON:  CTA multi stroke study earlier same day, MRI brain 12/13/2021    FINDINGS:  Intracranial compartment:    Age-related generalized cerebral volume loss.  The ventricles are midline and stable in size and configuration without distortion by mass effect or acute hydrocephalus.  No extra-axial blood or fluid collections.    Grossly stable mild patchy nonspecific T2/FLAIR hyperintense signal of the subcortical and periventricular white matter consistent with chronic microvascular ischemic change.  Grossly stable tiny area of  encephalomalacia involving the parasagittal posterior right frontal lobe near the vertex.  No mass lesion, acute hemorrhage, edema or acute infarct.    Normal vascular flow voids are preserved.  Sella and parasellar regions are within normal limits.    Skull/extracranial contents (limited evaluation): Bone marrow signal intensity is normal.  Hyperostosis frontalis interna.  Craniocervical junction is within normal limits.  Paranasal sinuses and mastoid air cells appear clear.                               X-Ray Chest AP Portable (Final result)  Result time 07/20/22 12:02:45    Final result by Riccardo Alva MD (07/20/22 12:02:45)                 Impression:      See above      Electronically signed by: Riccardo Alva MD  Date:    07/20/2022  Time:    12:02             Narrative:    EXAMINATION:  XR CHEST AP PORTABLE    CLINICAL HISTORY:  Shortness of breath    TECHNIQUE:  Single frontal view of the chest was performed.    COMPARISON:  Non 11/09/2021 e    FINDINGS:  Heart size normal.  The lungs are clear.  No pleural effusion                               CTA STROKE MULTI-PHASE (Final result)  Result time 07/20/22 13:03:45    Final result by Mono eHath MD (07/20/22 13:03:45)                 Impression:      CT head demonstrates no evidence of acute intracranial pathology.    CTA head and neck demonstrates no evidence of large vessel occlusion, severe stenosis, or intracranial aneurysm formation.    Mild intracranial atherosclerotic narrowing of the distal left M1 segment and left PCA.    Electronically signed by resident: Yuri Hamilton  Date:    07/20/2022  Time:    10:37    Electronically signed by: Mono Heath  Date:    07/20/2022  Time:    13:03             Narrative:    EXAMINATION:  CTA STROKE MULTI-PHASE    CLINICAL HISTORY:  Neuro deficit, acute, stroke suspected;    TECHNIQUE:  Axial CT images obtained throughout the region of the head before and after the administration of intravenous contrast.   CT angiogram was performed from through the cervical and intracranial vasculature during the IV bolus administration of 75mL of Omnipaque 350.  Multiplanar MPR and MIP reformats were performed.    COMPARISON:  MRI brain 12/13/2021.  CT head 03/30/2021.  CTA head and neck 08/27/2016.    FINDINGS:  CT head:    The ventricles are normal in size without evidence of hydrocephalus.    The brain appears within normal limits. No parenchymal mass, hemorrhage, edema or major vascular distribution infarct.    No extra-axial blood or fluid collections.    The cranium appears intact. Mastoid air cells and paranasal sinuses are essentially clear.      CTA:    Left-sided aortic arch with 3 arterial branches.    The origins of the right brachiocephalic, left common carotid, and left subclavian arteries from the arch are tortuous without significant stenosis.  There is kinking of the left subclavian artery.    The common and internal carotid arteries are normal in course and caliber. No significant stenosis in either carotid bifurcation by NASCET criteria.    The vertebral origins are patent. The cervical vertebral arteries are normal in course and caliber.  Minimal atherosclerotic calcification in the V4 segment of the right vertebral artery without significant stenosis.  Vertebrobasilar system is within normal limits without focal abnormality.    The anterior cerebral arteries are within normal limits, without evidence of significant stenosis, focal occlusion, or intracranial aneurysm formation.    There is mild stenosis at the distal left M1 segment/ left MCA bifurcation (axial series 5, image 367).  The remainder of the middle cerebral arteries are within normal limits without focal occlusion or intracranial aneurysm formation.    There is mild focal stenosis of the left posterior cerebral artery.  The right posterior cerebral artery is within normal limits, without evidence of significant stenosis, focal occlusion, or  intracranial aneurysm formation.  The venous sinuses appear patent.    Multinodular right thyroid gland with hyperdense and hypodense components measuring up to 1.2 cm.  The visualized lung apices are unremarkable without evidence of focal opacity.  Multilevel degenerative changes of the cervical spine.    These findings were communicated to Dr. Magana at 11:11 on 07/20/2022.                                 Medications   iohexoL (OMNIPAQUE 350) injection 75 mL (75 mLs Intravenous Given 7/20/22 1034)   potassium, sodium phosphates 280-160-250 mg packet 2 packet (2 packets Oral Given 7/20/22 1325)     Medical Decision Making:   History:   Old Medical Records: I decided to obtain old medical records.  Old Records Summarized: records from previous admission(s).  Initial Assessment:   Ms. Augustine is a 76 y.o. F with HTN, HLD, depression, seizure disorder, cervical radiculopathy, hemiplegia affecting right dominant side who presents to the emergency department due to neurologic deficits.  Differential Diagnosis:   -TIA  -CVA  -hypoglycemia  -seizure  -electrolyte abnormalities  -polypharmacy  -Thyroid abnormalities    Clinical Tests:   Lab Tests: Ordered and Reviewed  Radiological Study: Ordered and Reviewed  ED Management:  Patient was examined,  Given onset and presentation of patient's neurologic deficits a stroke code was called.  Patient was taken urgently to obtain a CTA multi phase which did not show any significant findings.  Discussion was had with vascular neurology who requested that patient get an MRI   MRI was successfully done and it did not show significant findings  Labs showed a low phosphorus which was repleted.   Follow-up discussion was had with vascular neurology who stated that  they did not feel patient had a stroke.  Patient's initial troponin was elevated but repeat troponin did not show significant elevation.  She was discharged to follow-up to Neurology.   She was discharged in stable  condition and return precautions were given.                ED Course as of 07/20/22 1518   Wed Jul 20, 2022   1037 76-year-old female with sudden onset facial and tongue numbness.  Patient has her symptoms she awoke this morning at around 8:00 a.m..  Last known normal 10:00 p.m. last night.  Subjective dysarthria but not noted on exam.  Code stroke activated.  Imaging obtained.  Awaiting read.  Vascular neurology at bedside. [DS]   1307 CTA is without acute occlusion or infarct.  Awaiting MRI results [DS]   1322 Discussion had with stroke seeing as patients MRI is negative they will be signing off.  [OO]   1437 Troponin I(!): 0.033 [OO]   1437 Troponin I(!) [OO]      ED Course User Index  [DS] Live Segal MD  [OO] Andrzej Magana MD             Clinical Impression:   Final diagnoses:  [I63.9] Stroke  [R06.02] Shortness of breath  [R53.1] Weakness (Primary)  [R20.0] Numbness  [R20.2] Paresthesia  [E83.39] Low phosphate levels                 Andrzej Magana MD  Resident  07/20/22 1518

## 2022-07-20 NOTE — CONSULTS
[facility] - Emergency Dept  Vascular Neurology  [facility]  Consult Note    Inpatient consult to Vascular (Stroke) Neurology  Consult performed by: Kalpana Sol NP  Consult ordered by: Andrzej Magana MD        Assessment/Plan:     Patient is a 76 y.o. year old female with:    Numbness of tongue  77 y/o female with HTN, HLD, NSTEMI, prior seizure event, prior stroke who presented with full tongue numbness and altered cognition with no focal symptoms.  Initial CT head with no early infarct signs or hemorrhage.  CTA with no high-grade stenosis or occlusion.  Patient's speech and behavior mildly slowed but appropriate with no focal exam findings.  Cerebrovascular cause less likely.  Patient does have prior stroke and stroke risk factors.  Would not be candidate for thrombolysis due to mild non-disabling symptoms.  LVO not suspected.      Recommendations:  -MRI brain - If MRI positive, please notify Stroke team and we will admit to Stroke Service  -Investigate for other causes for symptoms  -Vascular Neurology to sign off at this time, please call if MRI positive for infarct    HLD (hyperlipidemia)  -Stroke risk factor  -.6  -Patient does not appear to be on home statin  -Given prior history of stroke recommend Atorvastatin 40mg daily for goal LDL < 70    Vertebrobasilar dolichoectasia  -Noted again on CTA    Essential hypertension  -Stroke risk factor  -Low suspicion for cerebrovascular cause for symptoms  -If MRI negative for infarct can resume home regiment          STROKE DOCUMENTATION     Acute Stroke Times   Last Known Normal Date: 07/20/22  Last Known Normal Time: 0800  Symptom Onset Date: 07/20/22  Symptom Onset Time: 0800  Stroke Team Called Date: 07/20/22  Stroke Team Called Time: 1043  Stroke Team Arrival Date: 07/20/22  Stroke Team Arrival Time: 1048  CT Interpretation Time: 1019  CTA Interpretation Time: 1025    NIH Scale:  1a. Level of Consciousness: 0-->Alert, keenly  "responsive  1b. LOC Questions: 0-->Answers both questions correctly  1c. LOC Commands: 0-->Performs both tasks correctly  2. Best Gaze: 0-->Normal  3. Visual: 0-->No visual loss  4. Facial Palsy: 0-->Normal symmetrical movements  5a. Motor Arm, Left: 0-->No drift, limb holds 90 (or 45) degrees for full 10 secs  5b. Motor Arm, Right: 0-->No drift, limb holds 90 (or 45) degrees for full 10 secs  6a. Motor Leg, Left: 0-->No drift, leg holds 30 degree position for full 5 secs  6b. Motor Leg, Right: 0-->No drift, leg holds 30 degree position for full 5 secs  7. Limb Ataxia: 0-->Absent  8. Sensory: 0-->Normal, no sensory loss  9. Best Language: 0-->No aphasia, normal  10. Dysarthria: 0-->Normal  11. Extinction and Inattention (formerly Neglect): 0-->No abnormality  Total (NIH Stroke Scale): 0    Modified [place] Score: 0  [place] Coma Scale:    ABCD2 Score:    [id number] Score:   HAS -BLED Score:   ICH Score:   Hunt & Melvin Classification:       Thrombolysis Candidate? No, Non-disabling symptoms - Low NIHSS , Strong suspicion for stroke mimic or alternative diagnosis     Delays to Thrombolysis?  Not Applicable    Interventional Revascularization Candidate?   Is the patient eligible for mechanical endovascular reperfusion (DONYA)?  No; No large vessel occlusion identified on imaging     Delays to Thrombectomy? Not Applicable    Hemorrhagic change of an Ischemic Stroke: Does this patient have an ischemic stroke with hemorrhagic changes? No     Subjective:     History of Present Illness:  77 y/o female with HTN, HLD, NSTEMI, prior seizure event, prior stroke who presented with tongue "thickness/numbness" and altered cognition since this morning.  Patient states she went to bed last night feeling normal.  She did not sleep well through the night.  When she woke up, she felt a little off balance and had a little nausea.  She left her house around 7:45 to go to [employer].  Shortly after leaving around 8:00am she noticed her " "tongue felt very thick and numb causing her speech to be slurred.  She also became somewhat confused and could not remember where she was going and ended up going to the wrong place.  She also was confused about where to turn to get back to her son's house where she has lived since [place].  She has never had these symptoms before.  She denies speech difficulty outside of slurred speech.  She denies vision changes, unilateral weakness/numbness, gait abnormalities, or difficulty swallowing.  She does have a history of remote stroke with no residual symptoms.  She also has a history of seizure but states she had a single event 12 years ago with no recurrence. States unaware of cause.            Past Medical History:   Diagnosis Date    Abnormal mammogram of both breasts     Arthritis     Coronary vasospasm 10/31/2019    Hypertension     Major depressive disorder 5/14/2017    Memory disorder     Nephrolithiasis     Seizures     Stroke      Past Surgical History:   Procedure Laterality Date    CATARACT EXTRACTION      cysts breast      TONSILLECTOMY       No family history on file.  Social History     Tobacco Use    Smoking status: Never Smoker    Smokeless tobacco: Never Used   Substance Use Topics    Alcohol use: No    Drug use: No     Review of patient's allergies indicates:  No Known Allergies    Medications: I have reviewed the current medication administration record.    (Not in a hospital admission)      Review of Systems   Constitutional:  Negative for activity change, chills and fever.   HENT:  Positive for tinnitus (Chronic but noted to be louder last night). Negative for congestion, drooling and trouble swallowing.         Tongue "thick and feels numb"   Respiratory:  Positive for shortness of breath (Feels like not getting enough air).    Cardiovascular:  Negative for chest pain.   Gastrointestinal:  Positive for nausea. Negative for abdominal pain, diarrhea and vomiting.   Genitourinary:  " Negative for difficulty urinating and dysuria.   Musculoskeletal:  Negative for gait problem.   Skin:  Negative for color change and rash.   Allergic/Immunologic: Negative for immunocompromised state.   Neurological:  Positive for dizziness, speech difficulty, weakness (Generalized - not focal), light-headedness and numbness (Tongue only). Negative for facial asymmetry.   Psychiatric/Behavioral:  Positive for confusion and decreased concentration. Negative for agitation.    Objective:     Vital Signs (Most Recent):  Temp: 99.1 °F (37.3 °C) (07/20/22 1032)  Pulse: (S) 85 (07/20/22 1034)  Resp: 13 (07/20/22 1041)  BP: (!) 161/73 (07/20/22 1031)  SpO2: 100 % (07/20/22 1041)    Vital Signs Range (Last 24H):  Temp:  [99.1 °F (37.3 °C)-99.7 °F (37.6 °C)]   Pulse:  [83-88]   Resp:  [13-19]   BP: (142-161)/(73-90)   SpO2:  [96 %-100 %]     Physical Exam  Constitutional:       General: She is not in acute distress.  HENT:      Head: Normocephalic and atraumatic.      Right Ear: External ear normal.      Left Ear: External ear normal.      Nose: Nose normal.   Cardiovascular:      Rate and Rhythm: Normal rate and regular rhythm.   Pulmonary:      Effort: Pulmonary effort is normal. No respiratory distress.   Abdominal:      General: There is no distension.      Palpations: Abdomen is soft.      Tenderness: There is no guarding.   Musculoskeletal:      Cervical back: Normal range of motion.   Skin:     General: Skin is warm.   Neurological:      Mental Status: She is alert.   Psychiatric:         Attention and Perception: Attention normal.         Speech: Speech is delayed.         Behavior: Behavior is slowed.       Neurological Exam:   LOC: alert  Attention Span: Good   Language: No aphasia  Articulation: Dysarthria  Orientation: Person, Place, Time   Visual Fields: Full  EOM (CN III, IV, VI): Full/intact  Pupils (CN II, III): PERRL  Facial Sensation (CN V): Normal  Facial Movement (CN VII): Symmetric facial expression     Motor: Arm left  Normal 5/5  Leg left  Normal 5/5  Arm right  Normal 5/5  Leg right Normal 5/5  Cerebellum: No evidence of appendicular or axial ataxia  Sensation: Intact to light touch  Tone: Normal tone throughout      Laboratory:  CMP: No results for input(s): GLUCOSE, CALCIUM, ALBUMIN, PROT, NA, K, CO2, CL, BUN, CREATININE, ALKPHOS, ALT, AST, BILITOT in the last 24 hours.  CBC:   Recent Labs   Lab 22  1035   WBC 5.66   RBC 4.63   HGB 14.3   HCT 41.7      MCV 90   MCH 30.9   MCHC 34.3     Lipid Panel: No results for input(s): CHOL, LDLCALC, HDL, TRIG in the last 168 hours.  Coagulation: No results for input(s): PT, INR, APTT in the last 168 hours.  Hgb A1C: No results for input(s): HGBA1C in the last 168 hours.  TSH: No results for input(s): TSH in the last 168 hours.    Diagnostic Results:      Brain imagin2022 CT Head  No early infarct signs; no hemorrhage; no mass effect.    Vessel Imagin2022 CTA head and neck  No high grade stenosis or occlusion.  Dolichoectasia vertebrobasilar.  Mild atherosclerotic disease          Kalpana Sol NP  [facility]  Department of Vascular Neurology   [facility] - Emergency Dept

## 2022-07-20 NOTE — SUBJECTIVE & OBJECTIVE
"    Past Medical History:   Diagnosis Date    Abnormal mammogram of both breasts     Arthritis     Coronary vasospasm 10/31/2019    Hypertension     Major depressive disorder 5/14/2017    Memory disorder     Nephrolithiasis     Seizures     Stroke      Past Surgical History:   Procedure Laterality Date    CATARACT EXTRACTION      cysts breast      TONSILLECTOMY       No family history on file.  Social History     Tobacco Use    Smoking status: Never Smoker    Smokeless tobacco: Never Used   Substance Use Topics    Alcohol use: No    Drug use: No     Review of patient's allergies indicates:  No Known Allergies    Medications: I have reviewed the current medication administration record.    (Not in a hospital admission)      Review of Systems   Constitutional:  Negative for activity change, chills and fever.   HENT:  Positive for tinnitus (Chronic but noted to be louder last night). Negative for congestion, drooling and trouble swallowing.         Tongue "thick and feels numb"   Respiratory:  Positive for shortness of breath (Feels like not getting enough air).    Cardiovascular:  Negative for chest pain.   Gastrointestinal:  Positive for nausea. Negative for abdominal pain, diarrhea and vomiting.   Genitourinary:  Negative for difficulty urinating and dysuria.   Musculoskeletal:  Negative for gait problem.   Skin:  Negative for color change and rash.   Allergic/Immunologic: Negative for immunocompromised state.   Neurological:  Positive for dizziness, speech difficulty, weakness (Generalized - not focal), light-headedness and numbness (Tongue only). Negative for facial asymmetry.   Psychiatric/Behavioral:  Positive for confusion and decreased concentration. Negative for agitation.    Objective:     Vital Signs (Most Recent):  Temp: 99.1 °F (37.3 °C) (07/20/22 1032)  Pulse: (S) 85 (07/20/22 1034)  Resp: 13 (07/20/22 1041)  BP: (!) 161/73 (07/20/22 1031)  SpO2: 100 % (07/20/22 1041)    Vital Signs Range (Last " 24H):  Temp:  [99.1 °F (37.3 °C)-99.7 °F (37.6 °C)]   Pulse:  [83-88]   Resp:  [13-19]   BP: (142-161)/(73-90)   SpO2:  [96 %-100 %]     Physical Exam  Constitutional:       General: She is not in acute distress.  HENT:      Head: Normocephalic and atraumatic.      Right Ear: External ear normal.      Left Ear: External ear normal.      Nose: Nose normal.   Cardiovascular:      Rate and Rhythm: Normal rate and regular rhythm.   Pulmonary:      Effort: Pulmonary effort is normal. No respiratory distress.   Abdominal:      General: There is no distension.      Palpations: Abdomen is soft.      Tenderness: There is no guarding.   Musculoskeletal:      Cervical back: Normal range of motion.   Skin:     General: Skin is warm.   Neurological:      Mental Status: She is alert.   Psychiatric:         Attention and Perception: Attention normal.         Speech: Speech is delayed.         Behavior: Behavior is slowed.       Neurological Exam:   LOC: alert  Attention Span: Good   Language: No aphasia  Articulation: Dysarthria  Orientation: Person, Place, Time   Visual Fields: Full  EOM (CN III, IV, VI): Full/intact  Pupils (CN II, III): PERRL  Facial Sensation (CN V): Normal  Facial Movement (CN VII): Symmetric facial expression    Motor: Arm left  Normal 5/5  Leg left  Normal 5/5  Arm right  Normal 5/5  Leg right Normal 5/5  Cerebellum: No evidence of appendicular or axial ataxia  Sensation: Intact to light touch  Tone: Normal tone throughout      Laboratory:  CMP: No results for input(s): GLUCOSE, CALCIUM, ALBUMIN, PROT, NA, K, CO2, CL, BUN, CREATININE, ALKPHOS, ALT, AST, BILITOT in the last 24 hours.  CBC:   Recent Labs   Lab 07/20/22  1035   WBC 5.66   RBC 4.63   HGB 14.3   HCT 41.7      MCV 90   MCH 30.9   MCHC 34.3     Lipid Panel: No results for input(s): CHOL, LDLCALC, HDL, TRIG in the last 168 hours.  Coagulation: No results for input(s): PT, INR, APTT in the last 168 hours.  Hgb A1C: No results for input(s):  HGBA1C in the last 168 hours.  TSH: No results for input(s): TSH in the last 168 hours.    Diagnostic Results:      Brain imagin2022 CT Head  No early infarct signs; no hemorrhage; no mass effect.    Vessel Imagin2022 CTA head and neck  No high grade stenosis or occlusion.  Dolichoectasia vertebrobasilar.  Mild atherosclerotic disease

## 2022-07-20 NOTE — ED NOTES
Sofia Augustine, a 76 y.o. female presents to the ED via stroke code activation. Last known well was 8am today.  Took a drive to [place], had to pull over because she started to get confused, and noticed her speech was not the same.  Pt also reports tongue heaviness, facial numbness and headache. Upon observation pt has brief moments of sighs, as if she is gasping for air. Pt denies cardiac hx. + Hx for seizure disorder, + aspirin therapy.     Triage note:  Chief Complaint   Patient presents with    Numbness     Onset of tongue and facial numbness at 0800. Pt also reports brief episode of feeling disoriented/confused while driving w/ mild HA. Denies visual changes, numbness/tingling to extremities.     Review of patient's allergies indicates:  No Known Allergies  Past Medical History:   Diagnosis Date    Abnormal mammogram of both breasts     Arthritis     Coronary vasospasm 10/31/2019    Hypertension     Major depressive disorder 5/14/2017    Memory disorder     Nephrolithiasis     Seizures     Stroke      Two patient identifiers have been checked and are correct.      Appearance: Pt awake, alert & oriented to person, place & time. Pt in no acute distress at present time. Pt is clean and well groomed with clothes appropriately fastened.   Skin: Skin warm, dry & intact. Color consistent with ethnicity. Mucous membranes moist. No breakdown or brusing noted.   Musculoskeletal: Patient moving all extremities well, no obvious swelling or deformities noted.   Respiratory: Respirations spontaneous, even, and non-labored. Visible chest rise noted. Airway is open and patent. No accessory muscle use noted. Brief moments of sighs, as if the pt is gasping for air intermittently.   Neurologic: Sensation is intact. Speech is clear and appropriate. Eyes open spontaneously, behavior appropriate to situation, follows commands, facial expression symmetrical, bilateral hand grasp equal and even, purposeful motor  response noted. Pt report tongue heaviness and HA  Cardiac: All peripheral pulses present. No Bilateral lower extremity edema. Cap refill is <3 seconds.  Abdomen: Abdomen soft, non-tender to palpation.   : Pt reports no dysuria or hematuria.

## 2022-07-20 NOTE — DISCHARGE INSTRUCTIONS
Thank you for visiting us Today!    Please follow up with your primary care physician and Neurology concerning your symptoms.

## 2022-07-20 NOTE — ED NOTES
"Pt reports that she feels much better. "My face doesn't feel as numb. I feel the same when I had an allergic reaction to Voltaren. The same thing happened to me, same symptoms".   "

## 2022-07-21 ENCOUNTER — TELEPHONE (OUTPATIENT)
Dept: INTERNAL MEDICINE | Facility: CLINIC | Age: 77
End: 2022-07-21

## 2022-07-21 DIAGNOSIS — E78.5 HYPERLIPIDEMIA, UNSPECIFIED HYPERLIPIDEMIA TYPE: Primary | ICD-10-CM

## 2022-07-21 RX ORDER — ATORVASTATIN CALCIUM 40 MG/1
40 TABLET, FILM COATED ORAL DAILY
Qty: 30 TABLET | Refills: 6 | Status: SHIPPED | OUTPATIENT
Start: 2022-07-21 | End: 2023-03-21 | Stop reason: SDUPTHER

## 2022-07-21 NOTE — TELEPHONE ENCOUNTER
Patient has taken atorvastatin in the past.  Her medication record shows she is no longer taking medication.  Please advise if the patient had any adverse effects while taking medication.  If not, inquire if she would be amenable to restarting the medication.

## 2022-07-21 NOTE — TELEPHONE ENCOUNTER
----- Message from Micheline Cade sent at 7/21/2022 10:08 AM CDT -----  Contact: Sofia terry [phone]  [email or web address]     Patient is calling for Medical Advice regarding:    How long has patient had these symptoms:    Pharmacy name and phone#:    Would like response via [employer]:call back    Comments: Pt is requesting a call back from the nurse because she went to the ER and they tokld her that her cholesterol was high and that she needed to speak with her pcp

## 2022-07-21 NOTE — TELEPHONE ENCOUNTER
Please inform patient that prescription has been sent to the pharmacy electronically.  Schedule CMP to evaluate liver enzymes in 3 months.  We will recheck cholesterol in 6 months.

## 2022-08-29 DIAGNOSIS — M47.22 OSTEOARTHRITIS OF SPINE WITH RADICULOPATHY, CERVICAL REGION: ICD-10-CM

## 2022-08-29 RX ORDER — HYDROCODONE BITARTRATE AND ACETAMINOPHEN 5; 325 MG/1; MG/1
1 TABLET ORAL EVERY 8 HOURS PRN
Qty: 40 TABLET | Refills: 0 | Status: SHIPPED | OUTPATIENT
Start: 2022-08-29 | End: 2022-10-13 | Stop reason: SDUPTHER

## 2022-08-29 NOTE — TELEPHONE ENCOUNTER
----- Message from Teresa Forrest sent at 8/29/2022  2:52 PM CDT -----  Contact: pt [phone]  Requesting an RX refill or new RX.  Is this a refill or new RX: Refill  RX name and strength: HYDROcodone-acetaminophen (NORCO) 5-325 mg per tablet  Is this a 30 day or 90 day RX: 30 day   Patient advised that in the future they can use their [facility] account to request a refill?:  yes  Pharmacy name and phone #:   [id number] - [place] - [address]  AT [facility] [address]  [address] [place] [address]  Phone: [phone] Fax: [phone]    Comments:     Thank You

## 2022-08-31 DIAGNOSIS — Z78.0 MENOPAUSE: ICD-10-CM

## 2022-10-11 NOTE — ED NOTES
10/11/22 0953   Team Meeting   Meeting Type Daily Rounds   Team Members Present   Team Members Present Physician;Nurse;   Physician Team Member Igor   [facility] Member Harsha   Patient/Family Present   Patient Present No   Patient's Family Present No   Pt is medication compliant  Denying symptoms  Discharge today  Update called to Yohana in Obs 4.

## 2022-10-13 DIAGNOSIS — M47.22 OSTEOARTHRITIS OF SPINE WITH RADICULOPATHY, CERVICAL REGION: ICD-10-CM

## 2022-10-13 RX ORDER — HYDROCODONE BITARTRATE AND ACETAMINOPHEN 5; 325 MG/1; MG/1
1 TABLET ORAL EVERY 8 HOURS PRN
Qty: 40 TABLET | Refills: 0 | Status: SHIPPED | OUTPATIENT
Start: 2022-10-13 | End: 2022-12-16 | Stop reason: SDUPTHER

## 2022-10-13 NOTE — TELEPHONE ENCOUNTER
----- Message from Alejandra Chen sent at 10/13/2022 11:55 AM CDT -----  Contact: [phone]  Requesting an RX refill or new RX.  Is this a refill or new RX: Refill  RX name and strength :HYDROcodone-acetaminophen (NORCO) 5-325 mg per tablet  Is this a 30 day or 90 day RX: 40  Pharmacy name and phone #   [id number] - [place] - [address]  AT [facility] [address]  [address] [place] [address]  Phone: [phone] Fax: [phone]    The doctors have asked that we provide their patients with the following 2 reminders -- prescription refills can take up to 72 hours, and a friendly reminder that in the future you can use your [facility] account to request refills: yes

## 2022-10-14 ENCOUNTER — PES CALL (OUTPATIENT)
Dept: ADMINISTRATIVE | Facility: CLINIC | Age: 77
End: 2022-10-14
Payer: MEDICARE

## 2022-10-19 ENCOUNTER — TELEPHONE (OUTPATIENT)
Dept: ADMINISTRATIVE | Facility: CLINIC | Age: 77
End: 2022-10-19
Payer: MEDICARE

## 2022-10-20 ENCOUNTER — OFFICE VISIT (OUTPATIENT)
Dept: INTERNAL MEDICINE | Facility: CLINIC | Age: 77
End: 2022-10-20
Payer: MEDICARE

## 2022-10-20 ENCOUNTER — IMMUNIZATION (OUTPATIENT)
Dept: INTERNAL MEDICINE | Facility: CLINIC | Age: 77
End: 2022-10-20
Payer: MEDICARE

## 2022-10-20 VITALS
WEIGHT: 173.5 LBS | HEART RATE: 96 BPM | OXYGEN SATURATION: 98 % | DIASTOLIC BLOOD PRESSURE: 88 MMHG | BODY MASS INDEX: 29.62 KG/M2 | SYSTOLIC BLOOD PRESSURE: 110 MMHG | HEIGHT: 64 IN

## 2022-10-20 DIAGNOSIS — G40.909 SEIZURE DISORDER: ICD-10-CM

## 2022-10-20 DIAGNOSIS — M54.50 CHRONIC BILATERAL LOW BACK PAIN WITHOUT SCIATICA: ICD-10-CM

## 2022-10-20 DIAGNOSIS — G89.29 CHRONIC BILATERAL LOW BACK PAIN WITHOUT SCIATICA: ICD-10-CM

## 2022-10-20 DIAGNOSIS — E78.2 MIXED HYPERLIPIDEMIA: ICD-10-CM

## 2022-10-20 DIAGNOSIS — Z23 NEED FOR INFLUENZA VACCINATION: ICD-10-CM

## 2022-10-20 DIAGNOSIS — Z23 NEED FOR 23-POLYVALENT PNEUMOCOCCAL POLYSACCHARIDE VACCINE: ICD-10-CM

## 2022-10-20 DIAGNOSIS — Z00.00 ENCOUNTER FOR PREVENTIVE HEALTH EXAMINATION: Primary | ICD-10-CM

## 2022-10-20 DIAGNOSIS — I20.1 CORONARY VASOSPASM: ICD-10-CM

## 2022-10-20 DIAGNOSIS — F32.0 CURRENT MILD EPISODE OF MAJOR DEPRESSIVE DISORDER, UNSPECIFIED WHETHER RECURRENT: ICD-10-CM

## 2022-10-20 DIAGNOSIS — E66.3 OVERWEIGHT (BMI 25.0-29.9): ICD-10-CM

## 2022-10-20 DIAGNOSIS — G40.309 NONINTRACTABLE GENERALIZED IDIOPATHIC EPILEPSY WITHOUT STATUS EPILEPTICUS: ICD-10-CM

## 2022-10-20 DIAGNOSIS — I10 ESSENTIAL HYPERTENSION: Chronic | ICD-10-CM

## 2022-10-20 DIAGNOSIS — Z23 NEED FOR SHINGLES VACCINE: ICD-10-CM

## 2022-10-20 PROCEDURE — 3079F DIAST BP 80-89 MM HG: CPT | Mod: CPTII,S$GLB,, | Performed by: NURSE PRACTITIONER

## 2022-10-20 PROCEDURE — G0009 PNEUMOCOCCAL POLYSACCHARIDE VACCINE 23-VALENT =>2YO SQ IM: ICD-10-PCS | Mod: S$GLB,,, | Performed by: NURSE PRACTITIONER

## 2022-10-20 PROCEDURE — G0439 PR MEDICARE ANNUAL WELLNESS SUBSEQUENT VISIT: ICD-10-PCS | Mod: S$GLB,,, | Performed by: NURSE PRACTITIONER

## 2022-10-20 PROCEDURE — 3288F FALL RISK ASSESSMENT DOCD: CPT | Mod: CPTII,S$GLB,, | Performed by: NURSE PRACTITIONER

## 2022-10-20 PROCEDURE — 1170F PR FUNCTIONAL STATUS ASSESSED: ICD-10-PCS | Mod: CPTII,S$GLB,, | Performed by: NURSE PRACTITIONER

## 2022-10-20 PROCEDURE — 99999 PR PBB SHADOW E&M-EST. PATIENT-LVL IV: ICD-10-PCS | Mod: PBBFAC,,, | Performed by: NURSE PRACTITIONER

## 2022-10-20 PROCEDURE — 3288F PR FALLS RISK ASSESSMENT DOCUMENTED: ICD-10-PCS | Mod: CPTII,S$GLB,, | Performed by: NURSE PRACTITIONER

## 2022-10-20 PROCEDURE — G0008 FLU VACCINE - QUADRIVALENT - ADJUVANTED: ICD-10-PCS | Mod: S$GLB,,, | Performed by: NURSE PRACTITIONER

## 2022-10-20 PROCEDURE — 1170F FXNL STATUS ASSESSED: CPT | Mod: CPTII,S$GLB,, | Performed by: NURSE PRACTITIONER

## 2022-10-20 PROCEDURE — 1125F PR PAIN SEVERITY QUANTIFIED, PAIN PRESENT: ICD-10-PCS | Mod: CPTII,S$GLB,, | Performed by: NURSE PRACTITIONER

## 2022-10-20 PROCEDURE — 90732 PNEUMOCOCCAL POLYSACCHARIDE VACCINE 23-VALENT =>2YO SQ IM: ICD-10-PCS | Mod: S$GLB,,, | Performed by: NURSE PRACTITIONER

## 2022-10-20 PROCEDURE — G0009 ADMIN PNEUMOCOCCAL VACCINE: HCPCS | Mod: S$GLB,,, | Performed by: NURSE PRACTITIONER

## 2022-10-20 PROCEDURE — 1160F PR REVIEW ALL MEDS BY PRESCRIBER/CLIN PHARMACIST DOCUMENTED: ICD-10-PCS | Mod: CPTII,S$GLB,, | Performed by: NURSE PRACTITIONER

## 2022-10-20 PROCEDURE — 1101F PT FALLS ASSESS-DOCD LE1/YR: CPT | Mod: CPTII,S$GLB,, | Performed by: NURSE PRACTITIONER

## 2022-10-20 PROCEDURE — G0008 ADMIN INFLUENZA VIRUS VAC: HCPCS | Mod: S$GLB,,, | Performed by: NURSE PRACTITIONER

## 2022-10-20 PROCEDURE — 1125F AMNT PAIN NOTED PAIN PRSNT: CPT | Mod: CPTII,S$GLB,, | Performed by: NURSE PRACTITIONER

## 2022-10-20 PROCEDURE — 90732 PPSV23 VACC 2 YRS+ SUBQ/IM: CPT | Mod: S$GLB,,, | Performed by: NURSE PRACTITIONER

## 2022-10-20 PROCEDURE — 1159F MED LIST DOCD IN RCRD: CPT | Mod: CPTII,S$GLB,, | Performed by: NURSE PRACTITIONER

## 2022-10-20 PROCEDURE — 90694 FLU VACCINE - QUADRIVALENT - ADJUVANTED: ICD-10-PCS | Mod: S$GLB,,, | Performed by: NURSE PRACTITIONER

## 2022-10-20 PROCEDURE — 1160F RVW MEDS BY RX/DR IN RCRD: CPT | Mod: CPTII,S$GLB,, | Performed by: NURSE PRACTITIONER

## 2022-10-20 PROCEDURE — 90694 VACC AIIV4 NO PRSRV 0.5ML IM: CPT | Mod: S$GLB,,, | Performed by: NURSE PRACTITIONER

## 2022-10-20 PROCEDURE — 1101F PR PT FALLS ASSESS DOC 0-1 FALLS W/OUT INJ PAST YR: ICD-10-PCS | Mod: CPTII,S$GLB,, | Performed by: NURSE PRACTITIONER

## 2022-10-20 PROCEDURE — 99999 PR PBB SHADOW E&M-EST. PATIENT-LVL IV: CPT | Mod: PBBFAC,,, | Performed by: NURSE PRACTITIONER

## 2022-10-20 PROCEDURE — 3074F SYST BP LT 130 MM HG: CPT | Mod: CPTII,S$GLB,, | Performed by: NURSE PRACTITIONER

## 2022-10-20 PROCEDURE — 3074F PR MOST RECENT SYSTOLIC BLOOD PRESSURE < 130 MM HG: ICD-10-PCS | Mod: CPTII,S$GLB,, | Performed by: NURSE PRACTITIONER

## 2022-10-20 PROCEDURE — 3079F PR MOST RECENT DIASTOLIC BLOOD PRESSURE 80-89 MM HG: ICD-10-PCS | Mod: CPTII,S$GLB,, | Performed by: NURSE PRACTITIONER

## 2022-10-20 PROCEDURE — G0439 PPPS, SUBSEQ VISIT: HCPCS | Mod: S$GLB,,, | Performed by: NURSE PRACTITIONER

## 2022-10-20 PROCEDURE — 1159F PR MEDICATION LIST DOCUMENTED IN MEDICAL RECORD: ICD-10-PCS | Mod: CPTII,S$GLB,, | Performed by: NURSE PRACTITIONER

## 2022-10-20 RX ORDER — ERYTHROMYCIN 5 MG/G
OINTMENT OPHTHALMIC
COMMUNITY
Start: 2022-09-03 | End: 2022-10-20

## 2022-10-20 RX ORDER — CIPROFLOXACIN HYDROCHLORIDE 3 MG/ML
1 SOLUTION/ DROPS OPHTHALMIC 3 TIMES DAILY
COMMUNITY
Start: 2022-09-03 | End: 2022-10-20

## 2022-10-20 NOTE — PROGRESS NOTES
"  Sofia Augustine presented for a  Medicare AWV and comprehensive Health Risk Assessment today. The following components were reviewed and updated:    Medical history  Family History  Social history  Allergies and Current Medications  Health Risk Assessment  Health Maintenance  Care Team         ** See Completed Assessments for Annual Wellness Visit within the encounter summary.**         The following assessments were completed:  Living Situation  CAGE  Depression Screening  Timed Get Up and Go--not done walks with a cane  Whisper Test  Cognitive Function Screening      Nutrition Screening  ADL Screening  PAQ Screening        Vitals:    10/20/22 1036   BP: 110/88   BP Location: Left arm   Patient Position: Sitting   BP Method: Large (Manual)   Pulse: 96   SpO2: 98%   Weight: 78.7 kg (173 lb 8 oz)   Height: 5' 4" (1.626 m)     Body mass index is 29.78 kg/m².  Physical Exam  Vitals and nursing note reviewed.   Constitutional:       Appearance: Normal appearance.   HENT:      Head: Normocephalic.      Nose: Nose normal.      Mouth/Throat:      Mouth: Mucous membranes are moist.   Eyes:      Conjunctiva/sclera: Conjunctivae normal.   Cardiovascular:      Rate and Rhythm: Normal rate.   Pulmonary:      Effort: Pulmonary effort is normal.   Musculoskeletal:      Cervical back: Normal range of motion.      Comments: Walks with a cane   Skin:     General: Skin is warm and dry.   Neurological:      General: No focal deficit present.      Mental Status: She is alert and oriented to person, place, and time.   Psychiatric:         Mood and Affect: Mood normal.         Behavior: Behavior normal.         Thought Content: Thought content normal.         Judgment: Judgment normal.       Diagnoses and health risks identified today and associated recommendations/orders:    1. Encounter for preventive health examination  Exam done    Health Maintenance updated    Records reviewed    2. Seizure disorder  Chronic, followed by " PCP    3. Nonintractable generalized idiopathic epilepsy without status epilepticus  Chronic, followed by PCP    4. Current mild episode of major depressive disorder, unspecified whether recurrent  Chronic, followed by PCP    5. Coronary vasospasm  Chronic, followed by PCP    6. Essential hypertension  Chronic, followed by PCP    Take medications as prescribed.    Monitor BP at home, goal BP < or = 140/80, call office if consistently above this range.    Follow low salt DASH diet and exercise.    BMI reviewed.    Go to ED if Headaches, blurred vision, chest pain, or SOB occurs along with elevated readings > or = 160/90.    7. Mixed hyperlipidemia  Chronic, followed by PCP    Continue cholesterol med, low fat diet, and exercise.     8. Chronic bilateral low back pain without sciatica  Chronic, followed by PCP    9. Need for 23-polyvalent pneumococcal polysaccharide vaccine  - (In Office Administered) Pneumococcal Polysaccharide Vaccine (23 Valent) (SQ/IM)    10. Need for influenza vaccination  Given today    11. Need for shingles vaccine  Declined    12. BMI 29.0-29.9,adult  BMI reviewed    13. Overweight (BMI 25.0-29.9)  BMI reviewed.    Diet and exercise to lose weight.        Provided Sofia with a 5-10 year written screening schedule and personal prevention plan. Recommendations were developed using the USPSTF age appropriate recommendations. Education, counseling, and referrals were provided as needed. After Visit Summary printed and given to patient which includes a list of additional screenings\tests needed.    Follow up for next available with PCP Dr. Connell for f/u.    Andreina Carranza DNP      I offered to discuss advanced care planning, including how to pick a person who would make decisions for you if you were unable to make them for yourself, called a health care power of , and what kind of decisions you might make such as use of life sustaining treatments such as ventilators and tube feeding  when faced with a life limiting illness recorded on a living will that they will need to know. (How you want to be cared for as you near the end of your natural life)     X Patient is interested in learning more about how to make advanced directives.  I provided them paperwork and offered to discuss this with them.

## 2022-10-20 NOTE — PATIENT INSTRUCTIONS
Counseling and Referral of Other Preventative  (Italic type indicates deductible and co-insurance are waived)    Patient Name: Sofia Augustine  Today's Date: 10/20/2022    Health Maintenance         Date Due Completion Date    COVID-19 Vaccine (1) Declined declined    Sign Pain Contract Deferred to pain management Deferred to pain management    Complete Opioid Risk Tool Deferred to pain management Deferred to pain management    DEXA Scan Ordered 8-31-22 Ordered 8-31-22    Shingles Vaccine (1 of 2) Declined Declined`    Pneumococcal Vaccines (Age 65+) (2 - PPSV23 if available, else PCV20) Ordered today 3/24/2015    Influenza Vaccine (1) Ordered today 11/9/2021    Override on 1/24/2020: Declined    TETANUS VACCINE 10/20/2023 (Originally 9/27/1963) ---    Aspirin/Antiplatelet Therapy 12/13/2022 12/13/2021    Lipid Panel 07/20/2027 7/20/2022          No orders of the defined types were placed in this encounter.    The following information is provided to all patients.  This information is to help you find resources for any of the problems found today that may be affecting your health:                Living healthy guide: www.[facility].[place].gov      Understanding Diabetes: www.diabetes.org      Eating healthy: www.cdc.gov/healthyweight      CDC home safety checklist: www.cdc.gov/steadi/patient.html      Agency on Aging: www.goea.[place].[facility]      Alcoholics anonymous (AA): www.aa.org      Physical Activity: www.eva.nih.gov/vl3vpvq      Tobacco use: www.quitwithusla.org

## 2022-12-02 ENCOUNTER — TELEPHONE (OUTPATIENT)
Dept: INTERNAL MEDICINE | Facility: CLINIC | Age: 77
End: 2022-12-02
Payer: MEDICARE

## 2022-12-02 RX ORDER — SULFAMETHOXAZOLE AND TRIMETHOPRIM 800; 160 MG/1; MG/1
1 TABLET ORAL 2 TIMES DAILY
Qty: 14 TABLET | Refills: 0 | Status: SHIPPED | OUTPATIENT
Start: 2022-12-02 | End: 2022-12-09

## 2022-12-02 NOTE — TELEPHONE ENCOUNTER
----- Message from Nereyda Chen sent at 12/2/2022 11:31 AM CST -----  Contact: [phone]  Pt is calling foe amoxil she states she stepped on a nail please give return call

## 2022-12-02 NOTE — TELEPHONE ENCOUNTER
Spoke to pt and she states this happened 2 days ago. Pt said she put iodine on it after it happened,it now has puss. Please send abx to [facility] [address]

## 2022-12-02 NOTE — TELEPHONE ENCOUNTER
Called patient and left message to call the office.  Prescription for Bactrim has been sent to the pharmacy electronically.  Patient should update her tetanus if needed.  Also, recommend that she be evaluated.

## 2022-12-02 NOTE — TELEPHONE ENCOUNTER
Please inform patient she only needs an antibiotic if the wound is infected.  However, if she has not had a tetanus vaccine within the last 5 years, then she will need a booster.  Please advise.

## 2022-12-16 DIAGNOSIS — M47.22 OSTEOARTHRITIS OF SPINE WITH RADICULOPATHY, CERVICAL REGION: ICD-10-CM

## 2022-12-16 RX ORDER — HYDROCODONE BITARTRATE AND ACETAMINOPHEN 5; 325 MG/1; MG/1
1 TABLET ORAL EVERY 8 HOURS PRN
Qty: 40 TABLET | Refills: 0 | Status: SHIPPED | OUTPATIENT
Start: 2022-12-16 | End: 2023-01-24 | Stop reason: SDUPTHER

## 2022-12-16 NOTE — TELEPHONE ENCOUNTER
----- Message from Niecy Olsen sent at 12/16/2022 12:58 PM CST -----  Contact: [phone] Patient  Requesting an RX refill or new RX.  Is this a refill or new RX: new  RX name and strength (copy/paste from chart):  HYDROcodone-acetaminophen (NORCO) 5-325 mg per tablet  Is this a 30 day or 90 day RX:   Pharmacy name and phone # (copy/paste from chart):    [id number] - [place] - [address]  AT [facility] [address]  [address] [place] [address]  Phone: [phone] Fax: [phone]

## 2023-01-09 ENCOUNTER — HOSPITAL ENCOUNTER (EMERGENCY)
Facility: HOSPITAL | Age: 78
Discharge: HOME | End: 2023-01-09
Attending: EMERGENCY MEDICINE
Payer: MEDICARE

## 2023-01-09 ENCOUNTER — TELEPHONE (OUTPATIENT)
Dept: INTERNAL MEDICINE | Facility: CLINIC | Age: 78
End: 2023-01-09

## 2023-01-09 VITALS
HEART RATE: 81 BPM | TEMPERATURE: 98 F | BODY MASS INDEX: 29.02 KG/M2 | WEIGHT: 170 LBS | RESPIRATION RATE: 18 BRPM | OXYGEN SATURATION: 97 % | HEIGHT: 64 IN | SYSTOLIC BLOOD PRESSURE: 135 MMHG | DIASTOLIC BLOOD PRESSURE: 92 MMHG

## 2023-01-09 DIAGNOSIS — M25.562 ACUTE PAIN OF BOTH KNEES: Primary | ICD-10-CM

## 2023-01-09 DIAGNOSIS — Z87.39 HISTORY OF OSTEOARTHRITIS: ICD-10-CM

## 2023-01-09 DIAGNOSIS — M25.569 KNEE PAIN: ICD-10-CM

## 2023-01-09 DIAGNOSIS — M25.561 ACUTE PAIN OF BOTH KNEES: Primary | ICD-10-CM

## 2023-01-09 PROCEDURE — 99284 PR EMERGENCY DEPT VISIT,LEVEL IV: ICD-10-PCS | Mod: ,,, | Performed by: PHYSICIAN ASSISTANT

## 2023-01-09 PROCEDURE — 25000003 PHARM REV CODE 250: Performed by: PHYSICIAN ASSISTANT

## 2023-01-09 PROCEDURE — 99284 EMERGENCY DEPT VISIT MOD MDM: CPT | Mod: ,,, | Performed by: PHYSICIAN ASSISTANT

## 2023-01-09 PROCEDURE — 99284 EMERGENCY DEPT VISIT MOD MDM: CPT | Mod: 25

## 2023-01-09 RX ORDER — ONDANSETRON 4 MG/1
4 TABLET, ORALLY DISINTEGRATING ORAL
Status: COMPLETED | OUTPATIENT
Start: 2023-01-09 | End: 2023-01-09

## 2023-01-09 RX ORDER — HYDROCODONE BITARTRATE AND ACETAMINOPHEN 5; 325 MG/1; MG/1
1 TABLET ORAL
Status: COMPLETED | OUTPATIENT
Start: 2023-01-09 | End: 2023-01-09

## 2023-01-09 RX ADMIN — ONDANSETRON 4 MG: 4 TABLET, ORALLY DISINTEGRATING ORAL at 04:01

## 2023-01-09 RX ADMIN — HYDROCODONE BITARTRATE AND ACETAMINOPHEN 1 TABLET: 5; 325 TABLET ORAL at 04:01

## 2023-01-09 NOTE — TELEPHONE ENCOUNTER
----- Message from Rossana Herron sent at 1/9/2023 10:19 AM CST -----  Contact: [phone]  Patient is calling for Medical Advice regarding: Patient says she would like crutches for her pain in her knees , please call and advise. Patient states they have crutches that you hold with your hand and not under your arm and these are the ones she would want.

## 2023-01-09 NOTE — ED PROVIDER NOTES
Encounter Date: 1/9/2023       History     Chief Complaint   Patient presents with    Leg Pain     Both legs hurting ruddy my knees,  hx arthritis, denies injury     The history is provided by the patient and medical records. No  was used.     Sofia Augustine is a 77 y.o. female with medical history of  HTN, depression, seizure, coronary vasospasms, cervical radiculopathy presenting to the ED with the chief complaint of leg pain.     Reports acute on chronic bilateral knee pain (L>R) for the past few days. Pain worsens with ambulating and has been using a cane. Pain is localized in her knees and denies radiation. No numbness, paresthesias, extremity swelling. No recent falls or trauma. Reports being told she needed a knee replacement for her L, but did not follow-up with Orthopedics. Currently taking Norco at home for pain. Denies fever, chest pain, SOB, abdominal pain, vomiting, urinary or bowel movement changes. No history of knee surgeries or immunosuppression.      Review of patient's allergies indicates:  No Known Allergies  Past Medical History:   Diagnosis Date    Abnormal mammogram of both breasts     Arthritis     Coronary vasospasm 10/31/2019    Hypertension     Major depressive disorder 5/14/2017    Memory disorder     Nephrolithiasis     Seizures     Stroke      Past Surgical History:   Procedure Laterality Date    CATARACT EXTRACTION      cysts breast      TONSILLECTOMY       History reviewed. No pertinent family history.  Social History     Tobacco Use    Smoking status: Never    Smokeless tobacco: Never   Substance Use Topics    Alcohol use: No    Drug use: No     Review of Systems   Musculoskeletal:  Positive for arthralgias.     Physical Exam     Initial Vitals [01/09/23 1436]   BP Pulse Resp Temp SpO2   (!) 135/92 81 18 98.1 °F (36.7 °C) 97 %      MAP       --         Physical Exam    Constitutional: She appears well-developed and well-nourished. She is not diaphoretic. No  distress.   HENT:   Head: Normocephalic and atraumatic.   Mouth/Throat: Oropharynx is clear and moist. No oropharyngeal exudate.   Eyes: Conjunctivae and EOM are normal. Pupils are equal, round, and reactive to light. No scleral icterus.   Neck: Neck supple.   Normal range of motion.  Cardiovascular:  Normal rate and regular rhythm.           +2 DP pulses   Pulmonary/Chest: Breath sounds normal. No respiratory distress. She has no wheezes.   Abdominal: Abdomen is soft. She exhibits no distension. There is no abdominal tenderness. There is no rebound.   Musculoskeletal:         General: No tenderness or edema. Normal range of motion.      Cervical back: Normal range of motion and neck supple.      Comments: Full A/P BLE. No erythema, warmth, or overlying skin changes to knees. Ambulates with a cane.     Neurological: She is alert and oriented to person, place, and time. She has normal strength. No sensory deficit.   No focal weakness or sensory deficit to extremities   Skin: Skin is warm and dry. No rash noted. No erythema.   Psychiatric: She has a normal mood and affect.       ED Course   Procedures  Labs Reviewed   HIV 1 / 2 ANTIBODY          Imaging Results              US Lower Extremity Veins Bilateral (Final result)  Result time 01/09/23 18:37:55      Final result by Tacho Cooney MD (01/09/23 18:37:55)                   Impression:      No evidence of deep venous thrombosis in either lower extremity.      Electronically signed by: Tacho Cooney MD  Date:    01/09/2023  Time:    18:37               Narrative:    EXAMINATION:  US LOWER EXTREMITY VEINS BILATERAL    CLINICAL HISTORY:  Pain in unspecified knee    TECHNIQUE:  Duplex and color flow Doppler and dynamic compression was performed of the bilateral lower extremity veins was performed.    COMPARISON:  None    FINDINGS:  Duplex and color flow Doppler evaluation does not reveal any evidence of acute venous thrombosis in the common femoral, superficial  femoral, greater saphenous, popliteal, peroneal, anterior tibial and posterior tibial veins bilaterally.  There is no reflux to suggest valvular incompetence.                                       X-Ray Knee 1 or 2 View Bilateral (Final result)  Result time 01/09/23 17:07:59      Final result by Tacho Cooney MD (01/09/23 17:07:59)                   Impression:      1. No acute displaced fracture or dislocation of either knee noting degenerative changes.      Electronically signed by: Tacho Cooney MD  Date:    01/09/2023  Time:    17:07               Narrative:    EXAMINATION:  XR KNEE 1 OR 2 VIEW BILATERAL    CLINICAL HISTORY:  Pain in unspecified knee    COMPARISON:  10/28/2021    FINDINGS:  Four views bilateral knees.    There is bilateral medial compartmental narrowing.  There are degenerative changes of the knees.  There is osteopenia.  No acute displaced fracture or dislocation of either knee.  No large knee joint effusions.                                       Medications   ondansetron disintegrating tablet 4 mg (4 mg Oral Given 1/9/23 1645)   HYDROcodone-acetaminophen 5-325 mg per tablet 1 tablet (1 tablet Oral Given 1/9/23 1644)     Medical Decision Making:   History:   Old Medical Records: I decided to obtain old medical records.  Old Records Summarized: records from previous admission(s) and records from clinic visits.  Clinical Tests:   Radiological Study: Ordered and Reviewed     APC / Resident Notes:   77 y.o. female with medical history of  HTN, depression, seizure, coronary vasospasms, cervical radiculopathy presenting to the ED c/o acute on chronic BL knee pain for a few days. DDx includes but not limited to osteoarthritis, ligament injury, meniscus injury, muscular strain, Baker's cyst, DVT. Lower suspicion for fracture. No erythema, warmth, swelling and lower suspicion for septic joint.             Home dose of Norco given in the ED. X-ray showing degenerative changes BL. No large joint  effusion. No fracture. Venous U/S BLE negative for DVT. Okay for outpatient follow-up with Orthopedics. Ambulatory referral placed. Additionally ordered a walker for patient to go home with. Patient expresses understanding and agreeable to the plan. Return to ED precautions given for new, worsening, or concerning symptoms.       Clinical Impression:   Final diagnoses:  [M25.569] Knee pain  [M25.561, M25.562] Acute pain of both knees (Primary)  [Z87.39] History of osteoarthritis        ED Disposition Condition    Discharge Stable          ED Prescriptions    None       Follow-up Information       Follow up With Specialties Details Why Contact Info Additional Information    [facility] 5th Fl Orthopedics   [address], 5th Floor  [facility] [address] [phone] [facility] - Main Building, 5th Floor Please park in [facility] and take Atrium elevator             Saroj Gonzalez PA-C  01/09/23 [id number]

## 2023-01-09 NOTE — ED NOTES
Sofia ELIJAH Augustine, a 77 y.o. female presents to the ED w/ complaint of bilateral knee pain without injury. H/o arthritis. AAOx4. Ambulatory to and from wheelchair x1 assist with cane.     Triage note:  Chief Complaint   Patient presents with    Leg Pain     Both legs hurting ruddy my knees,  hx arthritis, denies injury     Review of patient's allergies indicates:  No Known Allergies  Past Medical History:   Diagnosis Date    Abnormal mammogram of both breasts     Arthritis     Coronary vasospasm 10/31/2019    Hypertension     Major depressive disorder 5/14/2017    Memory disorder     Nephrolithiasis     Seizures     Stroke

## 2023-01-10 ENCOUNTER — TELEPHONE (OUTPATIENT)
Dept: ORTHOPEDICS | Facility: CLINIC | Age: 78
End: 2023-01-10
Payer: MEDICARE

## 2023-01-10 NOTE — TELEPHONE ENCOUNTER
----- Message from Nano Bertrand MA sent at 1/10/2023 11:38 AM CST -----  Regarding: FW: PT HAD A ER VISIT ON YESTERDAY FOR KNEE PAIN  Contact: pt  How soon can you schedule her? I leave at noon so can you call her if I'm gone?  ----- Message -----  From: J Carlos Min  Sent: 1/10/2023  11:32 AM CST  To: Deborah Durán Staff, #  Subject: PT HAD A ER VISIT ON YESTERDAY FOR KNEE PAIN     Pt would like to be scheduled as soon as possible but first available was 2/3 and pt is wanting to be seen sooner.. Pt would like a call back to discuss with staff..     Confirmed contact info below:  Contact Name: Sofia Augustine  Phone Number: [phone]

## 2023-01-10 NOTE — DISCHARGE INSTRUCTIONS
Follow-up with Orthopedics for further evaluation of your knee pain. Use the below contact information to call their clinic. Continue your [place] at home for pain control. Use the provided walker for assistance,    Follow-up with your primary care provider for further evaluation. Return to the emergency room for new, worsening, or concerning symptoms.     No future appointments.

## 2023-01-10 NOTE — ED NOTES
Jaswant arrived to  pt. Walker in pt hand. Pt verbalized understanding of physician teaching. All d/c paperwork in hand. No needs observed or expressed at this time. Ambulatory to exit with walker without difficulty.

## 2023-01-10 NOTE — TELEPHONE ENCOUNTER
Spoke to pt in regards to sooner appt. Pt states she has already been scheduled for tomorrow. Pt pleased and verbalized understanding.

## 2023-01-11 ENCOUNTER — OFFICE VISIT (OUTPATIENT)
Dept: SPORTS MEDICINE | Facility: CLINIC | Age: 78
End: 2023-01-11
Payer: MEDICARE

## 2023-01-11 VITALS — BODY MASS INDEX: 29.18 KG/M2 | HEIGHT: 64 IN

## 2023-01-11 DIAGNOSIS — M25.562 ACUTE PAIN OF BOTH KNEES: ICD-10-CM

## 2023-01-11 DIAGNOSIS — M25.561 ACUTE PAIN OF BOTH KNEES: ICD-10-CM

## 2023-01-11 DIAGNOSIS — M17.0 BILATERAL PRIMARY OSTEOARTHRITIS OF KNEE: Primary | ICD-10-CM

## 2023-01-11 PROCEDURE — 3288F PR FALLS RISK ASSESSMENT DOCUMENTED: ICD-10-PCS | Mod: CPTII,S$GLB,, | Performed by: PHYSICIAN ASSISTANT

## 2023-01-11 PROCEDURE — 1101F PR PT FALLS ASSESS DOC 0-1 FALLS W/OUT INJ PAST YR: ICD-10-PCS | Mod: CPTII,S$GLB,, | Performed by: PHYSICIAN ASSISTANT

## 2023-01-11 PROCEDURE — 99999 PR PBB SHADOW E&M-EST. PATIENT-LVL III: ICD-10-PCS | Mod: PBBFAC,,, | Performed by: PHYSICIAN ASSISTANT

## 2023-01-11 PROCEDURE — 99999 PR PBB SHADOW E&M-EST. PATIENT-LVL III: CPT | Mod: PBBFAC,,, | Performed by: PHYSICIAN ASSISTANT

## 2023-01-11 PROCEDURE — 1159F PR MEDICATION LIST DOCUMENTED IN MEDICAL RECORD: ICD-10-PCS | Mod: CPTII,S$GLB,, | Performed by: PHYSICIAN ASSISTANT

## 2023-01-11 PROCEDURE — 1101F PT FALLS ASSESS-DOCD LE1/YR: CPT | Mod: CPTII,S$GLB,, | Performed by: PHYSICIAN ASSISTANT

## 2023-01-11 PROCEDURE — 3288F FALL RISK ASSESSMENT DOCD: CPT | Mod: CPTII,S$GLB,, | Performed by: PHYSICIAN ASSISTANT

## 2023-01-11 PROCEDURE — 1125F PR PAIN SEVERITY QUANTIFIED, PAIN PRESENT: ICD-10-PCS | Mod: CPTII,S$GLB,, | Performed by: PHYSICIAN ASSISTANT

## 2023-01-11 PROCEDURE — 1160F RVW MEDS BY RX/DR IN RCRD: CPT | Mod: CPTII,S$GLB,, | Performed by: PHYSICIAN ASSISTANT

## 2023-01-11 PROCEDURE — 1125F AMNT PAIN NOTED PAIN PRSNT: CPT | Mod: CPTII,S$GLB,, | Performed by: PHYSICIAN ASSISTANT

## 2023-01-11 PROCEDURE — 99204 PR OFFICE/OUTPT VISIT, NEW, LEVL IV, 45-59 MIN: ICD-10-PCS | Mod: S$GLB,,, | Performed by: PHYSICIAN ASSISTANT

## 2023-01-11 PROCEDURE — 99204 OFFICE O/P NEW MOD 45 MIN: CPT | Mod: S$GLB,,, | Performed by: PHYSICIAN ASSISTANT

## 2023-01-11 PROCEDURE — 1159F MED LIST DOCD IN RCRD: CPT | Mod: CPTII,S$GLB,, | Performed by: PHYSICIAN ASSISTANT

## 2023-01-11 PROCEDURE — 1160F PR REVIEW ALL MEDS BY PRESCRIBER/CLIN PHARMACIST DOCUMENTED: ICD-10-PCS | Mod: CPTII,S$GLB,, | Performed by: PHYSICIAN ASSISTANT

## 2023-01-11 RX ORDER — CELECOXIB 200 MG/1
200 CAPSULE ORAL 2 TIMES DAILY
Qty: 60 CAPSULE | Refills: 1 | Status: SHIPPED | OUTPATIENT
Start: 2023-01-11 | End: 2023-05-09

## 2023-01-11 NOTE — PROGRESS NOTES
Subjective:          Chief Complaint: Sofia Augustine is a 77 y.o. female who had concerns including Pain of the Left Knee and Pain of the Right Knee.    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  Patient previously seen by Dr. Sexton and was scheduled to have knee arthroplasty October 2021. The intermittent gradual pain started 10+ years ago with no clear RAMILA and is becoming progressively worse last few months. Pain is located over (points to) medial joint line and behind the patella bilaterally. She reports that the pain is a 8 /10 aching, throbbing, pain today and not responding adequately to conservative measures which have included activity modifications, rest, and oral medication. Is affecting ADLs and limiting desired level of activity. Denies numbness, tingling, radiation in toes, and inability to bear weight.  Pain is 6 /10 at its worst    Mechanical symptoms: none  Subjective instability: (+)   Worse at the end of the day and with increased activity  Better with rest.   Nocturnal symptoms: (+)    No previous surgeries or trauma on knees          Review of Systems   Constitutional: Negative for chills and fever.   HENT:  Negative for congestion and sore throat.    Eyes:  Negative for discharge and double vision.   Cardiovascular:  Negative for chest pain, palpitations and syncope.   Respiratory:  Negative for cough and shortness of breath.    Endocrine: Negative for cold intolerance and heat intolerance.   Skin:  Negative for dry skin and rash.   Musculoskeletal:  Positive for arthritis, joint pain and joint swelling.   Gastrointestinal:  Negative for abdominal pain, nausea and vomiting.   Neurological:  Negative for focal weakness, numbness and paresthesias.                 Objective:        General: Sofia is well-developed, well-nourished, appears stated age, in no acute distress, alert and oriented to time, place and person.     General    Nursing note  and vitals reviewed.  Constitutional: She is oriented to person, place, and time. She appears well-developed and well-nourished. No distress.   Eyes: Conjunctivae and EOM are normal. Pupils are equal, round, and reactive to light.   Neck: Neck supple. No JVD present.   Cardiovascular:  Normal heart sounds and intact distal pulses.            No murmur heard.  Pulmonary/Chest: Effort normal and breath sounds normal. No respiratory distress.   Abdominal: Soft. Bowel sounds are normal. She exhibits no distension. There is no abdominal tenderness.   Neurological: She is alert and oriented to person, place, and time. Coordination normal.   Psychiatric: She has a normal mood and affect. Her behavior is normal. Judgment and thought content normal.     General Musculoskeletal Exam   Gait: abnormal and antalgic       Right Knee Exam     Inspection   Erythema: absent  Scars: absent  Swelling: absent  Effusion: absent  Deformity: absent  Bruising: absent    Tenderness   The patient is tender to palpation of the medial joint line and patella.    Crepitus   The patient has crepitus of the patella.    Range of Motion   Extension:  0   Flexion:  100 abnormal     Tests   Meniscus   Bairon:  Medial - negative Lateral - negative  Ligament Examination   Lachman: normal (-1 to 2mm)   PCL-Posterior Drawer: normal (0 to 2mm)     MCL - Valgus: normal (0 to 2mm)  LCL - Varus: normal  Dial Test at 90 degrees: normal (< 5 degrees)  Posterolateral Corner: stable  Patella   Patellar Glide (quadrants): Lateral - 1   Medial - 2  Patellar Grind: positive    Other   Popliteal (Baker's) Cyst: absent  Sensation: normal    Left Knee Exam     Inspection   Erythema: absent  Scars: absent  Swelling: absent  Effusion: absent  Deformity: absent  Bruising: absent    Tenderness   The patient tender to palpation of the medial joint line and patella.    Crepitus   The patient has crepitus of the MFC and patella.    Range of Motion   Extension:  5 abnormal    Flexion:  110 abnormal     Tests   Meniscus   Bairon:  Medial - negative Lateral - negative  Stability   Lachman: normal (-1 to 2mm)   PCL-Posterior Drawer: normal (0 to 2mm)  MCL - Valgus: normal (0 to 2mm)  LCL - Varus: normal (0 to 2mm)  Dial Test at 90 degrees: normal (< 5 degrees)  Posterolateral Corner: stable  Patella   Patellar Glide (Quadrants): Lateral - 1 Medial - 2  Patellar Grind: positive    Other   Popliteal (Baker's) Cyst: absent  Sensation: normal    Right Hip Exam     Tests   Dorothy: negative  Left Hip Exam     Tests   Dorothy: negative          Muscle Strength   Right Lower Extremity   Hip Abduction: 5/5   Quadriceps:  4/5   Hamstrin/5   Left Lower Extremity   Hip Abduction: 5/5   Quadriceps:  4/5   Hamstrin/5     Vascular Exam     Right Pulses  Dorsalis Pedis:      2+  Posterior Tibial:      2+        Left Pulses  Dorsalis Pedis:      2+  Posterior Tibial:      2+        Edema  Right Lower Leg: absent  Left Lower Leg: absent    Radiographic findings today:    There is bilateral medial compartmental narrowing.  There are degenerative changes of the knees. There is osteopenia.  No acute displaced fracture or dislocation of either knee.  No large knee joint effusions.     Impression:     1. No acute displaced fracture or dislocation of either knee noting degenerative changes.    Kellgren-Dyllan Classification: 3-4    Xrays of the knees were ordered and reviewed by me today. These findings were discussed and reviewed with the patient.        Assessment:       Encounter Diagnoses   Name Primary?    Acute pain of both knees     History of osteoarthritis           Plan:       We have discussed a variety of treatment options including medications, injections, physical therapy and other alternative treatments. I also explained the indications, risks and benefits of surgery.  Today, we discussed that she would be a great candidate for total joint arthroplasty as previously discussed.   Recommending follow-up with Dr. Muñoz to continue TKA planning. Also recommending physical therapy.  Patient agrees with treatment plan.    1. Celebrex 200 mg BID daily PRN for pain management.  2. Ambulatory referral to physical therapy for pre-habilitation.  3. RICE - Ice compress to the affected area 2-3x a day for 15-20 minutes as needed for pain management.  4. Refer to Dr. Muñoz for follow-up and further management.    All of the patient's questions were answered and the patient will contact us if they have any questions or concerns in the interim.                Patient questionnaires may have been collected.

## 2023-01-12 ENCOUNTER — TELEPHONE (OUTPATIENT)
Dept: ORTHOPEDICS | Facility: CLINIC | Age: 78
End: 2023-01-12
Payer: MEDICARE

## 2023-01-12 NOTE — TELEPHONE ENCOUNTER
Message forwarded to Dr Muñoz to advise.     ----- Message from Ishaan Mixon sent at 1/11/2023  4:49 PM CST -----  Regarding: SCHEDULE SURGERY  Contact: Self  Pt stated she was previously schedule for surgery w/ Dr Muñoz approx 1yr ago however was postpone by Dr Muñoz due to an emergency he had. Pt ask if it's necessary to have another appointment w/ Dr Muñoz to discuss surgery or can she just schedule her surgery. Pt stated if she do have to have an appointment, request a Virtual/Audio Visit due to the pain that she is having and it's hard for her to get around. Pt ask for a call back      Contact info   [phone] Cell

## 2023-01-12 NOTE — TELEPHONE ENCOUNTER
Spoke with patient to let her know that previous message was forwarded to Dr Muñoz to see how to proceed.    ----- Message from Ashli Leslie MA sent at 1/12/2023  4:44 PM CST -----  Contact: [phone]  Pt states she left previous messages regarding speaking with someone about scheduling surgery. She wanted to speak with clinical staff instead of coming in for an appt. I informed pt that her previous message that was sent has been forwarded to Dr. Muñoz, but did not see any response as of yet. Pt would like to know the status of the previous message from this morning. Please reach out to pt at [phone]

## 2023-01-13 ENCOUNTER — TELEPHONE (OUTPATIENT)
Dept: ORTHOPEDICS | Facility: CLINIC | Age: 78
End: 2023-01-13
Payer: MEDICARE

## 2023-01-13 NOTE — TELEPHONE ENCOUNTER
Dr Muñoz will start surgery booking sheet to get patient back on surgery schedule.    ----- Message from Mona Alejo sent at 1/13/2023 10:50 AM CST -----  Contact: pt  Pt calling in regards to scheduling her procedure for knee replacement      Confirmed patient's contact info below:  Contact Name: Sofia Augustine  Phone Number: [phone]

## 2023-01-24 DIAGNOSIS — M47.22 OSTEOARTHRITIS OF SPINE WITH RADICULOPATHY, CERVICAL REGION: ICD-10-CM

## 2023-01-24 RX ORDER — HYDROCODONE BITARTRATE AND ACETAMINOPHEN 5; 325 MG/1; MG/1
1 TABLET ORAL EVERY 8 HOURS PRN
Qty: 40 TABLET | Refills: 0 | Status: SHIPPED | OUTPATIENT
Start: 2023-01-24 | End: 2023-02-22 | Stop reason: SDUPTHER

## 2023-01-24 NOTE — TELEPHONE ENCOUNTER
----- Message from Yareli Garcia sent at 1/24/2023 10:45 AM CST -----  Contact: Pt [phone]  Requesting an RX refill or new RX.  Is this a refill or new RX: Refill   RX name and strength (copy/paste from chart):  HYDROcodone-acetaminophen (NORCO) 5-325 mg per tablet  Is this a 30 day or 90 day RX: 90  Pharmacy name and phone # (copy/paste from chart):    [id number] - [place] - [address]  [address] [facility] [address]  [address] [place] [address]  Phone: [phone] Fax: [phone]

## 2023-02-22 DIAGNOSIS — M47.22 OSTEOARTHRITIS OF SPINE WITH RADICULOPATHY, CERVICAL REGION: ICD-10-CM

## 2023-02-22 RX ORDER — HYDROCODONE BITARTRATE AND ACETAMINOPHEN 5; 325 MG/1; MG/1
1 TABLET ORAL EVERY 8 HOURS PRN
Qty: 40 TABLET | Refills: 0 | Status: SHIPPED | OUTPATIENT
Start: 2023-02-22 | End: 2023-03-21 | Stop reason: SDUPTHER

## 2023-02-22 NOTE — TELEPHONE ENCOUNTER
Please inform patient that this is the last prescription that will be filled without an appointment.  Please schedule an appointment as soon as possible.

## 2023-02-22 NOTE — TELEPHONE ENCOUNTER
----- Message from Jin Tubbs sent at 2/22/2023  2:46 PM CST -----  Contact: [phone]  Requesting an RX refill or new RX.  Is this a refill or new RX: refill  RX name and strength (copy/paste from chart):  HYDROcodone-acetaminophen (NORCO) 5-325 mg per tablet  Is this a 30 day or 90 day RX:   Pharmacy name and phone # (copy/paste from chart):    [id number] - [place] - [address]  AT [facility] [address]  [address] [place] [address]  Phone: [phone] Fax: [phone]  The doctors have asked that we provide their patients with the following 2 reminders -- prescription refills can take up to 72 hours, and a friendly reminder that in the future you can use your [facility] account to request refills: call back

## 2023-02-28 ENCOUNTER — TELEPHONE (OUTPATIENT)
Dept: INTERNAL MEDICINE | Facility: CLINIC | Age: 78
End: 2023-02-28
Payer: MEDICARE

## 2023-02-28 NOTE — TELEPHONE ENCOUNTER
Spoke to pt and informed that she needed an appt for further refills. I scheduled her with the next available. She agreed to keeping the appt on 03/16 with .

## 2023-02-28 NOTE — TELEPHONE ENCOUNTER
----- Message from Jerica Somers sent at 2/28/2023  9:14 AM CST -----  Contact: SARTHAK PEREZ [[id number]]@ [phone]  Ozzy patient want to know why she was booked with Dr. Lupe Jimenez on 3/16?

## 2023-03-20 NOTE — PROGRESS NOTES
Subjective:       Patient ID: Sofia Augustine is a 77 y.o. female.    Chief Complaint: Annual Wellness Visit      Sofia Augustine is a 77 y.o. female who presents today for an annual wellness visit.     Patient complains of chronic knee pain rated 2/10 at rest and 8/10 with activity. She is currently using NSAIDs and Narcotics for pain control.     Review of patient's allergies indicates:  No Known Allergies   Medication List with Changes/Refills   Current Medications    ASPIRIN (ECOTRIN) 81 MG EC TABLET    Take 81 mg by mouth once daily.    CELECOXIB (CELEBREX) 200 MG CAPSULE    Take 1 capsule (200 mg total) by mouth 2 (two) times daily.    NITROGLYCERIN (NITROSTAT) 0.3 MG SL TABLET    PLACE ONE TABLET UNDER TONGUE AS NEEDED FOR CHEST PAIN EVERY 5 MINUTES   Changed and/or Refilled Medications    Modified Medication Previous Medication    ATORVASTATIN (LIPITOR) 40 MG TABLET atorvastatin (LIPITOR) 40 MG tablet       Take 1 tablet (40 mg total) by mouth once daily.    Take 1 tablet (40 mg total) by mouth once daily.    HYDROCODONE-ACETAMINOPHEN (NORCO) 5-325 MG PER TABLET HYDROcodone-acetaminophen (NORCO) 5-325 mg per tablet       Take 1 tablet by mouth every 8 (eight) hours as needed for Pain.    Take 1 tablet by mouth every 8 (eight) hours as needed for Pain.       Health Maintenance    MM2016 - Declined repeat   Flu Vaccine: 10/20/2022  Pneumonia 23 Vaccine: 10/20/2022  Pneumonia 13 Vaccine: 2015  Hepatitis C Screen: 2022  HIV Screen: 2021      Patient ambulates on her own without an assistive device.   Does she have issues with balance, falls or walking?  No    Do you go to the dentist regularly? No  When was you last exam:     Do you go to the eye doctor regularly? Yes  When was your last exam:    Do you wear contacts, glasses, reading glasses? Yes    Have you often been bothered by feeling down, depressed, or hopeless?  several days    Have you often been bothered by having  "little interest or pleasure in doing things?  Not at all    Review of Systems   Constitutional:  Negative for chills and fever.   Respiratory:  Negative for cough and shortness of breath.    Cardiovascular:  Negative for chest pain.   Musculoskeletal:  Positive for arthralgias.   Neurological:  Negative for dizziness and headaches.        Objective:     /84 (BP Location: Right arm, Patient Position: Sitting, BP Method: Medium (Manual))   Pulse 99   Temp 97.7 °F (36.5 °C) (Temporal)   Resp 20   Ht 5' 4" (1.626 m)   Wt 78 kg (171 lb 15.3 oz)   LMP  (LMP Unknown)   SpO2 97%   BMI 29.52 kg/m²     Physical Exam  Vitals reviewed.   Constitutional:       Appearance: Normal appearance.   HENT:      Head: Normocephalic and atraumatic.   Cardiovascular:      Rate and Rhythm: Normal rate and regular rhythm.      Heart sounds: Normal heart sounds. No murmur heard.  Pulmonary:      Effort: Pulmonary effort is normal.      Breath sounds: Normal breath sounds. No wheezing.   Skin:     General: Skin is warm and dry.   Neurological:      Mental Status: She is alert and oriented to person, place, and time.     BP Readings from Last 3 Encounters:   03/21/23 120/84   01/09/23 (!) 135/92   10/20/22 110/88     Wt Readings from Last 3 Encounters:   03/21/23 78 kg (171 lb 15.3 oz)   01/09/23 77.1 kg (170 lb)   10/20/22 78.7 kg (173 lb 8 oz)       Last Labs:  Glucose   Date Value Ref Range Status   03/21/2023 99 70 - 110 mg/dL Final   07/20/2022 130 (H) 70 - 110 mg/dL Final     BUN   Date Value Ref Range Status   03/21/2023 18 8 - 23 mg/dL Final   07/20/2022 18 8 - 23 mg/dL Final     Creatinine   Date Value Ref Range Status   03/21/2023 1.0 0.5 - 1.4 mg/dL Final   07/20/2022 1.0 0.5 - 1.4 mg/dL Final     Cholesterol   Date Value Ref Range Status   03/21/2023 221 (H) 120 - 199 mg/dL Final     Comment:     The National Cholesterol Education Program (NCEP) has set the  following guidelines (reference ranges) for " Cholesterol:  Optimal.....................<200 mg/dL  Borderline High.............200-239 mg/dL  High........................> or = 240 mg/dL     07/20/2022 247 (H) 120 - 199 mg/dL Final     Comment:     The National Cholesterol Education Program (NCEP) has set the  following guidelines (reference ranges) for Cholesterol:  Optimal.....................<200 mg/dL  Borderline High.............200-239 mg/dL  High........................> or = 240 mg/dL       Hemoglobin A1C   Date Value Ref Range Status   03/23/2019 5.7 (H) 4.0 - 5.6 % Final     Comment:     ADA Screening Guidelines:  5.7-6.4%  Consistent with prediabetes  >or=6.5%  Consistent with diabetes  High levels of fetal hemoglobin interfere with the HbA1C  assay. Heterozygous hemoglobin variants (HbS, HgC, etc)do  not significantly interfere with this assay.   However, presence of multiple variants may affect accuracy.     09/02/2017 5.3 4.0 - 5.6 % Final     Comment:     According to ADA guidelines, hemoglobin A1c <7.0% represents  optimal control in non-pregnant diabetic patients. Different  metrics may apply to specific patient populations.   Standards of Medical Care in Diabetes-2016.  For the purpose of screening for the presence of diabetes:  <5.7%     Consistent with the absence of diabetes  5.7-6.4%  Consistent with increasing risk for diabetes   (prediabetes)  >or=6.5%  Consistent with diabetes  Currently, no consensus exists for use of hemoglobin A1c  for diagnosis of diabetes for children.  This Hemoglobin A1c assay has significant interference with fetal   hemoglobin   (HbF). The results are invalid for patients with abnormal amounts of   HbF,   including those with known Hereditary Persistence   of Fetal Hemoglobin. Heterozygous hemoglobin variants (HbAS, HbAC,   HbAD, HbAE, HbA2) do not significantly interfere with this assay;   however, presence of multiple variants in a sample may impact the %   interference.       Hemoglobin   Date Value Ref  Range Status   07/20/2022 14.3 12.0 - 16.0 g/dL Final   12/13/2021 12.9 12.0 - 16.0 g/dL Final     POC Hematocrit   Date Value Ref Range Status   07/20/2022 43 36 - 54 %PCV Final   03/30/2021 42 36 - 54 %PCV Final     Hematocrit   Date Value Ref Range Status   07/20/2022 41.7 37.0 - 48.5 % Final   12/13/2021 39.9 37.0 - 48.5 % Final     Vit D, 25-Hydroxy   Date Value Ref Range Status   09/02/2017 15 (L) 30 - 96 ng/mL Final     Comment:     Vitamin D deficiency.........<10 ng/mL                              Vitamin D insufficiency......10-29 ng/mL       Vitamin D sufficiency........> or equal to 30 ng/mL  Vitamin D toxicity............>100 ng/mL     12/06/2016 19 (L) 30 - 96 ng/mL Final     Comment:     Vitamin D deficiency.........<10 ng/mL                              Vitamin D insufficiency......10-29 ng/mL       Vitamin D sufficiency........> or equal to 30 ng/mL  Vitamin D toxicity............>100 ng/mL         I have reviewed the following:     Details / Date    [x]   Labs     []   Micro     []   Pathology     []   Imaging     []   Cardiology Procedures     [x]   Other  Patient's Medical and Surgical History        Assessment and Plan:     1. Mixed hyperlipidemia    Chronic, stable, continue current medication     - Lipid Panel; Future  - atorvastatin (LIPITOR) 40 MG tablet; Take 1 tablet (40 mg total) by mouth once daily.  Dispense: 90 tablet; Refill: 3    2. Essential hypertension    Chronic, stable     3. History of MI (myocardial infarction)    4. Current mild episode of major depressive disorder, unspecified whether recurrent    Chronic, stable    5. Osteoarthritis of spine with radiculopathy, cervical region  6. Primary osteoarthritis of both knees    Chronic, stable, continue current medications     - HYDROcodone-acetaminophen (NORCO) 5-325 mg per tablet; Take 1 tablet by mouth every 8 (eight) hours as needed for Pain.  Dispense: 40 tablet; Refill: 0

## 2023-03-21 ENCOUNTER — OFFICE VISIT (OUTPATIENT)
Dept: INTERNAL MEDICINE | Facility: CLINIC | Age: 78
End: 2023-03-21
Payer: MEDICARE

## 2023-03-21 ENCOUNTER — LAB VISIT (OUTPATIENT)
Dept: LAB | Facility: HOSPITAL | Age: 78
End: 2023-03-21
Attending: INTERNAL MEDICINE
Payer: MEDICARE

## 2023-03-21 VITALS
WEIGHT: 171.94 LBS | SYSTOLIC BLOOD PRESSURE: 120 MMHG | HEIGHT: 64 IN | HEART RATE: 99 BPM | DIASTOLIC BLOOD PRESSURE: 84 MMHG | TEMPERATURE: 98 F | BODY MASS INDEX: 29.35 KG/M2 | OXYGEN SATURATION: 97 % | RESPIRATION RATE: 20 BRPM

## 2023-03-21 DIAGNOSIS — E78.5 HYPERLIPIDEMIA, UNSPECIFIED HYPERLIPIDEMIA TYPE: ICD-10-CM

## 2023-03-21 DIAGNOSIS — I25.2 HISTORY OF MI (MYOCARDIAL INFARCTION): ICD-10-CM

## 2023-03-21 DIAGNOSIS — M47.22 OSTEOARTHRITIS OF SPINE WITH RADICULOPATHY, CERVICAL REGION: ICD-10-CM

## 2023-03-21 DIAGNOSIS — E78.2 MIXED HYPERLIPIDEMIA: ICD-10-CM

## 2023-03-21 DIAGNOSIS — E78.2 MIXED HYPERLIPIDEMIA: Primary | ICD-10-CM

## 2023-03-21 DIAGNOSIS — M17.0 PRIMARY OSTEOARTHRITIS OF BOTH KNEES: ICD-10-CM

## 2023-03-21 DIAGNOSIS — F32.0 CURRENT MILD EPISODE OF MAJOR DEPRESSIVE DISORDER, UNSPECIFIED WHETHER RECURRENT: ICD-10-CM

## 2023-03-21 DIAGNOSIS — I10 ESSENTIAL HYPERTENSION: Chronic | ICD-10-CM

## 2023-03-21 LAB
ALBUMIN SERPL BCP-MCNC: 3.8 G/DL (ref 3.5–5.2)
ALP SERPL-CCNC: 84 U/L (ref 55–135)
ALT SERPL W/O P-5'-P-CCNC: 11 U/L (ref 10–44)
ANION GAP SERPL CALC-SCNC: 6 MMOL/L (ref 8–16)
AST SERPL-CCNC: 17 U/L (ref 10–40)
BILIRUB SERPL-MCNC: 1.1 MG/DL (ref 0.1–1)
BUN SERPL-MCNC: 18 MG/DL (ref 8–23)
CALCIUM SERPL-MCNC: 9.7 MG/DL (ref 8.7–10.5)
CHLORIDE SERPL-SCNC: 108 MMOL/L (ref 95–110)
CHOLEST SERPL-MCNC: 221 MG/DL (ref 120–199)
CHOLEST/HDLC SERPL: 4.2 {RATIO} (ref 2–5)
CO2 SERPL-SCNC: 27 MMOL/L (ref 23–29)
CREAT SERPL-MCNC: 1 MG/DL (ref 0.5–1.4)
EST. GFR  (NO RACE VARIABLE): 57.7 ML/MIN/1.73 M^2
GLUCOSE SERPL-MCNC: 99 MG/DL (ref 70–110)
HDLC SERPL-MCNC: 53 MG/DL (ref 40–75)
HDLC SERPL: 24 % (ref 20–50)
LDLC SERPL CALC-MCNC: 146 MG/DL (ref 63–159)
NONHDLC SERPL-MCNC: 168 MG/DL
POTASSIUM SERPL-SCNC: 4.2 MMOL/L (ref 3.5–5.1)
PROT SERPL-MCNC: 7.2 G/DL (ref 6–8.4)
SODIUM SERPL-SCNC: 141 MMOL/L (ref 136–145)
TRIGL SERPL-MCNC: 110 MG/DL (ref 30–150)

## 2023-03-21 PROCEDURE — 3288F FALL RISK ASSESSMENT DOCD: CPT | Mod: CPTII,S$GLB,, | Performed by: NURSE PRACTITIONER

## 2023-03-21 PROCEDURE — 1101F PR PT FALLS ASSESS DOC 0-1 FALLS W/OUT INJ PAST YR: ICD-10-PCS | Mod: CPTII,S$GLB,, | Performed by: NURSE PRACTITIONER

## 2023-03-21 PROCEDURE — 80053 COMPREHEN METABOLIC PANEL: CPT | Performed by: INTERNAL MEDICINE

## 2023-03-21 PROCEDURE — 36415 COLL VENOUS BLD VENIPUNCTURE: CPT | Mod: PO | Performed by: INTERNAL MEDICINE

## 2023-03-21 PROCEDURE — 1126F AMNT PAIN NOTED NONE PRSNT: CPT | Mod: CPTII,S$GLB,, | Performed by: NURSE PRACTITIONER

## 2023-03-21 PROCEDURE — 1160F PR REVIEW ALL MEDS BY PRESCRIBER/CLIN PHARMACIST DOCUMENTED: ICD-10-PCS | Mod: CPTII,S$GLB,, | Performed by: NURSE PRACTITIONER

## 2023-03-21 PROCEDURE — 1159F MED LIST DOCD IN RCRD: CPT | Mod: CPTII,S$GLB,, | Performed by: NURSE PRACTITIONER

## 2023-03-21 PROCEDURE — 3079F DIAST BP 80-89 MM HG: CPT | Mod: CPTII,S$GLB,, | Performed by: NURSE PRACTITIONER

## 2023-03-21 PROCEDURE — 99999 PR PBB SHADOW E&M-EST. PATIENT-LVL III: CPT | Mod: PBBFAC,,, | Performed by: NURSE PRACTITIONER

## 2023-03-21 PROCEDURE — 80061 LIPID PANEL: CPT | Performed by: NURSE PRACTITIONER

## 2023-03-21 PROCEDURE — 1126F PR PAIN SEVERITY QUANTIFIED, NO PAIN PRESENT: ICD-10-PCS | Mod: CPTII,S$GLB,, | Performed by: NURSE PRACTITIONER

## 2023-03-21 PROCEDURE — 3074F SYST BP LT 130 MM HG: CPT | Mod: CPTII,S$GLB,, | Performed by: NURSE PRACTITIONER

## 2023-03-21 PROCEDURE — 3079F PR MOST RECENT DIASTOLIC BLOOD PRESSURE 80-89 MM HG: ICD-10-PCS | Mod: CPTII,S$GLB,, | Performed by: NURSE PRACTITIONER

## 2023-03-21 PROCEDURE — 1101F PT FALLS ASSESS-DOCD LE1/YR: CPT | Mod: CPTII,S$GLB,, | Performed by: NURSE PRACTITIONER

## 2023-03-21 PROCEDURE — 1159F PR MEDICATION LIST DOCUMENTED IN MEDICAL RECORD: ICD-10-PCS | Mod: CPTII,S$GLB,, | Performed by: NURSE PRACTITIONER

## 2023-03-21 PROCEDURE — 1160F RVW MEDS BY RX/DR IN RCRD: CPT | Mod: CPTII,S$GLB,, | Performed by: NURSE PRACTITIONER

## 2023-03-21 PROCEDURE — 99999 PR PBB SHADOW E&M-EST. PATIENT-LVL III: ICD-10-PCS | Mod: PBBFAC,,, | Performed by: NURSE PRACTITIONER

## 2023-03-21 PROCEDURE — 3288F PR FALLS RISK ASSESSMENT DOCUMENTED: ICD-10-PCS | Mod: CPTII,S$GLB,, | Performed by: NURSE PRACTITIONER

## 2023-03-21 PROCEDURE — 99214 PR OFFICE/OUTPT VISIT, EST, LEVL IV, 30-39 MIN: ICD-10-PCS | Mod: S$GLB,,, | Performed by: NURSE PRACTITIONER

## 2023-03-21 PROCEDURE — 99214 OFFICE O/P EST MOD 30 MIN: CPT | Mod: S$GLB,,, | Performed by: NURSE PRACTITIONER

## 2023-03-21 PROCEDURE — 3074F PR MOST RECENT SYSTOLIC BLOOD PRESSURE < 130 MM HG: ICD-10-PCS | Mod: CPTII,S$GLB,, | Performed by: NURSE PRACTITIONER

## 2023-03-21 RX ORDER — HYDROCODONE BITARTRATE AND ACETAMINOPHEN 5; 325 MG/1; MG/1
1 TABLET ORAL EVERY 8 HOURS PRN
Qty: 40 TABLET | Refills: 0 | Status: SHIPPED | OUTPATIENT
Start: 2023-03-21 | End: 2023-04-20 | Stop reason: SDUPTHER

## 2023-03-21 RX ORDER — ATORVASTATIN CALCIUM 40 MG/1
40 TABLET, FILM COATED ORAL DAILY
Qty: 90 TABLET | Refills: 3 | Status: SHIPPED | OUTPATIENT
Start: 2023-03-21 | End: 2024-03-20

## 2023-03-22 PROBLEM — I20.1 CORONARY VASOSPASM: Status: RESOLVED | Noted: 2019-10-31 | Resolved: 2023-03-22

## 2023-03-22 PROBLEM — I25.2 HISTORY OF MI (MYOCARDIAL INFARCTION): Status: ACTIVE | Noted: 2017-05-13

## 2023-03-22 PROBLEM — R45.851 PASSIVE SUICIDAL IDEATIONS: Status: RESOLVED | Noted: 2019-10-29 | Resolved: 2023-03-22

## 2023-03-22 PROBLEM — G40.909 SEIZURE DISORDER: Status: RESOLVED | Noted: 2018-04-26 | Resolved: 2023-03-22

## 2023-03-22 PROBLEM — R79.89 POSITIVE D DIMER: Status: RESOLVED | Noted: 2019-03-23 | Resolved: 2023-03-22

## 2023-03-22 PROBLEM — R79.89 ELEVATED TROPONIN: Status: RESOLVED | Noted: 2017-06-22 | Resolved: 2023-03-22

## 2023-03-23 ENCOUNTER — TELEPHONE (OUTPATIENT)
Dept: INTERNAL MEDICINE | Facility: CLINIC | Age: 78
End: 2023-03-23
Payer: MEDICARE

## 2023-03-23 NOTE — TELEPHONE ENCOUNTER
Pt requesting refill on:  zaleplonDiscontinued 10/20/2022  Discontinued - zaleplon (SONATA) 5 MG Cap  Take 1 capsule (5 mg total) by mouth nightly., Starting Fri 7/1/2022, Until Thu 10/20/2022, Normal  Patient not taking: Reported on 10/20/2022   Dispense: 30 capsule   Refills: 3 ordered   Pharmacy: [facility] #[id number] [place] [address] [address]  AT [facility] [address] (Ph: [phone])   LOV:03/21/2023  RTC:not on file

## 2023-03-23 NOTE — TELEPHONE ENCOUNTER
----- Message from Divina Herron sent at 3/23/2023  9:24 AM CDT -----  Contact: [phone]  Pt is requesting a refill on her Zaleplon 10 mg. Pt is using   [id number]  [address] [facility] [address]  AT [facility] [address]  [address] [place] [address]  Phone: [phone] Fax: [phone]          Thank you

## 2023-03-27 RX ORDER — ZALEPLON 10 MG/1
10 CAPSULE ORAL NIGHTLY PRN
Qty: 30 CAPSULE | Refills: 3 | Status: SHIPPED | OUTPATIENT
Start: 2023-03-27 | End: 2023-07-21

## 2023-03-27 NOTE — TELEPHONE ENCOUNTER
----- Message from Yessi Patel MA sent at 3/24/2023  4:45 PM CDT -----  Contact: Mom @ [phone]  The patient calling to check on the status of message from yesterday for prescription to be sent to the pharmacy. Please give the patient a call back at [phone].

## 2023-03-27 NOTE — TELEPHONE ENCOUNTER
Pt c/o she is having trouble sleeping and request med that was prescribed in the past.    Discontinued - zaleplon (SONATA) 5 MG Cap      Take 1 capsule (5 mg total) by mouth nightly., Starting Fri 7/1/2022, Until Thu 10/20/2022, Normal  Patient not taking: Reported on 10/20/2022   Dispense: 30 capsule   Refills: 3 ordered   Pharmacy: [facility] #[id number] [place] [address] [address]  AT [facility] OF [facility] & [address] (Ph: [phone])

## 2023-04-18 ENCOUNTER — PES CALL (OUTPATIENT)
Dept: ADMINISTRATIVE | Facility: CLINIC | Age: 78
End: 2023-04-18
Payer: MEDICARE

## 2023-04-20 DIAGNOSIS — M47.22 OSTEOARTHRITIS OF SPINE WITH RADICULOPATHY, CERVICAL REGION: ICD-10-CM

## 2023-04-20 DIAGNOSIS — M17.0 PRIMARY OSTEOARTHRITIS OF BOTH KNEES: ICD-10-CM

## 2023-04-20 RX ORDER — HYDROCODONE BITARTRATE AND ACETAMINOPHEN 5; 325 MG/1; MG/1
1 TABLET ORAL EVERY 8 HOURS PRN
Qty: 40 TABLET | Refills: 0 | Status: SHIPPED | OUTPATIENT
Start: 2023-04-20 | End: 2023-05-24 | Stop reason: SDUPTHER

## 2023-04-20 NOTE — TELEPHONE ENCOUNTER
----- Message from Daisy Woods sent at 4/20/2023  1:25 PM CDT -----  Contact: Sofia Lynn   Requesting an RX refill or new RX.  Is this a refill or new RX: refill   RX name and strength (copy/paste from chart): Hydrocodone    Is this a 30 day or 90 day RX:   Pharmacy name and phone # (copy/paste from chart):  [facility] [address]   The doctors have asked that we provide their patients with the following 2 reminders -- prescription refills can take up to 72 hours, and a friendly reminder that in the future you can use your [facility] account to request refills:

## 2023-05-09 DIAGNOSIS — M25.562 ACUTE PAIN OF BOTH KNEES: ICD-10-CM

## 2023-05-09 DIAGNOSIS — M25.561 ACUTE PAIN OF BOTH KNEES: ICD-10-CM

## 2023-05-09 DIAGNOSIS — M17.0 BILATERAL PRIMARY OSTEOARTHRITIS OF KNEE: ICD-10-CM

## 2023-05-09 RX ORDER — CELECOXIB 200 MG/1
CAPSULE ORAL
Qty: 60 CAPSULE | Refills: 1 | Status: SHIPPED | OUTPATIENT
Start: 2023-05-09 | End: 2023-08-28 | Stop reason: SDUPTHER

## 2023-05-23 ENCOUNTER — NURSE TRIAGE (OUTPATIENT)
Dept: ADMINISTRATIVE | Facility: CLINIC | Age: 78
End: 2023-05-23
Payer: MEDICARE

## 2023-05-23 NOTE — TELEPHONE ENCOUNTER
"Spoke with patient she is wanting Norco refilled.  Patient would like prescription sent to [facility] on [address].  Patient was informed that message would be sent to office.  Stats she is having knee pain. Patient was then triaged related to her current symptoms advised that she been seen within 2 weeks per protocol.  Patient verbalized understanding.   Patient did not want to make an appointment at this time.   Reason for Disposition   Caller requesting a CONTROLLED substance prescription refill (e.g., narcotics, ADHD medicines)   [1] MILD pain (e.g., does not interfere with normal activities) AND [2] present > 7 days    Additional Information   Negative: Sounds like a life-threatening emergency to the triager   Negative: [1] Swollen joint AND [2] fever   Negative: [1] Red area or streak AND [2] fever   Negative: Patient sounds very sick or weak to the triager   Negative: [1] SEVERE pain (e.g., excruciating, unable to walk) AND [2] not improved after 2 hours of pain medicine   Negative: [1] Can't move swollen joint at all AND [2] no fever   Negative: [1] Thigh or calf pain AND [2] only 1 side AND [3] present > 1 hour   Negative: [1] Thigh, calf, or ankle swelling AND [2] only 1 side   Negative: [1] Looks infected (spreading redness, pus) AND [2] large red area (> 2 in. or 5 cm)   Negative: [1] Very swollen joint AND [2] no fever   Negative: Blistering rash in area of pain (i.e., dermatomal distribution or "band" or "stripe")   Negative: Looks like a boil, infected sore, or deep ulcer   Negative: [1] Redness of the skin AND [2] no fever   Negative: [1] MODERATE pain (e.g., interferes with normal activities, limping) AND [2] present > 3 days   Negative: [1] Swollen joint AND [2] no fever or redness   Negative: [1] Fluid-filled sack just below knee cap (i.e., inferior aspect of the anterior knee) AND [2] no fever or redness    Protocols used: Medication Refill and Renewal Call-A-, Knee Pain-A-    "

## 2023-05-24 DIAGNOSIS — M17.0 PRIMARY OSTEOARTHRITIS OF BOTH KNEES: ICD-10-CM

## 2023-05-24 DIAGNOSIS — M47.22 OSTEOARTHRITIS OF SPINE WITH RADICULOPATHY, CERVICAL REGION: ICD-10-CM

## 2023-05-24 RX ORDER — HYDROCODONE BITARTRATE AND ACETAMINOPHEN 5; 325 MG/1; MG/1
1 TABLET ORAL EVERY 8 HOURS PRN
Qty: 40 TABLET | Refills: 0 | Status: SHIPPED | OUTPATIENT
Start: 2023-05-24 | End: 2023-06-20 | Stop reason: SDUPTHER

## 2023-05-26 ENCOUNTER — TELEPHONE (OUTPATIENT)
Dept: INTERNAL MEDICINE | Facility: CLINIC | Age: 78
End: 2023-05-26

## 2023-05-26 ENCOUNTER — PES CALL (OUTPATIENT)
Dept: ADMINISTRATIVE | Facility: CLINIC | Age: 78
End: 2023-05-26
Payer: MEDICARE

## 2023-05-26 ENCOUNTER — TELEPHONE (OUTPATIENT)
Dept: INTERNAL MEDICINE | Facility: CLINIC | Age: 78
End: 2023-05-26
Payer: MEDICARE

## 2023-05-26 NOTE — TELEPHONE ENCOUNTER
----- Message from Kaye Valencia sent at 5/26/2023  2:48 PM CDT -----  Contact: PT/[phone]  Type:  Patient Returning Call    Who Called: Pt   Who Left Message for Patient: Wasn't  sure   Does the patient know what this is regarding?: Regarding wellness  visit   Would the patient rather a call back or a response via [facility]? Call   Best Call Back Number:[phone]  Additional Information: please  call pt to schedule  wellness visit

## 2023-06-09 ENCOUNTER — TELEPHONE (OUTPATIENT)
Dept: INTERNAL MEDICINE | Facility: CLINIC | Age: 78
End: 2023-06-09
Payer: MEDICARE

## 2023-06-09 NOTE — TELEPHONE ENCOUNTER
----- Message from Nereyda Chen sent at 6/9/2023  2:39 PM CDT -----  Contact: [phone]  Pt is calling she states she is needing something for diarrhea please give return call she states they all ate at [facility] and they are all sick please advise

## 2023-06-09 NOTE — TELEPHONE ENCOUNTER
Called patient and advised her to eat a bland diet and hydrate with electrolyte replacement drinks.  Advised patient that I do not recommend taking any prescription medication at this point.  Patient verbalized understanding.  She reports her symptoms are improving.  She denies any fever or blood in her stool.

## 2023-06-09 NOTE — TELEPHONE ENCOUNTER
Spoke to pt and she c/o of eating [employer] and has since been having diarrhea- 1day, she said she has not tried OTC med, but is requesting rx to be sent to [facility]

## 2023-06-20 DIAGNOSIS — M47.22 OSTEOARTHRITIS OF SPINE WITH RADICULOPATHY, CERVICAL REGION: ICD-10-CM

## 2023-06-20 DIAGNOSIS — M17.0 PRIMARY OSTEOARTHRITIS OF BOTH KNEES: ICD-10-CM

## 2023-06-20 RX ORDER — HYDROCODONE BITARTRATE AND ACETAMINOPHEN 5; 325 MG/1; MG/1
1 TABLET ORAL EVERY 8 HOURS PRN
Qty: 40 TABLET | Refills: 0 | Status: SHIPPED | OUTPATIENT
Start: 2023-06-20 | End: 2023-07-21 | Stop reason: SDUPTHER

## 2023-06-20 NOTE — TELEPHONE ENCOUNTER
----- Message from Patricia Taveras sent at 6/20/2023 10:40 AM CDT -----  Contact: [phone]  Requesting an RX refill or new RX.  Is this a refill or new RX: refill  RX name and strength (copy/paste from chart):  HYDROcodone-acetaminophen (NORCO) 5-325 mg per tablet  Is this a 30 day or 90 day RX: 30  Pharmacy name and phone # (copy/paste from chart):    [facility] #[id number] - [place] - [facility]  AT [facility] [address]  [address] [place] [address]  Phone: [phone] Fax: [phone]  The doctors have asked that we provide their patients with the following 2 reminders -- prescription refills can take up to 72 hours, and a friendly reminder that in the future you can use your [facility] account to request refills: yes

## 2023-06-27 ENCOUNTER — PES CALL (OUTPATIENT)
Dept: ADMINISTRATIVE | Facility: CLINIC | Age: 78
End: 2023-06-27
Payer: MEDICARE

## 2023-06-30 ENCOUNTER — OFFICE VISIT (OUTPATIENT)
Dept: INTERNAL MEDICINE | Facility: CLINIC | Age: 78
End: 2023-06-30
Payer: MEDICARE

## 2023-06-30 VITALS
BODY MASS INDEX: 29.96 KG/M2 | SYSTOLIC BLOOD PRESSURE: 116 MMHG | OXYGEN SATURATION: 98 % | HEIGHT: 64 IN | DIASTOLIC BLOOD PRESSURE: 70 MMHG | WEIGHT: 175.5 LBS | HEART RATE: 88 BPM

## 2023-06-30 DIAGNOSIS — D69.2 SENILE PURPURA: ICD-10-CM

## 2023-06-30 DIAGNOSIS — M15.9 OSTEOARTHRITIS OF MULTIPLE JOINTS, UNSPECIFIED OSTEOARTHRITIS TYPE: ICD-10-CM

## 2023-06-30 DIAGNOSIS — M54.50 CHRONIC BILATERAL LOW BACK PAIN WITHOUT SCIATICA: ICD-10-CM

## 2023-06-30 DIAGNOSIS — Z59.41 FOOD INSECURITY: ICD-10-CM

## 2023-06-30 DIAGNOSIS — N18.31 STAGE 3A CHRONIC KIDNEY DISEASE: ICD-10-CM

## 2023-06-30 DIAGNOSIS — F32.0 CURRENT MILD EPISODE OF MAJOR DEPRESSIVE DISORDER, UNSPECIFIED WHETHER RECURRENT: ICD-10-CM

## 2023-06-30 DIAGNOSIS — Z00.00 ENCOUNTER FOR PREVENTIVE HEALTH EXAMINATION: Primary | ICD-10-CM

## 2023-06-30 DIAGNOSIS — J84.10 CALCIFIED GRANULOMA OF LUNG: ICD-10-CM

## 2023-06-30 DIAGNOSIS — I10 ESSENTIAL HYPERTENSION: Chronic | ICD-10-CM

## 2023-06-30 DIAGNOSIS — Z59.9 FINANCIAL DIFFICULTIES: ICD-10-CM

## 2023-06-30 DIAGNOSIS — M47.22 OSTEOARTHRITIS OF SPINE WITH RADICULOPATHY, CERVICAL REGION: Chronic | ICD-10-CM

## 2023-06-30 DIAGNOSIS — G89.29 CHRONIC BILATERAL LOW BACK PAIN WITHOUT SCIATICA: ICD-10-CM

## 2023-06-30 DIAGNOSIS — E78.2 MIXED HYPERLIPIDEMIA: ICD-10-CM

## 2023-06-30 DIAGNOSIS — R26.9 ABNORMALITY OF GAIT AND MOBILITY: ICD-10-CM

## 2023-06-30 DIAGNOSIS — M47.812 SPONDYLOSIS OF CERVICAL SPINE: Chronic | ICD-10-CM

## 2023-06-30 DIAGNOSIS — D50.9 IRON DEFICIENCY ANEMIA, UNSPECIFIED IRON DEFICIENCY ANEMIA TYPE: ICD-10-CM

## 2023-06-30 PROCEDURE — 3288F FALL RISK ASSESSMENT DOCD: CPT | Mod: CPTII,S$GLB,, | Performed by: NURSE PRACTITIONER

## 2023-06-30 PROCEDURE — G0439 PR MEDICARE ANNUAL WELLNESS SUBSEQUENT VISIT: ICD-10-PCS | Mod: S$GLB,,, | Performed by: NURSE PRACTITIONER

## 2023-06-30 PROCEDURE — 1126F AMNT PAIN NOTED NONE PRSNT: CPT | Mod: CPTII,S$GLB,, | Performed by: NURSE PRACTITIONER

## 2023-06-30 PROCEDURE — 1101F PT FALLS ASSESS-DOCD LE1/YR: CPT | Mod: CPTII,S$GLB,, | Performed by: NURSE PRACTITIONER

## 2023-06-30 PROCEDURE — 1170F FXNL STATUS ASSESSED: CPT | Mod: CPTII,S$GLB,, | Performed by: NURSE PRACTITIONER

## 2023-06-30 PROCEDURE — 1126F PR PAIN SEVERITY QUANTIFIED, NO PAIN PRESENT: ICD-10-PCS | Mod: CPTII,S$GLB,, | Performed by: NURSE PRACTITIONER

## 2023-06-30 PROCEDURE — 1159F PR MEDICATION LIST DOCUMENTED IN MEDICAL RECORD: ICD-10-PCS | Mod: CPTII,S$GLB,, | Performed by: NURSE PRACTITIONER

## 2023-06-30 PROCEDURE — 3074F PR MOST RECENT SYSTOLIC BLOOD PRESSURE < 130 MM HG: ICD-10-PCS | Mod: CPTII,S$GLB,, | Performed by: NURSE PRACTITIONER

## 2023-06-30 PROCEDURE — 1159F MED LIST DOCD IN RCRD: CPT | Mod: CPTII,S$GLB,, | Performed by: NURSE PRACTITIONER

## 2023-06-30 PROCEDURE — 1160F RVW MEDS BY RX/DR IN RCRD: CPT | Mod: CPTII,S$GLB,, | Performed by: NURSE PRACTITIONER

## 2023-06-30 PROCEDURE — 3074F SYST BP LT 130 MM HG: CPT | Mod: CPTII,S$GLB,, | Performed by: NURSE PRACTITIONER

## 2023-06-30 PROCEDURE — 99999 PR PBB SHADOW E&M-EST. PATIENT-LVL IV: CPT | Mod: PBBFAC,,, | Performed by: NURSE PRACTITIONER

## 2023-06-30 PROCEDURE — 99999 PR PBB SHADOW E&M-EST. PATIENT-LVL IV: ICD-10-PCS | Mod: PBBFAC,,, | Performed by: NURSE PRACTITIONER

## 2023-06-30 PROCEDURE — 3078F DIAST BP <80 MM HG: CPT | Mod: CPTII,S$GLB,, | Performed by: NURSE PRACTITIONER

## 2023-06-30 PROCEDURE — 3288F PR FALLS RISK ASSESSMENT DOCUMENTED: ICD-10-PCS | Mod: CPTII,S$GLB,, | Performed by: NURSE PRACTITIONER

## 2023-06-30 PROCEDURE — 1101F PR PT FALLS ASSESS DOC 0-1 FALLS W/OUT INJ PAST YR: ICD-10-PCS | Mod: CPTII,S$GLB,, | Performed by: NURSE PRACTITIONER

## 2023-06-30 PROCEDURE — G0439 PPPS, SUBSEQ VISIT: HCPCS | Mod: S$GLB,,, | Performed by: NURSE PRACTITIONER

## 2023-06-30 PROCEDURE — 1170F PR FUNCTIONAL STATUS ASSESSED: ICD-10-PCS | Mod: CPTII,S$GLB,, | Performed by: NURSE PRACTITIONER

## 2023-06-30 PROCEDURE — 3078F PR MOST RECENT DIASTOLIC BLOOD PRESSURE < 80 MM HG: ICD-10-PCS | Mod: CPTII,S$GLB,, | Performed by: NURSE PRACTITIONER

## 2023-06-30 PROCEDURE — 1160F PR REVIEW ALL MEDS BY PRESCRIBER/CLIN PHARMACIST DOCUMENTED: ICD-10-PCS | Mod: CPTII,S$GLB,, | Performed by: NURSE PRACTITIONER

## 2023-06-30 SDOH — SOCIAL DETERMINANTS OF HEALTH (SDOH): PROBLEM RELATED TO HOUSING AND ECONOMIC CIRCUMSTANCES, UNSPECIFIED: Z59.9

## 2023-06-30 SDOH — SOCIAL DETERMINANTS OF HEALTH (SDOH): FOOD INSECURITY: Z59.41

## 2023-06-30 NOTE — PROGRESS NOTES
"Sofia Augustine presented for a  Medicare AWV and comprehensive Health Risk Assessment today. The following components were reviewed and updated:    Medical history  Family History  Social history  Allergies and Current Medications  Health Risk Assessment  Health Maintenance  Care Team         ** See Completed Assessments for Annual Wellness Visit within the encounter summary.**         The following assessments were completed:  Living Situation  CAGE  Depression Screening  Timed Get Up and Go  Whisper Test - abnormal, declines audiology referral at this time.  Cognitive Function Screening    Nutrition Screening  ADL Screening  PAQ Screening  Review for Opioid Screening: Pt has rx for hydrocodone-acetaminophen. Pain level assessed and reviewed. Patient provided with non-opioid treatment options for relief of knee and back pain; hydrocodone used only when absolutely necessary. Reviewed potential opioid use disorder risk factors. Patient instructed to follow up with PCP and/or Pain Medicine.  Review for Substance Use Disorders: Patient does not use substances.        Vitals:    06/30/23 1219   BP: 116/70   BP Location: Left arm   Pulse: 88   SpO2: 98%   Weight: 79.6 kg (175 lb 7.8 oz)   Height: 5' 4" (1.626 m)     Body mass index is 30.12 kg/m².    Physical Exam  Vitals reviewed.   Constitutional:       Appearance: Normal appearance.   HENT:      Head: Normocephalic.   Cardiovascular:      Rate and Rhythm: Normal rate.   Pulmonary:      Effort: Pulmonary effort is normal.   Abdominal:      General: Bowel sounds are normal.   Musculoskeletal:      Right lower leg: Edema present.      Left lower leg: Edema present.      Comments: Using crutch for ambulation.   Skin:     General: Skin is warm and dry.      Capillary Refill: Capillary refill takes less than 2 seconds.      Comments: Bruising to left arm.   Neurological:      Mental Status: She is alert and oriented to person, place, and time.   Psychiatric:         " Behavior: Behavior normal.         Thought Content: Thought content normal.         Judgment: Judgment normal.             Diagnoses and health risks identified today and associated recommendations/orders:    1. Encounter for preventive health examination  Assessments completed.  HM recommendations reviewed. Discussed shingles vaccines. Drug screen and pain contract per prescribing provider.   F/u with PCP as instructed.    2. Stage 3a chronic kidney disease  Chronic, stable on current regimen. Followed by PCP.    3. Current mild episode of major depressive disorder, unspecified whether recurrent  Chronic, stable on current regimen. Followed by PCP.     4. Calcified granuloma of lung  Chronic, stable on current regimen. Followed by PCP. Stable on imaging.    5. Essential hypertension  Chronic, stable on current regimen. Followed by PCP.    6. Mixed hyperlipidemia  Chronic, stable on current regimen. Followed by PCP.    7. Iron deficiency anemia, unspecified iron deficiency anemia type  Chronic, stable on current regimen. Followed by PCP.    8. Chronic bilateral low back pain without sciatica  Chronic, stable on current regimen. Followed by PCP.     9. Osteoarthritis of spine with radiculopathy, cervical region  Chronic, stable on current regimen. Followed by PCP.    10. Spondylosis of cervical spine  Chronic, stable on current regimen. Followed by PCP.    11. Osteoarthritis of multiple joints, unspecified osteoarthritis type  Chronic, stable on current regimen. Followed by PCP..    12. Abnormality of gait and mobility  Chronic, stable. No recent falls. Using crutch for ambulation. Followed by PCP.    13. Food insecurity  Chronic, stable. Has meals on wheels but has to stretch it. Referred to outpatient case management for resources. Followed by PCP.   - Ambulatory referral/consult to Outpatient Case Management    14. Financial difficulties  Chronic, stable. Referred to outpatient case management for resources.  Followed by PCP.   - Ambulatory referral/consult to Outpatient Case Management      Provided Sofia with a 5-10 year written screening schedule and personal prevention plan. Recommendations were developed using the USPSTF age appropriate recommendations. Education, counseling, and referrals were provided as needed. After Visit Summary printed and given to patient which includes a list of additional screenings\tests needed.    Follow up in about 1 year (around 6/30/2024) for Medicare AWV and with PCP as instructed.       Nan Jacobs NP    I offered to discuss advanced care planning, including how to pick a person who would make decisions for you if you were unable to make them for yourself, called a health care power of , and what kind of decisions you might make such as use of life sustaining treatments such as ventilators and tube feeding when faced with a life limiting illness recorded on a living will that they will need to know. (How you want to be cared for as you near the end of your natural life)     X Patient is interested in learning more about how to make advanced directives.  I provided them paperwork and offered to discuss this with them.

## 2023-06-30 NOTE — PATIENT INSTRUCTIONS
1. Follow up with Dr. Luanne Connell MD as instructed.    2. Shingles vaccine (SHINGRIX) now available to medicare patients at no out of pocket cost. At pharmacy if interested.    3. Listen out for call from case management about housing and food resources.    Counseling and Referral of Other Preventative  (Italic type indicates deductible and co-insurance are waived)    Patient Name: Sofia Augustine  Today's Date: 6/30/2023    Health Maintenance         Date Due Completion Date    Sign Pain Contract Never done ---    DEXA Scan Never done ---    Shingles Vaccine (1 of 2) Never done ---    Urine Drug Screen 10/29/2020 10/29/2019    TETANUS VACCINE 10/20/2023 (Originally 9/27/1963) ---    COVID-19 Vaccine (1) 10/20/2023 (Originally 3/27/1946) ---    Aspirin/Antiplatelet Therapy 03/21/2024 3/21/2023    Lipid Panel 03/21/2028 3/21/2023          Orders Placed This Encounter   Procedures    Ambulatory referral/consult to Outpatient Case Management     The following information is provided to all patients.  This information is to help you find resources for any of the problems found today that may be affecting your health:                Living healthy guide: www.[facility].[place].gov      Understanding Diabetes: www.diabetes.org      Eating healthy: www.cdc.gov/healthyweight      CDC home safety checklist: www.cdc.gov/steadi/patient.html      Agency on Aging: www.goea.[place].[facility]      Alcoholics anonymous (AA): www.aa.org      Physical Activity: www.eva.nih.gov/ah4hurk      Tobacco use: www.quitwithusla.org

## 2023-07-21 DIAGNOSIS — M17.0 PRIMARY OSTEOARTHRITIS OF BOTH KNEES: ICD-10-CM

## 2023-07-21 DIAGNOSIS — M47.22 OSTEOARTHRITIS OF SPINE WITH RADICULOPATHY, CERVICAL REGION: ICD-10-CM

## 2023-07-21 RX ORDER — ZALEPLON 10 MG/1
10 CAPSULE ORAL NIGHTLY PRN
COMMUNITY
Start: 2023-07-01 | End: 2023-12-06

## 2023-07-21 RX ORDER — ZALEPLON 10 MG/1
CAPSULE ORAL
Qty: 30 CAPSULE | Refills: 3 | Status: SHIPPED | OUTPATIENT
Start: 2023-07-21 | End: 2023-12-06 | Stop reason: SDUPTHER

## 2023-07-21 RX ORDER — HYDROCODONE BITARTRATE AND ACETAMINOPHEN 5; 325 MG/1; MG/1
1 TABLET ORAL EVERY 8 HOURS PRN
Qty: 40 TABLET | Refills: 0 | Status: SHIPPED | OUTPATIENT
Start: 2023-07-21 | End: 2023-08-31 | Stop reason: SDUPTHER

## 2023-07-21 NOTE — TELEPHONE ENCOUNTER
----- Message from Radha Hercules sent at 7/21/2023  1:20 PM CDT -----  Contact: Self/[phone]  Requesting an RX refill or new RX.  Is this a refill or new RX: New  RX name and strength :  HYDROcodone-acetaminophen (NORCO) 5-325 mg per tablet  Is this a 30 day or 90 day RX: 30  Pharmacy name and phone # :  [id number] - [place] [address] - [address]  AT [facility] [address]  [address] [place] [address]  Phone: [phone] Fax: [phone]      The doctors have asked that we provide their patients with the following 2 reminders -- prescription refills can take up to 72 hours, and a friendly reminder that in the future you can use your [facility] account to request refills:

## 2023-07-26 ENCOUNTER — PATIENT OUTREACH (OUTPATIENT)
Dept: ADMINISTRATIVE | Facility: OTHER | Age: 78
End: 2023-07-26
Payer: MEDICARE

## 2023-07-31 NOTE — PROGRESS NOTES
CHW - Outreach Attempt    Anna Marie Armijo Community Health Worker left a voicemail message for 2nd attempt to contact patient regarding: [facility]   Community Health Worker to attempt to contact patient on: August 3, 2023

## 2023-08-03 ENCOUNTER — PATIENT OUTREACH (OUTPATIENT)
Dept: ADMINISTRATIVE | Facility: OTHER | Age: 78
End: 2023-08-03
Payer: MEDICARE

## 2023-08-03 NOTE — PROGRESS NOTES
CHW - Outreach Attempt    Anna Marie Armijo Community Health Worker was unable to a voicemail message for 3rd attempt to contact patient regarding: Community Health     CHW - Unable to Contact    Community Health Worker to close episode at this time due to three missed attempts for patient contact.

## 2023-08-28 DIAGNOSIS — M17.0 BILATERAL PRIMARY OSTEOARTHRITIS OF KNEE: ICD-10-CM

## 2023-08-28 DIAGNOSIS — M25.561 ACUTE PAIN OF BOTH KNEES: ICD-10-CM

## 2023-08-28 DIAGNOSIS — M17.0 PRIMARY OSTEOARTHRITIS OF BOTH KNEES: ICD-10-CM

## 2023-08-28 DIAGNOSIS — M25.562 ACUTE PAIN OF BOTH KNEES: ICD-10-CM

## 2023-08-28 DIAGNOSIS — M47.22 OSTEOARTHRITIS OF SPINE WITH RADICULOPATHY, CERVICAL REGION: ICD-10-CM

## 2023-08-28 RX ORDER — CELECOXIB 200 MG/1
200 CAPSULE ORAL 2 TIMES DAILY
Qty: 60 CAPSULE | Refills: 1 | Status: SHIPPED | OUTPATIENT
Start: 2023-08-28

## 2023-08-28 RX ORDER — HYDROCODONE BITARTRATE AND ACETAMINOPHEN 5; 325 MG/1; MG/1
1 TABLET ORAL EVERY 8 HOURS PRN
Qty: 40 TABLET | Refills: 0 | OUTPATIENT
Start: 2023-08-28

## 2023-08-28 NOTE — TELEPHONE ENCOUNTER
----- Message from Anne Marie Mcdowell sent at 8/28/2023  2:32 PM CDT -----  Contact: [phone] patient  Requesting an RX refill or new RX.HYDROcodone-acetaminophen (NORCO) 5-325 mg per tablet  Is this a refill or new RX: refill  RX name and strength (copy/paste from chart):    Is this a 30 day or 90 day RX:   Pharmacy name and phone # [facility] #[id number] [employer]  AT [facility]  The doctors have asked that we provide their patients with the following 2 reminders -- prescription refills can take up to 72 hours, and a friendly reminder that in the future you can use your [facility] account to request refills:     Requesting an RX refill or new RX.zaleplon (SONATA) 10 MG capsule  Is this a refill or new RX: refill  RX name and strength (copy/paste from chart):    Is this a 30 day or 90 day RX:   Pharmacy name and phone #   [facility] #[id number] [employer]  AT [facility]  The doctors have asked that we provide their patients with the following 2 reminders -- prescription refills can take up to 72 hours, and a friendly reminder that in the future you can use your [facility] account to request refills:     Requesting an RX refill or new RX.celecoxib (CELEBREX) 200 MG capsule  Is this a refill or new RX: refill  RX name and strength (copy/paste from chart):    Is this a 30 day or 90 day RX:   Pharmacy name and phone #  [facility] #[id number] [employer]  AT [facility]  The doctors have asked that we provide their patients with the following 2 reminders -- prescription refills can take up to 72 hours, and a friendly reminder that in the future you can use your [facility] account to request refills:

## 2023-08-28 NOTE — TELEPHONE ENCOUNTER
Care Due:                  Date            Visit Type   Department     Provider  --------------------------------------------------------------------------------                                EP -                              PRIMARY      MET INTERNAL  Last Visit: 03-      CARE ([facility])   MEDICINE       Luanne Connell  Next Visit: None Scheduled  None         None Found                                                            Last  Test          Frequency    Reason                     Performed    Due Date  --------------------------------------------------------------------------------    Office Visit  12 months..  nitroGLYCERIN............  03- 03-    [facility] Embedded Care Due Messages. Reference number: [id number].   8/28/2023 3:20:27 PM CDT

## 2023-08-30 DIAGNOSIS — M47.22 OSTEOARTHRITIS OF SPINE WITH RADICULOPATHY, CERVICAL REGION: ICD-10-CM

## 2023-08-30 DIAGNOSIS — M17.0 PRIMARY OSTEOARTHRITIS OF BOTH KNEES: ICD-10-CM

## 2023-08-30 RX ORDER — HYDROCODONE BITARTRATE AND ACETAMINOPHEN 5; 325 MG/1; MG/1
1 TABLET ORAL EVERY 8 HOURS PRN
Qty: 40 TABLET | Refills: 0 | OUTPATIENT
Start: 2023-08-30

## 2023-08-30 NOTE — TELEPHONE ENCOUNTER
----- Message from Singh Lara sent at 8/30/2023  9:43 AM CDT -----  Contact: Pt [phone]  Requesting an RX refill or new RX.  Is this a refill or new RX:   RX name and strength (copy/paste from chart):  HYDROcodone-acetaminophen (NORCO) 5-325 mg per tablet  Is this a 30 day or 90 day RX:   Pharmacy name and phone # (copy/paste from chart):    The doctors have asked that we provide their patients with the following 2 reminders -- prescription refills can take up to 72 hours, and a friendly reminder that in the future you can use your [facility] account to request refills: yes Requesting an RX refill or new RX.  Is this a refill or new RX:   RX name and strength (copy/paste from chart):  [facility] [id number] [place] [address] [address]  AT [facility] [address]   Phone:  [phone]  Fax:  [phone]      Pt is also requesting a seizure medication that she took before I was not able to find on her chart and she did not know the name of it.

## 2023-08-31 NOTE — TELEPHONE ENCOUNTER
Please inform patient her pain medicine has not been refilled because she needs to schedule an appointment.  It has been 1 year since I have seen the patient in the office and 5 months since she has seen another provider in the office.  She can take the Celebrex that was prescribed on August 28th for pain.

## 2023-08-31 NOTE — TELEPHONE ENCOUNTER
----- Message from Divina Herron sent at 8/31/2023 11:52 AM CDT -----  Contact: [phone]  Requesting an RX refill or new RX.  Is this a refill or new RX: refill   RX name and strength (copy/paste from chart):  HYDROcodone-acetaminophen (NORCO) 5-325 mg per tablet  Is this a 30 day or 90 day RX: 30 day   Pharmacy name and phone # (copy/paste from chart):    [facility] #[id number] - [place] - [address]  AT [facility] [address]  [address] [place] [address]  Phone: [phone] Fax: [phone]    Comments: Per pt, this is her second message in regards to a refill. She called four days ago and is still waiting. Per pt,is there something else she can use for arthritis in her knees. Please call to advise.     The doctors have asked that we provide their patients with the following 2 reminders -- prescription refills can take up to 72 hours, and a friendly reminder that in the future you can use your [facility] account to request refills:    Thank you

## 2023-09-01 ENCOUNTER — TELEPHONE (OUTPATIENT)
Dept: INTERNAL MEDICINE | Facility: CLINIC | Age: 78
End: 2023-09-01
Payer: MEDICARE

## 2023-09-01 RX ORDER — HYDROCODONE BITARTRATE AND ACETAMINOPHEN 5; 325 MG/1; MG/1
1 TABLET ORAL EVERY 8 HOURS PRN
Qty: 40 TABLET | Refills: 0 | Status: SHIPPED | OUTPATIENT
Start: 2023-09-01 | End: 2023-10-10 | Stop reason: SDUPTHER

## 2023-09-01 NOTE — TELEPHONE ENCOUNTER
"Scheduled pt for appt on 09/07/2023 at 2;30 pm with . informed pt about the Celebrex and yet she still kept asking about the norco, I stated" it has been 1 year since I have seen the patient in the office and 5 months since she has seen another provider in the office.  She can take the Celebrex that was prescribed on August 28th for pain."    Pt still request a supply that would last till 09/07/2023 appt    Also a order for forearm crutches   "

## 2023-09-01 NOTE — TELEPHONE ENCOUNTER
----- Message from Gloria Tobias sent at 9/1/2023  1:21 PM CDT -----  Contact: [phone]  Patient would like a call back from Dr Connell's office in regards medications (patient has been advise to scheduled an appointment, but not answer from her). Would like a call back.   Please call and advise. Thank you

## 2023-09-01 NOTE — TELEPHONE ENCOUNTER
Please inform patient that prescription for pain medication has been sent to the pharmacy electronically.  Advise patient that we will order crutches at her upcoming visit.

## 2023-09-07 ENCOUNTER — OFFICE VISIT (OUTPATIENT)
Dept: INTERNAL MEDICINE | Facility: CLINIC | Age: 78
End: 2023-09-07
Payer: MEDICARE

## 2023-09-07 VITALS
OXYGEN SATURATION: 98 % | DIASTOLIC BLOOD PRESSURE: 74 MMHG | BODY MASS INDEX: 29.9 KG/M2 | TEMPERATURE: 98 F | RESPIRATION RATE: 20 BRPM | WEIGHT: 175.13 LBS | HEART RATE: 100 BPM | HEIGHT: 64 IN | SYSTOLIC BLOOD PRESSURE: 114 MMHG

## 2023-09-07 DIAGNOSIS — M25.562 CHRONIC PAIN OF BOTH KNEES: ICD-10-CM

## 2023-09-07 DIAGNOSIS — E78.5 HYPERLIPIDEMIA, UNSPECIFIED HYPERLIPIDEMIA TYPE: Primary | ICD-10-CM

## 2023-09-07 DIAGNOSIS — G89.29 CHRONIC PAIN OF BOTH KNEES: ICD-10-CM

## 2023-09-07 DIAGNOSIS — M25.561 CHRONIC PAIN OF BOTH KNEES: ICD-10-CM

## 2023-09-07 PROCEDURE — 99999 PR PBB SHADOW E&M-EST. PATIENT-LVL III: ICD-10-PCS | Mod: PBBFAC,,, | Performed by: INTERNAL MEDICINE

## 2023-09-07 PROCEDURE — 1126F PR PAIN SEVERITY QUANTIFIED, NO PAIN PRESENT: ICD-10-PCS | Mod: CPTII,S$GLB,, | Performed by: INTERNAL MEDICINE

## 2023-09-07 PROCEDURE — 1101F PT FALLS ASSESS-DOCD LE1/YR: CPT | Mod: CPTII,S$GLB,, | Performed by: INTERNAL MEDICINE

## 2023-09-07 PROCEDURE — 99999 PR PBB SHADOW E&M-EST. PATIENT-LVL III: CPT | Mod: PBBFAC,,, | Performed by: INTERNAL MEDICINE

## 2023-09-07 PROCEDURE — 1159F PR MEDICATION LIST DOCUMENTED IN MEDICAL RECORD: ICD-10-PCS | Mod: CPTII,S$GLB,, | Performed by: INTERNAL MEDICINE

## 2023-09-07 PROCEDURE — 3074F PR MOST RECENT SYSTOLIC BLOOD PRESSURE < 130 MM HG: ICD-10-PCS | Mod: CPTII,S$GLB,, | Performed by: INTERNAL MEDICINE

## 2023-09-07 PROCEDURE — 3288F FALL RISK ASSESSMENT DOCD: CPT | Mod: CPTII,S$GLB,, | Performed by: INTERNAL MEDICINE

## 2023-09-07 PROCEDURE — 99213 PR OFFICE/OUTPT VISIT, EST, LEVL III, 20-29 MIN: ICD-10-PCS | Mod: S$GLB,,, | Performed by: INTERNAL MEDICINE

## 2023-09-07 PROCEDURE — 3078F PR MOST RECENT DIASTOLIC BLOOD PRESSURE < 80 MM HG: ICD-10-PCS | Mod: CPTII,S$GLB,, | Performed by: INTERNAL MEDICINE

## 2023-09-07 PROCEDURE — 1160F PR REVIEW ALL MEDS BY PRESCRIBER/CLIN PHARMACIST DOCUMENTED: ICD-10-PCS | Mod: CPTII,S$GLB,, | Performed by: INTERNAL MEDICINE

## 2023-09-07 PROCEDURE — 1126F AMNT PAIN NOTED NONE PRSNT: CPT | Mod: CPTII,S$GLB,, | Performed by: INTERNAL MEDICINE

## 2023-09-07 PROCEDURE — 1159F MED LIST DOCD IN RCRD: CPT | Mod: CPTII,S$GLB,, | Performed by: INTERNAL MEDICINE

## 2023-09-07 PROCEDURE — 3074F SYST BP LT 130 MM HG: CPT | Mod: CPTII,S$GLB,, | Performed by: INTERNAL MEDICINE

## 2023-09-07 PROCEDURE — 1101F PR PT FALLS ASSESS DOC 0-1 FALLS W/OUT INJ PAST YR: ICD-10-PCS | Mod: CPTII,S$GLB,, | Performed by: INTERNAL MEDICINE

## 2023-09-07 PROCEDURE — 99213 OFFICE O/P EST LOW 20 MIN: CPT | Mod: S$GLB,,, | Performed by: INTERNAL MEDICINE

## 2023-09-07 PROCEDURE — 3288F PR FALLS RISK ASSESSMENT DOCUMENTED: ICD-10-PCS | Mod: CPTII,S$GLB,, | Performed by: INTERNAL MEDICINE

## 2023-09-07 PROCEDURE — 1160F RVW MEDS BY RX/DR IN RCRD: CPT | Mod: CPTII,S$GLB,, | Performed by: INTERNAL MEDICINE

## 2023-09-07 PROCEDURE — 3078F DIAST BP <80 MM HG: CPT | Mod: CPTII,S$GLB,, | Performed by: INTERNAL MEDICINE

## 2023-09-07 NOTE — PROGRESS NOTES
CC:  Followup of hyperlipidemia  HPI:  The patient is a 77 y.o. old female who presents to the office for followup of hypertelipidemia  She reports constant knee pain, which she describes as a burning sensation.    PAST MEDICAL HISTORY  Past Medical History:   Diagnosis Date    Abnormal mammogram of both breasts     Arthritis     Coronary vasospasm 10/31/2019    Hypertension     Major depressive disorder 5/14/2017    Memory disorder     Nephrolithiasis     Positive D dimer 3/23/2019    Seizures     Stroke        SURGICAL HISTORY:  Past Surgical History:   Procedure Laterality Date    CATARACT EXTRACTION      cysts breast      TONSILLECTOMY           MEDS:  Medcard reviewed and updated    ALLERGIES: Allergy Card reviewed and updated    SOCIAL HISTORY:   The patient is a nonsmoker, denies alcohol or illicit drug use.    ROS:  GENERAL: No fever, chills, fatigability or weight loss.  SKIN: No rashes.  HEAD: No headaches or recent head trauma.  EYES: No photophobia, ocular pain or diplopia.  EARS: Denies ear pain, discharge or vertigo.  Positive tinnitus.  NOSE: No epistaxis or postnasal drip.  MOUTH & THROAT: No hoarseness or change in voice.   NODES: Denies swollen glands.  CHEST: Positive shortness of breath.  Denies wheezing, cough and sputum production.  CARDIOVASCULAR: Denies chest pain or palpitations.  ABDOMEN: Appetite fine. Denies diarrhea, abdominal pain, constipation or blood in stool.  URINARY: No dysuria or hematuria.  MUSCULOSKELETAL: Positive knee pain.. Denies back pain.  NEUROLOGIC: History of seizures.  Positive dizziness.  ENDOCRINE: Denies polyuria or polydipsia.  PSYCHIATRIC: Denies mood swings, depression, anxiety, homicidal or suicidal thoughts.    PE:   Vitals:  Vitals:    09/07/23 1439   BP: 114/74   Pulse: 100   Resp: 20   Temp: 97.9 °F (36.6 °C)       APPEARANCE: Well nourished, well developed, in no acute distress.    CHEST: Lungs clear to auscultation with unlabored  respirations.  CARDIOVASCULAR: Normal S1, S2. No murmurs. No carotid bruits. No pedal edema.  ABDOMEN: Bowel sounds normal. Not distended. Soft. No tenderness or masses.   MUSCULOSKELETAL:  Abnormal gait, no cyanosis or clubbing. .  PSYCHIATRIC: The patient is oriented to person, place, and time and has a pleasant affect.        ASSESSMENT/PLAN:  Sofia was seen today for annual exam.    Diagnoses and all orders for this visit:    Hyperlipidemia, unspecified hyperlipidemia type  -     continue atorvastatin     Chronic pain of both knees  -     CRUTCHES FOR HOME USE

## 2023-09-13 ENCOUNTER — TELEPHONE (OUTPATIENT)
Dept: INTERNAL MEDICINE | Facility: CLINIC | Age: 78
End: 2023-09-13
Payer: MEDICARE

## 2023-09-13 DIAGNOSIS — G89.29 CHRONIC PAIN OF BOTH KNEES: Primary | ICD-10-CM

## 2023-09-13 DIAGNOSIS — M25.562 CHRONIC PAIN OF BOTH KNEES: Primary | ICD-10-CM

## 2023-09-13 DIAGNOSIS — M25.561 CHRONIC PAIN OF BOTH KNEES: Primary | ICD-10-CM

## 2023-09-13 NOTE — TELEPHONE ENCOUNTER
----- Message from Rossana Herron sent at 9/13/2023 10:50 AM CDT -----  Contact: [phone]  Patient is calling for Medical Advice regarding:CRUTCHES FOR HOME USE      Comments: Please call and advise.

## 2023-09-16 ENCOUNTER — NURSE TRIAGE (OUTPATIENT)
Dept: ADMINISTRATIVE | Facility: CLINIC | Age: 78
End: 2023-09-16
Payer: MEDICARE

## 2023-09-16 NOTE — TELEPHONE ENCOUNTER
Pt calling regarding prescription for crutches. According to a previous note the order has been faxed to DME. Patient was provided with the phone number to DME and will follow up Monday AM.    Reason for Disposition   [1] Prescription prescribed recently is not at pharmacy AND [2] triager has access to patient's EMR AND [3] prescription is recorded in the EMR    Protocols used: Medication Question Call-A-AH

## 2023-09-20 DIAGNOSIS — Z78.0 MENOPAUSE: ICD-10-CM

## 2023-09-23 ENCOUNTER — NURSE TRIAGE (OUTPATIENT)
Dept: ADMINISTRATIVE | Facility: CLINIC | Age: 78
End: 2023-09-23
Payer: MEDICARE

## 2023-09-23 ENCOUNTER — HOSPITAL ENCOUNTER (EMERGENCY)
Facility: HOSPITAL | Age: 78
Discharge: HOME | End: 2023-09-23
Attending: STUDENT IN AN ORGANIZED HEALTH CARE EDUCATION/TRAINING PROGRAM
Payer: MEDICARE

## 2023-09-23 VITALS
RESPIRATION RATE: 18 BRPM | TEMPERATURE: 98 F | BODY MASS INDEX: 29.18 KG/M2 | HEART RATE: 71 BPM | OXYGEN SATURATION: 97 % | SYSTOLIC BLOOD PRESSURE: 148 MMHG | WEIGHT: 170 LBS | DIASTOLIC BLOOD PRESSURE: 72 MMHG

## 2023-09-23 DIAGNOSIS — R07.9 CHEST PAIN: ICD-10-CM

## 2023-09-23 DIAGNOSIS — R05.9 COUGH: ICD-10-CM

## 2023-09-23 LAB
INFLUENZA A, MOLECULAR: NEGATIVE
INFLUENZA B, MOLECULAR: NEGATIVE
SARS-COV-2 RDRP RESP QL NAA+PROBE: NEGATIVE
SPECIMEN SOURCE: NORMAL

## 2023-09-23 PROCEDURE — 93010 ELECTROCARDIOGRAM REPORT: CPT | Mod: ,,, | Performed by: INTERNAL MEDICINE

## 2023-09-23 PROCEDURE — 93010 EKG 12-LEAD: ICD-10-PCS | Mod: ,,, | Performed by: INTERNAL MEDICINE

## 2023-09-23 PROCEDURE — 93005 ELECTROCARDIOGRAM TRACING: CPT

## 2023-09-23 PROCEDURE — U0002 COVID-19 LAB TEST NON-CDC: HCPCS | Performed by: STUDENT IN AN ORGANIZED HEALTH CARE EDUCATION/TRAINING PROGRAM

## 2023-09-23 PROCEDURE — 87502 INFLUENZA DNA AMP PROBE: CPT | Performed by: STUDENT IN AN ORGANIZED HEALTH CARE EDUCATION/TRAINING PROGRAM

## 2023-09-23 PROCEDURE — 99284 EMERGENCY DEPT VISIT MOD MDM: CPT | Mod: 25

## 2023-09-23 PROCEDURE — 25000003 PHARM REV CODE 250: Performed by: STUDENT IN AN ORGANIZED HEALTH CARE EDUCATION/TRAINING PROGRAM

## 2023-09-23 RX ORDER — IBUPROFEN 600 MG/1
600 TABLET ORAL
Status: COMPLETED | OUTPATIENT
Start: 2023-09-23 | End: 2023-09-23

## 2023-09-23 RX ADMIN — IBUPROFEN 600 MG: 600 TABLET ORAL at 10:09

## 2023-09-23 NOTE — DISCHARGE INSTRUCTIONS

## 2023-09-23 NOTE — ED TRIAGE NOTES
"Nonproductive cough, rhinorrhea and sneezing x 2 days, fever yesterday and chest pain worse with cough, pt reports feeling "bubbling" in chest and back  "

## 2023-09-23 NOTE — TELEPHONE ENCOUNTER
"    Reason for Disposition   Patient sounds very sick or weak to the triager    Additional Information   Negative: SEVERE difficulty breathing (e.g., struggling for each breath, speaks in single words)   Negative: Difficult to awaken or acting confused (e.g., disoriented, slurred speech)   Negative: Bluish (or gray) lips or face now   Negative: Shock suspected (e.g., cold/pale/clammy skin, too weak to stand, low BP, rapid pulse)   Negative: Sounds like a life-threatening emergency to the triager   Negative: SEVERE or constant chest pain or pressure  (Exception: Mild central chest pain, present only when coughing.)   Negative: MODERATE difficulty breathing (e.g., speaks in phrases, SOB even at rest, pulse 100-120)   Negative: [1] Headache AND [2] stiff neck (can't touch chin to chest)   Negative: Oxygen level (e.g., pulse oximetry) 90 percent or lower   Negative: Chest pain or pressure  (Exception: MILD central chest pain, present only when coughing.)   Negative: [1] Drinking very little AND [2] dehydration suspected (e.g., no urine > 12 hours, very dry mouth, very lightheaded)    Protocols used: Coronavirus (COVID-19) Diagnosed or [id number]    Sofia called to say she has been having sneezing for a couple of days, now coughing all the time, and since yesterday she says she hears and feels "rumbling in my chest and back when I breathe out, and my saliva is real thick.".  screened for Covid, and she has temp of 100.0, cough, sore throat, is short of breath and feels weak.  Said she almost fell when she was standing today, which is new. No record of Covid 19 vaccination in her chart is seen, and she states she has been around "a lot of people coughing lately".  Charted elevated risk factor of 5 for complications r/t Covid. History of MI, CVA, HTN, HLD.  [facility], recommend ED now for evaluation.  She states she will go to [facility] ED as is close to her home.  Care advice given, and encouraged wear " mask.  Assured her that I will message Luanne Connell MD, pcp.  Please contact caller directly with any additional care advice.

## 2023-09-23 NOTE — ED NOTES
Patient identifiers verified and correct for Sofia Bloom  LOC: The patient is awake, alert and aware of environment with an appropriate affect, the patient is oriented x 3 and speaking appropriately.   APPEARANCE: Patient appears comfortable and in no acute distress, patient is clean and well groomed.  SKIN: The skin is warm and dry, color consistent with ethnicity, patient has normal skin turgor and moist mucus membranes, skin intact, no breakdown or bruising noted.   MUSCULOSKELETAL: Patient moving all extremities spontaneously, no swelling noted.  RESPIRATORY: Airway is open and patent, respirations are spontaneous, patient has a normal effort and rate, no accessory muscle use noted, O2 sats noted at 96% on room air. SOB when lying down, cough and congestion x 2 days   CARDIAC: Patient has a normal rate and regular rhythm, no edema noted, capillary refill < 3 seconds.   GASTRO: Soft and non tender to palpation, no distention noted, normoactive bowel sounds present in all four quadrants. Pt states bowel movements have been regular.reports loss of taste  : Pt denies any pain or frequency with urination.  NEURO: Pt opens eyes spontaneously, behavior appropriate to situation, follows commands, facial expression symmetrical, bilateral hand grasp equal and even, purposeful motor response noted, normal sensation in all extremities when touched with a finger.

## 2023-09-23 NOTE — ED PROVIDER NOTES
Encounter Date: 9/23/2023       History     Chief Complaint   Patient presents with    Chest Pain    Shortness of Breath     X2 days ago      HPI  77-year-old woman with chronic knee pain, hyperlipidemia, presenting with productive cough, congestion, chest pain after coughing for 2 days.  No fever or chills or nausea or vomiting.  No pleuritic chest pain, no unilateral leg swelling  Review of patient's allergies indicates:  No Known Allergies  Past Medical History:   Diagnosis Date    Abnormal mammogram of both breasts     Arthritis     Coronary vasospasm 10/31/2019    Hypertension     Major depressive disorder 5/14/2017    Memory disorder     Nephrolithiasis     Positive D dimer 3/23/2019    Seizures     Stroke      Past Surgical History:   Procedure Laterality Date    CATARACT EXTRACTION      cysts breast      TONSILLECTOMY       History reviewed. No pertinent family history.  Social History     Tobacco Use    Smoking status: Never    Smokeless tobacco: Never   Substance Use Topics    Alcohol use: No    Drug use: No     Review of Systems    Physical Exam     Initial Vitals [09/23/23 0947]   BP Pulse Resp Temp SpO2   (!) 169/101 72 18 97.7 °F (36.5 °C) 96 %      MAP       --         Physical Exam    Nursing note and vitals reviewed.  Constitutional: No distress.   HENT:   Head: Atraumatic.   Mouth/Throat: Oropharynx is clear and moist and mucous membranes are normal.   Eyes: Conjunctivae and EOM are normal. Right conjunctiva is not injected. Left conjunctiva is not injected. No scleral icterus.   Neck: Neck supple.    Full passive range of motion without pain.     Cardiovascular:  S1 normal, S2 normal, normal heart sounds and normal pulses.           No murmur heard.  Pulses:       Radial pulses are 2+ on the right side and 2+ on the left side.   Pulmonary/Chest: Effort normal and breath sounds normal. No respiratory distress. She has no wheezes. She has no rhonchi. She has no rales.   Abdominal: Abdomen is soft.  She exhibits no distension. There is no abdominal tenderness.   Musculoskeletal:         General: Normal range of motion.      Cervical back: Full passive range of motion without pain and neck supple.     Neurological: She is alert and oriented to person, place, and time. No cranial nerve deficit. Gait normal.   Skin: Skin is warm. No ecchymosis and no rash noted.         ED Course   Procedures  Labs Reviewed   INFLUENZA A & B BY MOLECULAR   SARS-COV-2 RNA AMPLIFICATION, QUAL   SARS-COV2 (COVID) WITH FLU/RSV BY PCR        ECG Results              EKG 12-lead (Final result)  Result time 09/23/23 10:01:24      Final result by Interface, Lab In [facility] (09/23/23 10:01:24)                   Narrative:    Test Reason : R07.9,    Vent. Rate : 091 BPM     Atrial Rate : 091 BPM     P-R Int : 172 ms          QRS Dur : 070 ms      QT Int : 336 ms       P-R-T Axes : 033 -10 030 degrees     QTc Int : 413 ms    Normal sinus rhythm  Normal ECG  When compared with ECG of 20-JUL-2022 10:28,  No significant change was found  Confirmed by TIBURCIO MULLER MD ([id number]) on 9/23/2023 10:01:14 AM    Referred By:             Confirmed By:TIBURCIO MULLER MD                                  Imaging Results              X-Ray Chest 1 View (Final result)  Result time 09/23/23 12:42:11      Final result by Saroj Snow MD (09/23/23 12:42:11)                   Impression:      No radiographic evidence of pneumonia or other source of cough, noting that early/mild viral pneumonia or nonspecific bronchitis may be radiographically occult.      Electronically signed by: Saroj Snow MD  Date:    09/23/2023  Time:    12:42               Narrative:    EXAMINATION:  XR CHEST 1 VIEW    CLINICAL HISTORY:  Cough, unspecified    TECHNIQUE:  Single frontal view of the chest was performed.    COMPARISON:  Chest radiograph 07/20/2022, CT thorax 10/28/2019    FINDINGS:  Patient is slightly rotated.  Clothing artifacts overlie the chest.    Cardiomediastinal  silhouette is midline with similar tortuosity of the aorta.  Heart is not enlarged.  Pulmonary vasculature and hilar contours are within normal limits.  Bibasilar minimal platelike scarring versus atelectasis.  The lungs are otherwise well expanded without consolidation, pleural effusion or pneumothorax.  Cholecystectomy clips noted.  Osseous structures appears stable without acute process seen.                                       Medications   ibuprofen tablet 600 mg (600 mg Oral Given 9/23/23 1045)     Medical Decision Making  77-year-old woman with chronic knee pain, hyperlipidemia, presenting with productive cough, congestion, chest pain after coughing for 2 days.   Symptoms consistent with URI, unlikely pneumonia given timeline, chest x-ray with no consolidation or infiltrates  Lower concern for PE at this time given no signs of DVT, no pleuritic chest pain, satting 97% on room air, not tachycardic, higher concern for shortness of breath due to congestion and URI  COVID, flu, negative  Discharge with follow up to PMD, return to ED if significant worsening shortness of breath, severe chest pain in the absence of coughing    Amount and/or Complexity of Data Reviewed  Radiology: ordered. Decision-making details documented in ED Course.    Risk  Prescription drug management.               ED Course as of 09/23/23 1310   Sat Sep 23, 2023   1154 X-Ray Chest 1 View [LF]      ED Course User Index  [LF] Blanca Faulkner MD                    Clinical Impression:   Final diagnoses:  [R07.9] Chest pain  [R05.9] Cough        ED Disposition Condition    Discharge Stable          ED Prescriptions    None       Follow-up Information    None          Blanca Faulkner MD  09/23/23 1313

## 2023-09-25 ENCOUNTER — PATIENT OUTREACH (OUTPATIENT)
Dept: EMERGENCY MEDICINE | Facility: HOSPITAL | Age: 78
End: 2023-09-25
Payer: MEDICARE

## 2023-09-26 ENCOUNTER — NURSE TRIAGE (OUTPATIENT)
Dept: ADMINISTRATIVE | Facility: CLINIC | Age: 78
End: 2023-09-26
Payer: MEDICARE

## 2023-09-26 NOTE — PROGRESS NOTES
Patient was discharged from the ED  on 9/23/23. Attempted to contact patient on 2 occasions to assist with Post ED Discharge Navigation. Patient was unavailable. No further attempts scheduled.  Chaparro Beltran

## 2023-09-26 NOTE — TELEPHONE ENCOUNTER
Reason for Disposition   Patient sounds very sick or weak to the triager    Additional Information   Negative: SEVERE difficulty breathing (e.g., struggling for each breath, speaks in single words)   Negative: Bluish (or gray) lips or face now   Negative: [1] Rapid onset of cough AND [2] has hives   Negative: Coughing started suddenly after medicine, an allergic food or bee sting   Negative: [1] Difficulty breathing AND [2] exposure to flames, smoke, or fumes   Negative: [1] Stridor AND [2] difficulty breathing   Negative: Sounds like a life-threatening emergency to the triager   Negative: [1] MODERATE difficulty breathing (e.g., speaks in phrases, SOB even at rest, pulse 100-120) AND [2] still present when not coughing   Negative: Chest pain  (Exception: MILD central chest pain, present only when coughing.)    Protocols used: Cough - Acute Non-Productive-A-AH  Pt called re severe cough with chest, head and neck discomfort, wheezing. pt states seen Sat in ED, pt states she was told its not covid. hurts back and chest to cough. afeb. cough x 4-5 days. felt fine yest. non productive cough. rec ED now.or option to get in touch with dr. pt states she will have to get a ride. rec EMS due to no transportation. What to do in the interim?. Rec DM cough med, drink warm fluids, use humidifier, breath warm mist from shower. Pt states she will trying drinking warm fluids first.

## 2023-09-27 ENCOUNTER — OFFICE VISIT (OUTPATIENT)
Dept: URGENT CARE | Facility: CLINIC | Age: 78
End: 2023-09-27
Payer: MEDICARE

## 2023-09-27 ENCOUNTER — NURSE TRIAGE (OUTPATIENT)
Dept: ADMINISTRATIVE | Facility: CLINIC | Age: 78
End: 2023-09-27
Payer: MEDICARE

## 2023-09-27 VITALS
HEART RATE: 93 BPM | RESPIRATION RATE: 18 BRPM | SYSTOLIC BLOOD PRESSURE: 137 MMHG | OXYGEN SATURATION: 96 % | BODY MASS INDEX: 29.02 KG/M2 | HEIGHT: 64 IN | TEMPERATURE: 99 F | DIASTOLIC BLOOD PRESSURE: 84 MMHG | WEIGHT: 170 LBS

## 2023-09-27 DIAGNOSIS — R05.9 COUGH, UNSPECIFIED TYPE: ICD-10-CM

## 2023-09-27 DIAGNOSIS — J20.9 ACUTE BRONCHITIS, UNSPECIFIED ORGANISM: Primary | ICD-10-CM

## 2023-09-27 DIAGNOSIS — R06.2 WHEEZE: ICD-10-CM

## 2023-09-27 LAB
CTP QC/QA: YES
SARS-COV-2 RDRP RESP QL NAA+PROBE: NEGATIVE

## 2023-09-27 PROCEDURE — 99213 OFFICE O/P EST LOW 20 MIN: CPT | Mod: S$GLB,,, | Performed by: NURSE PRACTITIONER

## 2023-09-27 PROCEDURE — 87635: ICD-10-PCS | Mod: QW,S$GLB,, | Performed by: NURSE PRACTITIONER

## 2023-09-27 PROCEDURE — 87635 SARS-COV-2 COVID-19 AMP PRB: CPT | Mod: QW,S$GLB,, | Performed by: NURSE PRACTITIONER

## 2023-09-27 PROCEDURE — 99213 PR OFFICE/OUTPT VISIT, EST, LEVL III, 20-29 MIN: ICD-10-PCS | Mod: S$GLB,,, | Performed by: NURSE PRACTITIONER

## 2023-09-27 RX ORDER — PREDNISONE 20 MG/1
20 TABLET ORAL DAILY
Qty: 5 TABLET | Refills: 0 | Status: SHIPPED | OUTPATIENT
Start: 2023-09-27 | End: 2023-10-02

## 2023-09-27 RX ORDER — AMOXICILLIN AND CLAVULANATE POTASSIUM 875; 125 MG/1; MG/1
1 TABLET, FILM COATED ORAL EVERY 12 HOURS
Qty: 20 TABLET | Refills: 0 | Status: SHIPPED | OUTPATIENT
Start: 2023-09-27 | End: 2023-10-07

## 2023-09-27 RX ORDER — PROMETHAZINE HYDROCHLORIDE AND DEXTROMETHORPHAN HYDROBROMIDE 6.25; 15 MG/5ML; MG/5ML
5 SYRUP ORAL NIGHTLY PRN
Qty: 118 ML | Refills: 0 | Status: SHIPPED | OUTPATIENT
Start: 2023-09-27

## 2023-09-27 RX ORDER — BENZONATATE 200 MG/1
200 CAPSULE ORAL EVERY 8 HOURS PRN
Qty: 30 CAPSULE | Refills: 0 | Status: SHIPPED | OUTPATIENT
Start: 2023-09-27 | End: 2023-10-07

## 2023-09-27 NOTE — TELEPHONE ENCOUNTER
[place]    PCP:  Dr. Luanne Connell    She was seen at the ED on 9/23.  Diagnosis was not Covid.  C/O wheezing, continuous non-productive cough, rib pain, intermittent SOB, CP, clear nasal drainage, neck pain, and back pain.  Denies lung history, hemoptysis, H/O clots, fever, and cyanosis.  Pt had a coughing spell while talking with NT and pt sounds very bad over the phone.  Per protocol, care advised is call  now.  Pt VU and refused care advised.  Care advice reinforced but pt continues to refuse care advice.  Advised to call for worsening/questions/concerns.  VU.     Reason for Disposition   SEVERE difficulty breathing (e.g., struggling for each breath, speaks in single words)    Additional Information   Negative: Bluish (or gray) lips or face    Protocols used: Cough-A-OH

## 2023-09-27 NOTE — PROGRESS NOTES
"Subjective:      Patient ID: Sofia Augustine is a 78 y.o. female.    Vitals:  height is 5' 4" (1.626 m) and weight is 77.1 kg (170 lb). Her oral temperature is 99 °F (37.2 °C). Her blood pressure is 137/84 and her pulse is 93. Her respiration is 18 and oxygen saturation is 96%.     Chief Complaint: Cough    This is a 78 y.o. female who presents today with a chief complaint of persistent cough (cant catch breath after coughing), sensation of mucous stuck in throat, chest congestion, chest tightness, wheeze, and lightheadedness. Also c/o rib pain r/t coughing so much. Pt seen in the ER this past weekend for the same. CXR showed a viral process at that time. Reports decreased appetite. Denies sinus symptoms.     Cough  This is a new problem. The cough is Non-productive. Associated symptoms include chest pain, chills, headaches, nasal congestion, shortness of breath, sweats and wheezing. Pertinent negatives include no fever, postnasal drip or sore throat. Associated symptoms comments: Chest px due to cough . She has tried OTC cough suppressant for the symptoms. There is no history of asthma, bronchitis, COPD or pneumonia.       Constitution: Positive for chills. Negative for fever.   HENT:  Positive for congestion. Negative for postnasal drip and sore throat.    Cardiovascular:  Positive for chest pain.   Respiratory:  Positive for chest tightness, cough, shortness of breath and wheezing.    Gastrointestinal:  Positive for nausea. Negative for vomiting and diarrhea.   Neurological:  Positive for headaches.      Objective:     Physical Exam   Constitutional: She is oriented to person, place, and time. She appears well-developed. She is cooperative.  Non-toxic appearance. She does not appear ill. No distress.   HENT:   Head: Normocephalic.   Ears:   Right Ear: Hearing, tympanic membrane, external ear and ear canal normal.   Left Ear: Hearing, tympanic membrane, external ear and ear canal normal.   Nose: Nose normal. " No mucosal edema, rhinorrhea or nasal deformity. No epistaxis. Right sinus exhibits no maxillary sinus tenderness and no frontal sinus tenderness. Left sinus exhibits no maxillary sinus tenderness and no frontal sinus tenderness.   Mouth/Throat: Uvula is midline, oropharynx is clear and moist and mucous membranes are normal. Mucous membranes are moist. No trismus in the jaw. Normal dentition. No uvula swelling. No oropharyngeal exudate, posterior oropharyngeal edema or posterior oropharyngeal erythema. Oropharynx is clear.   Eyes: Conjunctivae and lids are normal. No scleral icterus.   Neck: Trachea normal and phonation normal. Neck supple. No edema present. No erythema present. No neck rigidity present.   Cardiovascular: Normal rate and regular rhythm.   Pulmonary/Chest: Effort normal. No respiratory distress. She has no decreased breath sounds. She has wheezes. She has no rhonchi.   Abdominal: Normal appearance.   Musculoskeletal: Normal range of motion.         General: No deformity. Normal range of motion.   Neurological: She is alert and oriented to person, place, and time. She exhibits normal muscle tone. Coordination normal.   Skin: Skin is warm, dry, intact, not diaphoretic and not pale.   Psychiatric: Her speech is normal and behavior is normal. Judgment and thought content normal.   Nursing note and vitals reviewed.    Results for orders placed or performed in visit on 09/27/23   POCT COVID-19 Rapid Screening   Result Value Ref Range    POC Rapid COVID Negative Negative     Acceptable Yes         Assessment:     1. Acute bronchitis, unspecified organism    2. Cough, unspecified type    3. Wheeze        Plan:       Acute bronchitis, unspecified organism  -     POCT COVID-19 Rapid Screening  -     amoxicillin-clavulanate 875-125mg (AUGMENTIN) 875-125 mg per tablet; Take 1 tablet by mouth every 12 (twelve) hours. for 10 days  Dispense: 20 tablet; Refill: 0    Cough, unspecified  type  Comments:  x1 week   Orders:  -     POCT COVID-19 Rapid Screening  -     benzonatate (TESSALON) 200 MG capsule; Take 1 capsule (200 mg total) by mouth every 8 (eight) hours as needed for Cough.  Dispense: 30 capsule; Refill: 0  -     amoxicillin-clavulanate 875-125mg (AUGMENTIN) 875-125 mg per tablet; Take 1 tablet by mouth every 12 (twelve) hours. for 10 days  Dispense: 20 tablet; Refill: 0  -     promethazine-dextromethorphan (PROMETHAZINE-DM) 6.25-15 mg/5 mL Syrp; Take 5 mLs by mouth nightly as needed (cough).  Dispense: 118 mL; Refill: 0    Wheeze  -     POCT COVID-19 Rapid Screening  -     predniSONE (DELTASONE) 20 MG tablet; Take 1 tablet (20 mg total) by mouth once daily. for 5 days  Dispense: 5 tablet; Refill: 0  -     amoxicillin-clavulanate 875-125mg (AUGMENTIN) 875-125 mg per tablet; Take 1 tablet by mouth every 12 (twelve) hours. for 10 days  Dispense: 20 tablet; Refill: 0      Patient Instructions   Oral fluids  Rest  Blow nose often  Avoid circulating air (such as ceiling fans) dries your airway  Avoid drinking cold drinks (worsens cough)  Avoid strong smells which could worsen cough (perfume, lotions, smoke...)  Therapeutic coughing to expel mucous  Sit in upright position often

## 2023-09-27 NOTE — PATIENT INSTRUCTIONS
Oral fluids  Rest  Blow nose often  Avoid circulating air (such as ceiling fans) dries your airway  Avoid drinking cold drinks (worsens cough)  Avoid strong smells which could worsen cough (perfume, lotions, smoke...)  Therapeutic coughing to expel mucous  Sit in upright position often

## 2023-10-10 DIAGNOSIS — M17.0 PRIMARY OSTEOARTHRITIS OF BOTH KNEES: ICD-10-CM

## 2023-10-10 DIAGNOSIS — M47.22 OSTEOARTHRITIS OF SPINE WITH RADICULOPATHY, CERVICAL REGION: ICD-10-CM

## 2023-10-10 RX ORDER — HYDROCODONE BITARTRATE AND ACETAMINOPHEN 5; 325 MG/1; MG/1
1 TABLET ORAL EVERY 8 HOURS PRN
Qty: 40 TABLET | Refills: 0 | Status: SHIPPED | OUTPATIENT
Start: 2023-10-10 | End: 2023-11-13 | Stop reason: SDUPTHER

## 2023-10-10 NOTE — TELEPHONE ENCOUNTER
No care due was identified.  Health [facility] Embedded Care Due Messages. Reference number: [id number].   10/10/2023 10:50:36 AM CDT

## 2023-10-10 NOTE — TELEPHONE ENCOUNTER
----- Message from Singh Lara sent at 10/10/2023 10:27 AM CDT -----  Contact: Pt [phone]  Requesting an RX refill or new RX.  Is this a refill or new RX: refill   RX name and strength (copy/paste from chart):  HYDROcodone-acetaminophen (NORCO) 5-325 mg per tablet  Is this a 30 day or 90 day RX:   Pharmacy name and phone # (copy/paste from chart):  [facility] #[id number] - [place] - [facility]  AT [facility] OF [facility] & [address]   Phone:  [phone]  Fax:  [phone]        The doctors have asked that we provide their patients with the following 2 reminders -- prescription refills can take up to 72 hours, and a friendly reminder that in the future you can use your [facility] account to request refills: yes

## 2023-11-13 DIAGNOSIS — M17.0 PRIMARY OSTEOARTHRITIS OF BOTH KNEES: ICD-10-CM

## 2023-11-13 DIAGNOSIS — M47.22 OSTEOARTHRITIS OF SPINE WITH RADICULOPATHY, CERVICAL REGION: ICD-10-CM

## 2023-11-13 RX ORDER — HYDROCODONE BITARTRATE AND ACETAMINOPHEN 5; 325 MG/1; MG/1
1 TABLET ORAL EVERY 8 HOURS PRN
Qty: 40 TABLET | Refills: 0 | Status: SHIPPED | OUTPATIENT
Start: 2023-11-13 | End: 2023-12-06 | Stop reason: SDUPTHER

## 2023-11-13 NOTE — TELEPHONE ENCOUNTER
Care Due:                  Date            Visit Type   Department     Provider  --------------------------------------------------------------------------------                                EP -                              PRIMARY      MET INTERNAL  Last Visit: 09-      CARE ([facility])   MANINDER Connell                              EP -                              PRIMARY      MET INTERNAL  Next Visit: 12-      CARE ([facility])   MEDICINE       Luanne Connell                                                            Last  Test          Frequency    Reason                     Performed    Due Date  --------------------------------------------------------------------------------    CBC.........  12 months..  celecoxib................  07-   07-    Health [facility] Embedded Care Due Messages. Reference number: [id number].   11/13/2023 9:45:16 AM CST

## 2023-11-13 NOTE — TELEPHONE ENCOUNTER
----- Message from Rocio Saxena sent at 11/13/2023  9:03 AM CST -----  Contact: [phone]  Requesting an RX refill or new RX.  Is this a refill or new RX:   RX name and strength   HYDROcodone-acetaminophen (NORCO) 5-325 mg per tablet  Is this a 30 day or 90 day RX: 30  Pharmacy name and phone #   [facility] #[id number] - [place]  [address]  AT [facility] & [address]  [address] [place] [address]  Phone: [phone] Fax: [phone]

## 2023-11-13 NOTE — TELEPHONE ENCOUNTER
Refill Routing Note   Medication(s) are not appropriate for processing by [facility] for the following reason(s):      Medication outside of protocol    [facility] action(s):  Route Care Due:  Labs due     Medication Therapy Plan: FOVS      Appointments  past 12m or future 3m with PCP    Date Provider   Last Visit   9/7/2023 Luanne Connell MD   Next Visit   12/6/2023 Launne Connell MD   ED visits in past 90 days: 1        Note composed:10:10 AM 11/13/2023

## 2023-12-06 ENCOUNTER — OFFICE VISIT (OUTPATIENT)
Dept: INTERNAL MEDICINE | Facility: CLINIC | Age: 78
End: 2023-12-06
Payer: MEDICARE

## 2023-12-06 VITALS
SYSTOLIC BLOOD PRESSURE: 112 MMHG | DIASTOLIC BLOOD PRESSURE: 68 MMHG | HEIGHT: 64 IN | BODY MASS INDEX: 28.98 KG/M2 | OXYGEN SATURATION: 99 % | TEMPERATURE: 97 F | WEIGHT: 169.75 LBS | HEART RATE: 83 BPM

## 2023-12-06 DIAGNOSIS — M25.569 KNEE PAIN, UNSPECIFIED CHRONICITY, UNSPECIFIED LATERALITY: ICD-10-CM
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
## 2023-12-06 DIAGNOSIS — M47.22 OSTEOARTHRITIS OF SPINE WITH RADICULOPATHY, CERVICAL REGION: ICD-10-CM

## 2023-12-06 DIAGNOSIS — I10 ESSENTIAL HYPERTENSION: Primary | Chronic | ICD-10-CM

## 2023-12-06 DIAGNOSIS — M17.0 PRIMARY OSTEOARTHRITIS OF BOTH KNEES: ICD-10-CM

## 2023-12-06 PROCEDURE — 3288F FALL RISK ASSESSMENT DOCD: CPT | Mod: CPTII,S$GLB,, | Performed by: INTERNAL MEDICINE

## 2023-12-06 PROCEDURE — 1159F MED LIST DOCD IN RCRD: CPT | Mod: CPTII,S$GLB,, | Performed by: INTERNAL MEDICINE

## 2023-12-06 PROCEDURE — 99214 OFFICE O/P EST MOD 30 MIN: CPT | Mod: S$GLB,,, | Performed by: INTERNAL MEDICINE

## 2023-12-06 PROCEDURE — 99999 PR PBB SHADOW E&M-EST. PATIENT-LVL III: CPT | Mod: PBBFAC,,, | Performed by: INTERNAL MEDICINE

## 2023-12-06 PROCEDURE — 3078F PR MOST RECENT DIASTOLIC BLOOD PRESSURE < 80 MM HG: ICD-10-PCS | Mod: CPTII,S$GLB,, | Performed by: INTERNAL MEDICINE

## 2023-12-06 PROCEDURE — 1125F PR PAIN SEVERITY QUANTIFIED, PAIN PRESENT: ICD-10-PCS | Mod: CPTII,S$GLB,, | Performed by: INTERNAL MEDICINE

## 2023-12-06 PROCEDURE — 1160F RVW MEDS BY RX/DR IN RCRD: CPT | Mod: CPTII,S$GLB,, | Performed by: INTERNAL MEDICINE

## 2023-12-06 PROCEDURE — 1159F PR MEDICATION LIST DOCUMENTED IN MEDICAL RECORD: ICD-10-PCS | Mod: CPTII,S$GLB,, | Performed by: INTERNAL MEDICINE

## 2023-12-06 PROCEDURE — 1160F PR REVIEW ALL MEDS BY PRESCRIBER/CLIN PHARMACIST DOCUMENTED: ICD-10-PCS | Mod: CPTII,S$GLB,, | Performed by: INTERNAL MEDICINE

## 2023-12-06 PROCEDURE — 3078F DIAST BP <80 MM HG: CPT | Mod: CPTII,S$GLB,, | Performed by: INTERNAL MEDICINE

## 2023-12-06 PROCEDURE — 1101F PR PT FALLS ASSESS DOC 0-1 FALLS W/OUT INJ PAST YR: ICD-10-PCS | Mod: CPTII,S$GLB,, | Performed by: INTERNAL MEDICINE

## 2023-12-06 PROCEDURE — 3288F PR FALLS RISK ASSESSMENT DOCUMENTED: ICD-10-PCS | Mod: CPTII,S$GLB,, | Performed by: INTERNAL MEDICINE

## 2023-12-06 PROCEDURE — 3074F SYST BP LT 130 MM HG: CPT | Mod: CPTII,S$GLB,, | Performed by: INTERNAL MEDICINE

## 2023-12-06 PROCEDURE — 1125F AMNT PAIN NOTED PAIN PRSNT: CPT | Mod: CPTII,S$GLB,, | Performed by: INTERNAL MEDICINE

## 2023-12-06 PROCEDURE — 3074F PR MOST RECENT SYSTOLIC BLOOD PRESSURE < 130 MM HG: ICD-10-PCS | Mod: CPTII,S$GLB,, | Performed by: INTERNAL MEDICINE

## 2023-12-06 PROCEDURE — 99214 PR OFFICE/OUTPT VISIT, EST, LEVL IV, 30-39 MIN: ICD-10-PCS | Mod: S$GLB,,, | Performed by: INTERNAL MEDICINE

## 2023-12-06 PROCEDURE — 99999 PR PBB SHADOW E&M-EST. PATIENT-LVL III: ICD-10-PCS | Mod: PBBFAC,,, | Performed by: INTERNAL MEDICINE

## 2023-12-06 PROCEDURE — 1101F PT FALLS ASSESS-DOCD LE1/YR: CPT | Mod: CPTII,S$GLB,, | Performed by: INTERNAL MEDICINE

## 2023-12-06 RX ORDER — CEPHALEXIN 500 MG/1
500 CAPSULE ORAL EVERY 12 HOURS
Qty: 10 CAPSULE | Refills: 0 | Status: SHIPPED | OUTPATIENT
Start: 2023-12-06

## 2023-12-06 RX ORDER — ZALEPLON 10 MG/1
CAPSULE ORAL
Qty: 30 CAPSULE | Refills: 3 | Status: SHIPPED | OUTPATIENT
Start: 2023-12-06 | End: 2024-02-01 | Stop reason: SDUPTHER

## 2023-12-06 RX ORDER — HYDROCODONE BITARTRATE AND ACETAMINOPHEN 5; 325 MG/1; MG/1
1 TABLET ORAL EVERY 8 HOURS PRN
Qty: 40 TABLET | Refills: 0 | Status: SHIPPED | OUTPATIENT
Start: 2023-12-06 | End: 2024-01-09 | Stop reason: SDUPTHER

## 2023-12-06 NOTE — PROGRESS NOTES
CC: followup of hypertension  HPI:  The patient is a 78 y.o. year old female who presents to the office for followup of hypertension.  The patient denies any chest pain, shortness of breath, blurred vision, excessive fatigue, nausea or vomiting, but reports occasional headaches.  She reports she was bitten by a flee on her face about 4-5 days ago.  She reports pain and itching on her chin has improved since taking two lleftover antibiotics.  She reports continued pain in her knees, for which she would like crutches.      PAST MEDICAL HISTORY:  Past Medical History:   Diagnosis Date    Abnormal mammogram of both breasts     Arthritis     Coronary vasospasm 10/31/2019    Hypertension     Major depressive disorder 5/14/2017    Memory disorder     Nephrolithiasis     Positive D dimer 3/23/2019    Seizures     Stroke        SURGICAL HISTORY:  Past Surgical History:   Procedure Laterality Date    CATARACT EXTRACTION      cysts breast      TONSILLECTOMY         MEDS:  Medcard reviewed and updated    ALLERGIES: Allergy Card reviewed and updated    SOCIAL HISTORY:   The patient is a nonsmoker.    PE:   APPEARANCE: Well nourished, well developed, in no acute distress.    CHEST: Lungs clear to auscultation with unlabored respirations.  CARDIOVASCULAR: Normal S1, S2. No murmurs. No carotid bruits. No pedal edema.  ABDOMEN: Bowel sounds normal. Not distended. Soft. No tenderness or masses.   SKIN: Small palpable nodule of left chin.  PSYCHIATRIC: The patient is oriented to person, place, and time and has a pleasant affect.        ASSESSMENT/PLAN:  Sofia was seen today for follow-up.    Diagnoses and all orders for this visit:    Essential hypertension  -     blood pressure is controlled, continue current medication     Insect bite, unspecified site, initial encounter  -     cephALEXin (KEFLEX) 500 MG capsule; Take 1 capsule (500 mg total) by mouth every 12 (twelve) hours.    Osteoarthritis of spine with radiculopathy, cervical  region  -     HYDROcodone-acetaminophen (NORCO) 5-325 mg per tablet; Take 1 tablet by mouth every 8 (eight) hours as needed for Pain.    Primary osteoarthritis of both knees  -     HYDROcodone-acetaminophen (NORCO) 5-325 mg per tablet; Take 1 tablet by mouth every 8 (eight) hours as needed for Pain.  -     pain is controlled, continue current therapy    Knee pain, unspecified chronicity, unspecified laterality  -     CRUTCHES FOR HOME USE    Other orders  -     zaleplon (SONATA) 10 MG capsule; TAKE 1 CAPSULE(10 MG) BY MOUTH EVERY NIGHT AS NEEDED

## 2024-01-06 ENCOUNTER — NURSE TRIAGE (OUTPATIENT)
Dept: ADMINISTRATIVE | Facility: CLINIC | Age: 79
End: 2024-01-06
Payer: MEDICARE

## 2024-01-06 NOTE — TELEPHONE ENCOUNTER
Patient is requesting a refill of Norco 5.325 mg and Zaleplon 10 mg. No refills are available at this time for Norco 5-325 mg. Patient states c/o pain in her knees due to arthritis and declined triage services at this time.     Patient advised to call her PCP on Monday, 01/08/24 to request a   Refill of Norco 5-325 mg. Patient  advised that there are three (3) refills available for Zaleplon 10 mg and to contact the [facility] Triage [facility] for any worsening symptoms. Patient also advised to Go to ED for severe pain. Patient states understanding of care advice.       Reason for Disposition   Caller requesting a CONTROLLED substance prescription refill (e.g., narcotics, ADHD medicines)    Additional Information   Negative: New-onset or worsening symptoms, see that guideline (e.g., diarrhea, runny nose, sore throat)   Negative: Medicine question not related to refill or renewal   Negative: Caller (e.g., patient or pharmacist) requesting information about a new medicine   Negative: Caller requesting information unrelated to medicine   Negative: [1] Prescription refill request for ESSENTIAL medicine (i.e., likelihood of harm to patient if not taken) AND [2] triager unable to refill per department policy   Negative: [1] Prescription not at pharmacy AND [2] was prescribed by PCP recently  (Exception: Triager has access to EMR and prescription is recorded there. Go to Home Care and confirm for pharmacy.)   Negative: [1] Pharmacy calling with prescription questions AND [2] triager unable to answer question   Negative: Prescription request for new medicine (not a refill)    Protocols used: Medication Refill and Renewal Call-A-

## 2024-01-08 ENCOUNTER — TELEPHONE (OUTPATIENT)
Dept: INTERNAL MEDICINE | Facility: CLINIC | Age: 79
End: 2024-01-08
Payer: MEDICARE

## 2024-01-08 DIAGNOSIS — M47.22 OSTEOARTHRITIS OF SPINE WITH RADICULOPATHY, CERVICAL REGION: ICD-10-CM

## 2024-01-08 DIAGNOSIS — M17.0 PRIMARY OSTEOARTHRITIS OF BOTH KNEES: ICD-10-CM

## 2024-01-09 RX ORDER — HYDROCODONE BITARTRATE AND ACETAMINOPHEN 5; 325 MG/1; MG/1
1 TABLET ORAL EVERY 8 HOURS PRN
Qty: 40 TABLET | Refills: 0 | Status: SHIPPED | OUTPATIENT
Start: 2024-01-09 | End: 2024-02-01 | Stop reason: SDUPTHER

## 2024-01-09 NOTE — TELEPHONE ENCOUNTER
----- Message from Elidia Swartz sent at 1/8/2024  2:11 PM CST -----  Contact: [phone] Patient  Requesting an RX refill or new RX.  Is this a refill or new RX: new  RX name and strength (copy/paste from chart):  HYDROcodone-acetaminophen (NORCO) 5-325 mg per tablet  Is this a 30 day or 90 day RX:   Pharmacy name and phone # (copy/paste from chart):   [facility] #[id number] - [place] - [address]  AT [facility] & [address]  [address] [place] [address]  Phone: [phone] Fax: [phone]      The doctors have asked that we provide their patients with the following 2 reminders -- prescription refills can take up to 72 hours, and a friendly reminder that in the future you can use your [facility] account to request refills: yes

## 2024-02-01 DIAGNOSIS — M47.22 OSTEOARTHRITIS OF SPINE WITH RADICULOPATHY, CERVICAL REGION: ICD-10-CM

## 2024-02-01 DIAGNOSIS — M17.0 PRIMARY OSTEOARTHRITIS OF BOTH KNEES: ICD-10-CM
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
## 2024-02-01 RX ORDER — ZALEPLON 10 MG/1
CAPSULE ORAL
Qty: 30 CAPSULE | Refills: 3 | Status: SHIPPED | OUTPATIENT
Start: 2024-02-01 | End: 2024-03-05 | Stop reason: SDUPTHER

## 2024-02-01 RX ORDER — HYDROCODONE BITARTRATE AND ACETAMINOPHEN 5; 325 MG/1; MG/1
1 TABLET ORAL EVERY 8 HOURS PRN
Qty: 40 TABLET | Refills: 0 | Status: SHIPPED | OUTPATIENT
Start: 2024-02-01 | End: 2024-03-05 | Stop reason: SDUPTHER

## 2024-02-01 RX ORDER — CEPHALEXIN 500 MG/1
500 CAPSULE ORAL EVERY 12 HOURS
Qty: 10 CAPSULE | Refills: 0 | OUTPATIENT
Start: 2024-02-01

## 2024-02-01 NOTE — TELEPHONE ENCOUNTER
Care Due:                  Date            Visit Type   Department     Provider  --------------------------------------------------------------------------------                                EP -                              PRIMARY      MET INTERNAL  Last Visit: 12-      CARE ([facility])   MEDICINE       Luanne Connell  Next Visit: None Scheduled  None         None Found                                                            Last  Test          Frequency    Reason                     Performed    Due Date  --------------------------------------------------------------------------------    CBC.........  12 months..  celecoxib................  07-   07-    CMP.........  12 months..  celecoxib................  03- 03-    Health [facility] Embedded Care Due Messages. Reference number: [id number].   2/01/2024 4:06:33 PM CST

## 2024-02-01 NOTE — TELEPHONE ENCOUNTER
----- Message from Cecynakia Hinson sent at 2/1/2024  3:55 PM CST -----  Contact: Mobile [phone]  Requesting an RX refill or new RX.  Is this a refill or new RX: Refill  RX name and strength HYDROcodone-acetaminophen (NORCO) 5-325 mg per tablet  Is this a 30 day or 90 day RX: 40 Tab  Pharmacy name and phone #   [facility] #[id number] [place]  [address] [facility]  AT [address] [place] [address]  Phone: [phone] Fax: [phone]  The doctors have asked that we provide their patients with the following 2 reminders -- prescription refills can take up to 72 hours, and a friendly reminder that in the future you can use your [facility] account to request refills:     Requesting an RX refill or new RX.  Is this a refill or new RX: Refill  RX name and strength zaleplon (SONATA) 10 MG capsule  Is this a 30 day or 90 day RX: 30 Cap 3 Ref  Pharmacy name and phone #   [facility] #[id number] [place]  [address]  AT [address] [place] [address]  Phone: [phone] Fax: [phone]  The doctors have asked that we provide their patients with the following 2 reminders -- prescription refills can take up to 72 hours, and a friendly reminder that in the future you can use your [facility] account to request refills:     Requesting an RX refill or new RX.  Is this a refill or new RX: Refill  RX name and strength cephALEXin (KEFLEX) 500 MG capsul  Is this a 30 day or 90 day RX: 10 Cap  Pharmacy name and phone #   [facility] #[id number] [place] - [address]  AT [address] [place] [address]  Phone: [phone] Fax: [phone]  The doctors have asked that we provide their patients with the following 2 reminders -- prescription refills can take up to 72 hours, and a friendly reminder that in the future you can use your [facility] account to request refills:

## 2024-03-05 DIAGNOSIS — M17.0 PRIMARY OSTEOARTHRITIS OF BOTH KNEES: ICD-10-CM

## 2024-03-05 DIAGNOSIS — M47.22 OSTEOARTHRITIS OF SPINE WITH RADICULOPATHY, CERVICAL REGION: ICD-10-CM

## 2024-03-05 NOTE — TELEPHONE ENCOUNTER
----- Message from Gloria Tobias sent at 3/5/2024  9:34 AM CST -----  Contact: [phone]  Requesting an RX refill or new RX.  Is this a refill or new RX: refill 1  RX name and strength (copy/paste from chart):  HYDROcodone-acetaminophen (NORCO) 5-325 mg per tablet  Is this a 30 day or 90 day RX:   Pharmacy name and phone # (copy/paste from chart):  [facility] #[id number] - [place], [place] - [address]  AT [facility]   Phone: [phone]  Fax: [phone]        Requesting an RX refill or new RX.  Is this a refill or new RX: refill 2  RX name and strength (copy/paste from chart):  sleeping pill (patient does not remember the name of it).   Is this a 30 day or 90 day RX:   Pharmacy name and phone # (copy/paste from chart):  [facility] #[id number] - [place], [place] - [address]  AT [facility]   Phone: [phone]  Fax: [phone]      The doctors have asked that we provide their patients with the following 2 reminders -- prescription refills can take up to 72 hours, and a friendly reminder that in the future you can use your [facility] account to request refills:

## 2024-03-05 NOTE — TELEPHONE ENCOUNTER
No care due was identified.  Health [facility] Embedded Care Due Messages. Reference number: [id number].   3/05/2024 10:41:50 AM CST

## 2024-03-06 RX ORDER — ZALEPLON 10 MG/1
CAPSULE ORAL
Qty: 30 CAPSULE | Refills: 3 | Status: SHIPPED | OUTPATIENT
Start: 2024-03-06 | End: 2024-05-02

## 2024-03-06 RX ORDER — HYDROCODONE BITARTRATE AND ACETAMINOPHEN 5; 325 MG/1; MG/1
1 TABLET ORAL EVERY 8 HOURS PRN
Qty: 40 TABLET | Refills: 0 | Status: SHIPPED | OUTPATIENT
Start: 2024-03-06 | End: 2024-04-12 | Stop reason: SDUPTHER

## 2024-04-12 DIAGNOSIS — M17.0 PRIMARY OSTEOARTHRITIS OF BOTH KNEES: ICD-10-CM

## 2024-04-12 DIAGNOSIS — M47.22 OSTEOARTHRITIS OF SPINE WITH RADICULOPATHY, CERVICAL REGION: ICD-10-CM

## 2024-04-12 RX ORDER — HYDROCODONE BITARTRATE AND ACETAMINOPHEN 5; 325 MG/1; MG/1
1 TABLET ORAL EVERY 8 HOURS PRN
Qty: 40 TABLET | Refills: 0 | Status: SHIPPED | OUTPATIENT
Start: 2024-04-12 | End: 2024-05-22 | Stop reason: SDUPTHER

## 2024-04-12 NOTE — TELEPHONE ENCOUNTER
Care Due:                  Date            Visit Type   Department     Provider  --------------------------------------------------------------------------------                                EP -                              PRIMARY      MET INTERNAL  Last Visit: 12-      CARE ([facility])   MEDICINE       Luanne Connell  Next Visit: None Scheduled  None         None Found                                                            Last  Test          Frequency    Reason                     Performed    Due Date  --------------------------------------------------------------------------------    CBC.........  12 months..  celecoxib................  07-   07-    CMP.........  12 months..  celecoxib................  03- 03-    Health [facility] Embedded Care Due Messages. Reference number: [id number].   4/12/2024 10:53:57 AM CDT

## 2024-04-12 NOTE — TELEPHONE ENCOUNTER
----- Message from Anne Marie Mcdowell sent at 4/12/2024 10:42 AM CDT -----  Contact: [phone]@patient  Requesting an RX refill or new RX.HYDROcodone-acetaminophen (NORCO) 5-325 mg per tablet  Is this a refill or new RX: refill  RX name and strength (copy/paste from chart):    Is this a 30 day or 90 day RX:   Pharmacy name and phone # [id number] [place] [address]  AT [facility] OF [facility] [address]  The doctors have asked that we provide their patients with the following 2 reminders -- prescription refills can take up to 72 hours, and a friendly reminder that in the future you can use your [facility] account to request refills:

## 2024-04-30 DIAGNOSIS — M17.0 BILATERAL PRIMARY OSTEOARTHRITIS OF KNEE: ICD-10-CM

## 2024-04-30 DIAGNOSIS — M25.561 ACUTE PAIN OF BOTH KNEES: ICD-10-CM

## 2024-04-30 DIAGNOSIS — M25.562 ACUTE PAIN OF BOTH KNEES: ICD-10-CM

## 2024-05-01 NOTE — TELEPHONE ENCOUNTER
Refill Routing Note   Medication(s) are not appropriate for processing by [facility] for the following reason(s):        Outside of protocol    [facility] action(s):  Route               Appointments  past 12m or future 3m with PCP    Date Provider   Last Visit   12/6/2023 Luanne Connell MD   Next Visit   Visit date not found Luanne Connell MD   ED visits in past 90 days: 0        Note composed:8:57 AM 05/01/2024

## 2024-05-01 NOTE — TELEPHONE ENCOUNTER
No care due was identified.  [facility] Embedded Care Due Messages. Reference number: [id number].   4/30/2024 8:41:05 PM CDT

## 2024-05-02 RX ORDER — ZALEPLON 10 MG/1
CAPSULE ORAL
Qty: 90 CAPSULE | Refills: 3 | Status: SHIPPED | OUTPATIENT
Start: 2024-05-02

## 2024-05-02 RX ORDER — CELECOXIB 200 MG/1
200 CAPSULE ORAL 2 TIMES DAILY
Qty: 180 CAPSULE | Refills: 1 | Status: SHIPPED | OUTPATIENT
Start: 2024-05-02

## 2024-05-22 DIAGNOSIS — M47.22 OSTEOARTHRITIS OF SPINE WITH RADICULOPATHY, CERVICAL REGION: ICD-10-CM

## 2024-05-22 DIAGNOSIS — M17.0 PRIMARY OSTEOARTHRITIS OF BOTH KNEES: ICD-10-CM

## 2024-05-22 RX ORDER — HYDROCODONE BITARTRATE AND ACETAMINOPHEN 5; 325 MG/1; MG/1
1 TABLET ORAL EVERY 8 HOURS PRN
Qty: 40 TABLET | Refills: 0 | Status: SHIPPED | OUTPATIENT
Start: 2024-05-22

## 2024-05-22 NOTE — TELEPHONE ENCOUNTER
No care due was identified.  [facility] Embedded Care Due Messages. Reference number: [id number].   5/22/2024 1:20:54 PM CDT

## 2024-07-08 DIAGNOSIS — M17.0 PRIMARY OSTEOARTHRITIS OF BOTH KNEES: ICD-10-CM

## 2024-07-08 DIAGNOSIS — M47.22 OSTEOARTHRITIS OF SPINE WITH RADICULOPATHY, CERVICAL REGION: ICD-10-CM

## 2024-07-08 RX ORDER — HYDROCODONE BITARTRATE AND ACETAMINOPHEN 5; 325 MG/1; MG/1
1 TABLET ORAL EVERY 8 HOURS PRN
Qty: 40 TABLET | Refills: 0 | Status: SHIPPED | OUTPATIENT
Start: 2024-07-08

## 2024-07-08 NOTE — TELEPHONE ENCOUNTER
Care Due:                  Date            Visit Type   Department     Provider  --------------------------------------------------------------------------------                                EP -                              PRIMARY      MET INTERNAL  Last Visit: 12-      CARE ([facility])   MEDICINE       Luanne Connell  Next Visit: None Scheduled  None         None Found                                                            Last  Test          Frequency    Reason                     Performed    Due Date  --------------------------------------------------------------------------------    CBC.........  12 months..  celecoxib................  07-   07-    CMP.........  12 months..  celecoxib................  03- 03-    Health [facility] Embedded Care Due Messages. Reference number: [id number].   7/08/2024 3:19:14 PM CDT

## 2024-07-08 NOTE — TELEPHONE ENCOUNTER
----- Message from Rossana Herron sent at 7/8/2024  2:47 PM CDT -----  Contact: [phone]  Requesting an RX refill or new RX.    Is this a refill or new RX: refill    RX name and strength   HYDROcodone-acetaminophen (NORCO) 5-325 mg per tablet 40 tablet    Is this a 30 day or 90 day RX:  Yes, quantity medically necessary    Pharmacy name and phone #   [id number] [place] [facility] [address]  AT [facility] OF [address] & [address]   Phone: [phone]  Fax: [phone]          The doctors have asked that we provide their patients with the following 2 reminders -- prescription refills can take up to 72 hours, and a friendly reminder that in the future you can use your [facility] account to request refills: yes

## 2024-07-24 ENCOUNTER — TELEPHONE (OUTPATIENT)
Dept: INTERNAL MEDICINE | Facility: CLINIC | Age: 79
End: 2024-07-24
Payer: MEDICARE

## 2024-07-24 NOTE — TELEPHONE ENCOUNTER
Pt states she has cough , headache, runny nose and diarrhea for one day no other symptoms , pt declined appt please review message thanks

## 2024-07-24 NOTE — TELEPHONE ENCOUNTER
----- Message from Lawrenceangle Tubbs sent at 7/24/2024 10:12 AM CDT -----  Contact: [phone]  1MEDICALADVICE     Patient is calling for Medical Advice regarding:Symptoms: Runny Nose, Cough, Headache, Diarrhea  Outcome: Schedule a same-day appointment or talk to a nurse or provider within 1 hour.  Reason: Caller denied all higher acuity questions    The caller accepted this outcome    How long has patient had these symptoms:yesterday    Pharmacy name and phone#:  [facility] #[id number] - [place] - [address]  AT [facility] [address]  [address] [place] [address]  Phone: [phone] Fax: [phone]        Patient wants a call back or thru [facility]:Call back     Comments:    Please advise patient replies from provider may take up to 48 hours.

## 2024-07-24 NOTE — TELEPHONE ENCOUNTER
Recommend patient take a home COVID test.  Also, recommend patient stay well hydrated and eat a bland diet.  It is possible that her illness is viral.  It can be difficult to determine with symptoms only present for 1 day.

## 2024-07-30 DIAGNOSIS — Z00.00 ENCOUNTER FOR MEDICARE ANNUAL WELLNESS EXAM: ICD-10-CM

## 2024-08-09 DIAGNOSIS — M47.22 OSTEOARTHRITIS OF SPINE WITH RADICULOPATHY, CERVICAL REGION: ICD-10-CM

## 2024-08-09 DIAGNOSIS — M17.0 PRIMARY OSTEOARTHRITIS OF BOTH KNEES: ICD-10-CM

## 2024-08-09 RX ORDER — HYDROCODONE BITARTRATE AND ACETAMINOPHEN 5; 325 MG/1; MG/1
1 TABLET ORAL EVERY 8 HOURS PRN
Qty: 40 TABLET | Refills: 0 | Status: SHIPPED | OUTPATIENT
Start: 2024-08-09

## 2024-08-13 NOTE — TELEPHONE ENCOUNTER
----- Message from Lucy Escobar sent at 1/23/2020  2:54 PM CST -----  Contact: Pt [phone]  Is this a refill or new RX:  Refill    RX name and strength: methylPREDNISolone (MEDROL DOSEPACK) 4 mg tablet     Pharmacy name and phone # [facility] #[id number] - [place] - [address] AT [facility] OF [address] & [address] [phone] (Phone) [phone] (Fax)     Comments:  She said the benadryl is not working.         Patient resting comfortably, fever now controlled, spoke with mother to take amoxicillin as directed, will follow-up pediatrician next week

## 2024-08-26 ENCOUNTER — TELEPHONE (OUTPATIENT)
Dept: INTERNAL MEDICINE | Facility: CLINIC | Age: 79
End: 2024-08-26
Payer: MEDICARE

## 2024-08-26 DIAGNOSIS — H91.90 HEARING LOSS, UNSPECIFIED HEARING LOSS TYPE, UNSPECIFIED LATERALITY: Primary | ICD-10-CM

## 2024-08-26 NOTE — TELEPHONE ENCOUNTER
Referrals to ent and audiology have been ordered, as well as an audiogram.  Please forward to referral coordinator.

## 2024-08-26 NOTE — TELEPHONE ENCOUNTER
----- Message from Gloria Tobias sent at 8/26/2024 10:19 AM CDT -----  Contact: [phone]  Patient would like to get a referral.  Referral to what specialty:  ENT  Does the patient want the referral with a specific physician:  no  [facility] the [facility]  Reason (be specific):  hearing loss  Does the patient already have the specialty clinic appointment scheduled:  yes  If yes, what date is the appointment scheduled:   12/30/24  Is the insurance listed in Epic correct? (this is important for a referral):  yes  Advised patient that once provider approves this either a nurse or  will return their call?:   Would the patient like a call back, or a response through their [facility] portal?:   phone  Comments:

## 2024-09-09 DIAGNOSIS — M47.22 OSTEOARTHRITIS OF SPINE WITH RADICULOPATHY, CERVICAL REGION: ICD-10-CM

## 2024-09-09 DIAGNOSIS — M17.0 PRIMARY OSTEOARTHRITIS OF BOTH KNEES: ICD-10-CM

## 2024-09-09 RX ORDER — HYDROCODONE BITARTRATE AND ACETAMINOPHEN 5; 325 MG/1; MG/1
1 TABLET ORAL EVERY 8 HOURS PRN
Qty: 40 TABLET | Refills: 0 | Status: SHIPPED | OUTPATIENT
Start: 2024-09-09

## 2024-09-09 NOTE — TELEPHONE ENCOUNTER
----- Message from Sushma Umana sent at 9/9/2024 12:04 PM CDT -----  Contact: Pt [phone]  Requesting an RX refill or new RX.    Is this a refill or new RX: Refill    RX name and strength (copy/paste from chart):  HYDROcodone-acetaminophen (NORCO) 5-325 mg per tablet    Is this a 30 day or 90 day RX: 30    Pharmacy name and phone # (copy/paste from chart):    [id number] - [place] - [address]  [address] [facility] [address]  [address] [place] [address]  Phone: [phone] Fax: [phone]    Please call and advise.    Thank You

## 2024-09-09 NOTE — TELEPHONE ENCOUNTER
Care Due:                  Date            Visit Type   Department     Provider  --------------------------------------------------------------------------------                                EP -                              PRIMARY      MET INTERNAL  Last Visit: 12-      CARE ([facility])   MEDICINE       Luanne Connell  Next Visit: None Scheduled  None         None Found                                                            Last  Test          Frequency    Reason                     Performed    Due Date  --------------------------------------------------------------------------------    Office Visit  12 months..  celecoxib................  12- 11-    CMP.........  12 months..  celecoxib................  03- 03-    [facility] Embedded Care Due Messages. Reference number: [id number].   9/09/2024 1:12:36 PM CDT

## 2024-09-25 DIAGNOSIS — Z78.0 MENOPAUSE: ICD-10-CM

## 2024-09-30 ENCOUNTER — OFFICE VISIT (OUTPATIENT)
Dept: INTERNAL MEDICINE | Facility: CLINIC | Age: 79
End: 2024-09-30
Payer: MEDICARE

## 2024-09-30 ENCOUNTER — PATIENT MESSAGE (OUTPATIENT)
Dept: BEHAVIORAL HEALTH | Facility: CLINIC | Age: 79
End: 2024-09-30
Payer: MEDICARE

## 2024-09-30 VITALS
HEART RATE: 90 BPM | OXYGEN SATURATION: 97 % | BODY MASS INDEX: 28.6 KG/M2 | RESPIRATION RATE: 16 BRPM | HEIGHT: 64 IN | TEMPERATURE: 97 F | WEIGHT: 167.56 LBS | DIASTOLIC BLOOD PRESSURE: 82 MMHG | SYSTOLIC BLOOD PRESSURE: 124 MMHG

## 2024-09-30 DIAGNOSIS — Z00.00 ENCOUNTER FOR PREVENTIVE HEALTH EXAMINATION: Primary | ICD-10-CM

## 2024-09-30 DIAGNOSIS — I25.2 HISTORY OF MI (MYOCARDIAL INFARCTION): ICD-10-CM

## 2024-09-30 DIAGNOSIS — G89.29 CHRONIC PAIN OF BOTH KNEES: ICD-10-CM

## 2024-09-30 DIAGNOSIS — F32.1 MAJOR DEPRESSIVE DISORDER, SINGLE EPISODE, MODERATE: ICD-10-CM

## 2024-09-30 DIAGNOSIS — N18.31 STAGE 3A CHRONIC KIDNEY DISEASE: ICD-10-CM

## 2024-09-30 DIAGNOSIS — J98.4 CALCIFIED GRANULOMA OF LUNG: ICD-10-CM

## 2024-09-30 DIAGNOSIS — R26.9 ABNORMALITY OF GAIT AND MOBILITY: ICD-10-CM

## 2024-09-30 DIAGNOSIS — F33.9 EPISODE OF RECURRENT MAJOR DEPRESSIVE DISORDER, UNSPECIFIED DEPRESSION EPISODE SEVERITY: ICD-10-CM

## 2024-09-30 DIAGNOSIS — I10 ESSENTIAL HYPERTENSION: ICD-10-CM

## 2024-09-30 DIAGNOSIS — M25.562 CHRONIC PAIN OF BOTH KNEES: ICD-10-CM

## 2024-09-30 DIAGNOSIS — Z13.31 POSITIVE DEPRESSION SCREENING: ICD-10-CM

## 2024-09-30 DIAGNOSIS — Z99.89 DEPENDENCE ON OTHER ENABLING MACHINES AND DEVICES: ICD-10-CM

## 2024-09-30 DIAGNOSIS — M25.561 CHRONIC PAIN OF BOTH KNEES: ICD-10-CM

## 2024-09-30 PROCEDURE — 1159F MED LIST DOCD IN RCRD: CPT | Mod: CPTII,S$GLB,, | Performed by: NURSE PRACTITIONER

## 2024-09-30 PROCEDURE — 99999 PR PBB SHADOW E&M-EST. PATIENT-LVL V: CPT | Mod: PBBFAC,,, | Performed by: NURSE PRACTITIONER

## 2024-09-30 PROCEDURE — 3074F SYST BP LT 130 MM HG: CPT | Mod: CPTII,S$GLB,, | Performed by: NURSE PRACTITIONER

## 2024-09-30 PROCEDURE — G0439 PPPS, SUBSEQ VISIT: HCPCS | Mod: S$GLB,,, | Performed by: NURSE PRACTITIONER

## 2024-09-30 PROCEDURE — 1170F FXNL STATUS ASSESSED: CPT | Mod: CPTII,S$GLB,, | Performed by: NURSE PRACTITIONER

## 2024-09-30 PROCEDURE — 3288F FALL RISK ASSESSMENT DOCD: CPT | Mod: CPTII,S$GLB,, | Performed by: NURSE PRACTITIONER

## 2024-09-30 PROCEDURE — 1125F AMNT PAIN NOTED PAIN PRSNT: CPT | Mod: CPTII,S$GLB,, | Performed by: NURSE PRACTITIONER

## 2024-09-30 PROCEDURE — 1160F RVW MEDS BY RX/DR IN RCRD: CPT | Mod: CPTII,S$GLB,, | Performed by: NURSE PRACTITIONER

## 2024-09-30 PROCEDURE — 1158F ADVNC CARE PLAN TLK DOCD: CPT | Mod: CPTII,S$GLB,, | Performed by: NURSE PRACTITIONER

## 2024-09-30 PROCEDURE — 3079F DIAST BP 80-89 MM HG: CPT | Mod: CPTII,S$GLB,, | Performed by: NURSE PRACTITIONER

## 2024-09-30 PROCEDURE — 1101F PT FALLS ASSESS-DOCD LE1/YR: CPT | Mod: CPTII,S$GLB,, | Performed by: NURSE PRACTITIONER

## 2024-09-30 NOTE — PROGRESS NOTES
Sofia Augustine presented for a  Medicare AWV and comprehensive Health Risk Assessment today. The following components were reviewed and updated:    Medical history  Family History  Social history  Allergies and Current Medications  Health Risk Assessment  Health Maintenance  Care Team         ** See Completed Assessments for Annual Wellness Visit within the encounter summary.**         The following assessments were completed:  Living Situation  CAGE  Depression Screening  Timed Get Up and Go  Whisper Test  Cognitive Function Screening  Nutrition Screening  ADL Screening  PAQ Screening      Opioid documentation:      Patient does have a current opioid prescription.      Patient accepted further discussion regarding opioid medication use.      Patient is currently taking hydrocodone narcotic for knee pain.        Pain level today is 7/10.       In addition to narcotic pain medications, patient is also using (no other oral, topical, or alternative treatments) for pain control.       Patient is not followed by a specialist currently for their pain and will not be referred today.       Patient's opioid risk potential based on ORT-OUD tool:       Cesar each box that applies   No   Yes     Family history of substance abuse   Alcohol [] []   Illegal drugs [] []   Rx drugs [] []     Personal history of substance abuse   Alcohol [] []   Illegal drugs [] []   Rx drugs [] []     Age between 16-45 years   []   []     Patient with ADD, OCD, Bipolar disorder, schizoprenia   []   []     Patient with depression   []   []                         Scoring total                                                                 Non-opioid treatment options have been discussed today and added to the patient's after visit summary.        Vitals:    09/30/24 1446   BP: 124/82   BP Location: Right arm   Patient Position: Sitting   Pulse: 90   Resp: 16   Temp: 97.3 °F (36.3 °C)   TempSrc: Skin   SpO2: 97%   Weight: 76 kg (167 lb 8.8 oz)  "  Height: 5' 4" (1.626 m)     Body mass index is 28.76 kg/m².  Physical Exam  Vitals and nursing note reviewed.   Constitutional:       Appearance: She is well-developed.      Comments: Uses canes     HENT:      Head: Normocephalic and atraumatic.      Right Ear: External ear normal.      Left Ear: External ear normal.      Nose: Nose normal.   Eyes:      Pupils: Pupils are equal, round, and reactive to light.   Cardiovascular:      Rate and Rhythm: Normal rate and regular rhythm.      Heart sounds: Normal heart sounds.   Pulmonary:      Effort: Pulmonary effort is normal.      Breath sounds: Normal breath sounds.   Musculoskeletal:         General: Normal range of motion.      Cervical back: Normal range of motion.   Skin:     General: Skin is warm and dry.   Neurological:      Mental Status: She is alert and oriented to person, place, and time.               Diagnoses and health risks identified today and associated recommendations/orders:    1. Encounter for preventive health examination  Health Maintenance updated   Records reviewed   Exam done     2. Episode of recurrent major depressive disorder, unspecified depression episode severity  PHQ 16 today. Patient reports she does not want medication for her depression. She is agreeable to try therapy. She does not report any SI/HI. Stable and chronic. Followed by PCP.   - Ambulatory referral/consult to Primary Care Behavioral Health (Non-Opioids); Future  - Ambulatory referral/consult to Psychiatry; Future    3. Calcified granuloma of lung  Noted on CT 3/30/21. Stable and chronic.    4. Stage 3a chronic kidney disease  Noted intermittent decreases of GFR. Avoid nephrotoxic medications. Stable and chronic.     5. Essential hypertension  Stable, followed by PCP   Take medications as prescribed.   Monitor BP at home, goal BP < or = 140/80, call office if consistently above this range.   Follow low salt DASH diet and exercise.   BMI reviewed.     6. Chronic pain of " both knees  Stable and chronic. Continue current medications. Followed by PCP.     7. History of MI (myocardial infarction)  Stable and chronic. Continue current medications. Followed by PCP.     8. Positive depression screening  PHQ 16 today. Patient reports she does not want medication for her depression. She is agreeable to try therapy. She does not report any SI/HI. Stable and chronic. Followed by PCP.   - Ambulatory referral/consult to Primary Care Behavioral Health (Non-Opioids); Future  - Ambulatory referral/consult to Psychiatry; Future    9. Major depressive disorder, single episode, moderate  PHQ 16 today. Patient reports she does not want medication for her depression. She is agreeable to try therapy. She does not report any SI/HI. Stable and chronic. Followed by PCP.   - Ambulatory referral/consult to Primary Care Behavioral Health (Non-Opioids); Future  - Ambulatory referral/consult to Psychiatry; Future    10. Dependence on other enabling machines and devices  Patient uses cane for mobility. Stable and chronic. Followed by PCP.     11. Abnormality of gait and mobility  Patient uses cane for mobility. Stable and chronic. Followed by PCP.   Counseling and Referral of Other Preventative  (Italic type indicates deductible and co-insurance are waived)    Patient Name: Sofia Augustine  Today's Date: 10/1/2024    Health Maintenance         Date Due Completion Date    TETANUS VACCINE Never done ---    DEXA Scan Never done ---    Shingles Vaccine (1 of 2) Never done ---    RSV Vaccine (Age 60+ and Pregnant patients) (1 - 1-dose 75+ series) Never done ---    Urine Drug Screen 10/29/2020 10/29/2019    Influenza Vaccine (1) 09/01/2024 10/20/2022    Override on 1/24/2020: Declined    COVID-19 Vaccine (1 - 2024-25 season) Never done ---    Lipid Panel 03/21/2028 3/21/2023          Orders Placed This Encounter   Procedures    Ambulatory referral/consult to Primary Care Behavioral Health (Non-Opioids)    Ambulatory  referral/consult to Psychiatry       Provided Sofia with a 5-10 year written screening schedule and personal prevention plan. Recommendations were developed using the USPSTF age appropriate recommendations. Education, counseling, and referrals were provided as needed. After Visit Summary printed and given to patient which includes a list of additional screenings\tests needed.    Follow up in about 4 months (around 1/29/2025) for pcp visit.    Laine Feng, NP      I offered to discuss advanced care planning, including how to pick a person who would make decisions for you if you were unable to make them for yourself, called a health care power of , and what kind of decisions you might make such as use of life sustaining treatments such as ventilators and tube feeding when faced with a life limiting illness recorded on a living will that they will need to know. (How you want to be cared for as you near the end of your natural life)     X Patient is interested in learning more about how to make advanced directives.  I provided them paperwork and offered to discuss this with them.

## 2024-10-01 NOTE — PATIENT INSTRUCTIONS
Counseling and Referral of Other Preventative  (Italic type indicates deductible and co-insurance are waived)    Patient Name: Sofia Augustine  Today's Date: 10/1/2024    Health Maintenance       Date Due Completion Date    TETANUS VACCINE Never done ---    DEXA Scan Never done ---    Shingles Vaccine (1 of 2) Never done ---    RSV Vaccine (Age 60+ and Pregnant patients) (1 - 1-dose 75+ series) Never done ---    Urine Drug Screen 10/29/2020 10/29/2019    Influenza Vaccine (1) 09/01/2024 10/20/2022    Override on 1/24/2020: Declined    COVID-19 Vaccine (1 - 2024-25 season) Never done ---    Lipid Panel 03/21/2028 3/21/2023        Orders Placed This Encounter   Procedures    Ambulatory referral/consult to Primary Care Behavioral Health (Non-Opioids)    Ambulatory referral/consult to Psychiatry     The following information is provided to all patients.  This information is to help you find resources for any of the problems found today that may be affecting your health:                  Living healthy guide: www.[facility].[place].gov      Understanding Diabetes: www.diabetes.org      Eating healthy: www.cdc.gov/healthyweight      CDC home safety checklist: www.cdc.gov/steadi/patient.html      Agency on Aging: www.goea.[place].gov      Alcoholics anonymous (AA): www.aa.org      Physical Activity: www.eva.nih.gov/pn5xzrm      Tobacco use: www.quitwithusla.org

## 2024-10-03 ENCOUNTER — PATIENT MESSAGE (OUTPATIENT)
Dept: BEHAVIORAL HEALTH | Facility: CLINIC | Age: 79
End: 2024-10-03
Payer: MEDICARE

## 2024-10-16 ENCOUNTER — TELEPHONE (OUTPATIENT)
Dept: INTERNAL MEDICINE | Facility: CLINIC | Age: 79
End: 2024-10-16
Payer: MEDICARE

## 2024-10-16 NOTE — TELEPHONE ENCOUNTER
----- Message from Radha sent at 10/16/2024  9:35 AM CDT -----  Contact: Self/[phone]  Requesting an RX refill or new RX.    Is this a refill or new RX: New    RX name and strength :  HYDROcodone-acetaminophen (NORCO) 5-325 mg per tablet    Is this a 30 day or 90 day RX: 30    Pharmacy name and phone # :  [facility] #[id number] - [place] - [address]  AT [facility] [address]  [address] [place] [address]  Phone: [phone] Fax: [phone]       The doctors have asked that we provide their patients with the following 2 reminders -- prescription refills can take up to 72 hours, and a friendly reminder that in the future you can use your [facility] account to request refills: Yes

## 2024-10-16 NOTE — TELEPHONE ENCOUNTER
----- Message from Radha sent at 10/16/2024  9:35 AM CDT -----  Contact: Self/[phone]  Requesting an RX refill or new RX.    Is this a refill or new RX: New    RX name and strength :  HYDROcodone-acetaminophen (NORCO) 5-325 mg per tablet    Is this a 30 day or 90 day RX: 30    Pharmacy name and phone # :  [email or web address] #[id number] - [place] - [address]  AT [facility] [address]  [address] [place] [address]  Phone: [phone] Fax: [phone]       The doctors have asked that we provide their patients with the following 2 reminders -- prescription refills can take up to 72 hours, and a friendly reminder that in the future you can use your [facility] account to request refills: Yes

## 2024-10-29 DIAGNOSIS — M17.0 PRIMARY OSTEOARTHRITIS OF BOTH KNEES: ICD-10-CM

## 2024-10-29 DIAGNOSIS — M47.22 OSTEOARTHRITIS OF SPINE WITH RADICULOPATHY, CERVICAL REGION: ICD-10-CM

## 2024-10-29 RX ORDER — HYDROCODONE BITARTRATE AND ACETAMINOPHEN 5; 325 MG/1; MG/1
1 TABLET ORAL EVERY 8 HOURS PRN
Qty: 40 TABLET | Refills: 0 | Status: SHIPPED | OUTPATIENT
Start: 2024-10-29

## 2024-11-19 DIAGNOSIS — G47.00 INSOMNIA, UNSPECIFIED TYPE: Primary | ICD-10-CM

## 2024-11-19 NOTE — TELEPHONE ENCOUNTER
----- Message from Masha sent at 11/19/2024 11:48 AM CST -----  Contact: [phone] Patient  Requesting an RX refill or new RX.    Is this a refill or new RX: new    RX name and strength (copy/paste from chart):  zaleplon (SONATA) 10 MG capsule    Is this a 30 day or 90 day RX: 90    Pharmacy name and phone # (copy/paste from chart):    [facility] #[id number] - [place] [facility]  AT [facility] [address]  [address] [place] [address]  Phone: [phone] Fax: [phone]    The doctors have asked that we provide their patients with the following 2 reminders -- prescription refills can take up to 72 hours, and a friendly reminder that in the future you can use your [facility] account to request refills: yes

## 2024-11-19 NOTE — TELEPHONE ENCOUNTER
Care Due:                  Date            Visit Type   Department     Provider  --------------------------------------------------------------------------------                                EP -                              PRIMARY      MET INTERNAL  Last Visit: 12-      CARE ([facility])   MANINDER Connell                              EP -                              PRIMARY      MET INTERNAL  Next Visit: 01-      CARE ([facility])   MANINDER Connell                                                            Last  Test          Frequency    Reason                     Performed    Due Date  --------------------------------------------------------------------------------    CMP.........  12 months..  celecoxib................  03- 03-    [facility] Embedded Care Due Messages. Reference number: [id number].   11/19/2024 1:53:53 PM CST

## 2024-11-20 RX ORDER — ZALEPLON 10 MG/1
CAPSULE ORAL
Qty: 90 CAPSULE | Refills: 3 | Status: SHIPPED | OUTPATIENT
Start: 2024-11-20

## 2024-12-02 DIAGNOSIS — M17.0 PRIMARY OSTEOARTHRITIS OF BOTH KNEES: ICD-10-CM

## 2024-12-02 DIAGNOSIS — M47.22 OSTEOARTHRITIS OF SPINE WITH RADICULOPATHY, CERVICAL REGION: ICD-10-CM

## 2024-12-02 RX ORDER — HYDROCODONE BITARTRATE AND ACETAMINOPHEN 5; 325 MG/1; MG/1
1 TABLET ORAL EVERY 8 HOURS PRN
Qty: 40 TABLET | Refills: 0 | Status: SHIPPED | OUTPATIENT
Start: 2024-12-02

## 2024-12-02 NOTE — TELEPHONE ENCOUNTER
----- Message from Rossana sent at 12/2/2024  1:27 PM CST -----  Contact: [phone]  Requesting an RX refill or new RX.    Is this a refill or new RX: refill    RX name and strength   HYDROcodone-acetaminophen (NORCO) 5-325 mg per tablet 40 tablet    Is this a 30 day or 90 day RX: Yes, quantity medically necessary    Pharmacy name and phone #   [facility] #[id number]  [place] - [address]  AT [facility] [address]   Phone: [phone]  Fax: [phone]          The doctors have asked that we provide their patients with the following 2 reminders -- prescription refills can take up to 72 hours, and a friendly reminder that in the future you can use your [facility] account to request refills: yes

## 2024-12-02 NOTE — TELEPHONE ENCOUNTER
No care due was identified.  [facility] Embedded Care Due Messages. Reference number: [id number].   12/02/2024 2:55:03 PM CST

## 2024-12-03 ENCOUNTER — TELEPHONE (OUTPATIENT)
Dept: INTERNAL MEDICINE | Facility: CLINIC | Age: 79
End: 2024-12-03
Payer: MEDICARE

## 2024-12-03 NOTE — TELEPHONE ENCOUNTER
----- Message from Rossana sent at 12/2/2024  1:31 PM CST -----  Contact: [phone]  .1MEDICALADVICE     Patient is calling for Medical Advice regarding: Patient need a letter from her doctor because she received a ticket for not wearing her seat belt the right way do to lump removal from both breasts several years ago and it hurts her to have the seatbelt across her chest.       Patient wants a call back or thru [facility]: call    Comments: Please call patient to get information, court date is in February.     Please advise patient replies from provider may take up to 48 hours.

## 2024-12-03 NOTE — TELEPHONE ENCOUNTER
Patient need a letter from her doctor because she received a ticket for not wearing her seat belt the right way do to lump removal from both breasts several years ago and it hurts her to have the seatbelt across her chest.

## 2024-12-10 ENCOUNTER — TELEPHONE (OUTPATIENT)
Dept: INTERNAL MEDICINE | Facility: CLINIC | Age: 79
End: 2024-12-10
Payer: MEDICARE

## 2024-12-10 RX ORDER — PROMETHAZINE HYDROCHLORIDE 12.5 MG/1
12.5 TABLET ORAL EVERY 6 HOURS PRN
Qty: 20 TABLET | Refills: 0 | Status: SHIPPED | OUTPATIENT
Start: 2024-12-10

## 2024-12-10 NOTE — TELEPHONE ENCOUNTER
Spoke with pt to inform her that a prescription for Phenergan has been sent to her pharmacy electronically.

## 2024-12-10 NOTE — TELEPHONE ENCOUNTER
----- Message from Lawrenceangle sent at 12/10/2024  9:05 AM CST -----  Contact: [phone]  .1MEDICALADVICE     Patient is calling for Medical Advice regarding:Symptom: Nausea But No Vomiting  Outcome: Schedule an urgent appointment (within 4 hours) or talk to a nurse or provider within 30 minutes.  Reason: Can't drink anything    The caller accepted this outcome.    How long has patient had these symptoms: today     Pharmacy name and phone#:  [facility] #[id number] - [place] - [address]  AT [facility] [address]  [address] [place] [address]  Phone: [phone] Fax: [phone]        Patient wants a call back or thru [facility]: call back     Comments:    Please advise patient replies from provider may take up to 48 hours.

## 2024-12-10 NOTE — TELEPHONE ENCOUNTER
- Describe Symptoms: nausea  - How long? Couple of days, pt believes got sick after [employer]  - Fever? no  - Pain? no  - Location? N/a  - otc? No  - Blood? no  - Last Bowel Movement? This AM  - Additional Information? N/a  - No UC/No ER    Offered pt appt, pt's knees are bad today. Pt would like nausea med. Not taking otc meds for nausea.

## 2024-12-10 NOTE — TELEPHONE ENCOUNTER
Please inform patient that prescription for Phenergan has been sent to her pharmacy electronically.  And advise patient that medication is sedating, and she should not operate heavy machinery while taking this medication.

## 2024-12-30 ENCOUNTER — CLINICAL SUPPORT (OUTPATIENT)
Dept: AUDIOLOGY | Facility: CLINIC | Age: 79
End: 2024-12-30
Payer: MEDICARE

## 2024-12-30 ENCOUNTER — OFFICE VISIT (OUTPATIENT)
Dept: OTOLARYNGOLOGY | Facility: CLINIC | Age: 79
End: 2024-12-30
Payer: MEDICARE

## 2024-12-30 DIAGNOSIS — H91.8X3 ASYMMETRICAL HEARING LOSS: ICD-10-CM

## 2024-12-30 DIAGNOSIS — H90.3 ASYMMETRICAL SENSORINEURAL HEARING LOSS: ICD-10-CM

## 2024-12-30 DIAGNOSIS — H93.12 TINNITUS, LEFT: Primary | ICD-10-CM

## 2024-12-30 DIAGNOSIS — H93.19 SUBJECTIVE TINNITUS, UNSPECIFIED LATERALITY: ICD-10-CM

## 2024-12-30 DIAGNOSIS — H90.3 SENSORINEURAL HEARING LOSS (SNHL) OF BOTH EARS: Primary | ICD-10-CM

## 2024-12-30 PROCEDURE — 1159F MED LIST DOCD IN RCRD: CPT | Mod: CPTII,S$GLB,, | Performed by: OTOLARYNGOLOGY

## 2024-12-30 PROCEDURE — 92557 COMPREHENSIVE HEARING TEST: CPT | Mod: S$GLB,,, | Performed by: AUDIOLOGIST

## 2024-12-30 PROCEDURE — 99204 OFFICE O/P NEW MOD 45 MIN: CPT | Mod: S$GLB,,, | Performed by: OTOLARYNGOLOGY

## 2024-12-30 PROCEDURE — 3288F FALL RISK ASSESSMENT DOCD: CPT | Mod: CPTII,S$GLB,, | Performed by: OTOLARYNGOLOGY

## 2024-12-30 PROCEDURE — 99999 PR PBB SHADOW E&M-EST. PATIENT-LVL I: CPT | Mod: PBBFAC,,, | Performed by: OTOLARYNGOLOGY

## 2024-12-30 PROCEDURE — 1101F PT FALLS ASSESS-DOCD LE1/YR: CPT | Mod: CPTII,S$GLB,, | Performed by: OTOLARYNGOLOGY

## 2024-12-30 PROCEDURE — 92567 TYMPANOMETRY: CPT | Mod: S$GLB,,, | Performed by: AUDIOLOGIST

## 2024-12-30 NOTE — PROGRESS NOTES
Subjective     Patient ID: Sofia Augustine is a 79 y.o. female.    Chief Complaint: Hearing Loss    HPI: Hx of prog HL/ Tinn    AS>AD.    MRI 2021 WNL.    No HAE.    Past Medical History: Patient has a past medical history of Abnormal mammogram of both breasts, Arthritis, Coronary vasospasm (10/31/2019), Hypertension, Major depressive disorder (5/14/2017), Memory disorder, Nephrolithiasis, Positive D dimer (3/23/2019), Seizures, and Stroke.    Past Surgical History: Patient has a past surgical history that includes Cataract extraction; cysts breast; and Tonsillectomy.    Social History: Patient reports that she has never smoked. She has never used smokeless tobacco. She reports that she does not drink alcohol and does not use drugs.    Family History: family history is not on file.    Medications:   Current Outpatient Medications   Medication Sig    aspirin (ECOTRIN) 81 MG EC tablet Take 81 mg by mouth once daily.    celecoxib (CELEBREX) 200 MG capsule TAKE 1 CAPSULE(200 MG) BY MOUTH TWICE DAILY    HYDROcodone-acetaminophen (NORCO) 5-325 mg per tablet Take 1 tablet by mouth every 8 (eight) hours as needed for Pain.    nitroGLYCERIN (NITROSTAT) 0.3 MG SL tablet PLACE ONE TABLET UNDER TONGUE AS NEEDED FOR CHEST PAIN EVERY 5 MINUTES    promethazine (PHENERGAN) 12.5 MG Tab Take 1 tablet (12.5 mg total) by mouth every 6 (six) hours as needed.    promethazine-dextromethorphan (PROMETHAZINE-DM) 6.25-15 mg/5 mL Syrp Take 5 mLs by mouth nightly as needed (cough).    zaleplon (SONATA) 10 MG capsule TAKE 1 CAPSULE(10 MG) BY MOUTH EVERY NIGHT AS NEEDED    atorvastatin (LIPITOR) 40 MG tablet Take 1 tablet (40 mg total) by mouth once daily.     No current facility-administered medications for this visit.       Allergies: Patient has No Known Allergies.    Review of Systems   Constitutional:  Negative for appetite change, chills, fatigue and fever.   HENT:  Positive for hearing loss and tinnitus. Negative for nasal congestion,  ear discharge, facial swelling, postnasal drip, rhinorrhea, sore throat and trouble swallowing.    Eyes:  Negative for photophobia, pain, discharge, redness, itching and visual disturbance.   Respiratory:  Negative for apnea, cough, choking, chest tightness, shortness of breath, wheezing and stridor.    Cardiovascular:  Negative for chest pain and palpitations.   Gastrointestinal:  Negative for abdominal pain, constipation, diarrhea, nausea and vomiting.   Musculoskeletal:  Negative for arthralgias, gait problem, joint swelling, myalgias, neck pain and neck stiffness.   Integumentary:  Negative for color change, pallor, rash and wound.   Neurological:  Negative for dizziness, tremors, seizures, syncope, facial asymmetry, speech difficulty, weakness, light-headedness, numbness and headaches.   Hematological:  Negative for adenopathy. Does not bruise/bleed easily.   Psychiatric/Behavioral:  Negative for agitation, behavioral problems, confusion, decreased concentration, dysphoric mood, hallucinations, sleep disturbance and suicidal ideas. The patient is not nervous/anxious and is not hyperactive.           Objective     Physical Exam  Vitals and nursing note reviewed.   Constitutional:       General: She is not in acute distress.     Appearance: Normal appearance. She is well-developed. She is not ill-appearing, toxic-appearing or diaphoretic.   HENT:      Head: Normocephalic and atraumatic. Not macrocephalic and not microcephalic. No raccoon eyes, Syed's sign, abrasion, contusion, right periorbital erythema, left periorbital erythema or laceration. Hair is normal.      Right Ear: Ear canal normal. Decreased hearing noted. No laceration, drainage, swelling or tenderness. No middle ear effusion. No foreign body. No mastoid tenderness. No hemotympanum. Tympanic membrane is not injected, scarred, perforated, erythematous, retracted or bulging. Tympanic membrane has normal mobility.      Left Ear: Ear canal normal.  Decreased hearing noted. No laceration, drainage, swelling or tenderness.  No middle ear effusion. No foreign body. No mastoid tenderness. No hemotympanum. Tympanic membrane is not injected, scarred, perforated, erythematous, retracted or bulging. Tympanic membrane has normal mobility.      Nose: No nasal deformity, septal deviation, laceration, mucosal edema or rhinorrhea.      Right Sinus: No maxillary sinus tenderness.      Left Sinus: No maxillary sinus tenderness.   Eyes:      General: Lids are normal.      Conjunctiva/sclera: Conjunctivae normal.      Pupils: Pupils are equal, round, and reactive to light.   Neck:      Thyroid: No thyroid mass or thyromegaly.      Vascular: No JVD.      Trachea: No tracheal tenderness or tracheal deviation.   Cardiovascular:      Rate and Rhythm: Normal rate and regular rhythm.   Pulmonary:      Effort: Pulmonary effort is normal. No tachypnea, bradypnea, accessory muscle usage or respiratory distress.      Breath sounds: No stridor.   Abdominal:      Palpations: Abdomen is soft.   Musculoskeletal:         General: Normal range of motion.      Cervical back: Normal range of motion and neck supple. No edema, erythema or rigidity. No muscular tenderness. Normal range of motion.   Lymphadenopathy:      Head:      Right side of head: No submental, submandibular, tonsillar, preauricular or posterior auricular adenopathy.      Left side of head: No submental, submandibular, tonsillar, preauricular or posterior auricular adenopathy.      Cervical: No cervical adenopathy.      Right cervical: No superficial, deep or posterior cervical adenopathy.     Left cervical: No superficial, deep or posterior cervical adenopathy.   Skin:     General: Skin is warm and dry.      Coloration: Skin is not pale.      Findings: No abrasion, bruising, burn, ecchymosis, erythema, laceration, lesion or rash.   Neurological:      Mental Status: She is alert and oriented to person, place, and time.       Cranial Nerves: No cranial nerve deficit.      Sensory: No sensory deficit.      Motor: No tremor, atrophy, abnormal muscle tone or seizure activity.   Psychiatric:         Speech: She is communicative. Speech is not rapid and pressured or slurred.         Behavior: Behavior normal. Behavior is cooperative.         Thought Content: Thought content normal.         Judgment: Judgment normal.            Assessment and Plan     1. Sensorineural hearing loss (SNHL) of both ears    2. Asymmetrical hearing loss    3. Subjective tinnitus, unspecified laterality        Patient to see Audiology for hearing aid evaluation.    Discussed with patient multiple tinnitus management strategies including the use of maskers, instruments, background sound enrichment and other means. All questions answered and appropriate references given.           No follow-ups on file.

## 2024-12-30 NOTE — PROGRESS NOTES
Audiologic Evaluation 12/30/2024:       Sofia Augustine, a 79 y.o. female, was seen today in the clinic for an audiologic evaluation.  Ms. Augustine reported bilateral hearing loss that is worse in the left ear. She also noted left-sided tinnitus and dizziness when getting out of bed in the morning. She denied otalgia.      Tympanometry revealed Type As tympanogram in the right ear and Type As tympanogram in the left ear. Audiogram results revealed normal hearing sloping to moderately severe sensorineural hearing loss in the right ear and slight sloping to profound sensorineural hearing loss in the left ear.  Speech reception thresholds were noted at 45 dB in the right ear and 50 dB in the left ear.  Speech discrimination scores were 84% in the right ear and 52% in the left ear.    Recommendations:  Otologic evaluation  Hearing aid consultation with medical clearance  Annual audiogram  Hearing protection when in noise

## 2025-01-02 ENCOUNTER — OFFICE VISIT (OUTPATIENT)
Dept: INTERNAL MEDICINE | Facility: CLINIC | Age: 80
End: 2025-01-02
Payer: MEDICARE

## 2025-01-02 VITALS
OXYGEN SATURATION: 99 % | HEIGHT: 64 IN | SYSTOLIC BLOOD PRESSURE: 130 MMHG | WEIGHT: 166 LBS | BODY MASS INDEX: 28.34 KG/M2 | RESPIRATION RATE: 12 BRPM | DIASTOLIC BLOOD PRESSURE: 64 MMHG | TEMPERATURE: 98 F | HEART RATE: 88 BPM

## 2025-01-02 DIAGNOSIS — R07.89 CHEST WALL PAIN FOLLOWING SURGERY: Primary | ICD-10-CM

## 2025-01-02 DIAGNOSIS — G89.18 CHEST WALL PAIN FOLLOWING SURGERY: Primary | ICD-10-CM

## 2025-01-02 PROCEDURE — 1126F AMNT PAIN NOTED NONE PRSNT: CPT | Mod: CPTII,S$GLB,, | Performed by: INTERNAL MEDICINE

## 2025-01-02 PROCEDURE — 1159F MED LIST DOCD IN RCRD: CPT | Mod: CPTII,S$GLB,, | Performed by: INTERNAL MEDICINE

## 2025-01-02 PROCEDURE — 3075F SYST BP GE 130 - 139MM HG: CPT | Mod: CPTII,S$GLB,, | Performed by: INTERNAL MEDICINE

## 2025-01-02 PROCEDURE — 3078F DIAST BP <80 MM HG: CPT | Mod: CPTII,S$GLB,, | Performed by: INTERNAL MEDICINE

## 2025-01-02 PROCEDURE — 99213 OFFICE O/P EST LOW 20 MIN: CPT | Mod: S$GLB,,, | Performed by: INTERNAL MEDICINE

## 2025-01-02 PROCEDURE — 99999 PR PBB SHADOW E&M-EST. PATIENT-LVL III: CPT | Mod: PBBFAC,,, | Performed by: INTERNAL MEDICINE

## 2025-01-02 NOTE — PROGRESS NOTES
History of present illness:   79-year-old lady with a history of hypertension, coronary vasospasm, previous CVA is in today with a couple of issues.  She states that two weeks ago she had three day episode of having some diarrhea and nausea.  No fever no chills.  No abdominal pain.  Those symptoms resolved completely.  Her primary complaint is she needs a note regarding wearing a seatbelt due to discomfort.  She had left breast surgery in the past and since that time has had sensitivity of the breast and left chest wall such she can not tolerate chest strep portion of the seatbelt.  She needs a note stating that she can not wear such for legal issues.    Current medications:   Medications are noted and reviewed in our electronic medical record medication list.      Review of systems:   Constitutional: No fever no chills no generalized body aches.    Respiratory: No cough shortness of breath.    Cardiovascular: No cardiac type chest pain palpitations or syncope.    GI: See HPI.    Physical examination:   General: Pleasant alert appropriately groomed lady no acute distress.    Vital signs:  All noted and reviewed as normal.    Cardiovascular: Regular rate rhythm.  No significant murmur.    Lungs: Clear to auscultation.    Chest wall: No gross deformities there is mild tenderness palpation over the medial left breast and costosternal region.  No masses or other.      Impression:   Resolved gastroenteritis.    Chronic chest sensitivity post off.      Plan:   Reassured regarding the GI issues which have resolved.    She is given a note stating that she was advised not to wear the chest strap portion of her seat belt due to her medical condition.  She has an appointment later this month with her PCP for general health assessment

## 2025-01-04 ENCOUNTER — NURSE TRIAGE (OUTPATIENT)
Dept: ADMINISTRATIVE | Facility: CLINIC | Age: 80
End: 2025-01-04
Payer: MEDICARE

## 2025-01-04 DIAGNOSIS — M17.0 PRIMARY OSTEOARTHRITIS OF BOTH KNEES: ICD-10-CM

## 2025-01-04 DIAGNOSIS — M47.22 OSTEOARTHRITIS OF SPINE WITH RADICULOPATHY, CERVICAL REGION: ICD-10-CM

## 2025-01-04 NOTE — TELEPHONE ENCOUNTER
Patient requesting a refill for norco. States that she has 2 tablets left. Advised per protocol to contact the office when it opens. Patient verbalizes understanding. Advised the patient to call back with any further questions or concerns.      Reason for Disposition   Caller requesting a CONTROLLED substance prescription refill (e.g., narcotics, ADHD medicines)    Protocols used: Medication Refill and Renewal Call-A-

## 2025-01-06 RX ORDER — HYDROCODONE BITARTRATE AND ACETAMINOPHEN 5; 325 MG/1; MG/1
1 TABLET ORAL EVERY 8 HOURS PRN
Qty: 40 TABLET | Refills: 0 | Status: SHIPPED | OUTPATIENT
Start: 2025-01-06

## 2025-01-06 NOTE — TELEPHONE ENCOUNTER
Please inform patient that prescription has been sent to the pharmacy electronically.  However, she will need to schedule an appointment before any additional refills are provided.

## 2025-01-06 NOTE — TELEPHONE ENCOUNTER
Vm left for patient on mobile phone that RX sent to pharmacy x one month but appt needed for further refills. Letter also sent to pt home.

## 2025-01-29 ENCOUNTER — OFFICE VISIT (OUTPATIENT)
Dept: INTERNAL MEDICINE | Facility: CLINIC | Age: 80
End: 2025-01-29
Payer: MEDICARE

## 2025-01-29 VITALS
WEIGHT: 169.75 LBS | HEART RATE: 86 BPM | TEMPERATURE: 97 F | SYSTOLIC BLOOD PRESSURE: 136 MMHG | HEIGHT: 64 IN | BODY MASS INDEX: 28.98 KG/M2 | DIASTOLIC BLOOD PRESSURE: 88 MMHG | OXYGEN SATURATION: 96 %

## 2025-01-29 DIAGNOSIS — G81.91 HEMIPLEGIA AFFECTING RIGHT DOMINANT SIDE, UNSPECIFIED ETIOLOGY, UNSPECIFIED HEMIPLEGIA TYPE: ICD-10-CM

## 2025-01-29 DIAGNOSIS — R07.9 CHEST PAIN, UNSPECIFIED TYPE: ICD-10-CM

## 2025-01-29 DIAGNOSIS — F32.1 MAJOR DEPRESSIVE DISORDER, SINGLE EPISODE, MODERATE: ICD-10-CM

## 2025-01-29 DIAGNOSIS — G45.9 TIA (TRANSIENT ISCHEMIC ATTACK): Primary | ICD-10-CM

## 2025-01-29 PROCEDURE — 99214 OFFICE O/P EST MOD 30 MIN: CPT | Mod: S$GLB,,, | Performed by: INTERNAL MEDICINE

## 2025-01-29 PROCEDURE — 1160F RVW MEDS BY RX/DR IN RCRD: CPT | Mod: CPTII,S$GLB,, | Performed by: INTERNAL MEDICINE

## 2025-01-29 PROCEDURE — G2211 COMPLEX E/M VISIT ADD ON: HCPCS | Mod: S$GLB,,, | Performed by: INTERNAL MEDICINE

## 2025-01-29 PROCEDURE — 1125F AMNT PAIN NOTED PAIN PRSNT: CPT | Mod: CPTII,S$GLB,, | Performed by: INTERNAL MEDICINE

## 2025-01-29 PROCEDURE — 93010 ELECTROCARDIOGRAM REPORT: CPT | Mod: S$GLB,,, | Performed by: INTERNAL MEDICINE

## 2025-01-29 PROCEDURE — 1159F MED LIST DOCD IN RCRD: CPT | Mod: CPTII,S$GLB,, | Performed by: INTERNAL MEDICINE

## 2025-01-29 PROCEDURE — 99999 PR PBB SHADOW E&M-EST. PATIENT-LVL IV: CPT | Mod: PBBFAC,,, | Performed by: INTERNAL MEDICINE

## 2025-01-29 PROCEDURE — 3075F SYST BP GE 130 - 139MM HG: CPT | Mod: CPTII,S$GLB,, | Performed by: INTERNAL MEDICINE

## 2025-01-29 PROCEDURE — 3079F DIAST BP 80-89 MM HG: CPT | Mod: CPTII,S$GLB,, | Performed by: INTERNAL MEDICINE

## 2025-01-29 PROCEDURE — 93005 ELECTROCARDIOGRAM TRACING: CPT | Mod: S$GLB,,, | Performed by: INTERNAL MEDICINE
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
## 2025-01-29 NOTE — LETTER
January 29, 2025                 [facility]  [address] [place] [address].  [place] [address]  Phone: [phone]  Fax: [phone] January 29, 2025     Patient: Sofia Augustine    YOB: 1945   Date of Visit: 1/29/2025       To Whom It May Concern:    It is my medical opinion that Sofia Augustine  is unable to wear a shoulder belt due to pain, secondary to a surgical procedure in the past.    If you have any questions or concerns, please don't hesitate to call.    Sincerely,          Luanne Connell MD

## 2025-01-30 LAB
OHS QRS DURATION: 74 MS
OHS QTC CALCULATION: 420 MS

## 2025-02-07 ENCOUNTER — LAB VISIT (OUTPATIENT)
Dept: LAB | Facility: HOSPITAL | Age: 80
End: 2025-02-07
Attending: INTERNAL MEDICINE
Payer: MEDICARE

## 2025-02-07 DIAGNOSIS — R07.9 CHEST PAIN, UNSPECIFIED TYPE: ICD-10-CM

## 2025-02-07 LAB
ALBUMIN SERPL BCP-MCNC: 3.6 G/DL (ref 3.5–5.2)
ALP SERPL-CCNC: 73 U/L (ref 40–150)
ALT SERPL W/O P-5'-P-CCNC: 5 U/L (ref 10–44)
ANION GAP SERPL CALC-SCNC: 8 MMOL/L (ref 8–16)
AST SERPL-CCNC: 16 U/L (ref 10–40)
BILIRUB SERPL-MCNC: 0.9 MG/DL (ref 0.1–1)
BUN SERPL-MCNC: 16 MG/DL (ref 8–23)
CALCIUM SERPL-MCNC: 9.2 MG/DL (ref 8.7–10.5)
CHLORIDE SERPL-SCNC: 109 MMOL/L (ref 95–110)
CHOLEST SERPL-MCNC: 218 MG/DL (ref 120–199)
CHOLEST/HDLC SERPL: 4 {RATIO} (ref 2–5)
CO2 SERPL-SCNC: 26 MMOL/L (ref 23–29)
CREAT SERPL-MCNC: 0.8 MG/DL (ref 0.5–1.4)
EST. GFR  (NO RACE VARIABLE): >60 ML/MIN/1.73 M^2
GLUCOSE SERPL-MCNC: 95 MG/DL (ref 70–110)
HDLC SERPL-MCNC: 55 MG/DL (ref 40–75)
HDLC SERPL: 25.2 % (ref 20–50)
LDLC SERPL CALC-MCNC: 141 MG/DL (ref 63–159)
NONHDLC SERPL-MCNC: 163 MG/DL
POTASSIUM SERPL-SCNC: 3.7 MMOL/L (ref 3.5–5.1)
PROT SERPL-MCNC: 6.9 G/DL (ref 6–8.4)
SODIUM SERPL-SCNC: 143 MMOL/L (ref 136–145)
TRIGL SERPL-MCNC: 110 MG/DL (ref 30–150)

## 2025-02-07 PROCEDURE — 36415 COLL VENOUS BLD VENIPUNCTURE: CPT | Mod: PO | Performed by: INTERNAL MEDICINE

## 2025-02-07 PROCEDURE — 80053 COMPREHEN METABOLIC PANEL: CPT | Performed by: INTERNAL MEDICINE

## 2025-02-07 PROCEDURE — 80061 LIPID PANEL: CPT | Performed by: INTERNAL MEDICINE

## 2025-02-14 ENCOUNTER — TELEPHONE (OUTPATIENT)
Dept: INTERNAL MEDICINE | Facility: CLINIC | Age: 80
End: 2025-02-14
Payer: MEDICARE

## 2025-02-14 DIAGNOSIS — M47.22 OSTEOARTHRITIS OF SPINE WITH RADICULOPATHY, CERVICAL REGION: ICD-10-CM

## 2025-02-14 DIAGNOSIS — M17.0 PRIMARY OSTEOARTHRITIS OF BOTH KNEES: ICD-10-CM

## 2025-02-14 RX ORDER — HYDROCODONE BITARTRATE AND ACETAMINOPHEN 5; 325 MG/1; MG/1
1 TABLET ORAL EVERY 8 HOURS PRN
Qty: 40 TABLET | Refills: 0 | Status: SHIPPED | OUTPATIENT
Start: 2025-02-14

## 2025-02-14 NOTE — TELEPHONE ENCOUNTER
Hello   Pt need refill on her Hydrocodone-acetaminophen 5-325 mg last refill 1/6/25 .    ----- Message from Gloria sent at 2/14/2025  2:40 PM CST -----  Contact: [phone]  Requesting an RX refill or new RX.  Is this a refill or new RX: refill 1  RX name and strength (copy/paste from chart): HYDROcodone-acetaminophen (NORCO) 5-325 mg per tablet   Is this a 30 day or 90 day RX:   Pharmacy name and phone # (copy/paste from chart):  [facility] #[id number] - [place] - [address]  AT [facility] [address]   Phone: [phone]  Fax: [phone]          The doctors have asked that we provide their patients with the following 2 reminders -- prescription refills can take up to 72 hours, and a friendly reminder that in the future you can use your [facility] account to request refills:

## 2025-02-26 ENCOUNTER — RESULTS FOLLOW-UP (OUTPATIENT)
Dept: INTERNAL MEDICINE | Facility: CLINIC | Age: 80
End: 2025-02-26

## 2025-03-17 DIAGNOSIS — M17.0 PRIMARY OSTEOARTHRITIS OF BOTH KNEES: ICD-10-CM

## 2025-03-17 DIAGNOSIS — M47.22 OSTEOARTHRITIS OF SPINE WITH RADICULOPATHY, CERVICAL REGION: ICD-10-CM

## 2025-03-17 RX ORDER — HYDROCODONE BITARTRATE AND ACETAMINOPHEN 5; 325 MG/1; MG/1
1 TABLET ORAL EVERY 8 HOURS PRN
Qty: 40 TABLET | Refills: 0 | Status: SHIPPED | OUTPATIENT
Start: 2025-03-17

## 2025-03-17 NOTE — TELEPHONE ENCOUNTER
Care Due:                  Date            Visit Type   Department     Provider  --------------------------------------------------------------------------------                                EP -                              PRIMARY      MET INTERNAL  Last Visit: 01-      CARE ([facility])   MANINDER Connell                              EP -                              PRIMARY      MET INTERNAL  Next Visit: 05-      CARE ([facility])   [facility]       Luanne Connell                                                            Last  Test          Frequency    Reason                     Performed    Due Date  --------------------------------------------------------------------------------    CBC.........  12 months..  celecoxib................  Not Found    Overdue    [employer] Embedded Care Due Messages. Reference number: [id number].   3/17/2025 4:37:48 PM CDT

## 2025-03-17 NOTE — TELEPHONE ENCOUNTER
----- Message from Edith sent at 3/17/2025  4:31 PM CDT -----  Contact: [phone]  Requesting an RX refill or new RX.Is this a refill or new RX: refillRX name and strength (copy/paste from chart):  HYDROcodone-acetaminophen (NORCO) 5-325 mg per tablet 40 tabletIs this a 30 day or 90 day RX: Pharmacy name and phone # (copy/paste from chart):  [facility] #[id number] - [place] - [address]  AT Count includes the [facility] & [id number] [place] [id number]: [phone] Fax: [phone]

## 2025-05-05 DIAGNOSIS — M47.22 OSTEOARTHRITIS OF SPINE WITH RADICULOPATHY, CERVICAL REGION: ICD-10-CM

## 2025-05-05 DIAGNOSIS — M17.0 PRIMARY OSTEOARTHRITIS OF BOTH KNEES: ICD-10-CM

## 2025-05-05 RX ORDER — HYDROCODONE BITARTRATE AND ACETAMINOPHEN 5; 325 MG/1; MG/1
1 TABLET ORAL EVERY 8 HOURS PRN
Qty: 40 TABLET | Refills: 0 | Status: SHIPPED | OUTPATIENT
Start: 2025-05-05

## 2025-05-05 NOTE — TELEPHONE ENCOUNTER
----- Message from Yareli sent at 5/5/2025  8:22 AM CDT -----  Contact: [phone]  Requesting an RX refill or new RX.Is this a refill or new RX: Refill RX name and strength (copy/paste from chart):  HYDROcodone-acetaminophen (NORCO) 5-325 mg per tablet Is this a 30 day or 90 day RX: 90Pharmacy name and phone # (copy/paste from chart):  [facility] #[id number] - [place] - [address]  AT Replaced by [facility] & [id number] [place] [id number]: [phone] Fax: [phone]

## 2025-05-05 NOTE — TELEPHONE ENCOUNTER
No care due was identified.  [facility] Embedded Care Due Messages. Reference number: [id number].   5/05/2025 9:11:33 AM CDT

## 2025-05-14 ENCOUNTER — OFFICE VISIT (OUTPATIENT)
Dept: INTERNAL MEDICINE | Facility: CLINIC | Age: 80
End: 2025-05-14
Payer: MEDICARE

## 2025-05-14 VITALS
BODY MASS INDEX: 29.74 KG/M2 | DIASTOLIC BLOOD PRESSURE: 72 MMHG | HEART RATE: 85 BPM | TEMPERATURE: 97 F | WEIGHT: 174.19 LBS | HEIGHT: 64 IN | SYSTOLIC BLOOD PRESSURE: 128 MMHG

## 2025-05-14 DIAGNOSIS — R07.9 CHEST PAIN, UNSPECIFIED TYPE: Primary | ICD-10-CM

## 2025-05-14 DIAGNOSIS — E78.2 MIXED HYPERLIPIDEMIA: ICD-10-CM

## 2025-05-14 PROCEDURE — 3074F SYST BP LT 130 MM HG: CPT | Mod: CPTII,S$GLB,, | Performed by: INTERNAL MEDICINE

## 2025-05-14 PROCEDURE — 99214 OFFICE O/P EST MOD 30 MIN: CPT | Mod: S$GLB,,, | Performed by: INTERNAL MEDICINE

## 2025-05-14 PROCEDURE — 3078F DIAST BP <80 MM HG: CPT | Mod: CPTII,S$GLB,, | Performed by: INTERNAL MEDICINE

## 2025-05-14 PROCEDURE — G2211 COMPLEX E/M VISIT ADD ON: HCPCS | Mod: S$GLB,,, | Performed by: INTERNAL MEDICINE

## 2025-05-14 PROCEDURE — 1125F AMNT PAIN NOTED PAIN PRSNT: CPT | Mod: CPTII,S$GLB,, | Performed by: INTERNAL MEDICINE

## 2025-05-14 PROCEDURE — 1160F RVW MEDS BY RX/DR IN RCRD: CPT | Mod: CPTII,S$GLB,, | Performed by: INTERNAL MEDICINE

## 2025-05-14 PROCEDURE — 3288F FALL RISK ASSESSMENT DOCD: CPT | Mod: CPTII,S$GLB,, | Performed by: INTERNAL MEDICINE

## 2025-05-14 PROCEDURE — 1101F PT FALLS ASSESS-DOCD LE1/YR: CPT | Mod: CPTII,S$GLB,, | Performed by: INTERNAL MEDICINE

## 2025-05-14 PROCEDURE — 99999 PR PBB SHADOW E&M-EST. PATIENT-LVL IV: CPT | Mod: PBBFAC,,, | Performed by: INTERNAL MEDICINE

## 2025-05-14 PROCEDURE — 1159F MED LIST DOCD IN RCRD: CPT | Mod: CPTII,S$GLB,, | Performed by: INTERNAL MEDICINE

## 2025-05-14 NOTE — PROGRESS NOTES
History of Present Illness    CHIEF COMPLAINT:  Sofia presents today for follow up of chest pain    CHEST PAIN:  She experiences intermittent chest pain triggered by stress, described as stabbing in nature, accompanied by facial numbness, tongue swelling, and difficulty speaking. During these episodes, she has shortness of breath and feels cold without sweating. While the chest pain resolves quickly, facial numbness persists for days. She takes aspirin for pain relief but reports nitroglycerin is ineffective. EKG in January was normal despite chest pain symptoms.    EXERCISE TOLERANCE:  She reports limited exercise tolerance, noting she often cannot complete shopping trips due to leg pain, requiring her to return to her car before finishing.    SLEEP:  She has difficulty initiating and maintaining sleep with frequent nighttime awakenings despite use of sleep medication.    CURRENT MEDICATIONS:  She takes Sonata for sleep, hydrocodone, aspirin 81mg, and has nitroglycerin available.    ALLERGIES:  No known medication allergies.    SOCIAL HISTORY:  Never smoker.    LABS:  Cholesterol was slightly elevated in February but showed overall improvement from previous values.      ROS:  Head: +tongue swelling, +speech difficulty  Respiratory: +shortness of breath  Cardiovascular: +chest pain  Gastrointestinal: -nausea, -vomiting  Musculoskeletal: +lower extremity pain with movement  Neurological: +numbness  Psychiatric: +sleep difficulty       PAST MEDICAL HISTORY:  Past Medical History:   Diagnosis Date    Abnormal mammogram of both breasts     Arthritis     Coronary vasospasm 10/31/2019    Hypertension     Major depressive disorder 5/14/2017    Memory disorder     Nephrolithiasis     Positive D dimer 3/23/2019    Seizures     Stroke        SURGICAL HISTORY:  Past Surgical History:   Procedure Laterality Date    CATARACT EXTRACTION      cysts breast      TONSILLECTOMY         MEDS:  Medcard reviewed and  updated    ALLERGIES: Allergy Card reviewed and updated    SOCIAL HISTORY:   The patient is a nonsmoker.    PE:   APPEARANCE: Well nourished, well developed, in no acute distress.    CHEST: Lungs clear to auscultation with unlabored respirations.  CARDIOVASCULAR: Normal S1, S2. No murmurs. No carotid bruits. No pedal edema.  ABDOMEN: Bowel sounds normal. Not distended. Soft. No tenderness or masses. PSYCHIATRIC: The patient is oriented to person, place, and time and has a pleasant affect.        ASSESSMENT/PLAN:  Sofia was seen today for follow-up.    Diagnoses and all orders for this visit:    Chest pain, unspecified type  -     Ambulatory referral/consult to Cardiology; Future    Mixed hyperlipidemia  -      elevated, but improving  -     encouraged healthy diet and exercise as tolerated      IMPRESSION:  - Reviewed history of coronary vasospasm and recent episodes of chest pain, noting description of stabbing chest pain associated with facial numbness, difficulty speaking, and shortness of breath.  - Considered stress as potential trigger for chest pain episodes.  - Assessed nitroglycerin has not been effective in relieving symptoms.  - Evaluated recent cholesterol levels, noting they are slightly elevated but improved.  - Determined need for cardiology evaluation to further assess chest pain and consider more aggressive cholesterol management.    CHEST PAIN (INTERCOSTAL PAIN):  - Referred the patient to cardiology for evaluation of chest pain and potential coronary vasospasm.  - Sofia reports occasional stabbing chest pain.  - EKG in January was normal despite chest pain.  - Advised to take aspirin when experiencing chest pain.    SHORTNESS OF BREATH:  - Sofia experiences shortness of breath during chest pain episodes.    FACIAL NUMBNESS AND SWELLING:  - Sofia reports numbness in face and tongue during chest pain episodes, with tongue swelling that causes difficulty talking.    LEG PAIN:  - Sofia reports leg  pain that limits ability to finish shopping and requires returning to the car.    INSOMNIA:  - Sofia reports inability to sleep despite taking hypnotics.  - Currently using Sonata for sleep management.    HYPERLIPIDEMIA:  - Cholesterol was checked in February and found to be slightly elevated but overall improved.  - Will provide cholesterol diet information via patient portal.  - Advised to follow a healthy diet and exercise as tolerated.    GENERAL HEALTH RECOMMENDATIONS:  - Discussed importance of healthy diet and exercise for cardiovascular health.  - Explained chair exercises as alternative for patients with limited mobility.    FOLLOW-UP:  - Follow up in 3-4 months.

## 2025-05-28 NOTE — PROGRESS NOTES
"Outpatient Neurology Consultation    Requesting physician: Dr. 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 Connell  RTC 4 months     Problem List Items Addressed This Visit    None  Visit Diagnoses         TIA (transient ischemic attack)                CC: TIA    HPI:  78 y/o WF referred for episodic neurological symptoms.  She reports a few spells/year when stressed of facial numbness and abnl tongue sensation.  Spells start with being thick-tongued, followed by L facial numbness and chest tightness.  She denies associated headache. Spells last "all day."   She has had multiple neurological evaluations for these symptoms.  Her chart indicates an episode in '19 generalizing to involve bilat face and all 4 extremities.  She endorses significant stress and chronic depression.     Testing of note:  CTAs mild L M1 and L PCA stenosis  MRIs - mild WM changes    EEG 2016 - independent temporal slowing  EEG 2017 nl       Past Medical History:   Diagnosis Date    Abnormal mammogram of both breasts     Arthritis     Coronary vasospasm 10/31/2019    Hypertension     Major depressive disorder 5/14/2017    Memory disorder     Nephrolithiasis     Positive D dimer 3/23/2019    Seizures     Stroke       Past Surgical History:   Procedure Laterality Date    CATARACT EXTRACTION      cysts breast      TONSILLECTOMY        Outpatient Medications Marked as Taking for the 5/29/25 encounter (Office Visit) with Reinaldo Rosario MD   Medication Sig Dispense Refill    aspirin (ECOTRIN) 81 MG EC tablet Take 81 mg by mouth once daily.      " "HYDROcodone-acetaminophen (NORCO) 5-325 mg per tablet Take 1 tablet by mouth every 8 (eight) hours as needed for Pain. 40 tablet 0    nitroGLYCERIN (NITROSTAT) 0.3 MG SL tablet PLACE ONE TABLET UNDER TONGUE AS NEEDED FOR CHEST PAIN EVERY 5 MINUTES 100 tablet 0    zaleplon (SONATA) 10 MG capsule TAKE 1 CAPSULE(10 MG) BY MOUTH EVERY NIGHT AS NEEDED 90 capsule 3      Review of patient's allergies indicates:  No Known Allergies   No family history on file.   Social History[1]    BP (!) 135/91   Pulse 78   Ht 5' 4" (1.626 m)   Wt 79.8 kg (175 lb 14.8 oz)   LMP  (LMP Unknown)   BMI 30.20 kg/m²    Well developed, well nourished female  Extremities: no edema  HEENT: edentulous    Mental status:   Awake, alert and appropriately oriented   Normal recent and remote memory   Normal attention and concentration   Normal speech and language   Normal fund of knowledge   No extinction  Cranial nerves:   PERRLA   EOMF without nystagmus   VFF   Normal facial sensation   Normal facial movements   Intact hearing bilaterally   Palate elevates symmetrically   Normal SCM and trapezius strength   Tongue midline  Motor:   No pronator drift   Normal FF movements bilaterally   Normal muscle tone, bulk and power   No abnormal movements  Sensory   Intact to LT,    GS temp  DTRs   2+ and symmetric in UEs; absent LEs   Plantar responses nt  Coordination   Intact to FNF  Gait   W/C    Data Reviewed:    No results found for: "LDL"  Lab Results   Component Value Date    CHOL 218 (H) 02/07/2025    CHOL 221 (H) 03/21/2023    CHOL 247 (H) 07/20/2022      Lab Results   Component Value Date    HDL 55 02/07/2025    HDL 53 03/21/2023    HDL 59 07/20/2022     Lab Results   Component Value Date    LDLCALC 141.0 02/07/2025    LDLCALC 146.0 03/21/2023    LDLCALC 171.6 (H) 07/20/2022      Lab Results   Component Value Date    TRIG 110 02/07/2025    TRIG 110 03/21/2023    TRIG 82 07/20/2022     Lab Results   Component Value Date    TOTALCHOLEST 4.0 " 02/07/2025    TOTALCHOLEST 4.2 03/21/2023    TOTALCHOLEST 4.2 07/20/2022     Lab Results   Component Value Date    NONHDLCHOL 163 02/07/2025    NONHDLCHOL 168 03/21/2023    NONHDLCHOL 188 07/20/2022     Lab Results   Component Value Date    CHOLHDL 25.2 02/07/2025    CHOLHDL 24.0 03/21/2023    CHOLHDL 23.9 07/20/2022     Lab Results   Component Value Date    HGBA1C 5.7 (H) 03/23/2019     Lab Results   Component Value Date    QUALSMUQ92 254 12/06/2016     Lab Results   Component Value Date    TSH 0.599 07/20/2022     Lab Results   Component Value Date    WBC 5.66 07/20/2022    HGB 14.3 07/20/2022    HCT 41.7 07/20/2022    MCV 90 07/20/2022     07/20/2022       CMP  Sodium   Date Value Ref Range Status   02/07/2025 143 136 - 145 mmol/L Final     Potassium   Date Value Ref Range Status   02/07/2025 3.7 3.5 - 5.1 mmol/L Final     Chloride   Date Value Ref Range Status   02/07/2025 109 95 - 110 mmol/L Final     CO2   Date Value Ref Range Status   02/07/2025 26 23 - 29 mmol/L Final     Glucose   Date Value Ref Range Status   02/07/2025 95 70 - 110 mg/dL Final     BUN   Date Value Ref Range Status   02/07/2025 16 8 - 23 mg/dL Final     Creatinine   Date Value Ref Range Status   02/07/2025 0.8 0.5 - 1.4 mg/dL Final     Calcium   Date Value Ref Range Status   02/07/2025 9.2 8.7 - 10.5 mg/dL Final     Total Protein   Date Value Ref Range Status   02/07/2025 6.9 6.0 - 8.4 g/dL Final     Albumin   Date Value Ref Range Status   02/07/2025 3.6 3.5 - 5.2 g/dL Final     Total Bilirubin   Date Value Ref Range Status   02/07/2025 0.9 0.1 - 1.0 mg/dL Final     Comment:     For infants and newborns, interpretation of results should be based  on gestational age, weight and in agreement with clinical  observations.    Premature Infant recommended reference ranges:  Up to 24 hours.............<8.0 mg/dL  Up to 48 hours............<12.0 mg/dL  3-5 days..................<15.0 mg/dL  6-29 days.................<15.0 mg/dL        Alkaline Phosphatase   Date Value Ref Range Status   02/07/2025 73 40 - 150 U/L Final     AST   Date Value Ref Range Status   02/07/2025 16 10 - 40 U/L Final     ALT   Date Value Ref Range Status   02/07/2025 5 (L) 10 - 44 U/L Final     Anion Gap   Date Value Ref Range Status   02/07/2025 8 8 - 16 mmol/L Final     eGFR   Date Value Ref Range Status   02/07/2025 >60.0 >60 mL/min/1.73 m^2 Final         Reinaldo Rosario MD         [1]   Social History  Socioeconomic History    Marital status:    Tobacco Use    Smoking status: Never    Smokeless tobacco: Never   Substance and Sexual Activity    Alcohol use: No    Drug use: No    Sexual activity: Never     Social Drivers of Health     Financial Resource Strain: Medium Risk (1/1/2025)    Overall Financial Resource Strain (CARDIA)     Difficulty of Paying Living Expenses: Somewhat hard   Food Insecurity: Food Insecurity Present (1/1/2025)    Hunger Vital Sign     Worried About Running Out of Food in the Last Year: Sometimes true     Ran Out of Food in the Last Year: Sometimes true   Transportation Needs: No Transportation Needs (6/30/2023)    PRAPARE - Transportation     Lack of Transportation (Medical): No     Lack of Transportation (Non-Medical): No   Physical Activity: Unknown (1/1/2025)    Exercise Vital Sign     Days of Exercise per Week: 1 day   Stress: No Stress Concern Present (1/1/2025)    [employer] - Occupational Stress Questionnaire     Feeling of Stress : Only a little   Housing Stability: Low Risk  (6/30/2023)    Housing Stability Vital Sign     Unable to Pay for Housing in the Last Year: No     Number of Places Lived in the Last Year: 1     Unstable Housing in the Last Year: No

## 2025-05-29 ENCOUNTER — OFFICE VISIT (OUTPATIENT)
Dept: NEUROLOGY | Facility: CLINIC | Age: 80
End: 2025-05-29
Payer: MEDICARE

## 2025-05-29 VITALS
DIASTOLIC BLOOD PRESSURE: 91 MMHG | HEART RATE: 78 BPM | BODY MASS INDEX: 30.04 KG/M2 | WEIGHT: 175.94 LBS | HEIGHT: 64 IN | SYSTOLIC BLOOD PRESSURE: 135 MMHG

## 2025-05-29 DIAGNOSIS — F41.9 ANXIETY AND DEPRESSION: Primary | ICD-10-CM

## 2025-05-29 DIAGNOSIS — F32.A ANXIETY AND DEPRESSION: Primary | ICD-10-CM

## 2025-05-29 DIAGNOSIS — G45.9 TIA (TRANSIENT ISCHEMIC ATTACK): ICD-10-CM

## 2025-05-29 DIAGNOSIS — E78.2 MIXED HYPERLIPIDEMIA: ICD-10-CM

## 2025-05-29 PROCEDURE — 99999 PR PBB SHADOW E&M-EST. PATIENT-LVL III: CPT | Mod: PBBFAC,,, | Performed by: PSYCHIATRY & NEUROLOGY

## 2025-05-29 RX ORDER — ESCITALOPRAM OXALATE 10 MG/1
10 TABLET ORAL DAILY
Qty: 90 TABLET | Refills: 3 | Status: SHIPPED | OUTPATIENT
Start: 2025-05-29 | End: 2026-05-29

## 2025-05-29 RX ORDER — ATORVASTATIN CALCIUM 40 MG/1
40 TABLET, FILM COATED ORAL DAILY
Qty: 90 TABLET | Refills: 3 | Status: SHIPPED | OUTPATIENT
Start: 2025-05-29 | End: 2026-05-29

## 2025-05-30 ENCOUNTER — TELEPHONE (OUTPATIENT)
Dept: INTERNAL MEDICINE | Facility: CLINIC | Age: 80
End: 2025-05-30
Payer: MEDICARE

## 2025-05-30 NOTE — TELEPHONE ENCOUNTER
Lov 5/14/25. Need appt?    ----- Message from Yareli sent at 5/30/2025 10:01 AM CDT -----  Contact: [phone]  .1MEDICALADVICE Patient is calling for Medical Advice regarding: Pt is requesting a motorized scooter. She stated that she is having a lot of trouble with both of her legs. She stated that she is unable to bear weight on her legs How long has patient had these symptoms:Pharmacy name and phone#:Patient wants a call back or [facility]: Call Back Comments:Please advise patient replies from provider may take up to 48 hours.

## 2025-06-03 ENCOUNTER — TELEPHONE (OUTPATIENT)
Dept: INTERNAL MEDICINE | Facility: CLINIC | Age: 80
End: 2025-06-03
Payer: MEDICARE

## 2025-06-03 DIAGNOSIS — E66.9 OBESITY (BMI 30-39.9): Primary | ICD-10-CM

## 2025-06-05 ENCOUNTER — OFFICE VISIT (OUTPATIENT)
Dept: CARDIOLOGY | Facility: CLINIC | Age: 80
End: 2025-06-05
Payer: MEDICARE

## 2025-06-05 VITALS
SYSTOLIC BLOOD PRESSURE: 137 MMHG | WEIGHT: 175.5 LBS | DIASTOLIC BLOOD PRESSURE: 80 MMHG | BODY MASS INDEX: 30.12 KG/M2 | HEART RATE: 97 BPM

## 2025-06-05 DIAGNOSIS — I24.89 DEMAND ISCHEMIA: ICD-10-CM

## 2025-06-05 DIAGNOSIS — R07.9 CHEST PAIN, UNSPECIFIED TYPE: Primary | ICD-10-CM

## 2025-06-05 DIAGNOSIS — E78.2 MIXED HYPERLIPIDEMIA: ICD-10-CM

## 2025-06-05 DIAGNOSIS — G45.9 TIA (TRANSIENT ISCHEMIC ATTACK): ICD-10-CM

## 2025-06-05 PROCEDURE — 99999 PR PBB SHADOW E&M-EST. PATIENT-LVL III: CPT | Mod: PBBFAC,,, | Performed by: INTERNAL MEDICINE

## 2025-06-05 PROCEDURE — 3075F SYST BP GE 130 - 139MM HG: CPT | Mod: CPTII,S$GLB,, | Performed by: INTERNAL MEDICINE

## 2025-06-05 PROCEDURE — 1101F PT FALLS ASSESS-DOCD LE1/YR: CPT | Mod: CPTII,S$GLB,, | Performed by: INTERNAL MEDICINE

## 2025-06-05 PROCEDURE — 3288F FALL RISK ASSESSMENT DOCD: CPT | Mod: CPTII,S$GLB,, | Performed by: INTERNAL MEDICINE

## 2025-06-05 PROCEDURE — 1159F MED LIST DOCD IN RCRD: CPT | Mod: CPTII,S$GLB,, | Performed by: INTERNAL MEDICINE

## 2025-06-05 PROCEDURE — 3079F DIAST BP 80-89 MM HG: CPT | Mod: CPTII,S$GLB,, | Performed by: INTERNAL MEDICINE

## 2025-06-05 PROCEDURE — 1160F RVW MEDS BY RX/DR IN RCRD: CPT | Mod: CPTII,S$GLB,, | Performed by: INTERNAL MEDICINE

## 2025-06-05 PROCEDURE — 99204 OFFICE O/P NEW MOD 45 MIN: CPT | Mod: S$GLB,,, | Performed by: INTERNAL MEDICINE

## 2025-06-10 ENCOUNTER — TELEPHONE (OUTPATIENT)
Dept: INTERNAL MEDICINE | Facility: CLINIC | Age: 80
End: 2025-06-10
Payer: MEDICARE

## 2025-06-10 NOTE — TELEPHONE ENCOUNTER
----- Message from Nina sent at 6/10/2025 11:05 AM CDT -----  Type:  RX Refill RequestWho Called: SARTHAK PEREZ [[id number]]Refill or New Rx:RefillRX Name and Strength:HYDROcodone-acetaminophen (NORCO) 5-325 mg per tabletPreferred Pharmacy with phone number:[id number] [address] [facility] [address] [facility] [address] Phone: [phone]: [phone] the patient rather a call back or a response via [facility]? Call back Best Call Back Number:[phone] Additional Information: Patient states she would like a refill on medication above. Patient would like a call back with further assistance.

## 2025-06-13 DIAGNOSIS — M47.22 OSTEOARTHRITIS OF SPINE WITH RADICULOPATHY, CERVICAL REGION: ICD-10-CM

## 2025-06-13 DIAGNOSIS — M17.0 PRIMARY OSTEOARTHRITIS OF BOTH KNEES: ICD-10-CM

## 2025-06-13 RX ORDER — HYDROCODONE BITARTRATE AND ACETAMINOPHEN 5; 325 MG/1; MG/1
1 TABLET ORAL EVERY 8 HOURS PRN
Qty: 40 TABLET | Refills: 0 | Status: SHIPPED | OUTPATIENT
Start: 2025-06-13

## 2025-06-13 NOTE — TELEPHONE ENCOUNTER
No care due was identified.  [facility] Embedded Care Due Messages. Reference number: [id number].   6/13/2025 10:42:26 AM CDT

## 2025-06-19 ENCOUNTER — TELEPHONE (OUTPATIENT)
Dept: INTERNAL MEDICINE | Facility: CLINIC | Age: 80
End: 2025-06-19
Payer: MEDICARE

## 2025-06-19 DIAGNOSIS — R26.89 IMPAIRED GAIT AND MOBILITY: Primary | ICD-10-CM

## 2025-06-19 DIAGNOSIS — G47.00 INSOMNIA, UNSPECIFIED TYPE: ICD-10-CM

## 2025-06-19 NOTE — TELEPHONE ENCOUNTER
Copied from CRM #[id number]. Topic: General Inquiry - Patient Advice  >> Jun 19, 2025  9:23 AM Mikhail wrote:  .1MEDICALADVICE     Patient is calling for Medical Advice regarding:pt is calling in regards to see if you can get her a Rx for a scooter because she having trouble walking and if so can you give her a call in regards to the Rx     [facility], provide patient's call back phone number:[facility] [phone]    Comments:    Please advise patient replies from provider may take up to 48 hours.

## 2025-06-19 NOTE — TELEPHONE ENCOUNTER
Bishnu ordered and faxed to [facility] on 06/19/25 with the last office note. However this may need another appt in office. For documentation.

## 2025-06-20 ENCOUNTER — HOSPITAL ENCOUNTER (OUTPATIENT)
Dept: CARDIOLOGY | Facility: HOSPITAL | Age: 80
Discharge: HOME | End: 2025-06-20
Attending: INTERNAL MEDICINE
Payer: MEDICARE

## 2025-06-20 VITALS
HEIGHT: 64 IN | BODY MASS INDEX: 29.88 KG/M2 | HEART RATE: 97 BPM | DIASTOLIC BLOOD PRESSURE: 70 MMHG | WEIGHT: 175 LBS | SYSTOLIC BLOOD PRESSURE: 105 MMHG

## 2025-06-20 DIAGNOSIS — R07.9 CHEST PAIN, UNSPECIFIED TYPE: ICD-10-CM

## 2025-06-20 LAB
CV STRESS BASE HR: 85 BPM
DIASTOLIC BLOOD PRESSURE: 94 MMHG
OHS CV CPX 1 MINUTE RECOVERY HEART RATE: 106 BPM
OHS CV CPX 85 PERCENT MAX PREDICTED HEART RATE MALE: 119
OHS CV CPX MAX PREDICTED HEART RATE: 140
OHS CV CPX PATIENT IS FEMALE: 1
OHS CV CPX PATIENT IS MALE: 0
OHS CV CPX PEAK DIASTOLIC BLOOD PRESSURE: 94 MMHG
OHS CV CPX PEAK HEAR RATE: 86 BPM
OHS CV CPX PEAK RATE PRESSURE PRODUCT: NORMAL
OHS CV CPX PEAK SYSTOLIC BLOOD PRESSURE: 119 MMHG
OHS CV CPX PERCENT MAX PREDICTED HEART RATE ACHIEVED: 63
OHS CV CPX RATE PRESSURE PRODUCT PRESENTING: 9945
OHS CV INITIAL DOSE: 10 MCG/KG/MIN
OHS CV PEAK DOSE: 30 MCG/KG/MIN
SYSTOLIC BLOOD PRESSURE: 117 MMHG

## 2025-06-20 PROCEDURE — 78452 HT MUSCLE IMAGE SPECT MULT: CPT | Mod: 26,,, | Performed by: INTERNAL MEDICINE

## 2025-06-20 PROCEDURE — 93016 CV STRESS TEST SUPVJ ONLY: CPT | Mod: ,,, | Performed by: INTERNAL MEDICINE

## 2025-06-20 PROCEDURE — 93018 CV STRESS TEST I&R ONLY: CPT | Mod: ,,, | Performed by: INTERNAL MEDICINE

## 2025-06-20 PROCEDURE — 78452 HT MUSCLE IMAGE SPECT MULT: CPT

## 2025-06-20 PROCEDURE — 63600175 PHARM REV CODE 636 W HCPCS: Performed by: INTERNAL MEDICINE

## 2025-06-20 PROCEDURE — A9502 TC99M TETROFOSMIN: HCPCS | Performed by: INTERNAL MEDICINE

## 2025-06-20 RX ORDER — REGADENOSON 0.08 MG/ML
0.4 INJECTION, SOLUTION INTRAVENOUS
Status: COMPLETED | OUTPATIENT
Start: 2025-06-20 | End: 2025-06-20

## 2025-06-20 RX ORDER — ZALEPLON 10 MG/1
CAPSULE ORAL
Qty: 90 CAPSULE | Refills: 0 | Status: SHIPPED | OUTPATIENT
Start: 2025-06-20

## 2025-06-20 RX ORDER — AMINOPHYLLINE 25 MG/ML
75 INJECTION, SOLUTION INTRAVENOUS
Status: COMPLETED | OUTPATIENT
Start: 2025-06-20 | End: 2025-06-20

## 2025-06-20 RX ADMIN — TETROFOSMIN 30 MILLICURIE: 1.38 INJECTION, POWDER, LYOPHILIZED, FOR SOLUTION INTRAVENOUS at 09:06

## 2025-06-20 RX ADMIN — TETROFOSMIN 10 MILLICURIE: 1.38 INJECTION, POWDER, LYOPHILIZED, FOR SOLUTION INTRAVENOUS at 08:06

## 2025-06-20 RX ADMIN — AMINOPHYLLINE 75 MG: 25 INJECTION, SOLUTION INTRAVENOUS at 09:06

## 2025-06-20 RX ADMIN — REGADENOSON 0.4 MG: 0.08 INJECTION, SOLUTION INTRAVENOUS at 08:06

## 2025-06-20 NOTE — TELEPHONE ENCOUNTER
No care due was identified.  [facility] Embedded Care Due Messages. Reference number: [id number].   6/19/2025 7:19:55 PM CDT

## 2025-06-20 NOTE — TELEPHONE ENCOUNTER
Refill Routing Note   Medication(s) are not appropriate for processing by [facility] for the following reason(s):        Outside of protocol    [facility] action(s):  Route        Medication Therapy Plan: Pt also takes Norco regularly ( 200 total pills since January) combined with regular use of sonata.      Appointments  past 12m or future 3m with PCP    Date Provider   Last Visit   5/14/2025 Luanne Connell MD   Next Visit   Visit date not found Luanne Connell MD   ED visits in past 90 days: 0        Note composed:8:06 AM 06/20/2025

## 2025-06-23 ENCOUNTER — TELEPHONE (OUTPATIENT)
Dept: INTERNAL MEDICINE | Facility: CLINIC | Age: 80
End: 2025-06-23
Payer: MEDICARE

## 2025-06-23 DIAGNOSIS — Z74.09 IMPAIRED MOBILITY: Primary | ICD-10-CM

## 2025-06-23 LAB
CV STRESS BASE HR: 85 BPM
DIASTOLIC BLOOD PRESSURE: 94 MMHG
OHS CV CPX 1 MINUTE RECOVERY HEART RATE: 106 BPM
OHS CV CPX 85 PERCENT MAX PREDICTED HEART RATE MALE: 119
OHS CV CPX MAX PREDICTED HEART RATE: 140
OHS CV CPX PATIENT IS FEMALE: 1
OHS CV CPX PATIENT IS MALE: 0
OHS CV CPX PEAK DIASTOLIC BLOOD PRESSURE: 94 MMHG
OHS CV CPX PEAK HEAR RATE: 86 BPM
OHS CV CPX PEAK RATE PRESSURE PRODUCT: NORMAL
OHS CV CPX PEAK SYSTOLIC BLOOD PRESSURE: 119 MMHG
OHS CV CPX PERCENT MAX PREDICTED HEART RATE ACHIEVED: 63
OHS CV CPX RATE PRESSURE PRODUCT PRESENTING: 9945
OHS CV INITIAL DOSE: 10 MCG/KG/MIN
OHS CV PEAK DOSE: 30 MCG/KG/MIN
SUMMED DIFFERENCE: 0
SUMMED REST SCORE: 0
SUMMED STRESS SCORE: 0
SYSTOLIC BLOOD PRESSURE: 117 MMHG

## 2025-06-23 NOTE — TELEPHONE ENCOUNTER
Please inform patient that she will need a mobility evaluation to see if she qualifies for a scooter.  A referral to Physical Medicine and Rehabilitation has been ordered.  Please forward to referral coordinator.

## 2025-06-30 DIAGNOSIS — M47.22 OSTEOARTHRITIS OF SPINE WITH RADICULOPATHY, CERVICAL REGION: ICD-10-CM

## 2025-06-30 DIAGNOSIS — M17.0 PRIMARY OSTEOARTHRITIS OF BOTH KNEES: ICD-10-CM

## 2025-06-30 RX ORDER — HYDROCODONE BITARTRATE AND ACETAMINOPHEN 5; 325 MG/1; MG/1
1 TABLET ORAL EVERY 8 HOURS PRN
Qty: 40 TABLET | Refills: 0 | Status: SHIPPED | OUTPATIENT
Start: 2025-06-30

## 2025-06-30 NOTE — TELEPHONE ENCOUNTER
Copied from CRM #[id number]. Topic: General Inquiry - Patient Advice  >> Jun 27, 2025 12:01 PM Yareli wrote:  .1MEDICALADVICE     Patient is calling for Medical Advice regarding: Pt called and stated that she takes HYDROcodone-acetaminophen (NORCO) 5-325 mg per tablet for her knee pain and she has taken 3 already today and said that she is still having pain.  She said that she is just tired now and nothing has helped with the pain.     How long has patient had these symptoms:    Pharmacy name and phone#:   [id number] - [place] - [address]  [address] [facility] [address]  [address] [place] [address]  Phone: [phone] Fax: [phone]       Patient wants a call back or thru [facility], provide patient's call back phone number: Call Back [phone]    Comments:    Please advise patient replies from provider may take up to 48 hours.

## 2025-06-30 NOTE — TELEPHONE ENCOUNTER
No care due was identified.  [facility] Embedded Care Due Messages. Reference number: [id number].   6/30/2025 9:36:54 AM CDT

## 2025-07-01 ENCOUNTER — OFFICE VISIT (OUTPATIENT)
Dept: ORTHOPEDICS | Facility: CLINIC | Age: 80
End: 2025-07-01
Payer: MEDICARE

## 2025-07-01 ENCOUNTER — HOSPITAL ENCOUNTER (OUTPATIENT)
Dept: RADIOLOGY | Facility: HOSPITAL | Age: 80
Discharge: HOME | End: 2025-07-01
Attending: NURSE PRACTITIONER
Payer: MEDICARE

## 2025-07-01 VITALS — WEIGHT: 173.31 LBS | BODY MASS INDEX: 29.74 KG/M2

## 2025-07-01 DIAGNOSIS — M17.0 PRIMARY OSTEOARTHRITIS OF BOTH KNEES: ICD-10-CM

## 2025-07-01 PROCEDURE — 73562 X-RAY EXAM OF KNEE 3: CPT | Mod: TC,50

## 2025-07-01 PROCEDURE — 73562 X-RAY EXAM OF KNEE 3: CPT | Mod: 26,50,, | Performed by: RADIOLOGY

## 2025-07-01 PROCEDURE — 99999 PR PBB SHADOW E&M-EST. PATIENT-LVL III: CPT | Mod: PBBFAC,,, | Performed by: NURSE PRACTITIONER

## 2025-07-01 PROCEDURE — 1160F RVW MEDS BY RX/DR IN RCRD: CPT | Mod: CPTII,S$GLB,, | Performed by: NURSE PRACTITIONER

## 2025-07-01 PROCEDURE — 20610 DRAIN/INJ JOINT/BURSA W/O US: CPT | Mod: 50,S$GLB,, | Performed by: NURSE PRACTITIONER

## 2025-07-01 PROCEDURE — 99204 OFFICE O/P NEW MOD 45 MIN: CPT | Mod: 25,S$GLB,, | Performed by: NURSE PRACTITIONER

## 2025-07-01 PROCEDURE — 1159F MED LIST DOCD IN RCRD: CPT | Mod: CPTII,S$GLB,, | Performed by: NURSE PRACTITIONER

## 2025-07-01 PROCEDURE — 1101F PT FALLS ASSESS-DOCD LE1/YR: CPT | Mod: CPTII,S$GLB,, | Performed by: NURSE PRACTITIONER

## 2025-07-01 PROCEDURE — 1125F AMNT PAIN NOTED PAIN PRSNT: CPT | Mod: CPTII,S$GLB,, | Performed by: NURSE PRACTITIONER

## 2025-07-01 PROCEDURE — 3288F FALL RISK ASSESSMENT DOCD: CPT | Mod: CPTII,S$GLB,, | Performed by: NURSE PRACTITIONER

## 2025-07-01 RX ADMIN — TRIAMCINOLONE ACETONIDE 80 MG: 40 INJECTION, SUSPENSION INTRA-ARTICULAR; INTRAMUSCULAR at 03:07

## 2025-07-01 NOTE — PROGRESS NOTES
CHIEF COMPLAINT:  - Bilateral knee pain and mobility issues    HPI:  Sofia presents for evaluation of bilateral knee pain. She describes her knees as severely painful, with burning and hurting sensations even when lying down. Pain severity is significant, limiting her mobility. She can get out of her car and into a store but cannot walk around inside due to the intensity of the pain. She is mostly confined to her bed, expressing frustration with her limited mobility and inability to complete tasks.    For pain management, she has been taking medication, reporting three doses within a few hours without relief. She denies any injections or other treatments for her knees in at least four years. She currently uses two crutches for ambulation but does not find a walker helpful. She is considering getting a scooter to improve her mobility, particularly for grocery shopping.    She mentions having to cancel a previously planned surgery due to a possible transient ischemic attack. She can still drive and likes to go out to avoid boredom. 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  - Mobility severely limited due to knee pain, affecting ability to walk around stores or perform daily activities    SOCIAL HISTORY:  - Daughter worked for the [employer]  - Daughter 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, including Agent Orange and depleted uranium      ROS  General: denies  fever and chills  Resp: no c/o sob  CVS: no c/o cp  MSK: +joint pain, +pain with movement, +difficulty walking    PE  General: AAOx3, pleasant and cooperative  Resp: respirations even and unlabored  MSK: bilateral knee exam  10 degrees extension  100 degrees flexion  No warmth or erythema   - effusion  + tenderness over the lateral joint lines  + crepitus  4/5 quad and hamstring strength  4/5 adduction and abduction      Xray:  Personally interpreted by me and reviewed with the patient: there are tricompartmental degenerative changes with medial and patellofemoral joint space narrowing    Assessment:  Bilateral knee djd    Plan:  Cortisone injections bilateral knees today  RICE    REFERRALS:  - Referred to Dr. Del Rio for mobility scooter evaluation.    - Sofia declines surgical intervention at this time.    FOLLOW UP:  - Follow up in 3-4 months for potential repeat steroid injections.  - Contact the office if pain relief is inadequate.    PATIENT INSTRUCTIONS:  - Use mobility scooter for grocery shopping.     Knee Injection Procedure Note  Diagnosis: bilateral knee degenerative arthritis  Indications: bilateral knee pain  Procedure Details: Verbal consent was obtained for the procedure. The injection site was identified and the skin was prepared with alcohol. The bilateral knee was injected from an anterolateral approach with 1 ml of Kenalog and 4 ml Lidocaine each under sterile technique using a 25 gauge needle. The needle was removed and the area cleansed and dressed.  Complications:  Patient tolerated the procedure well.    she was advised to rest the knee today, using ice and elevation as needed for comfort and swelling.Immediate relief of the knee pain may be short lived and secondary to the lidocaine. she may have an increase in discomfort tonight followed by steady improvement over the next several days. It may take 1-2 weeks following the injection to get the full benefit of the medication.        This note  was generated with the assistance of ambient listening technology.  I attest to having reviewed and edited the generated note for accuracy, though some syntax or spelling errors may persist. Please contact the author of this note for any clarification.

## 2025-07-20 RX ORDER — TRIAMCINOLONE ACETONIDE 40 MG/ML
80 INJECTION, SUSPENSION INTRA-ARTICULAR; INTRAMUSCULAR
Status: COMPLETED | OUTPATIENT
Start: 2025-07-01 | End: 2025-07-01

## 2025-07-25 ENCOUNTER — TELEPHONE (OUTPATIENT)
Dept: ORTHOPEDICS | Facility: CLINIC | Age: 80
End: 2025-07-25
Payer: MEDICARE

## 2025-07-25 DIAGNOSIS — M17.0 PRIMARY OSTEOARTHRITIS OF BOTH KNEES: Primary | ICD-10-CM

## 2025-07-25 NOTE — TELEPHONE ENCOUNTER
Copied from CRM #[id number]. Topic: General Inquiry - Patient Advice  >> Jul 24, 2025  4:07 PM J Carlos wrote:  Name of Caller: Ms. Black     Nature of Call: Orders for her scooter      Best Call Back Number: Confirmed contact info below:  Contact Name: Sofia Augustine  Phone Number: [phone]      Additional Information: Pt is currently wanting or a motorized scooter that was ordered by the provider  >> Jul 25, 2025  2:44 PM Leeanna Cabrera NP wrote:  I put the order in for PM&R scooter eval. Looks like Dr. Connell did as well. Nothing I can do to make it go faster unfortunately  ----- Message -----  From: Claudia Rodriguez MA  Sent: 7/25/2025   8:47 AM CDT  To: Leeanna Cabrera NP    Good morning, Pt is requesting a scooter. Are you cristy to place an order for that or would her PCP do that?Thanks! Dom  ----- Message -----  From: J Carlos Min  Sent: 7/24/2025   4:12 PM CDT  To: Deborah Arteaga    >> Jul 25, 2025  8:47 AM Med Assistant Claudia wrote:  Good morning, Pt is requesting a scooter. Are you cristy to place an order for that or would her PCP do that?Thanks! Dom  ----- Message -----  From: J Carlos Min  Sent: 7/24/2025   4:12 PM CDT  To: Deborah Arteaga

## 2025-07-25 NOTE — TELEPHONE ENCOUNTER
Called spoke to pt regarding appt in aug. She was trying to gt a sooner appt or for something to go faster do to not being able to get around as much. I explained we were unable to make the processes faster. Pt understood conversation.

## 2025-08-20 ENCOUNTER — OFFICE VISIT (OUTPATIENT)
Dept: PHYSICAL MEDICINE AND REHAB | Facility: CLINIC | Age: 80
End: 2025-08-20
Payer: MEDICARE

## 2025-08-20 VITALS — WEIGHT: 168.13 LBS | HEIGHT: 64 IN | OXYGEN SATURATION: 98 % | BODY MASS INDEX: 28.7 KG/M2

## 2025-08-20 DIAGNOSIS — Z78.9 IMPAIRED MOBILITY AND ADLS: ICD-10-CM

## 2025-08-20 DIAGNOSIS — R53.83 FATIGUE, UNSPECIFIED TYPE: ICD-10-CM

## 2025-08-20 DIAGNOSIS — R29.898 BILATERAL ARM WEAKNESS: ICD-10-CM

## 2025-08-20 DIAGNOSIS — R06.09 DOE (DYSPNEA ON EXERTION): ICD-10-CM

## 2025-08-20 DIAGNOSIS — G89.29 CHRONIC LEFT SHOULDER PAIN: ICD-10-CM

## 2025-08-20 DIAGNOSIS — M79.642 BILATERAL HAND PAIN: ICD-10-CM

## 2025-08-20 DIAGNOSIS — M79.641 BILATERAL HAND PAIN: ICD-10-CM

## 2025-08-20 DIAGNOSIS — R26.9 GAIT DISORDER: Primary | ICD-10-CM

## 2025-08-20 DIAGNOSIS — I69.351 HEMIPARESIS AFFECTING RIGHT SIDE AS LATE EFFECT OF STROKE: ICD-10-CM

## 2025-08-20 DIAGNOSIS — M25.512 CHRONIC LEFT SHOULDER PAIN: ICD-10-CM

## 2025-08-20 DIAGNOSIS — M17.0 PRIMARY OSTEOARTHRITIS OF BOTH KNEES: ICD-10-CM

## 2025-08-20 DIAGNOSIS — Z74.09 IMPAIRED MOBILITY AND ADLS: ICD-10-CM

## 2025-08-20 DIAGNOSIS — R29.898 BILATERAL LEG WEAKNESS: ICD-10-CM

## 2025-08-20 PROCEDURE — 1101F PT FALLS ASSESS-DOCD LE1/YR: CPT | Mod: CPTII,S$GLB,, | Performed by: PHYSICAL MEDICINE & REHABILITATION

## 2025-08-20 PROCEDURE — 1159F MED LIST DOCD IN RCRD: CPT | Mod: CPTII,S$GLB,, | Performed by: PHYSICAL MEDICINE & REHABILITATION

## 2025-08-20 PROCEDURE — 99204 OFFICE O/P NEW MOD 45 MIN: CPT | Mod: S$GLB,,, | Performed by: PHYSICAL MEDICINE & REHABILITATION

## 2025-08-20 PROCEDURE — 1125F AMNT PAIN NOTED PAIN PRSNT: CPT | Mod: CPTII,S$GLB,, | Performed by: PHYSICAL MEDICINE & REHABILITATION

## 2025-08-20 PROCEDURE — 3288F FALL RISK ASSESSMENT DOCD: CPT | Mod: CPTII,S$GLB,, | Performed by: PHYSICAL MEDICINE & REHABILITATION

## 2025-08-20 PROCEDURE — 99999 PR PBB SHADOW E&M-EST. PATIENT-LVL III: CPT | Mod: PBBFAC,,, | Performed by: PHYSICAL MEDICINE & REHABILITATION
